# Patient Record
Sex: MALE | Race: WHITE | Employment: OTHER | URBAN - METROPOLITAN AREA
[De-identification: names, ages, dates, MRNs, and addresses within clinical notes are randomized per-mention and may not be internally consistent; named-entity substitution may affect disease eponyms.]

---

## 2022-05-05 ENCOUNTER — APPOINTMENT (OUTPATIENT)
Dept: ULTRASOUND IMAGING | Age: 82
DRG: 920 | End: 2022-05-05
Payer: OTHER GOVERNMENT

## 2022-05-05 ENCOUNTER — HOSPITAL ENCOUNTER (INPATIENT)
Age: 82
LOS: 3 days | Discharge: HOME | DRG: 920 | End: 2022-05-08
Attending: EMERGENCY MEDICINE | Admitting: FAMILY MEDICINE
Payer: OTHER GOVERNMENT

## 2022-05-05 ENCOUNTER — APPOINTMENT (OUTPATIENT)
Dept: GENERAL RADIOLOGY | Age: 82
DRG: 920 | End: 2022-05-05
Payer: OTHER GOVERNMENT

## 2022-05-05 DIAGNOSIS — K92.2 GASTROINTESTINAL HEMORRHAGE, UNSPECIFIED GASTROINTESTINAL HEMORRHAGE TYPE: ICD-10-CM

## 2022-05-05 DIAGNOSIS — K62.5 RECTAL BLEED: Primary | ICD-10-CM

## 2022-05-05 DIAGNOSIS — D64.9 ANEMIA, UNSPECIFIED TYPE: ICD-10-CM

## 2022-05-05 LAB
ABO/RH: NORMAL
ALBUMIN SERPL-MCNC: 3.8 G/DL (ref 3.5–5.2)
ALP BLD-CCNC: 94 U/L (ref 40–129)
ALT SERPL-CCNC: 7 U/L (ref 0–40)
ANION GAP SERPL CALCULATED.3IONS-SCNC: 9 MMOL/L (ref 7–16)
ANISOCYTOSIS: ABNORMAL
ANTIBODY SCREEN: NORMAL
AST SERPL-CCNC: 12 U/L (ref 0–39)
BASOPHILS ABSOLUTE: 0 E9/L (ref 0–0.2)
BASOPHILS RELATIVE PERCENT: 0.4 % (ref 0–2)
BILIRUB SERPL-MCNC: 0.2 MG/DL (ref 0–1.2)
BUN BLDV-MCNC: 23 MG/DL (ref 6–23)
CALCIUM SERPL-MCNC: 9.1 MG/DL (ref 8.6–10.2)
CHLORIDE BLD-SCNC: 107 MMOL/L (ref 98–107)
CO2: 21 MMOL/L (ref 22–29)
CREAT SERPL-MCNC: 1.9 MG/DL (ref 0.7–1.2)
EKG ATRIAL RATE: 68 BPM
EKG P AXIS: 79 DEGREES
EKG P-R INTERVAL: 230 MS
EKG Q-T INTERVAL: 416 MS
EKG QRS DURATION: 108 MS
EKG QTC CALCULATION (BAZETT): 442 MS
EKG R AXIS: 4 DEGREES
EKG T AXIS: -13 DEGREES
EKG VENTRICULAR RATE: 68 BPM
EOSINOPHILS ABSOLUTE: 0.05 E9/L (ref 0.05–0.5)
EOSINOPHILS RELATIVE PERCENT: 0.9 % (ref 0–6)
GFR AFRICAN AMERICAN: 41
GFR NON-AFRICAN AMERICAN: 34 ML/MIN/1.73
GLUCOSE BLD-MCNC: 101 MG/DL (ref 74–99)
HCT VFR BLD CALC: 23.6 % (ref 37–54)
HEMOGLOBIN: 6.7 G/DL (ref 12.5–16.5)
HYPOCHROMIA: ABNORMAL
INR BLD: 1
LYMPHOCYTES ABSOLUTE: 0.59 E9/L (ref 1.5–4)
LYMPHOCYTES RELATIVE PERCENT: 11.2 % (ref 20–42)
MAGNESIUM: 2 MG/DL (ref 1.6–2.6)
MCH RBC QN AUTO: 23.9 PG (ref 26–35)
MCHC RBC AUTO-ENTMCNC: 28.4 % (ref 32–34.5)
MCV RBC AUTO: 84.3 FL (ref 80–99.9)
MONOCYTES ABSOLUTE: 0.38 E9/L (ref 0.1–0.95)
MONOCYTES RELATIVE PERCENT: 6.9 % (ref 2–12)
NEUTROPHILS ABSOLUTE: 4.37 E9/L (ref 1.8–7.3)
NEUTROPHILS RELATIVE PERCENT: 81 % (ref 43–80)
OVALOCYTES: ABNORMAL
PDW BLD-RTO: 17.2 FL (ref 11.5–15)
PLATELET # BLD: 236 E9/L (ref 130–450)
PMV BLD AUTO: 10.3 FL (ref 7–12)
POIKILOCYTES: ABNORMAL
POLYCHROMASIA: ABNORMAL
POTASSIUM SERPL-SCNC: 4.3 MMOL/L (ref 3.5–5)
PRO-BNP: 1337 PG/ML (ref 0–450)
PROTHROMBIN TIME: 11.3 SEC (ref 9.3–12.4)
RBC # BLD: 2.8 E12/L (ref 3.8–5.8)
SODIUM BLD-SCNC: 137 MMOL/L (ref 132–146)
TOTAL PROTEIN: 6.4 G/DL (ref 6.4–8.3)
TROPONIN, HIGH SENSITIVITY: 34 NG/L (ref 0–11)
WBC # BLD: 5.4 E9/L (ref 4.5–11.5)

## 2022-05-05 PROCEDURE — 93971 EXTREMITY STUDY: CPT

## 2022-05-05 PROCEDURE — 99285 EMERGENCY DEPT VISIT HI MDM: CPT

## 2022-05-05 PROCEDURE — 2580000003 HC RX 258: Performed by: FAMILY MEDICINE

## 2022-05-05 PROCEDURE — 84484 ASSAY OF TROPONIN QUANT: CPT

## 2022-05-05 PROCEDURE — 85610 PROTHROMBIN TIME: CPT

## 2022-05-05 PROCEDURE — 82728 ASSAY OF FERRITIN: CPT

## 2022-05-05 PROCEDURE — 93005 ELECTROCARDIOGRAM TRACING: CPT | Performed by: PHYSICIAN ASSISTANT

## 2022-05-05 PROCEDURE — 86850 RBC ANTIBODY SCREEN: CPT

## 2022-05-05 PROCEDURE — 86900 BLOOD TYPING SEROLOGIC ABO: CPT

## 2022-05-05 PROCEDURE — 1200000000 HC SEMI PRIVATE

## 2022-05-05 PROCEDURE — 86923 COMPATIBILITY TEST ELECTRIC: CPT

## 2022-05-05 PROCEDURE — 86901 BLOOD TYPING SEROLOGIC RH(D): CPT

## 2022-05-05 PROCEDURE — 71045 X-RAY EXAM CHEST 1 VIEW: CPT

## 2022-05-05 PROCEDURE — 85018 HEMOGLOBIN: CPT

## 2022-05-05 PROCEDURE — 85025 COMPLETE CBC W/AUTO DIFF WBC: CPT

## 2022-05-05 PROCEDURE — 83880 ASSAY OF NATRIURETIC PEPTIDE: CPT

## 2022-05-05 PROCEDURE — 6360000002 HC RX W HCPCS: Performed by: STUDENT IN AN ORGANIZED HEALTH CARE EDUCATION/TRAINING PROGRAM

## 2022-05-05 PROCEDURE — P9016 RBC LEUKOCYTES REDUCED: HCPCS

## 2022-05-05 PROCEDURE — 2580000003 HC RX 258: Performed by: STUDENT IN AN ORGANIZED HEALTH CARE EDUCATION/TRAINING PROGRAM

## 2022-05-05 PROCEDURE — 83540 ASSAY OF IRON: CPT

## 2022-05-05 PROCEDURE — 93010 ELECTROCARDIOGRAM REPORT: CPT | Performed by: INTERNAL MEDICINE

## 2022-05-05 PROCEDURE — 83735 ASSAY OF MAGNESIUM: CPT

## 2022-05-05 PROCEDURE — 83550 IRON BINDING TEST: CPT

## 2022-05-05 PROCEDURE — C9113 INJ PANTOPRAZOLE SODIUM, VIA: HCPCS | Performed by: STUDENT IN AN ORGANIZED HEALTH CARE EDUCATION/TRAINING PROGRAM

## 2022-05-05 PROCEDURE — 80053 COMPREHEN METABOLIC PANEL: CPT

## 2022-05-05 RX ORDER — ACETAMINOPHEN 325 MG/1
650 TABLET ORAL EVERY 6 HOURS PRN
Status: DISCONTINUED | OUTPATIENT
Start: 2022-05-05 | End: 2022-05-08 | Stop reason: HOSPADM

## 2022-05-05 RX ORDER — POLYETHYLENE GLYCOL 3350 17 G/17G
17 POWDER, FOR SOLUTION ORAL DAILY PRN
Status: DISCONTINUED | OUTPATIENT
Start: 2022-05-05 | End: 2022-05-08 | Stop reason: HOSPADM

## 2022-05-05 RX ORDER — ONDANSETRON 4 MG/1
4 TABLET, ORALLY DISINTEGRATING ORAL EVERY 8 HOURS PRN
Status: DISCONTINUED | OUTPATIENT
Start: 2022-05-05 | End: 2022-05-08 | Stop reason: HOSPADM

## 2022-05-05 RX ORDER — SODIUM CHLORIDE 0.9 % (FLUSH) 0.9 %
5-40 SYRINGE (ML) INJECTION PRN
Status: DISCONTINUED | OUTPATIENT
Start: 2022-05-05 | End: 2022-05-08 | Stop reason: HOSPADM

## 2022-05-05 RX ORDER — SODIUM CHLORIDE 0.9 % (FLUSH) 0.9 %
5-40 SYRINGE (ML) INJECTION EVERY 12 HOURS SCHEDULED
Status: DISCONTINUED | OUTPATIENT
Start: 2022-05-05 | End: 2022-05-08 | Stop reason: HOSPADM

## 2022-05-05 RX ORDER — DEXTROSE AND SODIUM CHLORIDE 5; .9 G/100ML; G/100ML
INJECTION, SOLUTION INTRAVENOUS CONTINUOUS
Status: DISCONTINUED | OUTPATIENT
Start: 2022-05-05 | End: 2022-05-06

## 2022-05-05 RX ORDER — ONDANSETRON 2 MG/ML
4 INJECTION INTRAMUSCULAR; INTRAVENOUS EVERY 6 HOURS PRN
Status: DISCONTINUED | OUTPATIENT
Start: 2022-05-05 | End: 2022-05-08 | Stop reason: HOSPADM

## 2022-05-05 RX ORDER — ACETAMINOPHEN 650 MG/1
650 SUPPOSITORY RECTAL EVERY 6 HOURS PRN
Status: DISCONTINUED | OUTPATIENT
Start: 2022-05-05 | End: 2022-05-08 | Stop reason: HOSPADM

## 2022-05-05 RX ORDER — SODIUM CHLORIDE 9 MG/ML
INJECTION, SOLUTION INTRAVENOUS PRN
Status: DISCONTINUED | OUTPATIENT
Start: 2022-05-05 | End: 2022-05-08 | Stop reason: HOSPADM

## 2022-05-05 RX ADMIN — DEXTROSE AND SODIUM CHLORIDE: 5; 900 INJECTION, SOLUTION INTRAVENOUS at 19:57

## 2022-05-05 RX ADMIN — SODIUM CHLORIDE 80 MG: 9 INJECTION, SOLUTION INTRAVENOUS at 19:58

## 2022-05-05 NOTE — ED PROVIDER NOTES
61-year-old male presenting for abnormal lab. Patient states that he had blood work drawn at the [facility] and his hemoglobin was 6. He has never required a blood transfusion before. Patient states he has been losing blood in his stool. Symptoms have been present for 1 month. They have been mild and intermittent. Exacerbated by nothing and relieved by nothing. He states he has been having bright red blood on the paper when he wipes. He had a colonoscopy 1 month ago and states he had polyps removed. He complains of fatigue and weakness, as well as shortness of breath with exertion. He also complains of lightheadedness and notes swelling of his feet. He denies any chest pain or abdominal pain. Review of Systems   Constitutional: Positive for fatigue. Negative for fever. HENT: Negative for congestion. Respiratory: Positive for shortness of breath. Negative for cough. Cardiovascular: Negative for chest pain. Gastrointestinal: Positive for blood in stool. Negative for abdominal pain, diarrhea and nausea. Genitourinary: Negative for flank pain. Musculoskeletal: Negative for back pain and myalgias. Skin: Negative for rash and wound. Neurological: Positive for light-headedness. Negative for headaches. All other systems reviewed and are negative. Physical Exam  Constitutional:       General: He is not in acute distress. Appearance: Normal appearance. HENT:      Head: Normocephalic. Right Ear: External ear normal.      Left Ear: External ear normal.      Nose: Nose normal.   Cardiovascular:      Rate and Rhythm: Normal rate and regular rhythm. Pulmonary:      Effort: Pulmonary effort is normal.      Breath sounds: Normal breath sounds. Abdominal:      General: There is no distension. Palpations: Abdomen is soft. Tenderness: There is no abdominal tenderness.    Genitourinary:     Comments: No masses, lesions, or fissures, normal rectal tone, light brown stool expressed from rectal vault, weakly hemoccult positive  Musculoskeletal:      Comments: 1-2+ pitting edema bilateral lower extremities   Skin:     General: Skin is warm and dry. Neurological:      General: No focal deficit present. Mental Status: He is alert. Psychiatric:         Mood and Affect: Mood normal.         Behavior: Behavior normal.          Procedures     MDM  Number of Diagnoses or Management Options  Anemia, unspecified type  Gastrointestinal hemorrhage, unspecified gastrointestinal hemorrhage type  Diagnosis management comments: [de-identified] yo male presenting for low hemoglobin. Hemoglobin 6.7. Hemoccult weakly positive. No DVT on US. Patient ordered 1 unit PRBCs. General surgery consulted. Patient admitted to telemetry in stable condition. ED Course as of 05/06/22 0234 Thu May 05, 2022   1627 Spoke to Dr. Liana Parra, discussed case. He is agreeable to following patient. Spoke to Dr. Jemma Bowie, discussed case. He is agreeable to admission. [AP]      ED Course User Index  [AP] Dennys Astorga MD      ED Course as of 05/06/22 0238   Thu May 05, 2022   1627 Spoke to Dr. Liana Parra, discussed case. He is agreeable to following patient. Spoke to Dr. Jemma Bowie, discussed case. He is agreeable to admission. [AP]      ED Course User Index  [AP] Dennys Astorga MD       --------------------------------------------- PAST HISTORY ---------------------------------------------  Past Medical History:  has no past medical history on file. Past Surgical History:  has no past surgical history on file. Social History:  reports that he has never smoked. He has never used smokeless tobacco. He reports that he does not drink alcohol and does not use drugs. Family History: family history is not on file. The patients home medications have been reviewed. Allergies: Patient has no known allergies.     -------------------------------------------------- RESULTS -------------------------------------------------    LABS:  Results for orders placed or performed during the hospital encounter of 05/05/22   CBC with Auto Differential   Result Value Ref Range    WBC 5.4 4.5 - 11.5 E9/L    RBC 2.80 (L) 3.80 - 5.80 E12/L    Hemoglobin 6.7 (LL) 12.5 - 16.5 g/dL    Hematocrit 23.6 (L) 37.0 - 54.0 %    MCV 84.3 80.0 - 99.9 fL    MCH 23.9 (L) 26.0 - 35.0 pg    MCHC 28.4 (L) 32.0 - 34.5 %    RDW 17.2 (H) 11.5 - 15.0 fL    Platelets 849 103 - 885 E9/L    MPV 10.3 7.0 - 12.0 fL    Neutrophils % 81.0 (H) 43.0 - 80.0 %    Lymphocytes % 11.2 (L) 20.0 - 42.0 %    Monocytes % 6.9 2.0 - 12.0 %    Eosinophils % 0.9 0.0 - 6.0 %    Basophils % 0.4 0.0 - 2.0 %    Neutrophils Absolute 4.37 1.80 - 7.30 E9/L    Lymphocytes Absolute 0.59 (L) 1.50 - 4.00 E9/L    Monocytes Absolute 0.38 0.10 - 0.95 E9/L    Eosinophils Absolute 0.05 0.05 - 0.50 E9/L    Basophils Absolute 0.00 0.00 - 0.20 E9/L    Anisocytosis 2+     Polychromasia 1+     Hypochromia 2+     Poikilocytes 1+     Ovalocytes 1+    Comprehensive Metabolic Panel   Result Value Ref Range    Sodium 137 132 - 146 mmol/L    Potassium 4.3 3.5 - 5.0 mmol/L    Chloride 107 98 - 107 mmol/L    CO2 21 (L) 22 - 29 mmol/L    Anion Gap 9 7 - 16 mmol/L    Glucose 101 (H) 74 - 99 mg/dL    BUN 23 6 - 23 mg/dL    CREATININE 1.9 (H) 0.7 - 1.2 mg/dL    GFR Non-African American 34 >=60 mL/min/1.73    GFR African American 41     Calcium 9.1 8.6 - 10.2 mg/dL    Total Protein 6.4 6.4 - 8.3 g/dL    Albumin 3.8 3.5 - 5.2 g/dL    Total Bilirubin 0.2 0.0 - 1.2 mg/dL    Alkaline Phosphatase 94 40 - 129 U/L    ALT 7 0 - 40 U/L    AST 12 0 - 39 U/L   Troponin   Result Value Ref Range    Troponin, High Sensitivity 34 (H) 0 - 11 ng/L   Brain Natriuretic Peptide   Result Value Ref Range    Pro-BNP 1,337 (H) 0 - 450 pg/mL   Magnesium   Result Value Ref Range    Magnesium 2.0 1.6 - 2.6 mg/dL   Protime-INR   Result Value Ref Range    Protime 11.3 9.3 - 12.4 sec    INR 1.0 Hemoglobin   Result Value Ref Range    Hemoglobin 6.4 (LL) 12.5 - 16.5 g/dL   Iron and TIBC   Result Value Ref Range    Iron 17 (L) 59 - 158 mcg/dL    TIBC 236 (L) 250 - 450 mcg/dL    Iron Saturation 7 (L) 20 - 55 %   Ferritin   Result Value Ref Range    Ferritin 24 ng/mL   EKG 12 Lead   Result Value Ref Range    Ventricular Rate 68 BPM    Atrial Rate 68 BPM    P-R Interval 230 ms    QRS Duration 108 ms    Q-T Interval 416 ms    QTc Calculation (Bazett) 442 ms    P Axis 79 degrees    R Axis 4 degrees    T Axis -13 degrees   TYPE AND SCREEN   Result Value Ref Range    ABO/Rh O POS     Antibody Screen NEG    PREPARE RBC (CROSSMATCH)   Result Value Ref Range    Product Code Blood Bank [id number]     Description Blood Bank Red Blood Cells, Leuko-reduced     Unit Number [id number]     Dispense Status Blood Bank transfused     Product Code Blood Bank [id number]     Description Blood Bank Red Blood Cells, Leuko-reduced     Unit Number [id number]     Dispense Status Blood Bank issued        RADIOLOGY:  XR CHEST PORTABLE   Final Result   No acute  pulmonary process         US DUP LOWER EXTREMITY RIGHT HARITHA   Final Result   No evidence of DVT in the right lower extremity. RECOMMENDATIONS:   Unavailable             EKG:  This EKG is signed and interpreted by me. Rate: 68  Rhythm: Sinus  Interpretation: no acute ST elevations or depressions; no acute T wave changes; , , QTc 442; occasional PVC  Comparison: no previous EKG available      ------------------------- NURSING NOTES AND VITALS REVIEWED ---------------------------  Date / Time Roomed:  5/5/2022  2:16 PM  ED Bed Assignment:  [id number]    The nursing notes within the ED encounter and vital signs as below have been reviewed.      Patient Vitals for the past 24 hrs:   BP Temp Temp src Pulse Resp SpO2 Height Weight   05/06/22 0204 117/89 98 °F (36.7 °C) Oral 66 17 94 % -- --   05/06/22 0139 121/65 97.9 °F (36.6 °C) Oral 64 17 95 % -- --   05/06/22 0030 (!) 119/57 98 °F (36.7 °C) Oral 60 17 97 % -- --   05/05/22 2232 (!) 110/58 -- -- -- -- 97 % -- --   05/05/22 2134 (!) 123/52 -- -- -- -- 97 % -- --   05/05/22 2002 131/67 -- -- -- -- 97 % -- --   05/05/22 1955 134/69 -- -- -- -- 98 % -- --   05/05/22 1831 (!) 152/75 -- -- -- -- 100 % -- --   05/05/22 1802 (!) 147/76 -- -- -- -- 100 % -- --   05/05/22 1731 131/78 -- -- -- -- 98 % -- --   05/05/22 1640 (!) 143/72 -- -- -- -- 98 % -- --   05/05/22 1632 (!) 143/72 97.5 °F (36.4 °C) -- 60 16 97 % -- --   05/05/22 1617 (!) 140/84 97.8 °F (36.6 °C) -- 68 16 98 % -- --   05/05/22 1104 126/67 98.2 °F (36.8 °C) Oral -- 16 -- 5' 11\" (1.803 m) 212 lb (96.2 kg)   05/05/22 1002 -- -- -- 78 -- 99 % -- --       Oxygen Saturation Interpretation: Normal    ------------------------------------------ PROGRESS NOTES ------------------------------------------    Counseling:  I have spoken with the patient and discussed todays results, in addition to providing specific details for the plan of care and counseling regarding the diagnosis and prognosis. Their questions are answered at this time and they are agreeable with the plan of admission.    --------------------------------- ADDITIONAL PROVIDER NOTES ---------------------------------    This patient's ED course included: a personal history and physicial examination, re-evaluation prior to disposition, multiple bedside re-evaluations, IV medications, cardiac monitoring, continuous pulse oximetry and complex medical decision making and emergency management    This patient has remained hemodynamically stable during their ED course. Diagnosis:  1. Anemia, unspecified type    2. Gastrointestinal hemorrhage, unspecified gastrointestinal hemorrhage type        Disposition:  Patient's disposition: Admit to telemetry  Patient's condition is stable.                 Fallon Sarmiento MD  Resident  05/06/22 9528

## 2022-05-05 NOTE — CONSULTS
GENERAL SURGERY  CONSULT NOTE  5/5/2022    Physician Consulted: Dr. Aggie Marrero  Reason for Consult: Rectal bleeding      SHAHBAZ Ponce is a [de-identified] y.o. male who presents for evaluation of rectal bleeding as well as abnormal lab work. Patient recently underwent a colonoscopy with polypectomy at the [address] [place] [address] by the gastroenterology group roughly 4 weeks ago. Since then patient has had intermittent blood per rectum. Patient states that it is worse when he has constipation. He states that he has progressively started to feel weak over these last 4 weeks and wanted to be evaluated. Outside facility his hemoglobin was measured to be 6, on repeat here was 6.7. On evaluation patient was receiving a unit of blood. Patient's been otherwise hemodynamically stable. Patient was FOBT positive. Patient is now 100% sure of his current medication but does state that he thinks he takes a blood thinning medication. History reviewed. No pertinent past medical history. History reviewed. No pertinent surgical history. Medications Prior to Admission:    Prior to Admission medications    Not on File       No Known Allergies    History reviewed. No pertinent family history. Social History     Tobacco Use    Smoking status: Never Smoker    Smokeless tobacco: Never Used   Substance Use Topics    Alcohol use: Never    Drug use: Never         Review of Systems   General ROS: Fatigue  Hematological and Lymphatic ROS: negative  Respiratory ROS: negative  Cardiovascular ROS: negative  Gastrointestinal ROS: positive for -bright red blood per rectum  Genito-Urinary ROS: negative  Musculoskeletal ROS: negative      PHYSICAL EXAM:    Vitals:    05/05/22 1632   BP: (!) 143/72   Pulse: 60   Resp: 16   Temp: 97.5 °F (36.4 °C)   SpO2: 97%       General Appearance:  awake, alert, oriented, in no acute distress  Skin:  Skin color, texture, turgor normal. No rashes or lesions.   Head/face:  NCAT  Eyes:  PERRL  Lungs:  No chest wall tenderness. Heart:  Heart regular rate and rhythm  Abdomen:  Soft, non-tender, normal bowel sounds. No bruits, organomegaly or masses. Extremities: pulses present in all extremities  Male Rectal: Small hemorrhoids present not actively bleeding, bright blood specks on stool, FOBT positive. Normal rectal tone no masses or asymmetry noted      LABS:    CBC  Recent Labs     05/05/22  1139   WBC 5.4   HGB 6.7*   HCT 23.6*        BMP  Recent Labs     05/05/22  1138      K 4.3      CO2 21*   BUN 23   CREATININE 1.9*   CALCIUM 9.1     Liver Function  Recent Labs     05/05/22  1138   BILITOT 0.2   AST 12   ALT 7   ALKPHOS 94   PROT 6.4   LABALBU 3.8     No results for input(s): LACTATE in the last 72 hours. Recent Labs     05/05/22  1615   INR 1.0       RADIOLOGY    XR CHEST PORTABLE    Result Date: 5/5/2022  EXAMINATION: ONE XRAY VIEW OF THE CHEST 5/5/2022 1:23 pm COMPARISON: None. HISTORY: ORDERING SYSTEM PROVIDED HISTORY: chest pain TECHNOLOGIST PROVIDED HISTORY: Reason for exam:->chest pain What reading provider will be dictating this exam?->CRC FINDINGS: The lungs are without acute focal process. There is no effusion or pneumothorax. The cardiomediastinal silhouette is without acute process. The osseous structures are without acute process. No acute  pulmonary process     US DUP LOWER EXTREMITY RIGHT HARITHA    Result Date: 5/5/2022  EXAMINATION: DUPLEX VENOUS ULTRASOUND OF THE RIGHT LOWER EXTREMITY 5/5/2022 12:41 pm TECHNIQUE: Duplex ultrasound using B-mode/gray scaled imaging and Doppler spectral analysis and color flow was obtained of the deep venous structures of the right lower extremity. COMPARISON: None.  HISTORY: ORDERING SYSTEM PROVIDED HISTORY: right leg swelling TECHNOLOGIST PROVIDED HISTORY: Reason for exam:->right leg swelling What reading provider will be dictating this exam?->CRC FINDINGS: The visualized veins of the right lower extremity are patent and free of echogenic thrombus. The veins demonstrate good compressibility with normal color flow study and spectral analysis. No evidence of DVT in the right lower extremity.  RECOMMENDATIONS: Unavailable         ASSESSMENT:  [de-identified] y.o. male with rectal bleeding most likely post polypectomy bleeding    PLAN:  Okay for clear liquid diet  Obtain medical and medication records from [address] [place] [address] all anticoagulation if patient is truly taking anticoagulation at this time  Continue to monitor rectal bleeding  Will attempt to manage nonoperatively, however if persistent transfusion requirements are necessary then patient may need a colonoscopy to evaluate bleeding  Continue to monitor hemoglobin  Transfuse when below 7    Electronically signed by Ruslan Adams DO on 5/5/22 at 5:10 PM EDT

## 2022-05-05 NOTE — H&P
Hospitalist History & Physical      PCP: No primary care provider on file. Date of Admission: 5/5/2022    Date of Service: Pt seen/examined on 5/5/2022 and is admitted to Inpatient with expected LOS greater than two midnights due to medical therapy. Chief Complaint:  had concerns including Abnormal Lab (Bloodwork from  the other day, HGB 6.0.) and Leg Swelling (Right lower leg swelling for the last 2 weeks. ). History Of Present Illness:    Mr. Dwayne Jaquez, a [de-identified]y.o. year old male  who past medical history of abdominal aortic aneurysm status postrepair 10 years ago and recent colonoscopy a month ago status post polypectomy. Presents to the ER due to abnormal lab work hemoglobin of 6.7. Patient mention has been getting very weak and unable to even take his normal walks as he gets very tired which is unusual for him. Given his symptoms he saw physician and lab work was abnormal signs to come to the ER. He does follow with the [place] clinic normally. Hemoglobin 6.7 and he was ordered a weight of blood. He did mention he he had a colonoscopy a month ago with polypectomy and has been having rectal bleeding since then for the past month. Reports most times is just some blood when he wipes but also sometimes some blood streaks mixed with his stool. Denies any issues with hemorrhoids though he does get constipated sometimes. By ED no hemorrhage on exam.  Patient denies being on any blood thinners of a baby aspirin he takes daily. Past Medical History:    History reviewed. No pertinent past medical history. Past Surgical History:    History reviewed. No pertinent surgical history. Medications Prior to Admission:      Prior to Admission medications    Not on File       Allergies:  Patient has no known allergies. Social History:    TOBACCO:   reports that he has never smoked. He has never used smokeless tobacco.  ETOH:   reports no history of alcohol use.     Family History: Reviewed in detail and negative for DM, CAD, Cancer, CVA. Positive as follows\"  History reviewed. No pertinent family history. REVIEW OF SYSTEMS:   Pertinent positives as noted in the HPI. All other systems reviewed and negative. PHYSICAL EXAM:  BP (!) 143/72   Pulse 60   Temp 97.5 °F (36.4 °C)   Resp 16   Ht 5' 11\" (1.803 m)   Wt 212 lb (96.2 kg)   SpO2 97%   BMI 29.57 kg/m²   General appearance: No apparent distress, appears stated age and cooperative. HEENT: Normal cephalic, atraumatic without obvious deformity. Pupils equal, round, and reactive to light. Extra ocular muscles intact. Conjunctivae/corneas clear. Neck: Supple, with full range of motion. No jugular venous distention. Trachea midline. Respiratory: Clear to auscultation and no wheezing or rhonchi  Cardiovascular: Normal heart sounds normal S1-S2 no murmur rubs or gallops  Abdomen: Soft and nontender   musculoskeletal: No clubbing, cyanosis, no edema. Brisk capillary refill. Skin: Normal skin color. No rashes or lesions. Neurologic:  Neurovascularly intact without any focal sensory/motor deficits. Cranial nerves: II-XII intact, grossly non-focal.  Psychiatric: Alert and oriented, thought content appropriate, normal insight      Reviewed EKG and CXR personally    CBC:   Recent Labs     05/05/22  1139   WBC 5.4   RBC 2.80*   HGB 6.7*   HCT 23.6*   MCV 84.3   RDW 17.2*        BMP:   Recent Labs     05/05/22  1138      K 4.3      CO2 21*   BUN 23   CREATININE 1.9*   MG 2.0     LFT:  Recent Labs     05/05/22  1138   PROT 6.4   ALKPHOS 94   ALT 7   AST 12   BILITOT 0.2     CE:  No results for input(s): Vandana Comment in the last 72 hours. PT/INR:   Recent Labs     05/05/22  1615   INR 1.0     BNP: No results for input(s): BNP in the last 72 hours.   ESR: No results found for: SEDRATE  CRP: No results found for: CRP  D Dimer: No results found for: DDIMER   Folate and B12: No results found for: [id number], No results found for: FOLATE  Lactic Acid: No results found for: LACTA  Thyroid Studies: No results found for: TSH, S3FVRPL, D2VDYXD, THYROIDAB    Oupatient labs:  Lab Results   Component Value Date    INR 1.0 05/05/2022       Urinalysis:  No results found for: NITRU, WBCUA, BACTERIA, RBCUA, BLOODU, SPECGRAV, GLUCOSEU    Imaging:  XR CHEST PORTABLE    Result Date: 5/5/2022  EXAMINATION: ONE XRAY VIEW OF THE CHEST 5/5/2022 1:23 pm COMPARISON: None. HISTORY: ORDERING SYSTEM PROVIDED HISTORY: chest pain TECHNOLOGIST PROVIDED HISTORY: Reason for exam:->chest pain What reading provider will be dictating this exam?->CRC FINDINGS: The lungs are without acute focal process. There is no effusion or pneumothorax. The cardiomediastinal silhouette is without acute process. The osseous structures are without acute process. No acute  pulmonary process     US DUP LOWER EXTREMITY RIGHT HARITHA    Result Date: 5/5/2022  EXAMINATION: DUPLEX VENOUS ULTRASOUND OF THE RIGHT LOWER EXTREMITY 5/5/2022 12:41 pm TECHNIQUE: Duplex ultrasound using B-mode/gray scaled imaging and Doppler spectral analysis and color flow was obtained of the deep venous structures of the right lower extremity. COMPARISON: None. HISTORY: ORDERING SYSTEM PROVIDED HISTORY: right leg swelling TECHNOLOGIST PROVIDED HISTORY: Reason for exam:->right leg swelling What reading provider will be dictating this exam?->CRC FINDINGS: The visualized veins of the right lower extremity are patent and free of echogenic thrombus. The veins demonstrate good compressibility with normal color flow study and spectral analysis. No evidence of DVT in the right lower extremity. RECOMMENDATIONS: Unavailable       ASSESSMENT:  1. Acute blood loss anemia  2. Rectal bleed  3. Weakness  4. History of recent colonoscopy a month ago status post polypectomy  5. Abdominal aortic aneurysm status postrepair 10 years ago      PLAN:  1. Patient has been ordered for weight of blood.   Monitor hemoglobin closely. PPI IV. Consult GI.  N.p.o. for now. IV hydration. PT OT for weakness. Check iron studies. SCDs for DVT prophylaxis. Do not have patient's medications as follows with the [facility]. At this time awaiting them to fax medication list by ER. Diet: Diet NPO  Code Status: Full Code    Surrogate decision maker confirmed with patient:  Primary Emergency Contact: mariah martinez    DVT Prophylaxis: []Lovenox []Heparin [x]PCD [] [address] []Encouraged ambulation    Disposition: [x]Med/Surg [] Intermediate [] ICU/CCU  Admit status: [] Observation [x] Inpatient     +++++++++++++++++++++++++++++++++++++++++++++++++  Ethyl MD Kiesha  [facility] Physician - [address] [place] [address]  +++++++++++++++++++++++++++++++++++++++++++++++++  NOTE: This report was transcribed using voice recognition software. Every effort was made to ensure accuracy; however, inadvertent computerized transcription errors may be present.

## 2022-05-05 NOTE — ED NOTES
Department of Emergency Medicine  FIRST PROVIDER TRIAGE NOTE             Independent MLP           5/5/22  11:25 AM EDT    Date of Encounter: 5/5/22   MRN: [id number]      HPI: Bethel Carr is a [de-identified] y.o. male who presents to the ED for Abnormal Lab (Bloodwork from  the other day, HGB 6.0.) and Leg Swelling (Right lower leg swelling for the last 2 weeks. )     Patient [de-identified]year-old sent in from the [place] due to a low hemoglobin of 6 as well as right leg swelling for the last 2 weeks with some shortness of breath    ROS: Negative for cp, abd pain, back pain, fever or cough. PE: Gen Appearance/Constitutional: alert  HEENT: NC/NT. PERRLA,  Airway patent. Neck: supple     Initial Plan of Care: All treatment areas with department are currently occupied. Plan to order/Initiate the following while awaiting opening in ED: labs, EKG, imaging studies and ultrasound.   Initiate Treatment-Testing, Proceed toTreatment Area When Bed Available for ED Attending/MLP to Continue Care    Electronically signed by Keneth Mcburney, PA-C   DD: 5/5/22         Keneth Mcburney, PA-C  05/05/22 1126

## 2022-05-06 LAB
ANION GAP SERPL CALCULATED.3IONS-SCNC: 12 MMOL/L (ref 7–16)
BASOPHILS ABSOLUTE: 0.01 E9/L (ref 0–0.2)
BASOPHILS RELATIVE PERCENT: 0.2 % (ref 0–2)
BLOOD BANK DISPENSE STATUS: NORMAL
BLOOD BANK DISPENSE STATUS: NORMAL
BLOOD BANK PRODUCT CODE: NORMAL
BLOOD BANK PRODUCT CODE: NORMAL
BPU ID: NORMAL
BPU ID: NORMAL
BUN BLDV-MCNC: 19 MG/DL (ref 6–23)
CALCIUM SERPL-MCNC: 8.9 MG/DL (ref 8.6–10.2)
CHLORIDE BLD-SCNC: 109 MMOL/L (ref 98–107)
CO2: 18 MMOL/L (ref 22–29)
CREAT SERPL-MCNC: 1.8 MG/DL (ref 0.7–1.2)
DESCRIPTION BLOOD BANK: NORMAL
DESCRIPTION BLOOD BANK: NORMAL
EOSINOPHILS ABSOLUTE: 0.24 E9/L (ref 0.05–0.5)
EOSINOPHILS RELATIVE PERCENT: 5.8 % (ref 0–6)
FERRITIN: 24 NG/ML
GFR AFRICAN AMERICAN: 44
GFR NON-AFRICAN AMERICAN: 36 ML/MIN/1.73
GLUCOSE BLD-MCNC: 85 MG/DL (ref 74–99)
HCT VFR BLD CALC: 23.7 % (ref 37–54)
HEMOGLOBIN: 6.4 G/DL (ref 12.5–16.5)
HEMOGLOBIN: 7.2 G/DL (ref 12.5–16.5)
HEMOGLOBIN: 7.5 G/DL (ref 12.5–16.5)
IMMATURE GRANULOCYTES #: 0.02 E9/L
IMMATURE GRANULOCYTES %: 0.5 % (ref 0–5)
IRON SATURATION: 7 % (ref 20–55)
IRON: 17 MCG/DL (ref 59–158)
LYMPHOCYTES ABSOLUTE: 0.78 E9/L (ref 1.5–4)
LYMPHOCYTES RELATIVE PERCENT: 18.8 % (ref 20–42)
MCH RBC QN AUTO: 25.4 PG (ref 26–35)
MCHC RBC AUTO-ENTMCNC: 30.4 % (ref 32–34.5)
MCV RBC AUTO: 83.7 FL (ref 80–99.9)
MONOCYTES ABSOLUTE: 0.37 E9/L (ref 0.1–0.95)
MONOCYTES RELATIVE PERCENT: 8.9 % (ref 2–12)
NEUTROPHILS ABSOLUTE: 2.74 E9/L (ref 1.8–7.3)
NEUTROPHILS RELATIVE PERCENT: 65.8 % (ref 43–80)
PDW BLD-RTO: 16.5 FL (ref 11.5–15)
PLATELET # BLD: 185 E9/L (ref 130–450)
PMV BLD AUTO: 10.8 FL (ref 7–12)
POTASSIUM REFLEX MAGNESIUM: 4.2 MMOL/L (ref 3.5–5)
RBC # BLD: 2.83 E12/L (ref 3.8–5.8)
SODIUM BLD-SCNC: 139 MMOL/L (ref 132–146)
TOTAL IRON BINDING CAPACITY: 236 MCG/DL (ref 250–450)
WBC # BLD: 4.2 E9/L (ref 4.5–11.5)

## 2022-05-06 PROCEDURE — 6360000002 HC RX W HCPCS: Performed by: FAMILY MEDICINE

## 2022-05-06 PROCEDURE — C9113 INJ PANTOPRAZOLE SODIUM, VIA: HCPCS | Performed by: FAMILY MEDICINE

## 2022-05-06 PROCEDURE — 85018 HEMOGLOBIN: CPT

## 2022-05-06 PROCEDURE — 97165 OT EVAL LOW COMPLEX 30 MIN: CPT

## 2022-05-06 PROCEDURE — 6370000000 HC RX 637 (ALT 250 FOR IP): Performed by: FAMILY MEDICINE

## 2022-05-06 PROCEDURE — 36415 COLL VENOUS BLD VENIPUNCTURE: CPT

## 2022-05-06 PROCEDURE — 1200000000 HC SEMI PRIVATE

## 2022-05-06 PROCEDURE — 85025 COMPLETE CBC W/AUTO DIFF WBC: CPT

## 2022-05-06 PROCEDURE — 80048 BASIC METABOLIC PNL TOTAL CA: CPT

## 2022-05-06 PROCEDURE — A4216 STERILE WATER/SALINE, 10 ML: HCPCS | Performed by: FAMILY MEDICINE

## 2022-05-06 PROCEDURE — 97161 PT EVAL LOW COMPLEX 20 MIN: CPT

## 2022-05-06 PROCEDURE — 36430 TRANSFUSION BLD/BLD COMPNT: CPT

## 2022-05-06 PROCEDURE — 2580000003 HC RX 258: Performed by: FAMILY MEDICINE

## 2022-05-06 RX ORDER — LATANOPROST 50 UG/ML
1 SOLUTION/ DROPS OPHTHALMIC DAILY
COMMUNITY

## 2022-05-06 RX ORDER — LATANOPROST 50 UG/ML
1 SOLUTION/ DROPS OPHTHALMIC NIGHTLY
Status: DISCONTINUED | OUTPATIENT
Start: 2022-05-06 | End: 2022-05-08 | Stop reason: HOSPADM

## 2022-05-06 RX ORDER — METOPROLOL SUCCINATE 50 MG/1
75 TABLET, EXTENDED RELEASE ORAL 2 TIMES DAILY
COMMUNITY

## 2022-05-06 RX ORDER — LANOLIN ALCOHOL/MO/W.PET/CERES
325 CREAM (GRAM) TOPICAL
COMMUNITY

## 2022-05-06 RX ORDER — DOCUSATE SODIUM 100 MG/1
100 CAPSULE, LIQUID FILLED ORAL 2 TIMES DAILY
COMMUNITY

## 2022-05-06 RX ORDER — FUROSEMIDE 20 MG/1
20 TABLET ORAL 2 TIMES DAILY
COMMUNITY

## 2022-05-06 RX ORDER — POLYVINYL ALCOHOL 14 MG/ML
1 SOLUTION/ DROPS OPHTHALMIC 3 TIMES DAILY PRN
Status: DISCONTINUED | OUTPATIENT
Start: 2022-05-06 | End: 2022-05-08 | Stop reason: HOSPADM

## 2022-05-06 RX ORDER — LISINOPRIL 40 MG/1
40 TABLET ORAL 2 TIMES DAILY
COMMUNITY

## 2022-05-06 RX ORDER — ACETAMINOPHEN 325 MG/1
325 TABLET ORAL EVERY 6 HOURS PRN
COMMUNITY

## 2022-05-06 RX ORDER — DOCUSATE SODIUM 100 MG/1
100 CAPSULE, LIQUID FILLED ORAL 2 TIMES DAILY
Status: DISCONTINUED | OUTPATIENT
Start: 2022-05-06 | End: 2022-05-08 | Stop reason: HOSPADM

## 2022-05-06 RX ORDER — ATORVASTATIN CALCIUM 40 MG/1
40 TABLET, FILM COATED ORAL DAILY
Status: DISCONTINUED | OUTPATIENT
Start: 2022-05-06 | End: 2022-05-08 | Stop reason: HOSPADM

## 2022-05-06 RX ORDER — DORZOLAMIDE HCL 20 MG/ML
1 SOLUTION/ DROPS OPHTHALMIC 3 TIMES DAILY
Status: DISCONTINUED | OUTPATIENT
Start: 2022-05-06 | End: 2022-05-08 | Stop reason: HOSPADM

## 2022-05-06 RX ORDER — POLYETHYLENE GLYCOL 3350 17 G/17G
17 POWDER, FOR SOLUTION ORAL DAILY PRN
COMMUNITY

## 2022-05-06 RX ORDER — ASPIRIN 81 MG/1
81 TABLET, CHEWABLE ORAL DAILY
COMMUNITY

## 2022-05-06 RX ORDER — ATORVASTATIN CALCIUM 40 MG/1
40 TABLET, FILM COATED ORAL NIGHTLY
COMMUNITY

## 2022-05-06 RX ORDER — BRIMONIDINE TARTRATE 2 MG/ML
1 SOLUTION/ DROPS OPHTHALMIC 3 TIMES DAILY
Status: DISCONTINUED | OUTPATIENT
Start: 2022-05-06 | End: 2022-05-08 | Stop reason: HOSPADM

## 2022-05-06 RX ORDER — M-VIT,TX,IRON,MINS/CALC/FOLIC 27MG-0.4MG
1 TABLET ORAL DAILY
Status: DISCONTINUED | OUTPATIENT
Start: 2022-05-07 | End: 2022-05-08 | Stop reason: HOSPADM

## 2022-05-06 RX ORDER — M-VIT,TX,IRON,MINS/CALC/FOLIC 27MG-0.4MG
1 TABLET ORAL DAILY
COMMUNITY

## 2022-05-06 RX ORDER — LISINOPRIL 20 MG/1
40 TABLET ORAL DAILY
Status: DISCONTINUED | OUTPATIENT
Start: 2022-05-07 | End: 2022-05-08 | Stop reason: HOSPADM

## 2022-05-06 RX ORDER — FUROSEMIDE 20 MG/1
20 TABLET ORAL 2 TIMES DAILY
Status: DISCONTINUED | OUTPATIENT
Start: 2022-05-06 | End: 2022-05-08 | Stop reason: HOSPADM

## 2022-05-06 RX ORDER — SODIUM CHLORIDE 9 MG/ML
INJECTION, SOLUTION INTRAVENOUS PRN
Status: DISCONTINUED | OUTPATIENT
Start: 2022-05-06 | End: 2022-05-08 | Stop reason: HOSPADM

## 2022-05-06 RX ADMIN — METOPROLOL SUCCINATE 75 MG: 50 TABLET, EXTENDED RELEASE ORAL at 22:15

## 2022-05-06 RX ADMIN — FUROSEMIDE 20 MG: 20 TABLET ORAL at 14:16

## 2022-05-06 RX ADMIN — PANTOPRAZOLE SODIUM 40 MG: 40 INJECTION, POWDER, FOR SOLUTION INTRAVENOUS at 11:32

## 2022-05-06 RX ADMIN — ATORVASTATIN CALCIUM 40 MG: 40 TABLET, FILM COATED ORAL at 14:16

## 2022-05-06 RX ADMIN — Medication 10 ML: at 01:13

## 2022-05-06 RX ADMIN — FUROSEMIDE 20 MG: 20 TABLET ORAL at 22:15

## 2022-05-06 RX ADMIN — BRIMONIDINE TARTRATE 1 DROP: 2 SOLUTION/ DROPS OPHTHALMIC at 22:16

## 2022-05-06 RX ADMIN — DORZOLAMIDE HYDROCHLORIDE 1 DROP: 20 SOLUTION/ DROPS OPHTHALMIC at 22:16

## 2022-05-06 RX ADMIN — PANTOPRAZOLE SODIUM 40 MG: 40 INJECTION, POWDER, FOR SOLUTION INTRAVENOUS at 01:11

## 2022-05-06 RX ADMIN — LATANOPROST 1 DROP: 50 SOLUTION OPHTHALMIC at 22:15

## 2022-05-06 RX ADMIN — BRIMONIDINE TARTRATE 1 DROP: 2 SOLUTION/ DROPS OPHTHALMIC at 14:17

## 2022-05-06 RX ADMIN — DOCUSATE SODIUM 100 MG: 100 CAPSULE, LIQUID FILLED ORAL at 14:16

## 2022-05-06 RX ADMIN — Medication 10 ML: at 22:16

## 2022-05-06 RX ADMIN — SODIUM CHLORIDE 125 MG: 9 INJECTION, SOLUTION INTRAVENOUS at 11:37

## 2022-05-06 RX ADMIN — Medication 10 ML: at 11:33

## 2022-05-06 RX ADMIN — DORZOLAMIDE HYDROCHLORIDE 1 DROP: 20 SOLUTION/ DROPS OPHTHALMIC at 14:17

## 2022-05-06 RX ADMIN — DOCUSATE SODIUM 100 MG: 100 CAPSULE, LIQUID FILLED ORAL at 22:15

## 2022-05-06 ASSESSMENT — ENCOUNTER SYMPTOMS
BLOOD IN STOOL: 1
BACK PAIN: 0
SHORTNESS OF BREATH: 1
DIARRHEA: 0
COUGH: 0
NAUSEA: 0
ABDOMINAL PAIN: 0

## 2022-05-06 NOTE — PROGRESS NOTES
Physical Therapy Initial Assessment     Name: Ranjit Armstrong  : 1940  MRN: [id number]      Date of Service: 2022    Evaluating PT:  Sheela Melgoza PT, DPT [id number]    Room #:  9316/2189-E  Diagnosis:  Anemia [D64.9]  Rectal bleed [K62.5]  PMHx/PSHx:  History reviewed. No pertinent past medical history. Procedure/Surgery:  None this admission   Reason for admission: anemia   Precautions:  Fall risk   Equipment Needs:  None at this time     SUBJECTIVE:  Pt lives alone in single story basement apartment with 2-3 BETO with hand rails. Pt notes he was independent with all mobility PTA but notes he has been having increased fatigue as result limiting his activity at home. OBJECTIVE:   Initial Evaluation  Date: 2022 Treatment Short Term/ Long Term   Goals   AM-PAC 6 Clicks 53/28     Was pt agreeable to Eval/treatment?  Yes     Does pt have pain? discomfort     Bed Mobility  Rolling: ind  Supine to sit: ind  Sit to supine: ind  Scooting: ind  Rolling: ind  Supine to sit: ind  Sit to supine: ind  Scooting: ind   Transfers Sit to stand: SBA  Stand to sit: SBA  Stand pivot: SBA  Sit to stand: ind  Stand to sit: ind  Stand pivot: ind   Ambulation    50 feet with no AD SBA  150 feet with no AD ind   Stair negotiation: ascended and descended NT  3 steps with hand rail ind   ROM BUE:  Refer to OT eval   BLE:  WNL     Strength BUE:  Refer to OT eval   BLE:  WNL     Balance Sitting EOB:  ind  Dynamic Standing:  SBA  Sitting EOB:  ind  Dynamic Standing:  ind     Pt is A & O x 4  Sensation:  Pt denies numbness and tingling to extremities  Edema:  None noted     Vitals:  Blood Pressure at rest -- Blood Pressure post session --   Heart Rate at rest -- Heart Rate post session --   SPO2 at rest -- SPO2 post session --     Therapeutic Exercises:  none performed this visit    Patient education  Pt educated on home safety, activity pacing, role of PT     Patient response to education:   Pt verbalized understanding Pt demonstrated skill Pt requires further education in this area   x x      ASSESSMENT:  Conditions Requiring Skilled Therapeutic Intervention:  [x]Decreased strength     []Decreased ROM  [x]Decreased functional mobility  []Decreased balance   [x]Decreased endurance   []Decreased posture  []Decreased sensation  []Decreased coordination   []Decreased vision  []Decreased safety awareness   []Increased pain     Comments:  Pt sitting EOB on arrival and agreeable to PT evaluation. RN cleared pt for evaluation. Pt  Notes he has been feeling weak but otherwise feels he is at his baseline. Pt independent with all transfers to/from bed today. SBA required for mobility d/t management of lines and for safety. Pt noted no fatigue after short bout of ambulation in room today; declined further ambulation at this time. Pt requested to stay in chair at end of session and was left in bedside chair with all needs met, RN aware. Treatment:  Patient practiced and was instructed in the following treatment:     Bed mobility: performed with cues for safety awareness and proper hand placement to promote improved functional independence.  Transfer Training: performed to assess safety, functional strength, independence    Gait training: performed to assess safety, functional strength, independence and endurance      Pt's/ family goals   1. Return home safely. Prognosis is good for reaching above PT goals. Patient and or family understand(s) diagnosis, prognosis, and plan of care.   yes    PHYSICAL THERAPY PLAN OF CARE:    PT POC is established based on physician order and patient diagnosis     Referring provider/PT Order:     PT eval and treat  Start:  05/05/22 1730,   End:  05/05/22 1730,   ONE TIME,   Standing Count:  1 Occurrences,   R         Danni Schneider MD     Diagnosis:  Anemia [D64.9]  Rectal bleed [K62.5]  Specific instructions for next treatment:  Increase ambulation distance to assess endurance     Current Treatment Recommendations:   [x] Strengthening to improve independence with functional mobility   [] ROM to improve independence with functional mobility   [] Balance Training to improve static/dynamic balance and to reduce fall risk  [x] Endurance Training to improve activity tolerance during functional mobility   [] Transfer Training to improve safety and independence with all functional transfers   [x] Gait Training to improve gait mechanics, endurance and assess need for appropriate assistive device  [] Stair Training in preparation for safe discharge home and/or into the community   [] Positioning to prevent skin breakdown and contractures  [] Safety and Education Training   [x] Patient/Caregiver Education   [] HEP  [] Other     PT long term treatment goals are located in above grid    Frequency of treatments: 2-5x/week x 1-2 weeks. Time in  0917  Time out  0933    Total Treatment Time  0 minutes     Evaluation Time includes thorough review of current medical information, gathering information on past medical history/social history and prior level of function, completion of standardized testing/informal observation of tasks, assessment of data and education on plan of care and goals.     CPT codes:  [x] Low Complexity PT evaluation 53865  [] Moderate Complexity PT evaluation 50337  [] High Complexity PT evaluation 72884  [] PT Re-evaluation 24420  [] Gait training 63223 -- minutes  [] Manual therapy 71811 - minutes  [] Therapeutic activities 61083 -- minutes  [] Therapeutic exercises 98377 - minutes  [] Neuromuscular reeducation 48412 -- minutes     Shannan Solano, PT, DPT  [id number]

## 2022-05-06 NOTE — ED NOTES
Report called to [facility].  In transport     [place], [address] [facility]  05/06/22 0003

## 2022-05-06 NOTE — PROGRESS NOTES
Release of information form faxed to the [facility] await medical records

## 2022-05-06 NOTE — PLAN OF CARE
Problem: Safety - Adult  Goal: Free from fall injury  Outcome: Progressing     Problem: Pain  Goal: Verbalizes/displays adequate comfort level or baseline comfort level  Outcome: Progressing

## 2022-05-06 NOTE — PROGRESS NOTES
Hospitalist Progress Note      SYNOPSIS: Patient admitted on 2022 for Anemia      SUBJECTIVE:    Patient seen and examined. Denies any bloody stools so far. Received units of blood. Discussed iron deficiency and iron transfusion. He is tolerated clear liquid diet. Discussed plan of care per general surgery. All questions and concerns addressed. Records reviewed. Stable overnight. No other overnight issues reported. Temp (24hrs), Av.9 °F (36.6 °C), Min:97.5 °F (36.4 °C), Max:98.2 °F (36.8 °C)    DIET: ADULT DIET; Clear Liquid  CODE: Full Code    Intake/Output Summary (Last 24 hours) at 2022 0918  Last data filed at 2022 0548  Gross per 24 hour   Intake 1342.5 ml   Output 200 ml   Net 1142.5 ml       OBJECTIVE:    BP (!) 111/57   Pulse 64   Temp 97.9 °F (36.6 °C) (Oral)   Resp 18   Ht 5' 11\" (1.803 m)   Wt 212 lb (96.2 kg)   SpO2 95%   BMI 29.57 kg/m²     General appearance: No apparent distress, appears stated age and cooperative. HEENT:  Conjunctivae/corneas clear. Neck: Supple. No jugular venous distention. Respiratory: Clear to auscultation bilaterally, normal respiratory effort  Cardiovascular: Regular rate rhythm, normal S1-S2  Abdomen: Soft, nontender, nondistended  Musculoskeletal: No clubbing, cyanosis, no bilateral lower extremity edema. Brisk capillary refill. Skin:  No rashes  on visible skin  Neurologic: awake, alert and following commands       ASSESSMENT:  1. Acute blood loss anemia  2. Rectal bleed  3. Weakness  4. Iron deficiency anemia  5. History of recent colonoscopy a month ago status post polypectomy  6. Abdominal aortic aneurysm status postrepair 10 years ago        PLAN:  1. Hemoglobin 7.4 status post 2 units of PRBC. Diet advanced to clear this time per general surgery like to monitor and if he requires multiple transfusion a drop in hemoglobin may benefit from colonoscopy.   Also awaiting records from the [facility] and also his medications list.  DC fluids for now as he is tolerating oral intake. PPI. 2.  Iron deficiency anemia. IV iron ordered. 3.  PT OT      DISPOSITION:     Medications:  REVIEWED DAILY    Infusion Medications    sodium chloride      sodium chloride      sodium chloride      dextrose 5 % and 0.9 % NaCl 75 mL/hr at 05/05/22 1957     Scheduled Medications    ferric gluconate (FERRLECIT) IVPB  125 mg IntraVENous Daily    sodium chloride flush  5-40 mL IntraVENous 2 times per day    pantoprazole (PROTONIX) 40 mg injection  40 mg IntraVENous Q12H     PRN Meds: sodium chloride, sodium chloride, sodium chloride flush, sodium chloride, ondansetron **OR** ondansetron, polyethylene glycol, acetaminophen **OR** acetaminophen    Labs:     Recent Labs     05/05/22  1139 05/05/22  2354 05/06/22  0546   WBC 5.4  --  4.2*   HGB 6.7* 6.4* 7.2*   HCT 23.6*  --  23.7*     --  185       Recent Labs     05/05/22  1138 05/06/22  0546    139   K 4.3 4.2    109*   CO2 21* 18*   BUN 23 19   CREATININE 1.9* 1.8*   CALCIUM 9.1 8.9       Recent Labs     05/05/22  1138   PROT 6.4   ALKPHOS 94   ALT 7   AST 12   BILITOT 0.2       Recent Labs     05/05/22  1615   INR 1.0       No results for input(s): CKTOTAL, TROPONINI in the last 72 hours. Chronic labs:    Lab Results   Component Value Date    INR 1.0 05/05/2022       Radiology: REVIEWED DAILY    +++++++++++++++++++++++++++++++++++++++++++++++++  Adriana Kennedy MD  [facility] [place] Physician - [address], [place]  +++++++++++++++++++++++++++++++++++++++++++++++++  NOTE: This report was transcribed using voice recognition software. Every effort was made to ensure accuracy; however, inadvertent computerized transcription errors may be present.

## 2022-05-06 NOTE — ED PROVIDER NOTES
[de-identified] male presenting for abnormal lab. Patient states that he had blood work drawn at the [place] and his hemoglobin was 6. He has never required a blood transfusion before. Patient states he has been losing blood in his stool. He states he has been having bright red blood on the paper when he wipes. He had a colonoscopy 1 month ago and states he had polyps removed. He complains of fatigue and weakness, as well as shortness of breath with exertion. He also complains of lightheadedness and notes swelling of his feet. He denies any chest pain or abdominal pain. Review of Systems     Physical Exam  Constitutional:       General: He is not in acute distress. Appearance: Normal appearance. HENT:      Head: Normocephalic. Right Ear: External ear normal.      Left Ear: External ear normal.      Nose: Nose normal.   Cardiovascular:      Rate and Rhythm: Normal rate and regular rhythm. Musculoskeletal:      Comments: 1-2+ pitting edema bilateral lower extremities   Neurological:      Mental Status: He is alert. Procedures     [facility]     ED Course as of 05/06/22 0147   Thu May 05, 2022   0467 Spoke to Dr. Luis Thomas, discussed case. He is agreeable to following patient. Spoke to Dr. Berenda Skiff, discussed case. He is agreeable to admission.  [AP]      ED Course User Index  [AP] Dayton Man MD

## 2022-05-06 NOTE — PROGRESS NOTES
Left a message with the [place] medical records department asking for patient records awaiting call back

## 2022-05-06 NOTE — PLAN OF CARE
Problem: Safety - Adult  Goal: Free from fall injury  5/6/2022 0817 by Salu Cormier RN  Outcome: Progressing  5/6/2022 0154 by Ariana Barnes RN  Outcome: Progressing     Problem: Pain  Goal: Verbalizes/displays adequate comfort level or baseline comfort level  5/6/2022 0817 by Saul Cormier RN  Outcome: Progressing  5/6/2022 0154 by Ariana Barnes RN  Outcome: Progressing

## 2022-05-06 NOTE — CARE COORDINATION
5/6 Care Coordination. Patient was admit from ED yesterday for abnormal labs, Hgb 6.0, RLE edema, fatigue and SOB. He was sent in from [facility]. Patient has received 2 units PRBC so far. [address] consulted, they want to manage nonoperatively as long as Hgb does not continue trending down. Started on Ferrlecit IV. OT evaluated this AM, 18/24. PT eval pending. Met with patient at bedside regarding transition of care planning. Patient's PCP is Dr. Chely Infante at [facility] and he uses them for prescriptions. Patient lives home alone in a 1 story home with 3 BETO. DME owned is none and no history of HHC/ELISA. Plan on discharge is to return home with no needs identified at this time and patient states he transported himself here and if necessary his nephew Rolo Umanzor will provide transportation. Left message with [facility] regarding admission.      KEVEN AllredN, RN  [facility] Case Management   Cell: [phone]

## 2022-05-06 NOTE — PROGRESS NOTES
GENERAL SURGERY  DAILY PROGRESS NOTE  5/6/2022    Chief Complaint   Patient presents with    Abnormal Lab     Bloodwork from  the other day, HGB 6.0.    Leg Swelling     Right lower leg swelling for the last 2 weeks. Subjective:  No acute issues overnight. Decreased hgb from admission, working on 2nd unit of blood.  Decreased weakness, decrease bloody BM-- hasn't had one while here     Objective:  /67   Pulse 66   Temp 97.9 °F (36.6 °C) (Oral)   Resp 17   Ht 5' 11\" (1.803 m)   Wt 212 lb (96.2 kg)   SpO2 96%   BMI 29.57 kg/m²     GENERAL:  Laying in bed, awake, alert, cooperative, no apparent distress  LUNGS:  No increased work of breathing  CARDIOVASCULAR:  RR  ABDOMEN:  Soft, non-tender, non-distended  EXTREMITIES: No edema or swelling  SKIN: Warm and dry    Assessment/Plan:  [de-identified] y.o. male with rectal bleeding most likely post polypectomy bleeding    Okay for clear liquid diet  Will attempt to manage nonoperatively, however if persistent transfusion requirements are necessary then patient may need a colonoscopy to evaluate bleeding  Monitor h/h, transfuse below 7     Electronically signed by Jan Crum DO on 5/6/2022 at 6:14 AM

## 2022-05-06 NOTE — PROGRESS NOTES
[address] [place] [address], [address] [place] [address]                                                  Patient Name: Boo Kee    MRN: [id number]    : 1940    Room: [address] [place] [address]      Evaluating OT: Rochelle Pathak, [address] OTR/L; [id number]      Referring Provider: Violet Almendarez MD    Specific Provider Orders/Date: OT Eval and Treat 2022      Diagnosis: Anemia    Surgery:  None this admission    Pertinent Medical History:  has no past medical history on file.      Reason for Admission:     Recommended Adaptive Equipment: shower chair - pt declining at this time     Precautions:  Fall Risk, IV     Assessment of current deficits    [x] Functional mobility  [x]ADLs  [x] Strength               [x]Cognition    [x] Functional transfers   [x] IADLs         [x] Safety Awareness   [x]Endurance    [x] Fine Coordination              [x] Balance      [] Vision/perception   []Sensation     []Gross Motor Coordination  [] ROM  [] Delirium                   [] Motor Control     OT PLAN OF CARE   OT POC based on physician orders, patient diagnosis and results of clinical assessment    Frequency/Duration: 1-3 days/wk for 2 weeks PRN   Specific OT Treatment Interventions to include:   * Instruction/training on adapted ADL techniques and AE recommendations to increase functional independence within precautions       * Training on energy conservation strategies, correct breathing pattern and techniques to improve independence/tolerance for self-care routine  * Functional transfer/mobility training/DME recommendations for increased independence, safety, and fall prevention  * Patient/Family education to increase follow through with safety techniques and functional independence  * Recommendation of environmental modifications for increased safety with functional transfers/mobility and ADLs  * Therapeutic exercise to improve motor endurance, ROM, and functional strength for ADLs/functional transfers  * Therapeutic activities to facilitate/challenge dynamic balance, stand tolerance for increased safety and independence with ADLs    Home Living: Pt lives alone in 2 story home with 2-3 steps to enter and 1HR. Bathroom setup: tub/shower unit with sliding glass doors   Equipment owned: none    Prior Level of Function: independent with ADLs , independent with IADLs; ambulated with no AD prior  Driving: yes      Pain Level: pt reports overall body aches from being sedentary in bed - experiencing increased \"aching\" in Right shoulder (reports fall ~5 years ago [place])  Cognition: A&O: 4/4; Follows multi step directions   Memory:  good   Sequencing:  fair   Problem solving:  fair   Judgement/safety:  fair     Functional Assessment:  AM-PAC Daily Activity Raw Score: 18/24   Initial Eval Status  Date: 5/6/22 Treatment Status  Date: STGs = LTGs  Time frame: 10-14 days   Feeding Stand by Assist   Tray set up  Independent    Grooming Stand by Assist   Standing sink side washing hands  Independent    UB Dressing Stand by Assist   Tiago/doff gown seated EOB   Demonstrated overhead reach  Independent    LB Dressing Stand by Assist   Tiago/doff pants/socks seated EOB   (pt donned pants prior to session and demonstrated functional reach seated EOB)  Independent    Bathing Stand by Assist  Educated on safety/endurance during task - encouraged use of shower chair (pt declined need at this time)  Independent    Toileting Stand by Assist   Tiago/doff toilet demonstrated safety and functional reach for pericare  Independent    Bed Mobility  Log Roll: NT  Supine to sit: NT   Sit to supine: NT  Pt seated EOB upon entering room and seated in bedside chair at end of session   Supine to sit:  Independent   Sit to supine: Independent    Functional Transfers Sit to stand: IND   Stand to sit:IND  Stand pivot: SBA  Commode: SBA  Stand pivot: IND  Commode: IND    Functional Mobility SBA with no AD   Within household distances    (EOB <>bathroom)  Educated on gait pace and proper body mechanics  Independent    Balance Sitting:     Static - IND     Dynamic - IND  Standing: SBA no AD  Standing: IND   Activity Tolerance Fair   Pt reports overall fatigue and weakness (not baseline)  Educated on AD to assist with endurance and safety during prolonged tasks (including showering)     Visual/  Perceptual Glasses: yes - reading only           Vitals SpO2 on RA during session = 96%       Hand Dominance: Right   AROM (PROM) Strength Additional Info:  Goal:   RUE  WFL 4/5 good  and wfl FMC/dexterity noted during ADL tasks   Improve overall RUE strength to 4+/5 for participation in functional tasks       LUE WFL 4/5 Good  and wfl FMC/dexterity noted during ADL tasks   Improve overall LUE strength to 4+/5 for participation in functional tasks       Fine Motor Coordination:  finger to nose assessment appears WFL  Hearing: WFL   Sensation:  No c/o numbness or tingling  Tone: WFL   Edema: unremarkable    Patient/Family Goal: pt reports goal is to return home with no AD (shower chair) and no at home/outpatient therapy - would like to return to leisure activities (golfing and walking 1 mile a day)    Comment: Cleared by RN to see pt. Upon arrival patient seated EOB and agreeable to OT session. At end of session, patient seated in bedside chair with call light and phone within reach, all lines and tubes intact. Overall patient demonstrated  decreased independence and safety during completion of ADL/functional transfer/mobility tasks. Pt would benefit from continued skilled OT to increase safety and independence with completion of ADL/IADL tasks for functional independence and quality of life.     Treatment: Facilitation of unsupported sitting balance at EOB (addressed posture, weight shifting, dynamic reaching to improve independence) functional transfer EOB <> toilet and EOB to bedside chair (verbal/tactile cues and retraining for transfers from various surfaces for safety and increased independence) standing tolerance tasks (addressed posture, balance and activity tolerance within bathroom d/t impact on ADLs and functional activity) and functional mobility within household distances (bedroom and bathroom - in preparation for ambulation to/ from bathroom; cuing on posture, w/w management and safety) - skilled cuing on hand placement, posture, body mechanics, energy conservation techniques and safety during functional mobility and ADLs. Therapist facilitated self-care retraining: UB/LB self-care tasks (downing gown over shoulders, and socks seated EOB), toileting hygiene task and grooming tasks while educating pt on modified techniques, posture, safety and energy conservation techniques. Rehab Potential: Good  for established goals       LTG: maximize independence with ADLs to return to PLOF    Patient and/or family were instructed on functional diagnosis, prognosis/goals and OT plan of care. Demonstrated fair understanding. [] Malnutrition indicators have been identified and nursing has been notified to ensure a dietitian consult is ordered. ·  Eval Complexity: Low    Evaluation time includes thorough review of current medical information, gathering information on past medical & social history & PLOF, completion of standardized testing, informal observation of tasks, consultation with other medical professions/disciplines, assessment of data & development of POC/goals.      Time In: 8305  Time Out: 0932  Total Treatment Time: none    Min Units   OT Eval Low 53021  x     OT Eval Medium 07101      OT Eval High 82948      OT Re-Eval [id number]       Therapeutic Ex [id number]       Therapeutic Activities 41723       ADL/Self Care 87475       Orthotic Management 54531       Manual 57461     Neuro Re-Ed 33228       Non-Billable Time          Evaluation Time additionally includes thorough review of current medical information, gathering information on past medical history/social history and prior level of function, interpretation of standardized testing/informal observation of tasks, assessment of data and development of plan of care and goals.           AZAEL Meneses OTR/L; [id number]

## 2022-05-07 VITALS
HEIGHT: 71 IN | TEMPERATURE: 98 F | WEIGHT: 212 LBS | RESPIRATION RATE: 18 BRPM | HEART RATE: 60 BPM | DIASTOLIC BLOOD PRESSURE: 69 MMHG | OXYGEN SATURATION: 96 % | SYSTOLIC BLOOD PRESSURE: 117 MMHG | BODY MASS INDEX: 29.68 KG/M2

## 2022-05-07 LAB
ANION GAP SERPL CALCULATED.3IONS-SCNC: 7 MMOL/L (ref 7–16)
BASOPHILS ABSOLUTE: 0.01 E9/L (ref 0–0.2)
BASOPHILS RELATIVE PERCENT: 0.3 % (ref 0–2)
BUN BLDV-MCNC: 17 MG/DL (ref 6–23)
CALCIUM SERPL-MCNC: 9.5 MG/DL (ref 8.6–10.2)
CHLORIDE BLD-SCNC: 109 MMOL/L (ref 98–107)
CO2: 21 MMOL/L (ref 22–29)
CREAT SERPL-MCNC: 1.9 MG/DL (ref 0.7–1.2)
EOSINOPHILS ABSOLUTE: 0.2 E9/L (ref 0.05–0.5)
EOSINOPHILS RELATIVE PERCENT: 6.3 % (ref 0–6)
GFR AFRICAN AMERICAN: 41
GFR NON-AFRICAN AMERICAN: 34 ML/MIN/1.73
GLUCOSE BLD-MCNC: 85 MG/DL (ref 74–99)
HCT VFR BLD CALC: 25.1 % (ref 37–54)
HEMOGLOBIN: 7.4 G/DL (ref 12.5–16.5)
HEMOGLOBIN: 7.5 G/DL (ref 12.5–16.5)
IMMATURE GRANULOCYTES #: 0.01 E9/L
IMMATURE GRANULOCYTES %: 0.3 % (ref 0–5)
LYMPHOCYTES ABSOLUTE: 0.65 E9/L (ref 1.5–4)
LYMPHOCYTES RELATIVE PERCENT: 20.4 % (ref 20–42)
MCH RBC QN AUTO: 24.8 PG (ref 26–35)
MCHC RBC AUTO-ENTMCNC: 29.9 % (ref 32–34.5)
MCV RBC AUTO: 83.1 FL (ref 80–99.9)
MONOCYTES ABSOLUTE: 0.41 E9/L (ref 0.1–0.95)
MONOCYTES RELATIVE PERCENT: 12.9 % (ref 2–12)
NEUTROPHILS ABSOLUTE: 1.91 E9/L (ref 1.8–7.3)
NEUTROPHILS RELATIVE PERCENT: 59.8 % (ref 43–80)
PDW BLD-RTO: 16.5 FL (ref 11.5–15)
PLATELET # BLD: 187 E9/L (ref 130–450)
PMV BLD AUTO: 10.7 FL (ref 7–12)
POTASSIUM REFLEX MAGNESIUM: 4.3 MMOL/L (ref 3.5–5)
RBC # BLD: 3.02 E12/L (ref 3.8–5.8)
SODIUM BLD-SCNC: 137 MMOL/L (ref 132–146)
WBC # BLD: 3.2 E9/L (ref 4.5–11.5)

## 2022-05-07 PROCEDURE — 6370000000 HC RX 637 (ALT 250 FOR IP): Performed by: FAMILY MEDICINE

## 2022-05-07 PROCEDURE — 85018 HEMOGLOBIN: CPT

## 2022-05-07 PROCEDURE — 80048 BASIC METABOLIC PNL TOTAL CA: CPT

## 2022-05-07 PROCEDURE — 1200000000 HC SEMI PRIVATE

## 2022-05-07 PROCEDURE — 36415 COLL VENOUS BLD VENIPUNCTURE: CPT

## 2022-05-07 PROCEDURE — C9113 INJ PANTOPRAZOLE SODIUM, VIA: HCPCS | Performed by: FAMILY MEDICINE

## 2022-05-07 PROCEDURE — 85025 COMPLETE CBC W/AUTO DIFF WBC: CPT

## 2022-05-07 PROCEDURE — A4216 STERILE WATER/SALINE, 10 ML: HCPCS | Performed by: FAMILY MEDICINE

## 2022-05-07 PROCEDURE — 6360000002 HC RX W HCPCS: Performed by: FAMILY MEDICINE

## 2022-05-07 PROCEDURE — 2580000003 HC RX 258: Performed by: FAMILY MEDICINE

## 2022-05-07 RX ADMIN — FUROSEMIDE 20 MG: 20 TABLET ORAL at 11:14

## 2022-05-07 RX ADMIN — DORZOLAMIDE HYDROCHLORIDE 1 DROP: 20 SOLUTION/ DROPS OPHTHALMIC at 11:10

## 2022-05-07 RX ADMIN — DOCUSATE SODIUM 100 MG: 100 CAPSULE, LIQUID FILLED ORAL at 21:25

## 2022-05-07 RX ADMIN — PANTOPRAZOLE SODIUM 40 MG: 40 INJECTION, POWDER, FOR SOLUTION INTRAVENOUS at 11:09

## 2022-05-07 RX ADMIN — Medication 10 ML: at 11:29

## 2022-05-07 RX ADMIN — LATANOPROST 1 DROP: 50 SOLUTION OPHTHALMIC at 21:26

## 2022-05-07 RX ADMIN — BRIMONIDINE TARTRATE 1 DROP: 2 SOLUTION/ DROPS OPHTHALMIC at 21:26

## 2022-05-07 RX ADMIN — METOPROLOL SUCCINATE 75 MG: 50 TABLET, EXTENDED RELEASE ORAL at 11:07

## 2022-05-07 RX ADMIN — DORZOLAMIDE HYDROCHLORIDE 1 DROP: 20 SOLUTION/ DROPS OPHTHALMIC at 22:06

## 2022-05-07 RX ADMIN — LISINOPRIL 40 MG: 20 TABLET ORAL at 11:08

## 2022-05-07 RX ADMIN — BRIMONIDINE TARTRATE 1 DROP: 2 SOLUTION/ DROPS OPHTHALMIC at 17:49

## 2022-05-07 RX ADMIN — ATORVASTATIN CALCIUM 40 MG: 40 TABLET, FILM COATED ORAL at 11:13

## 2022-05-07 RX ADMIN — DOCUSATE SODIUM 100 MG: 100 CAPSULE, LIQUID FILLED ORAL at 11:07

## 2022-05-07 RX ADMIN — POLYVINYL ALCOHOL 1 DROP: 14 SOLUTION/ DROPS OPHTHALMIC at 21:26

## 2022-05-07 RX ADMIN — BRIMONIDINE TARTRATE 1 DROP: 2 SOLUTION/ DROPS OPHTHALMIC at 11:11

## 2022-05-07 RX ADMIN — PANTOPRAZOLE SODIUM 40 MG: 40 INJECTION, POWDER, FOR SOLUTION INTRAVENOUS at 01:49

## 2022-05-07 RX ADMIN — SODIUM CHLORIDE 125 MG: 9 INJECTION, SOLUTION INTRAVENOUS at 11:26

## 2022-05-07 RX ADMIN — DORZOLAMIDE HYDROCHLORIDE 1 DROP: 20 SOLUTION/ DROPS OPHTHALMIC at 17:50

## 2022-05-07 RX ADMIN — FUROSEMIDE 20 MG: 20 TABLET ORAL at 21:25

## 2022-05-07 RX ADMIN — Medication 1 TABLET: at 11:09

## 2022-05-07 RX ADMIN — METOPROLOL SUCCINATE 75 MG: 50 TABLET, EXTENDED RELEASE ORAL at 21:25

## 2022-05-07 RX ADMIN — Medication 10 ML: at 22:07

## 2022-05-07 NOTE — PROGRESS NOTES
Hospitalist Progress Note      SYNOPSIS: Patient admitted on 2022 for Anemia      SUBJECTIVE:    Patient seen and examined  Records reviewed. Patient examined today. Denies any nausea or chest pain. Tolerating oral intake. Denies any bloody stools so far as he had a bowel movement. Denies any blood in it. Denies abdominal pain. Stable overnight. No other overnight issues reported. Temp (24hrs), Av.4 °F (36.3 °C), Min:97.3 °F (36.3 °C), Max:97.4 °F (36.3 °C)    DIET: ADULT DIET; Regular; Low Fat (less than or equal to 50 gm/day)  CODE: Full Code    Intake/Output Summary (Last 24 hours) at 2022 1242  Last data filed at 2022 0517  Gross per 24 hour   Intake 240 ml   Output --   Net 240 ml       OBJECTIVE:    /67   Pulse 64   Temp 97.3 °F (36.3 °C) (Oral)   Resp 16   Ht 5' 11\" (1.803 m)   Wt 212 lb (96.2 kg)   SpO2 93%   BMI 29.57 kg/m²     General appearance: No apparent distress, appears stated age and cooperative. HEENT:  Conjunctivae/corneas clear. Neck: Supple. No jugular venous distention. Respiratory: Clear to auscultation bilaterally, normal respiratory effort  Cardiovascular: Regular rate rhythm, normal S1-S2  Abdomen: Soft, nontender, nondistended  Musculoskeletal: No clubbing, cyanosis, no bilateral lower extremity edema. Brisk capillary refill. Skin:  No rashes  on visible skin  Neurologic: awake, alert and following commands       ASSESSMENT:  1.  Acute blood loss anemia  2.  Rectal bleed  3.  Weakness  4.  Iron deficiency anemia  5. History of recent colonoscopy a month ago status post polypectomy  6.  Abdominal aortic aneurysm status postrepair 10 years ago        PLAN:  1. Hemoglobin 7.5 status post 2 units of PRBC. Diet has been advanced today to regular per general surgery. At this time no plan for colonoscopy as the patient has drop in hemoglobin or persistent bleeding. Last dose of IV iron today.   May benefit from oral iron at time of discharge. DISPOSITION:   Medications:  REVIEWED DAILY    Infusion Medications    sodium chloride      sodium chloride      sodium chloride       Scheduled Medications    atorvastatin  40 mg Oral Daily    docusate sodium  100 mg Oral BID    furosemide  20 mg Oral BID    latanoprost  1 drop Both Eyes Nightly    lisinopril  40 mg Oral Daily    metoprolol succinate  75 mg Oral BID    therapeutic multivitamin-minerals  1 tablet Oral Daily    dorzolamide  1 drop Both Eyes TID    brimonidine  1 drop Both Eyes TID    sodium chloride flush  5-40 mL IntraVENous 2 times per day    pantoprazole (PROTONIX) 40 mg injection  40 mg IntraVENous Q12H     PRN Meds: sodium chloride, polyvinyl alcohol, sodium chloride, sodium chloride flush, sodium chloride, ondansetron **OR** ondansetron, polyethylene glycol, acetaminophen **OR** acetaminophen    Labs:     Recent Labs     05/05/22  1139 05/05/22  2354 05/06/22  0546 05/06/22  0546 05/06/22 2010 05/07/22  0330 05/07/22  0529   WBC 5.4  --  4.2*  --   --   --  3.2*   HGB 6.7*   < > 7.2*   < > 7.5* 7.4* 7.5*   HCT 23.6*  --  23.7*  --   --   --  25.1*     --  185  --   --   --  187    < > = values in this interval not displayed. Recent Labs     05/05/22  1138 05/06/22  0546 05/07/22  0529    139 137   K 4.3 4.2 4.3    109* 109*   CO2 21* 18* 21*   BUN 23 19 17   CREATININE 1.9* 1.8* 1.9*   CALCIUM 9.1 8.9 9.5       Recent Labs     05/05/22  1138   PROT 6.4   ALKPHOS 94   ALT 7   AST 12   BILITOT 0.2       Recent Labs     05/05/22  1615   INR 1.0       No results for input(s): CKTOTAL, TROPONINI in the last 72 hours.     Chronic labs:    Lab Results   Component Value Date    INR 1.0 05/05/2022       Radiology: REVIEWED DAILY    +++++++++++++++++++++++++++++++++++++++++++++++++  Levormauri Balbuena MD  [facility] - [address] [facility], [place]  +++++++++++++++++++++++++++++++++++++++++++++++++  NOTE: This report was transcribed using voice recognition software. Every effort was made to ensure accuracy; however, inadvertent computerized transcription errors may be present.

## 2022-05-07 NOTE — PROGRESS NOTES
GENERAL SURGERY  DAILY PROGRESS NOTE  5/7/2022    Chief Complaint   Patient presents with    Abnormal Lab     Bloodwork from Dr the other day, HGB 6.0.    Leg Swelling     Right lower leg swelling for the last 2 weeks. Subjective:  Had brown bowel movements overnight. Denies pain and has had no signs of bleeding overnight. Hb remained stable in the 7's     Objective:  /67   Pulse 64   Temp 97.3 °F (36.3 °C) (Oral)   Resp 16   Ht 5' 11\" (1.803 m)   Wt 212 lb (96.2 kg)   SpO2 93%   BMI 29.57 kg/m²     GENERAL:  Laying in bed, awake, alert, cooperative, no apparent distress  LUNGS:  No increased work of breathing  CARDIOVASCULAR:  RR  ABDOMEN:  Soft, non-tender, non-distended  EXTREMITIES: No edema or swelling  SKIN: Warm and dry    Assessment/Plan:  [de-identified] y.o. male with rectal bleeding most likely post polypectomy bleeding seems to be resolved     Okay for diet  Reduce H/H checks   If patient rebleeds or transfusion requirements increase patient may need assessment with colonoscopy  Monitor h/h, transfuse below 7     Electronically signed by Gisel Tran MD on 5/7/2022 at 7:36 AM     Dr. Burger Life covering for Dr. Aubree Lakhani    Pt seen and examined; agree w above  No further bleeding per patient; normal brown bm overnight  Hb remains stable  No plans for endoscopic intervention at this time but will continue to monitor for bleeding or increasing transfusion requirements    Dorene Phalen MD  Min[facility]ally Invasive General Surgery and Endoscopy  [address] [place] [address].  [address]: [phone]  F: [phone]    Electronically signed by Brenden Medel MD on 5/7/2022 at 9:58 AM

## 2022-05-08 LAB
ANION GAP SERPL CALCULATED.3IONS-SCNC: 8 MMOL/L (ref 7–16)
BASOPHILS ABSOLUTE: 0.02 E9/L (ref 0–0.2)
BASOPHILS RELATIVE PERCENT: 0.5 % (ref 0–2)
BUN BLDV-MCNC: 21 MG/DL (ref 6–23)
CALCIUM SERPL-MCNC: 9.5 MG/DL (ref 8.6–10.2)
CHLORIDE BLD-SCNC: 111 MMOL/L (ref 98–107)
CO2: 22 MMOL/L (ref 22–29)
CREAT SERPL-MCNC: 2.2 MG/DL (ref 0.7–1.2)
EOSINOPHILS ABSOLUTE: 0.18 E9/L (ref 0.05–0.5)
EOSINOPHILS RELATIVE PERCENT: 4.6 % (ref 0–6)
GFR AFRICAN AMERICAN: 35
GFR NON-AFRICAN AMERICAN: 29 ML/MIN/1.73
GLUCOSE BLD-MCNC: 87 MG/DL (ref 74–99)
HCT VFR BLD CALC: 26 % (ref 37–54)
HEMOGLOBIN: 7.8 G/DL (ref 12.5–16.5)
IMMATURE GRANULOCYTES #: 0.01 E9/L
IMMATURE GRANULOCYTES %: 0.3 % (ref 0–5)
LYMPHOCYTES ABSOLUTE: 0.61 E9/L (ref 1.5–4)
LYMPHOCYTES RELATIVE PERCENT: 15.6 % (ref 20–42)
MCH RBC QN AUTO: 24.9 PG (ref 26–35)
MCHC RBC AUTO-ENTMCNC: 30 % (ref 32–34.5)
MCV RBC AUTO: 83.1 FL (ref 80–99.9)
MONOCYTES ABSOLUTE: 0.5 E9/L (ref 0.1–0.95)
MONOCYTES RELATIVE PERCENT: 12.8 % (ref 2–12)
NEUTROPHILS ABSOLUTE: 2.58 E9/L (ref 1.8–7.3)
NEUTROPHILS RELATIVE PERCENT: 66.2 % (ref 43–80)
PDW BLD-RTO: 16.8 FL (ref 11.5–15)
PLATELET # BLD: 205 E9/L (ref 130–450)
PMV BLD AUTO: 11.3 FL (ref 7–12)
POTASSIUM REFLEX MAGNESIUM: 4.2 MMOL/L (ref 3.5–5)
RBC # BLD: 3.13 E12/L (ref 3.8–5.8)
SODIUM BLD-SCNC: 141 MMOL/L (ref 132–146)
WBC # BLD: 3.9 E9/L (ref 4.5–11.5)

## 2022-05-08 PROCEDURE — 85025 COMPLETE CBC W/AUTO DIFF WBC: CPT

## 2022-05-08 PROCEDURE — 2580000003 HC RX 258: Performed by: FAMILY MEDICINE

## 2022-05-08 PROCEDURE — 6370000000 HC RX 637 (ALT 250 FOR IP): Performed by: FAMILY MEDICINE

## 2022-05-08 PROCEDURE — 36415 COLL VENOUS BLD VENIPUNCTURE: CPT

## 2022-05-08 PROCEDURE — 80048 BASIC METABOLIC PNL TOTAL CA: CPT

## 2022-05-08 RX ORDER — PANTOPRAZOLE SODIUM 40 MG/1
40 TABLET, DELAYED RELEASE ORAL
Qty: 90 TABLET | Refills: 1 | Status: SHIPPED | OUTPATIENT
Start: 2022-05-08

## 2022-05-08 RX ADMIN — BRIMONIDINE TARTRATE 1 DROP: 2 SOLUTION/ DROPS OPHTHALMIC at 08:50

## 2022-05-08 RX ADMIN — DOCUSATE SODIUM 100 MG: 100 CAPSULE, LIQUID FILLED ORAL at 08:48

## 2022-05-08 RX ADMIN — ATORVASTATIN CALCIUM 40 MG: 40 TABLET, FILM COATED ORAL at 08:48

## 2022-05-08 RX ADMIN — LISINOPRIL 40 MG: 20 TABLET ORAL at 08:48

## 2022-05-08 RX ADMIN — DORZOLAMIDE HYDROCHLORIDE 1 DROP: 20 SOLUTION/ DROPS OPHTHALMIC at 08:50

## 2022-05-08 RX ADMIN — Medication 10 ML: at 08:54

## 2022-05-08 RX ADMIN — METOPROLOL SUCCINATE 75 MG: 50 TABLET, EXTENDED RELEASE ORAL at 08:48

## 2022-05-08 RX ADMIN — FUROSEMIDE 20 MG: 20 TABLET ORAL at 08:48

## 2022-05-08 RX ADMIN — Medication 1 TABLET: at 08:49

## 2022-05-08 NOTE — PROGRESS NOTES
GENERAL SURGERY  DAILY PROGRESS NOTE  5/8/2022    Chief Complaint   Patient presents with    Abnormal Lab     Bloodwork from Dr the other day, HGB 6.0.    Leg Swelling     Right lower leg swelling for the last 2 weeks. Subjective:  Patient in no pain. Having brown bowel movements. HgB 7.8 from 7.5    Objective:  /69   Pulse 60   Temp 98 °F (36.7 °C) (Oral)   Resp 18   Ht 5' 11\" (1.803 m)   Wt 212 lb (96.2 kg)   SpO2 96%   BMI 29.57 kg/m²     GENERAL:  Laying in bed, awake, alert, cooperative, no apparent distress  LUNGS:  No increased work of breathing  CARDIOVASCULAR:  RR  ABDOMEN:  Soft, non-tender, non-distended  EXTREMITIES: No edema or swelling  SKIN: Warm and dry    Assessment/Plan:  [de-identified] y.o. male with rectal bleeding most likely post polypectomy bleeding seems to be resolved     [facility] for diet  Ok for Madeline signed by Cheryle Lin MD on 5/8/2022 at 7:51 AM     Dr. Roula Jaramillo covering for Dr. Zain Grijalva seen and examined; agree w above  Sitting in chair in his clothes ready to leave  Hb remains stable  Ok for [place]     Rose Martinez MD  Minimally Invasive General Surgery and Endoscopy  [address] [facility].  [address]: [phone]  F: [phone]    Electronically signed by Irma Sow MD on 5/8/2022 at 9:01 AM

## 2022-05-08 NOTE — DISCHARGE SUMMARY
Hospitalist Discharge Summary    Patient ID: Jero Bryant   Patient : [id number]  Patient's PCP: No primary care provider on file. Admit Date: 2022   Admitting Physician: Andriy Infante MD    Discharge Date:  2022  Discharge Physician: Andriy Infante MD   Discharge Condition: Stable  Discharge Disposition: Home    History of presenting illness:  Mr. Jero Bryant, a [de-identified]y.o. year old male  who past medical history of abdominal aortic aneurysm status postrepair 10 years ago and recent colonoscopy a month ago status post polypectomy. Presents to the ER due to abnormal lab work hemoglobin of 6.7. Patient mention has been getting very weak and unable to even take his normal walks as he gets very tired which is unusual for him. Given his symptoms he saw physician and lab work was abnormal signs to come to the ER. He does follow with the [place] clinic normally. Hemoglobin 6.7 and he was ordered a weight of blood. He did mention he he had a colonoscopy a month ago with polypectomy and has been having rectal bleeding since then for the past month. Reports most times is just some blood when he wipes but also sometimes some blood streaks mixed with his stool. Denies any issues with hemorrhoids though he does get constipated sometimes. By ED no hemorrhage on exam.  Patient denies being on any blood thinners of a baby aspirin he takes daily.       Hospital course in brief:  (Please refer to daily progress notes for a comprehensive review of the hospitalization by requesting medical records)  He was seen by general surgery and managed conservatively. The 16th of PRBC transfusion. Hemoglobin improved to 7.8 at the time of discharge. Also found to have an deficiency anemia received IV iron. He did tolerate oral intake and by general surgery no need for colonoscopy at this time. He is to have CBC checked palpation in 3 to 5 days.     Physical Exam  Constitutional:       Appearance: Normal appearance. Eyes:      Pupils: Pupils are equal, round, and reactive to light. Cardiovascular:      Rate and Rhythm: Normal rate and regular rhythm. Pulses: Normal pulses. Heart sounds: Normal heart sounds. Pulmonary:      Effort: Pulmonary effort is normal.   Abdominal:      General: Abdomen is flat. Bowel sounds are normal.   Skin:     General: Skin is warm. Findings: No rash. Neurological:      General: No focal deficit present. Mental Status: He is alert and oriented to person, place, and time. Psychiatric:         Mood and Affect: Mood normal.         Behavior: Behavior normal.       Consults:   IP CONSULT TO HOSPITALIST  IP CONSULT TO GENERAL SURGERY  IP CONSULT TO INTERNAL MEDICINE  IP CONSULT TO GENERAL SURGERY    Discharge Diagnoses:  1.  Acute blood loss anemia  2.  Rectal bleed  3.  Weakness  4.  Iron deficiency anemia  5.  History of recent colonoscopy a month ago status post polypectomy  6.  Abdominal aortic aneurysm status postrepair 10 years ago    Discharge Instructions / Follow up:    Continued appropriate risk factor modification of blood pressure, diabetes and serum lipids will remain essential to reducing risk of future atherosclerotic development    Activity: activity as tolerated    Significant labs:  CBC:   Recent Labs     05/06/22 0546 05/06/22 2010 05/07/22 0330 05/07/22 0529 05/08/22  0534   WBC 4.2*  --   --  3.2* 3.9*   RBC 2.83*  --   --  3.02* 3.13*   HGB 7.2*   < > 7.4* 7.5* 7.8*   HCT 23.7*  --   --  25.1* 26.0*   MCV 83.7  --   --  83.1 83.1   RDW 16.5*  --   --  16.5* 16.8*     --   --  187 205    < > = values in this interval not displayed.      BMP:   Recent Labs     05/05/22  1138 05/05/22  1138 05/06/22 0546 05/07/22 0529 05/08/22  0534      < > 139 137 141   K 4.3  --  4.2 4.3 4.2      < > 109* 109* 111*   CO2 21*   < > 18* 21* 22   BUN 23   < > 19 17 21   CREATININE 1.9*   < > 1.8* 1.9* 2.2*   MG 2.0  --   --   --   -- < > = values in this interval not displayed. LFT:  Recent Labs     05/05/22  1138   PROT 6.4   ALKPHOS 94   ALT 7   AST 12   BILITOT 0.2     PT/INR:   Recent Labs     05/05/22  1615   INR 1.0     BNP: No results for input(s): BNP in the last 72 hours. Hgb A1C: No results found for: LABA1C  Folate and B12: No results found for: [id number], No results found for: FOLATE  Thyroid Studies: No results found for: TSH, X8MSDWR, P3BYNVF, THYROIDAB    Urinalysis:  No results found for: NITRU, WBCUA, BACTERIA, RBCUA, BLOODU, SPECGRAV, GLUCOSEU    Imaging:  XR CHEST PORTABLE    Result Date: 5/5/2022  EXAMINATION: ONE XRAY VIEW OF THE CHEST 5/5/2022 1:23 pm COMPARISON: None. HISTORY: ORDERING SYSTEM PROVIDED HISTORY: chest pain TECHNOLOGIST PROVIDED HISTORY: Reason for exam:->chest pain What reading provider will be dictating this exam?->CRC FINDINGS: The lungs are without acute focal process. There is no effusion or pneumothorax. The cardiomediastinal silhouette is without acute process. The osseous structures are without acute process. No acute  pulmonary process     US DUP LOWER EXTREMITY RIGHT HARITHA    Result Date: 5/5/2022  EXAMINATION: DUPLEX VENOUS ULTRASOUND OF THE RIGHT LOWER EXTREMITY 5/5/2022 12:41 pm TECHNIQUE: Duplex ultrasound using B-mode/gray scaled imaging and Doppler spectral analysis and color flow was obtained of the deep venous structures of the right lower extremity. COMPARISON: None. HISTORY: ORDERING SYSTEM PROVIDED HISTORY: right leg swelling TECHNOLOGIST PROVIDED HISTORY: Reason for exam:->right leg swelling What reading provider will be dictating this exam?->CRC FINDINGS: The visualized veins of the right lower extremity are patent and free of echogenic thrombus. The veins demonstrate good compressibility with normal color flow study and spectral analysis. No evidence of DVT in the right lower extremity.  RECOMMENDATIONS: Unavailable       Discharge Medications:      Medication List START taking these medications    pantoprazole 40 MG tablet  Commonly known as: PROTONIX  Take 1 tablet by mouth every morning (before breakfast)        CONTINUE taking these medications    acetaminophen 325 MG tablet  Commonly known as: TYLENOL     aspirin 81 MG chewable tablet     atorvastatin 40 MG tablet  Commonly known as: LIPITOR     brinzolamide-brimonidine 1-0.2 % Susp     carboxymethylcellulose 1 % ophthalmic solution     docusate sodium 100 MG capsule  Commonly known as: COLACE     ferrous sulfate 325 (65 Fe) MG EC tablet  Commonly known as: FE TABS 325     furosemide 20 MG tablet  Commonly known as: LASIX     latanoprost 0.005 % ophthalmic solution  Commonly known as: XALATAN     lisinopril 40 MG tablet  Commonly known as: PRINIVIL;ZESTRIL     metoprolol succinate 50 MG extended release tablet  Commonly known as: TOPROL XL     polyethylene glycol 17 g packet  Commonly known as: GLYCOLAX     therapeutic multivitamin-minerals tablet     Vitamin D3 125 MCG (5000 UT) Tabs           Where to Get Your Medications      These medications were sent to [address] [phone] [facility] [phone]  [address] [place] [id number]    Phone: [phone]   · pantoprazole 40 MG tablet         Time Spent on discharge is more than 35 minutes in the examination, evaluation, counseling and review of medications and discharge plan.    +++++++++++++++++++++++++++++++++++++++++++++++++  Emir Connors MD  [address]  +++++++++++++++++++++++++++++++++++++++++++++++++  NOTE: This report was transcribed using voice recognition software. Every effort was made to ensure accuracy; however, inadvertent computerized transcription errors may be present.

## 2022-10-11 ENCOUNTER — HOSPITAL ENCOUNTER (INPATIENT)
Age: 82
LOS: 7 days | Discharge: HOME | DRG: 812 | End: 2022-10-18
Attending: EMERGENCY MEDICINE | Admitting: FAMILY MEDICINE
Payer: OTHER GOVERNMENT

## 2022-10-11 DIAGNOSIS — D50.0 CHRONIC BLOOD LOSS ANEMIA: ICD-10-CM

## 2022-10-11 DIAGNOSIS — K92.2 GASTROINTESTINAL HEMORRHAGE, UNSPECIFIED GASTROINTESTINAL HEMORRHAGE TYPE: Primary | ICD-10-CM

## 2022-10-11 LAB
ABO/RH: NORMAL
ALBUMIN SERPL-MCNC: 3.7 G/DL (ref 3.5–5.2)
ALP BLD-CCNC: 97 U/L (ref 40–129)
ALT SERPL-CCNC: 10 U/L (ref 0–40)
ANION GAP SERPL CALCULATED.3IONS-SCNC: 13 MMOL/L (ref 7–16)
ANTIBODY SCREEN: NORMAL
AST SERPL-CCNC: 14 U/L (ref 0–39)
BASOPHILS ABSOLUTE: 0.03 E9/L (ref 0–0.2)
BASOPHILS RELATIVE PERCENT: 0.5 % (ref 0–2)
BILIRUB SERPL-MCNC: <0.2 MG/DL (ref 0–1.2)
BLOOD BANK DISPENSE STATUS: NORMAL
BLOOD BANK PRODUCT CODE: NORMAL
BPU ID: NORMAL
BUN BLDV-MCNC: 27 MG/DL (ref 6–23)
CALCIUM SERPL-MCNC: 10 MG/DL (ref 8.6–10.2)
CHLORIDE BLD-SCNC: 104 MMOL/L (ref 98–107)
CO2: 19 MMOL/L (ref 22–29)
CREAT SERPL-MCNC: 2.4 MG/DL (ref 0.7–1.2)
DESCRIPTION BLOOD BANK: NORMAL
EOSINOPHILS ABSOLUTE: 0.27 E9/L (ref 0.05–0.5)
EOSINOPHILS RELATIVE PERCENT: 4.8 % (ref 0–6)
GFR AFRICAN AMERICAN: 32
GFR NON-AFRICAN AMERICAN: 26 ML/MIN/1.73
GLUCOSE BLD-MCNC: 102 MG/DL (ref 74–99)
HCT VFR BLD CALC: 22.5 % (ref 37–54)
HEMOGLOBIN: 6.8 G/DL (ref 12.5–16.5)
IMMATURE GRANULOCYTES #: 0.02 E9/L
IMMATURE GRANULOCYTES %: 0.4 % (ref 0–5)
INR BLD: 1
LYMPHOCYTES ABSOLUTE: 0.76 E9/L (ref 1.5–4)
LYMPHOCYTES RELATIVE PERCENT: 13.6 % (ref 20–42)
MCH RBC QN AUTO: 28.2 PG (ref 26–35)
MCHC RBC AUTO-ENTMCNC: 30.2 % (ref 32–34.5)
MCV RBC AUTO: 93.4 FL (ref 80–99.9)
MONOCYTES ABSOLUTE: 0.38 E9/L (ref 0.1–0.95)
MONOCYTES RELATIVE PERCENT: 6.8 % (ref 2–12)
NEUTROPHILS ABSOLUTE: 4.14 E9/L (ref 1.8–7.3)
NEUTROPHILS RELATIVE PERCENT: 73.9 % (ref 43–80)
PDW BLD-RTO: 15.4 FL (ref 11.5–15)
PLATELET # BLD: 266 E9/L (ref 130–450)
PMV BLD AUTO: 9.5 FL (ref 7–12)
POTASSIUM SERPL-SCNC: 4.7 MMOL/L (ref 3.5–5)
PROTHROMBIN TIME: 10.8 SEC (ref 9.3–12.4)
RBC # BLD: 2.41 E12/L (ref 3.8–5.8)
SODIUM BLD-SCNC: 136 MMOL/L (ref 132–146)
TOTAL PROTEIN: 6.4 G/DL (ref 6.4–8.3)
WBC # BLD: 5.6 E9/L (ref 4.5–11.5)

## 2022-10-11 PROCEDURE — 36430 TRANSFUSION BLD/BLD COMPNT: CPT

## 2022-10-11 PROCEDURE — 93005 ELECTROCARDIOGRAM TRACING: CPT | Performed by: PHYSICIAN ASSISTANT

## 2022-10-11 PROCEDURE — P9016 RBC LEUKOCYTES REDUCED: HCPCS

## 2022-10-11 PROCEDURE — 86900 BLOOD TYPING SEROLOGIC ABO: CPT

## 2022-10-11 PROCEDURE — 99285 EMERGENCY DEPT VISIT HI MDM: CPT

## 2022-10-11 PROCEDURE — 85025 COMPLETE CBC W/AUTO DIFF WBC: CPT

## 2022-10-11 PROCEDURE — 2580000003 HC RX 258: Performed by: FAMILY MEDICINE

## 2022-10-11 PROCEDURE — 86923 COMPATIBILITY TEST ELECTRIC: CPT

## 2022-10-11 PROCEDURE — 86850 RBC ANTIBODY SCREEN: CPT

## 2022-10-11 PROCEDURE — 6370000000 HC RX 637 (ALT 250 FOR IP): Performed by: FAMILY MEDICINE

## 2022-10-11 PROCEDURE — 36415 COLL VENOUS BLD VENIPUNCTURE: CPT

## 2022-10-11 PROCEDURE — 2060000000 HC ICU INTERMEDIATE R&B

## 2022-10-11 PROCEDURE — 86901 BLOOD TYPING SEROLOGIC RH(D): CPT

## 2022-10-11 PROCEDURE — 80053 COMPREHEN METABOLIC PANEL: CPT

## 2022-10-11 PROCEDURE — 85610 PROTHROMBIN TIME: CPT

## 2022-10-11 RX ORDER — ATORVASTATIN CALCIUM 40 MG/1
40 TABLET, FILM COATED ORAL NIGHTLY
Status: DISCONTINUED | OUTPATIENT
Start: 2022-10-11 | End: 2022-10-18 | Stop reason: HOSPADM

## 2022-10-11 RX ORDER — DOCUSATE SODIUM 100 MG/1
100 CAPSULE, LIQUID FILLED ORAL 2 TIMES DAILY
Status: DISCONTINUED | OUTPATIENT
Start: 2022-10-11 | End: 2022-10-18 | Stop reason: HOSPADM

## 2022-10-11 RX ORDER — SODIUM CHLORIDE 0.9 % (FLUSH) 0.9 %
5-40 SYRINGE (ML) INJECTION EVERY 12 HOURS SCHEDULED
Status: DISCONTINUED | OUTPATIENT
Start: 2022-10-11 | End: 2022-10-18 | Stop reason: HOSPADM

## 2022-10-11 RX ORDER — POLYVINYL ALCOHOL 14 MG/ML
1 SOLUTION/ DROPS OPHTHALMIC 3 TIMES DAILY
Status: DISCONTINUED | OUTPATIENT
Start: 2022-10-11 | End: 2022-10-18 | Stop reason: HOSPADM

## 2022-10-11 RX ORDER — SODIUM CHLORIDE 0.9 % (FLUSH) 0.9 %
5-40 SYRINGE (ML) INJECTION PRN
Status: DISCONTINUED | OUTPATIENT
Start: 2022-10-11 | End: 2022-10-18 | Stop reason: HOSPADM

## 2022-10-11 RX ORDER — FERROUS SULFATE 325(65) MG
325 TABLET ORAL
Status: DISCONTINUED | OUTPATIENT
Start: 2022-10-12 | End: 2022-10-18 | Stop reason: HOSPADM

## 2022-10-11 RX ORDER — DORZOLAMIDE HCL 20 MG/ML
1 SOLUTION/ DROPS OPHTHALMIC 2 TIMES DAILY
Status: DISCONTINUED | OUTPATIENT
Start: 2022-10-11 | End: 2022-10-18 | Stop reason: HOSPADM

## 2022-10-11 RX ORDER — SODIUM CHLORIDE 9 MG/ML
INJECTION, SOLUTION INTRAVENOUS CONTINUOUS
Status: DISCONTINUED | OUTPATIENT
Start: 2022-10-11 | End: 2022-10-15

## 2022-10-11 RX ORDER — ONDANSETRON 2 MG/ML
4 INJECTION INTRAMUSCULAR; INTRAVENOUS EVERY 6 HOURS PRN
Status: DISCONTINUED | OUTPATIENT
Start: 2022-10-11 | End: 2022-10-18 | Stop reason: HOSPADM

## 2022-10-11 RX ORDER — ACETAMINOPHEN 325 MG/1
650 TABLET ORAL EVERY 6 HOURS PRN
Status: DISCONTINUED | OUTPATIENT
Start: 2022-10-11 | End: 2022-10-18 | Stop reason: HOSPADM

## 2022-10-11 RX ORDER — ONDANSETRON 4 MG/1
4 TABLET, ORALLY DISINTEGRATING ORAL EVERY 8 HOURS PRN
Status: DISCONTINUED | OUTPATIENT
Start: 2022-10-11 | End: 2022-10-18 | Stop reason: HOSPADM

## 2022-10-11 RX ORDER — BRIMONIDINE TARTRATE 2 MG/ML
1 SOLUTION/ DROPS OPHTHALMIC 2 TIMES DAILY
Status: DISCONTINUED | OUTPATIENT
Start: 2022-10-11 | End: 2022-10-18 | Stop reason: HOSPADM

## 2022-10-11 RX ORDER — LATANOPROST 50 UG/ML
1 SOLUTION/ DROPS OPHTHALMIC DAILY
Status: DISCONTINUED | OUTPATIENT
Start: 2022-10-11 | End: 2022-10-18 | Stop reason: HOSPADM

## 2022-10-11 RX ORDER — LISINOPRIL 20 MG/1
40 TABLET ORAL 2 TIMES DAILY
Status: DISCONTINUED | OUTPATIENT
Start: 2022-10-11 | End: 2022-10-18 | Stop reason: HOSPADM

## 2022-10-11 RX ORDER — FUROSEMIDE 20 MG/1
20 TABLET ORAL 2 TIMES DAILY
Status: DISCONTINUED | OUTPATIENT
Start: 2022-10-12 | End: 2022-10-18 | Stop reason: HOSPADM

## 2022-10-11 RX ORDER — ACETAMINOPHEN 650 MG/1
650 SUPPOSITORY RECTAL EVERY 6 HOURS PRN
Status: DISCONTINUED | OUTPATIENT
Start: 2022-10-11 | End: 2022-10-18 | Stop reason: HOSPADM

## 2022-10-11 RX ORDER — PANTOPRAZOLE SODIUM 40 MG/1
40 TABLET, DELAYED RELEASE ORAL
Status: DISCONTINUED | OUTPATIENT
Start: 2022-10-12 | End: 2022-10-11 | Stop reason: SDUPTHER

## 2022-10-11 RX ORDER — ASPIRIN 81 MG/1
81 TABLET, CHEWABLE ORAL DAILY
Status: DISCONTINUED | OUTPATIENT
Start: 2022-10-12 | End: 2022-10-15

## 2022-10-11 RX ORDER — SODIUM CHLORIDE 9 MG/ML
INJECTION, SOLUTION INTRAVENOUS PRN
Status: DISCONTINUED | OUTPATIENT
Start: 2022-10-11 | End: 2022-10-11

## 2022-10-11 RX ORDER — SODIUM CHLORIDE 9 MG/ML
INJECTION, SOLUTION INTRAVENOUS PRN
Status: DISCONTINUED | OUTPATIENT
Start: 2022-10-11 | End: 2022-10-18 | Stop reason: HOSPADM

## 2022-10-11 RX ADMIN — ATORVASTATIN CALCIUM 40 MG: 40 TABLET, FILM COATED ORAL at 22:50

## 2022-10-11 RX ADMIN — DOCUSATE SODIUM 100 MG: 100 CAPSULE, LIQUID FILLED ORAL at 22:50

## 2022-10-11 RX ADMIN — METOPROLOL SUCCINATE 75 MG: 50 TABLET, EXTENDED RELEASE ORAL at 22:50

## 2022-10-11 RX ADMIN — DORZOLAMIDE HYDROCHLORIDE 1 DROP: 20 SOLUTION/ DROPS OPHTHALMIC at 22:52

## 2022-10-11 RX ADMIN — BRIMONIDINE TARTRATE 1 DROP: 2 SOLUTION OPHTHALMIC at 22:52

## 2022-10-11 RX ADMIN — SODIUM CHLORIDE: 9 INJECTION, SOLUTION INTRAVENOUS at 22:55

## 2022-10-11 RX ADMIN — SODIUM CHLORIDE, PRESERVATIVE FREE 10 ML: 5 INJECTION INTRAVENOUS at 22:49

## 2022-10-11 RX ADMIN — LATANOPROST 1 DROP: 50 SOLUTION OPHTHALMIC at 22:52

## 2022-10-11 RX ADMIN — POLYVINYL ALCOHOL 1 DROP: 14 SOLUTION/ DROPS OPHTHALMIC at 22:52

## 2022-10-11 ASSESSMENT — ENCOUNTER SYMPTOMS
COLOR CHANGE: 0
VOMITING: 0
ABDOMINAL PAIN: 0
CONSTIPATION: 0
SHORTNESS OF BREATH: 0
COUGH: 0
DIARRHEA: 0
NAUSEA: 0
ABDOMINAL DISTENTION: 0

## 2022-10-11 ASSESSMENT — LIFESTYLE VARIABLES
HOW OFTEN DO YOU HAVE A DRINK CONTAINING ALCOHOL: NEVER
HOW MANY STANDARD DRINKS CONTAINING ALCOHOL DO YOU HAVE ON A TYPICAL DAY: PATIENT DOES NOT DRINK

## 2022-10-11 ASSESSMENT — PAIN - FUNCTIONAL ASSESSMENT: PAIN_FUNCTIONAL_ASSESSMENT: NONE - DENIES PAIN

## 2022-10-11 NOTE — ED PROVIDER NOTES
HPI:  10/11/22,   Time: 7:33 PM EDT         Joaquim Segovia is a [de-identified] y.o. male presenting to the ED for dizziness fatigue and low hemoglobin. Patient went to the [facility] and was told that his blood count was 2 and he should go to the emergency department. Patient states he has been having black stool. Patient denies hematemesis or hematochezia. Patient states that he had a colonoscopy 5 months ago his current symptomatology started, beginning 4 days ago. The complaint has been persistent, moderate in severity, and worsened by light exertion. She denies chest pain abdominal pain vomiting. He complains of some mild lower extremity edema. ROS:   Pertinent positives and negatives are stated within HPI, all other systems reviewed and are negative.  --------------------------------------------- PAST HISTORY ---------------------------------------------  Past Medical History:  has a past medical history of Disease of blood and blood forming organ and Hypertension. Past Surgical History:  has a past surgical history that includes Abdominal aortic aneurysm repair. Social History:  reports that he has never smoked. He has never used smokeless tobacco. He reports that he does not drink alcohol and does not use drugs. Family History: family history is not on file. The patients home medications have been reviewed. Allergies: Patient has no known allergies.     -------------------------------------------------- RESULTS -------------------------------------------------  All laboratory and radiology results have been personally reviewed by myself   LABS:  Results for orders placed or performed during the hospital encounter of 10/11/22   CBC with Auto Differential   Result Value Ref Range    WBC 5.6 4.5 - 11.5 E9/L    RBC 2.41 (L) 3.80 - 5.80 E12/L    Hemoglobin 6.8 (LL) 12.5 - 16.5 g/dL    Hematocrit 22.5 (L) 37.0 - 54.0 %    MCV 93.4 80.0 - 99.9 fL    MCH 28.2 26.0 - 35.0 pg    MCHC 30.2 (L) 32.0 - 34.5 % RDW 15.4 (H) 11.5 - 15.0 fL    Platelets 512 968 - 466 E9/L    MPV 9.5 7.0 - 12.0 fL    Neutrophils % 73.9 43.0 - 80.0 %    Immature Granulocytes % 0.4 0.0 - 5.0 %    Lymphocytes % 13.6 (L) 20.0 - 42.0 %    Monocytes % 6.8 2.0 - 12.0 %    Eosinophils % 4.8 0.0 - 6.0 %    Basophils % 0.5 0.0 - 2.0 %    Neutrophils Absolute 4.14 1.80 - 7.30 E9/L    Immature Granulocytes # 0.02 E9/L    Lymphocytes Absolute 0.76 (L) 1.50 - 4.00 E9/L    Monocytes Absolute 0.38 0.10 - 0.95 E9/L    Eosinophils Absolute 0.27 0.05 - 0.50 E9/L    Basophils Absolute 0.03 0.00 - 0.20 E9/L   Comprehensive Metabolic Panel   Result Value Ref Range    Sodium 136 132 - 146 mmol/L    Potassium 4.7 3.5 - 5.0 mmol/L    Chloride 104 98 - 107 mmol/L    CO2 19 (L) 22 - 29 mmol/L    Anion Gap 13 7 - 16 mmol/L    Glucose 102 (H) 74 - 99 mg/dL    BUN 27 (H) 6 - 23 mg/dL    Creatinine 2.4 (H) 0.7 - 1.2 mg/dL    GFR Non-African American 26 >=60 mL/min/1.73    GFR African American 32     Calcium 10.0 8.6 - 10.2 mg/dL    Total Protein 6.4 6.4 - 8.3 g/dL    Albumin 3.7 3.5 - 5.2 g/dL    Total Bilirubin <0.2 0.0 - 1.2 mg/dL    Alkaline Phosphatase 97 40 - 129 U/L    ALT 10 0 - 40 U/L    AST 14 0 - 39 U/L   Protime-INR   Result Value Ref Range    Protime 10.8 9.3 - 12.4 sec    INR 1.0    EKG 12 Lead   Result Value Ref Range    Ventricular Rate 77 BPM    Atrial Rate 77 BPM    P-R Interval 260 ms    QRS Duration 108 ms    Q-T Interval 362 ms    QTc Calculation (Bazett) 409 ms    P Axis 65 degrees    R Axis 35 degrees    T Axis 73 degrees   TYPE AND SCREEN   Result Value Ref Range    ABO/Rh O POS     Antibody Screen NEG    PREPARE RBC (CROSSMATCH), 1 Units   Result Value Ref Range    Product Code Blood Bank [id number]     Description Blood Bank Red Blood Cells, Apheresis, Leuko-reduced     Unit Number [id number]     Dispense Status Blood Bank issued        RADIOLOGY:  Interpreted by Radiologist.  No orders to display       ------------------------- NURSING NOTES AND VITALS REVIEWED ---------------------------   The nursing notes within the ED encounter and vital signs as below have been reviewed. /61   Pulse 80   Temp 98.1 °F (36.7 °C)   Resp 16   Ht 5' 11\" (1.803 m)   Wt 196 lb (88.9 kg)   SpO2 98%   BMI 27.34 kg/m²   Oxygen Saturation Interpretation: Normal      ---------------------------------------------------PHYSICAL EXAM--------------------------------------      Constitutional/General: Alert and oriented x3, well appearing, non toxic in NAD  Head: NC/AT  Eyes: PERRL, EOMI  Mouth: Oropharynx clear, handling secretions, no trismus  Neck: Supple, full ROM, no meningeal signs  Pulmonary: Lungs clear to auscultation bilaterally, no wheezes, rales, or rhonchi. Not in respiratory distress  Cardiovascular:  Regular rate and rhythm, no murmurs, gallops, or rubs. 2+ distal pulses  Abdomen: Soft, non tender, non distended,   Extremities: Moves all extremities x 4. Warm and well perfused 1+ edema. Skin: warm and dry without rash  Neurologic: GCS 15,  Psych: Normal Affect  Rectal guaiac positive dark stool    ------------------------------ ED COURSE/MEDICAL DECISION MAKING----------------------  Medications   0.9 % sodium chloride infusion (has no administration in time range)         Medical Decision Making:    A blood transfusion was ordered when the patient's hemoglobin was noted to be 6.8. Patient's EKG revealed no acute pathology. Patient was discussed with sound physicians and admitted to the hospital.    Counseling: The emergency provider has spoken with the patient and discussed todays results, in addition to providing specific details for the plan of care and counseling regarding the diagnosis and prognosis. Questions are answered at this time and they are agreeable with the plan.      --------------------------------- IMPRESSION AND DISPOSITION ---------------------------------    IMPRESSION  1.  Gastrointestinal hemorrhage, unspecified gastrointestinal hemorrhage type    2. Chronic blood loss anemia        DISPOSITION  Disposition: Admit to telemetry  Patient condition is serious      EKG: This EKG is signed and interpreted by me.     Rate: 77  Rhythm: Sinus  Interpretation: no acute changes  Comparison: no previous EKG available                 Mary Nolasco MD  10/11/22 2000       Mary Nolasco MD  11/26/22 8494

## 2022-10-11 NOTE — ED NOTES
[de-identified] y/o m to the ed with a c/c of fatigue and abnormal hgb. Patient denies chest pain, sob or difficulty breathing. Patient denies ABD pain or n/v/d. Patient noted with + msp x 4, pupils PERRLA @ 3 and lung sounds that are clear and equil bilaterally. Patient placed on telemetry, BP and pulse ox. 12-lead EKG performed. Vitals noted as recorded. PIV placed with labs drawn and sent. EKG performed. Call light placed within patient's reach, and bed in lowest position with side rails up x 2 for safety. Provider at the bedside for assessment.         Ronnie Marroquin RN  10/11/22 3180

## 2022-10-11 NOTE — ED NOTES
The following labs were labeled with appropriate pt sticker and tubed to lab:     [x] Blue     [x] Lavender   [] on ice  [x] Green/yellow  [] Green/black [] on ice  [] Brandy Kate  [] on ice  [] Yellow  [] Red  [x] Pink  [] ABG  [] VBG    [] COVID-19 swab    [] Rapid  [] PCR  [] Flu swab  [] Peds Viral Panel     [] Urine Sample  [] Pelvic Cultures  [] Blood Cultures  [] X 2  [] STREP Cultures         Wylie Dandy, RN  10/11/22 5903

## 2022-10-11 NOTE — ED NOTES
Department of Emergency Medicine  FIRST PROVIDER TRIAGE NOTE             Independent MLP           10/11/22  5:25 PM EDT    Date of Encounter: 10/11/22   MRN: [id number]      HPI: Mata Davidson is a [de-identified] y.o. male who presents to the ED for No chief complaint on file. Patient is a 53-year-old that is presenting from the [place]. Patient states he was called and told that his hemoglobin was 2. Patient states he felt very fatigued recently. Patient states that he has had low hemoglobin before. Patient states he is not bleeding from anywhere that he knows of. Patient states he has no abdominal pain he just feels exhausted and cannot walk    ROS: Negative for cp, sob, abd pain, or back pain. PE: Gen Appearance/Constitutional: alert  HEENT: NC/NT. PERRLA,  Airway patent. Neck: supple     Initial Plan of Care: All treatment areas with department are currently occupied. Plan to order/Initiate the following while awaiting opening in ED: labs and EKG.   Initiate Treatment-Testing, Proceed toTreatment Area When Bed Available for ED Attending/MLP to Continue Care    Electronically signed by Kimberlee Powell PA-C   DD: 10/11/22       Kimberlee Powell PA-C  10/11/22 0705 RIVAS Kohler PA-C  10/11/22 2740

## 2022-10-12 ENCOUNTER — ANESTHESIA (OUTPATIENT)
Dept: ENDOSCOPY | Age: 82
DRG: 812 | End: 2022-10-12
Payer: OTHER GOVERNMENT

## 2022-10-12 ENCOUNTER — APPOINTMENT (OUTPATIENT)
Dept: GENERAL RADIOLOGY | Age: 82
DRG: 812 | End: 2022-10-12
Payer: OTHER GOVERNMENT

## 2022-10-12 ENCOUNTER — ANESTHESIA EVENT (OUTPATIENT)
Dept: ENDOSCOPY | Age: 82
DRG: 812 | End: 2022-10-12
Payer: OTHER GOVERNMENT

## 2022-10-12 LAB
ALBUMIN SERPL-MCNC: 3.3 G/DL (ref 3.5–5.2)
ALP BLD-CCNC: 89 U/L (ref 40–129)
ALT SERPL-CCNC: 9 U/L (ref 0–40)
ANION GAP SERPL CALCULATED.3IONS-SCNC: 8 MMOL/L (ref 7–16)
APTT: 26.8 SEC (ref 24.5–35.1)
AST SERPL-CCNC: 14 U/L (ref 0–39)
BILIRUB SERPL-MCNC: 0.3 MG/DL (ref 0–1.2)
BUN BLDV-MCNC: 25 MG/DL (ref 6–23)
CALCIUM SERPL-MCNC: 9.7 MG/DL (ref 8.6–10.2)
CHLORIDE BLD-SCNC: 108 MMOL/L (ref 98–107)
CO2: 21 MMOL/L (ref 22–29)
CREAT SERPL-MCNC: 2.3 MG/DL (ref 0.7–1.2)
EKG ATRIAL RATE: 77 BPM
EKG P AXIS: 65 DEGREES
EKG P-R INTERVAL: 260 MS
EKG Q-T INTERVAL: 362 MS
EKG QRS DURATION: 108 MS
EKG QTC CALCULATION (BAZETT): 409 MS
EKG R AXIS: 35 DEGREES
EKG T AXIS: 73 DEGREES
EKG VENTRICULAR RATE: 77 BPM
GFR AFRICAN AMERICAN: 33
GFR NON-AFRICAN AMERICAN: 27 ML/MIN/1.73
GLUCOSE BLD-MCNC: 87 MG/DL (ref 74–99)
HCT VFR BLD CALC: 22.2 % (ref 37–54)
HCT VFR BLD CALC: 23.5 % (ref 37–54)
HCT VFR BLD CALC: 24.1 % (ref 37–54)
HCT VFR BLD CALC: 26.4 % (ref 37–54)
HEMOGLOBIN: 6.6 G/DL (ref 12.5–16.5)
HEMOGLOBIN: 7 G/DL (ref 12.5–16.5)
HEMOGLOBIN: 7 G/DL (ref 12.5–16.5)
HEMOGLOBIN: 7.6 G/DL (ref 12.5–16.5)
INFLUENZA A: NOT DETECTED
INFLUENZA B: NOT DETECTED
IRON SATURATION: 33 % (ref 20–55)
IRON: 90 MCG/DL (ref 59–158)
POTASSIUM REFLEX MAGNESIUM: 4.7 MMOL/L (ref 3.5–5)
PROCALCITONIN: 0.17 NG/ML (ref 0–0.08)
SARS-COV-2 RNA, RT PCR: NOT DETECTED
SODIUM BLD-SCNC: 137 MMOL/L (ref 132–146)
TOTAL IRON BINDING CAPACITY: 271 MCG/DL (ref 250–450)
TOTAL PROTEIN: 5.8 G/DL (ref 6.4–8.3)

## 2022-10-12 PROCEDURE — 2060000000 HC ICU INTERMEDIATE R&B

## 2022-10-12 PROCEDURE — 6370000000 HC RX 637 (ALT 250 FOR IP): Performed by: ANESTHESIOLOGY

## 2022-10-12 PROCEDURE — 85018 HEMOGLOBIN: CPT

## 2022-10-12 PROCEDURE — 83550 IRON BINDING TEST: CPT

## 2022-10-12 PROCEDURE — 87636 SARSCOV2 & INF A&B AMP PRB: CPT

## 2022-10-12 PROCEDURE — 85730 THROMBOPLASTIN TIME PARTIAL: CPT

## 2022-10-12 PROCEDURE — 83540 ASSAY OF IRON: CPT

## 2022-10-12 PROCEDURE — 84145 PROCALCITONIN (PCT): CPT

## 2022-10-12 PROCEDURE — 80053 COMPREHEN METABOLIC PANEL: CPT

## 2022-10-12 PROCEDURE — 6360000002 HC RX W HCPCS: Performed by: STUDENT IN AN ORGANIZED HEALTH CARE EDUCATION/TRAINING PROGRAM

## 2022-10-12 PROCEDURE — 85014 HEMATOCRIT: CPT

## 2022-10-12 PROCEDURE — 6370000000 HC RX 637 (ALT 250 FOR IP): Performed by: FAMILY MEDICINE

## 2022-10-12 PROCEDURE — 36415 COLL VENOUS BLD VENIPUNCTURE: CPT

## 2022-10-12 PROCEDURE — A4216 STERILE WATER/SALINE, 10 ML: HCPCS | Performed by: STUDENT IN AN ORGANIZED HEALTH CARE EDUCATION/TRAINING PROGRAM

## 2022-10-12 PROCEDURE — 2700000000 HC OXYGEN THERAPY PER DAY

## 2022-10-12 PROCEDURE — 6360000002 HC RX W HCPCS: Performed by: NURSE ANESTHETIST, CERTIFIED REGISTERED

## 2022-10-12 PROCEDURE — 71045 X-RAY EXAM CHEST 1 VIEW: CPT

## 2022-10-12 PROCEDURE — 2580000003 HC RX 258: Performed by: FAMILY MEDICINE

## 2022-10-12 PROCEDURE — 6370000000 HC RX 637 (ALT 250 FOR IP): Performed by: INTERNAL MEDICINE

## 2022-10-12 PROCEDURE — 94640 AIRWAY INHALATION TREATMENT: CPT

## 2022-10-12 PROCEDURE — 2580000003 HC RX 258: Performed by: STUDENT IN AN ORGANIZED HEALTH CARE EDUCATION/TRAINING PROGRAM

## 2022-10-12 PROCEDURE — C9113 INJ PANTOPRAZOLE SODIUM, VIA: HCPCS | Performed by: STUDENT IN AN ORGANIZED HEALTH CARE EDUCATION/TRAINING PROGRAM

## 2022-10-12 RX ORDER — CALCIUM CARBONATE 200(500)MG
500 TABLET,CHEWABLE ORAL 3 TIMES DAILY PRN
Status: DISCONTINUED | OUTPATIENT
Start: 2022-10-12 | End: 2022-10-18 | Stop reason: HOSPADM

## 2022-10-12 RX ORDER — LANOLIN ALCOHOL/MO/W.PET/CERES
3 CREAM (GRAM) TOPICAL NIGHTLY PRN
Status: DISCONTINUED | OUTPATIENT
Start: 2022-10-12 | End: 2022-10-18 | Stop reason: HOSPADM

## 2022-10-12 RX ORDER — IPRATROPIUM BROMIDE AND ALBUTEROL SULFATE 2.5; .5 MG/3ML; MG/3ML
1 SOLUTION RESPIRATORY (INHALATION)
Status: DISCONTINUED | OUTPATIENT
Start: 2022-10-12 | End: 2022-10-18 | Stop reason: HOSPADM

## 2022-10-12 RX ORDER — SODIUM CHLORIDE 9 MG/ML
INJECTION, SOLUTION INTRAVENOUS PRN
Status: DISCONTINUED | OUTPATIENT
Start: 2022-10-12 | End: 2022-10-18 | Stop reason: HOSPADM

## 2022-10-12 RX ORDER — PROPOFOL 10 MG/ML
INJECTION, EMULSION INTRAVENOUS CONTINUOUS PRN
Status: DISCONTINUED | OUTPATIENT
Start: 2022-10-12 | End: 2022-10-12 | Stop reason: SDUPTHER

## 2022-10-12 RX ORDER — HYDRALAZINE HYDROCHLORIDE 20 MG/ML
10 INJECTION INTRAMUSCULAR; INTRAVENOUS EVERY 6 HOURS PRN
Status: DISCONTINUED | OUTPATIENT
Start: 2022-10-12 | End: 2022-10-18 | Stop reason: HOSPADM

## 2022-10-12 RX ORDER — IPRATROPIUM BROMIDE AND ALBUTEROL SULFATE 2.5; .5 MG/3ML; MG/3ML
1 SOLUTION RESPIRATORY (INHALATION) ONCE
Status: COMPLETED | OUTPATIENT
Start: 2022-10-12 | End: 2022-10-12

## 2022-10-12 RX ADMIN — SODIUM CHLORIDE, PRESERVATIVE FREE 40 MG: 5 INJECTION INTRAVENOUS at 22:54

## 2022-10-12 RX ADMIN — PROPOFOL 100 MCG/KG/MIN: 10 INJECTION, EMULSION INTRAVENOUS at 07:57

## 2022-10-12 RX ADMIN — ATORVASTATIN CALCIUM 40 MG: 40 TABLET, FILM COATED ORAL at 19:50

## 2022-10-12 RX ADMIN — SODIUM CHLORIDE, PRESERVATIVE FREE 10 ML: 5 INJECTION INTRAVENOUS at 12:26

## 2022-10-12 RX ADMIN — LATANOPROST 1 DROP: 50 SOLUTION OPHTHALMIC at 19:48

## 2022-10-12 RX ADMIN — BRIMONIDINE TARTRATE 1 DROP: 2 SOLUTION OPHTHALMIC at 19:48

## 2022-10-12 RX ADMIN — ACETAMINOPHEN 650 MG: 325 TABLET, FILM COATED ORAL at 22:53

## 2022-10-12 RX ADMIN — IPRATROPIUM BROMIDE AND ALBUTEROL SULFATE 1 AMPULE: 2.5; .5 SOLUTION RESPIRATORY (INHALATION) at 11:55

## 2022-10-12 RX ADMIN — DORZOLAMIDE HYDROCHLORIDE 1 DROP: 20 SOLUTION/ DROPS OPHTHALMIC at 19:48

## 2022-10-12 RX ADMIN — POLYVINYL ALCOHOL 1 DROP: 14 SOLUTION/ DROPS OPHTHALMIC at 14:58

## 2022-10-12 RX ADMIN — SODIUM CHLORIDE, PRESERVATIVE FREE 40 MG: 5 INJECTION INTRAVENOUS at 00:13

## 2022-10-12 RX ADMIN — DOCUSATE SODIUM 100 MG: 100 CAPSULE, LIQUID FILLED ORAL at 19:50

## 2022-10-12 RX ADMIN — IPRATROPIUM BROMIDE AND ALBUTEROL SULFATE 1 AMPULE: 2.5; .5 SOLUTION RESPIRATORY (INHALATION) at 20:09

## 2022-10-12 RX ADMIN — IPRATROPIUM BROMIDE AND ALBUTEROL SULFATE 1 AMPULE: 2.5; .5 SOLUTION RESPIRATORY (INHALATION) at 15:24

## 2022-10-12 RX ADMIN — SODIUM CHLORIDE, PRESERVATIVE FREE 40 MG: 5 INJECTION INTRAVENOUS at 12:13

## 2022-10-12 RX ADMIN — SODIUM CHLORIDE, PRESERVATIVE FREE 10 ML: 5 INJECTION INTRAVENOUS at 19:49

## 2022-10-12 RX ADMIN — IPRATROPIUM BROMIDE AND ALBUTEROL SULFATE 1 AMPULE: .5; 3 SOLUTION RESPIRATORY (INHALATION) at 08:33

## 2022-10-12 RX ADMIN — FERROUS SULFATE TAB 325 MG (65 MG ELEMENTAL FE) 325 MG: 325 (65 FE) TAB at 14:24

## 2022-10-12 RX ADMIN — METOPROLOL SUCCINATE 75 MG: 50 TABLET, EXTENDED RELEASE ORAL at 12:12

## 2022-10-12 RX ADMIN — POLYVINYL ALCOHOL 1 DROP: 14 SOLUTION/ DROPS OPHTHALMIC at 19:49

## 2022-10-12 RX ADMIN — FERROUS SULFATE TAB 325 MG (65 MG ELEMENTAL FE) 325 MG: 325 (65 FE) TAB at 17:57

## 2022-10-12 RX ADMIN — SODIUM CHLORIDE: 9 INJECTION, SOLUTION INTRAVENOUS at 12:17

## 2022-10-12 ASSESSMENT — PAIN DESCRIPTION - LOCATION: LOCATION: COCCYX

## 2022-10-12 ASSESSMENT — PAIN DESCRIPTION - DESCRIPTORS: DESCRIPTORS: ACHING

## 2022-10-12 ASSESSMENT — PAIN SCALES - GENERAL: PAINLEVEL_OUTOF10: 3

## 2022-10-12 NOTE — H&P
Hospitalist History & Physical      PCP: No primary care provider on file. Date of Service: Pt seen/examined on 10/11/2022    Chief Complaint:  had concerns including Dizziness and Fatigue (Patient has been very tired and dizzy since last week. He went to have lab work done and he was told hs blood count was 2 and to go to the ER.). History Of Present Illness:    Mr. Ellender Barthel, a [de-identified]y.o. year old male  who  has a past medical history of Disease of blood and blood forming organ and Hypertension. Patient presented to the emergency department with fatigue and low hemoglobin. Patient was seen by Gena Kohler earlier today and was told that his blood count was low that he did go to the emergency department. Patient reports having black stool. Symptoms started about 4 days ago. Worse with light exertion. Denies fever, chills, nausea, vomiting, chest pain, shortness of breath. Vital signs within normal limits and stable. Laboratory studies demonstrate BUN 27, creatinine 2.4, glucose 102, hemoglobin 6.8. Patient was transfused 1 unit of packed red blood cells and medicine was consulted for admission. Past Medical History:   Diagnosis Date    Disease of blood and blood forming organ     Hypertension        Past Surgical History:   Procedure Laterality Date    ABDOMINAL AORTIC ANEURYSM REPAIR         Prior to Admission medications    Medication Sig Start Date End Date Taking?  Authorizing Provider   pantoprazole (PROTONIX) 40 MG tablet Take 1 tablet by mouth every morning (before breakfast) 5/8/22   Anisha Hroton MD   acetaminophen (TYLENOL) 325 MG tablet Take 325 mg by mouth every 6 hours as needed for Pain    Historical Provider, MD   aspirin 81 MG chewable tablet Take 81 mg by mouth daily    Historical Provider, MD   atorvastatin (LIPITOR) 40 MG tablet Take 40 mg by mouth nightly    Historical Provider, MD   Cholecalciferol (VITAMIN D3) 125 MCG (5000 UT) TABS Take 1,000 Units by mouth Historical Provider, MD   docusate sodium (COLACE) 100 MG capsule Take 100 mg by mouth 2 times daily    Historical Provider, MD   ferrous sulfate (FE TABS 325) 325 (65 Fe) MG EC tablet Take 325 mg by mouth 3 times daily (with meals)    Historical Provider, MD   furosemide (LASIX) 20 MG tablet Take 20 mg by mouth 2 times daily    Historical Provider, MD   lisinopril (PRINIVIL;ZESTRIL) 40 MG tablet Take 40 mg by mouth 2 times daily    Historical Provider, MD   metoprolol succinate (TOPROL XL) 50 MG extended release tablet Take 75 mg by mouth in the morning and at bedtime    Historical Provider, MD   Multiple Vitamins-Minerals (THERAPEUTIC MULTIVITAMIN-MINERALS) tablet Take 1 tablet by mouth daily    Historical Provider, MD   polyethylene glycol (GLYCOLAX) 17 g packet Take 17 g by mouth daily as needed for Constipation    Historical Provider, MD   carboxymethylcellulose 1 % ophthalmic solution 1 drop 3 times daily    Historical Provider, MD   brinzolamide-brimonidine 1-0.2 % SUSP Apply 1 drop to eye 2 times daily Left eye    Historical Provider, MD   latanoprost (XALATAN) 0.005 % ophthalmic solution Place 1 drop into both eyes daily    Historical Provider, MD         Allergies:  Patient has no known allergies. Social History:    TOBACCO:   reports that he has never smoked. He has never used smokeless tobacco.  ETOH:   reports no history of alcohol use. Family History:    Reviewed in detail and negative for DM, CAD, Cancer, CVA. Positive as follows\"  No family history on file. REVIEW OF SYSTEMS:   Pertinent positives as noted in the HPI. All other systems reviewed and negative. PHYSICAL EXAM:  BP (!) 116/56   Pulse 75   Temp 98.1 °F (36.7 °C) (Oral)   Resp 16   Ht 5' 11\" (1.803 m)   Wt 196 lb (88.9 kg)   SpO2 98%   BMI 27.34 kg/m²   General appearance: No apparent distress, appears stated age and cooperative. HEENT: Normal cephalic, atraumatic without obvious deformity.  Pupils equal, round, and reactive to light. Extra ocular muscles intact. Conjunctivae/corneas clear. Neck: Supple, with full range of motion. No jugular venous distention. Trachea midline. Respiratory: CTA. Cardiovascular: RRR  Abdomen: S/NT/ND  Musculoskeletal: No clubbing, cyanosis, edema of bilateral lower extremities. Brisk capillary refill. Skin: Normal skin color. No rashes or lesions. Neurologic:  Neurovascularly intact without any focal sensory/motor deficits. Cranial nerves: II-XII intact, grossly non-focal.  Psychiatric: Alert and oriented, thought content appropriate, normal insight    Reviewed EKG and CXR personally      CBC:   Recent Labs     10/11/22  1748   WBC 5.6   RBC 2.41*   HGB 6.8*   HCT 22.5*   MCV 93.4   RDW 15.4*        BMP:   Recent Labs     10/11/22  1748      K 4.7      CO2 19*   BUN 27*   CREATININE 2.4*     LFT:  Recent Labs     10/11/22  1748   PROT 6.4   ALKPHOS 97   ALT 10   AST 14   BILITOT <0.2     CE:  No results for input(s): Inez Zelaya in the last 72 hours. PT/INR:   Recent Labs     10/11/22  1748   INR 1.0     BNP: No results for input(s): BNP in the last 72 hours. ESR: No results found for: SEDRATE  CRP: No results found for: CRP  D Dimer: No results found for: DDIMER   Folate and B12: No results found for: [id number], No results found for: FOLATE  Lactic Acid: No results found for: LACTA  Thyroid Studies: No results found for: TSH, V3HUYSQ, D3ABNSG, THYROIDAB    Oupatient labs:  Lab Results   Component Value Date    INR 1.0 10/11/2022       Urinalysis:  No results found for: NITRU, WBCUA, BACTERIA, RBCUA, BLOODU, SPECGRAV, GLUCOSEU    Imaging:  No results found.     ASSESSMENT:  -Gastrointestinal hemorrhage  -Acute blood loss anemia  -CKD stage IV  -Hypertension  -GERD      PLAN:  -Admit to medicine  -Consult general surgery  -Monitor hemoglobin levels  -Transfuse if hemoglobin less than 7.0  -Protonix IV push panel  -Normal saline 75 mL/h  -Continue home medications        Diet: No diet orders on file  Code Status: Prior  Surrogate decision maker confirmed with patient:   Extended Emergency Contact Information  Primary Emergency Contact: mariah martinez  Mobile Phone: [phone]  Relation: Child   needed? No    DVT Prophylaxis: []Lovenox []Heparin []PCD [] [address] []Encouraged ambulation  Disposition: []Med/Surg [] Intermediate [] ICU/CCU  Admit status: [] Observation [] Inpatient     +++++++++++++++++++++++++++++++++++++++++++++++++  Bettylou Sicks, DO  +++++++++++++++++++++++++++++++++++++++++++++++++  NOTE: This report was transcribed using voice recognition software. Every effort was made to ensure accuracy; however, inadvertent computerized transcription errors may be present.

## 2022-10-12 NOTE — CARE COORDINATION
Spoke with patient. He is from home, lives alone. He does not use assistive devices. 2=3 steps to enter the home, 1 floor set up with washer/dryer in the basement. PCP is Dr. Davon Mensah at the [place]. No current homecare services. He tells me he thinks he has POA paperwork for his daughter, Arslan Agosto, he does have other children that do not live in the area. He drove himself here and plans to drive himself home. Only VA listed, I asked patient if he has medicare or another insurance. 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 Emailed [facility] to see if there is a medicare number for patient. . [facility] notified of admission, left a voicemail with the transfer center. Will check to see if he is service connnected. EGD today aborted at the beginning of procedure. PBO was able to locate [facility] policy for patient. He does want his VA coverage to be primary for him here. Spoke with Leon Ball at the [place] and sent clinicals to [phone]. Patient is NOT service connected. For questions I can be reached at [phone].  Evelia [place]

## 2022-10-12 NOTE — ANESTHESIA PRE PROCEDURE
Department of Anesthesiology  Preprocedure Note       Name:  Jayna Elizondo   Age:  [de-identified] y.o.  :  1940                                          MRN:  [id number]         Date:  10/12/2022      Surgeon: Jesus Herr):  Georgie Byers MD    Procedure: Procedure(s):  EGD ESOPHAGOGASTRODUODENOSCOPY    Medications prior to admission:   Prior to Admission medications    Medication Sig Start Date End Date Taking?  Authorizing Provider   pantoprazole (PROTONIX) 40 MG tablet Take 1 tablet by mouth every morning (before breakfast) 22   Parish Mcqueen MD   acetaminophen (TYLENOL) 325 MG tablet Take 325 mg by mouth every 6 hours as needed for Pain    Historical Provider, MD   aspirin 81 MG chewable tablet Take 81 mg by mouth daily    Historical Provider, MD   atorvastatin (LIPITOR) 40 MG tablet Take 40 mg by mouth nightly    Historical Provider, MD   Cholecalciferol (VITAMIN D3) 125 MCG (5000 UT) TABS Take 1,000 Units by mouth     Historical Provider, MD   docusate sodium (COLACE) 100 MG capsule Take 100 mg by mouth 2 times daily    Historical Provider, MD   ferrous sulfate (FE TABS 325) 325 (65 Fe) MG EC tablet Take 325 mg by mouth 3 times daily (with meals)    Historical Provider, MD   furosemide (LASIX) 20 MG tablet Take 20 mg by mouth 2 times daily    Historical Provider, MD   lisinopril (PRINIVIL;ZESTRIL) 40 MG tablet Take 40 mg by mouth 2 times daily    Historical Provider, MD   metoprolol succinate (TOPROL XL) 50 MG extended release tablet Take 75 mg by mouth in the morning and at bedtime    Historical Provider, MD   Multiple Vitamins-Minerals (THERAPEUTIC MULTIVITAMIN-MINERALS) tablet Take 1 tablet by mouth daily    Historical Provider, MD   polyethylene glycol (GLYCOLAX) 17 g packet Take 17 g by mouth daily as needed for Constipation    Historical Provider, MD   carboxymethylcellulose 1 % ophthalmic solution 1 drop 3 times daily    Historical Provider, MD   brinzolamide-brimonidine 1-0.2 % SUSP Apply 1 drop to eye 2 times daily Left eye    Historical Provider, MD   latanoprost (XALATAN) 0.005 % ophthalmic solution Place 1 drop into both eyes daily    Historical Provider, MD       Current medications:    Current Facility-Administered Medications   Medication Dose Route Frequency Provider Last Rate Last Admin    calcium carbonate (TUMS) chewable tablet 500 mg  500 mg Oral TID PRN Jairon , DO        melatonin tablet 3 mg  3 mg Oral Nightly PRN Jairon Realo, DO        hydrALAZINE (APRESOLINE) injection 10 mg  10 mg IntraVENous Q6H PRN Adam Le DO        [Held by provider] aspirin chewable tablet 81 mg  81 mg Oral Daily Kaitlyn Rojas, DO        atorvastatin (LIPITOR) tablet 40 mg  40 mg Oral Nightly Kaitlyn Rojas, DO   40 mg at 10/11/22 2250    polyvinyl alcohol (LIQUIFILM TEARS) 1.4 % ophthalmic solution 1 drop  1 drop Both Eyes TID Kaitlyn Rojas, DO   1 drop at 10/11/22 2252    docusate sodium (COLACE) capsule 100 mg  100 mg Oral BID Kaitlyn Rojas, DO   100 mg at 10/11/22 2250    ferrous sulfate (IRON 325) tablet 325 mg  325 mg Oral TID WC Kaitlyn Rojas, DO        [Held by provider] furosemide (LASIX) tablet 20 mg  20 mg Oral BID Kaitlyn Rojas, DO        latanoprost (XALATAN) 0.005 % ophthalmic solution 1 drop  1 drop Both Eyes Daily Kaitlyn Rojas, DO   1 drop at 10/11/22 2252    lisinopril (PRINIVIL;ZESTRIL) tablet 40 mg  40 mg Oral BID Kaitlyn Rojas, DO        metoprolol succinate (TOPROL XL) extended release tablet 75 mg  75 mg Oral BID Kaitlyn Rojas, DO   75 mg at 10/11/22 2250    sodium chloride flush 0.9 % injection 5-40 mL  5-40 mL IntraVENous 2 times per day Kaitlyn Rojas, DO   10 mL at 10/11/22 2249    sodium chloride flush 0.9 % injection 5-40 mL  5-40 mL IntraVENous PRN Kaitlyn Rojas, DO        0.9 % sodium chloride infusion   IntraVENous PRN Kaitlyn Rojas, DO        ondansetron (ZOFRAN-ODT) disintegrating tablet 4 mg  4 mg Oral Q8H PRN Kaitlyn Rojas, DO        Or  ondansetron (ZOFRAN) injection 4 mg  4 mg IntraVENous Q6H PRN Lenord Farm, DO        acetaminophen (TYLENOL) tablet 650 mg  650 mg Oral Q6H PRN Lenord Farm, DO        Or    acetaminophen (TYLENOL) suppository 650 mg  650 mg Rectal Q6H PRN Lenord Farm, DO        0.9 % sodium chloride infusion   IntraVENous Continuous Lenord Farm, DO 75 mL/hr at 10/11/22 2255 New Bag at 10/11/22 2255    dorzolamide (TRUSOPT) 2 % ophthalmic solution 1 drop  1 drop Ophthalmic BID Lenord Farm, DO   1 drop at 10/11/22 2252    And    brimonidine (ALPHAGAN) 0.2 % ophthalmic solution 1 drop  1 drop Ophthalmic BID Lenord Farm, DO   1 drop at 10/11/22 2252    pantoprazole (PROTONIX) 40 mg in sodium chloride (PF) 10 mL injection  40 mg IntraVENous Q12H Елена Parker MD   40 mg at 10/12/22 0013       Allergies:  No Known Allergies    Problem List:    Patient Active Problem List   Diagnosis Code    Anemia D64.9    Rectal bleed K62.5    GI bleed K92.2       Past Medical History:        Diagnosis Date    Disease of blood and blood forming organ     Hypertension        Past Surgical History:        Procedure Laterality Date    ABDOMINAL AORTIC ANEURYSM REPAIR         Social History:    Social History     Tobacco Use    Smoking status: Never    Smokeless tobacco: Never   Substance Use Topics    Alcohol use: Never                                Counseling given: Not Answered      Vital Signs (Current):   Vitals:    10/11/22 2005 10/11/22 2030 10/11/22 2100 10/11/22 2215   BP:  117/64 (!) 116/56 123/61   Pulse:  75 75 78   Resp:  14 16 16   Temp: 36.7 °C (98.1 °F)  36.7 °C (98.1 °F) 36.7 °C (98 °F)   TempSrc: Oral  Oral Oral   SpO2:  98% 98% 97%   Weight:       Height:                                                  BP Readings from Last 3 Encounters:   10/11/22 123/61   05/07/22 117/69       NPO Status:                                                                                 BMI:   Wt Readings from Last 3 Encounters:   10/11/22 196 lb (88.9 kg)   05/05/22 212 lb (96.2 kg)     Body mass index is 27.34 kg/m². CBC:   Lab Results   Component Value Date/Time    WBC 5.6 10/11/2022 05:48 PM    RBC 2.41 10/11/2022 05:48 PM    HGB 7.0 10/12/2022 03:24 AM    HCT 23.5 10/12/2022 03:24 AM    MCV 93.4 10/11/2022 05:48 PM    RDW 15.4 10/11/2022 05:48 PM     10/11/2022 05:48 PM       CMP:   Lab Results   Component Value Date/Time     10/12/2022 03:21 AM    K 4.7 10/12/2022 03:21 AM     10/12/2022 03:21 AM    CO2 21 10/12/2022 03:21 AM    BUN 25 10/12/2022 03:21 AM    CREATININE 2.3 10/12/2022 03:21 AM    GFRAA 33 10/12/2022 03:21 AM    LABGLOM 27 10/12/2022 03:21 AM    GLUCOSE 87 10/12/2022 03:21 AM    PROT 5.8 10/12/2022 03:21 AM    CALCIUM 9.7 10/12/2022 03:21 AM    BILITOT 0.3 10/12/2022 03:21 AM    ALKPHOS 89 10/12/2022 03:21 AM    AST 14 10/12/2022 03:21 AM    ALT 9 10/12/2022 03:21 AM       POC Tests: No results for input(s): POCGLU, POCNA, POCK, POCCL, POCBUN, POCHEMO, POCHCT in the last 72 hours.     Coags:   Lab Results   Component Value Date/Time    PROTIME 10.8 10/11/2022 05:48 PM    INR 1.0 10/11/2022 05:48 PM    APTT 26.8 10/12/2022 03:21 AM       HCG (If Applicable): No results found for: PREGTESTUR, PREGSERUM, HCG, HCGQUANT     ABGs: No results found for: PHART, PO2ART, IUI0SEJ, CRG5FQV, BEART, Q0ZYMNCX     Type & Screen (If Applicable):  No results found for: LABABO, LABRH    Drug/Infectious Status (If Applicable):  No results found for: HIV, HEPCAB    COVID-19 Screening (If Applicable): No results found for: COVID19        Anesthesia Evaluation  Patient summary reviewed and Nursing notes reviewed no history of anesthetic complications:   Airway: Mallampati: II  TM distance: >3 FB   Neck ROM: full  Mouth opening: > = 3 FB   Dental:    (+) implants      Pulmonary:Negative Pulmonary ROS breath sounds clear to auscultation                             Cardiovascular:  Exercise tolerance: good (>4 METS),   (+) hypertension: no interval change,         Rhythm: regular  Rate: normal                    Neuro/Psych:               GI/Hepatic/Renal:   (+) GERD:,           Endo/Other:                     Abdominal:             Vascular: Other Findings:      hx aaa repair, gi bleed     Anesthesia Plan      MAC     ASA 3       Induction: intravenous. Anesthetic plan and risks discussed with patient. Plan discussed with attending.                     JONATHAN Sullivan - CRNA   10/12/2022

## 2022-10-12 NOTE — PROGRESS NOTES
Hospitalist Progress Note      SYNOPSIS: Patient admitted on 10/11/2022 for GI bleed presented to the emergency department with fatigue and low hemoglobin. Patient was seen by Gena Kohler earlier today and was told that his blood count was low that he did go to the emergency department. Patient reports having black stool. Symptoms started about 4 days ago. Worse with light exertion. Denies fever, chills, nausea, vomiting, chest pain, shortness of breath. Vital signs within normal limits and stable. Laboratory studies demonstrate BUN 27, creatinine 2.4, glucose 102, hemoglobin 6.8. Patient was transfused 1 unit of packed red blood cells and medicine was consulted for admission. General surgery consulted. EGD attempted 10/12 however became hypoxic so was aborted      SUBJECTIVE:  Stable overnight. No other overnight issues reported. Patient seen and examined  Records reviewed. EGD attempted 10/12 however became hypoxic so was aborted        Temp (24hrs), Av.1 °F (36.7 °C), Min:98 °F (36.7 °C), Max:98.2 °F (36.8 °C)    DIET: Diet NPO Exceptions are: Ice Chips  CODE: Full Code    Intake/Output Summary (Last 24 hours) at 10/12/2022 0742  Last data filed at 10/12/2022 0159  Gross per 24 hour   Intake 684.12 ml   Output --   Net 684.12 ml       Review of Systems  All bolded are positive; please see HPI  General:  Fever, chills, diaphoresis, fatigue, malaise, night sweats, weight loss  Psychological:  Anxiety, disorientation, hallucinations. ENT:  Epistaxis, headaches, vertigo, visual changes. Cardiovascular:  Chest pain, irregular heartbeats, palpitations, paroxysmal nocturnal dyspnea. Respiratory:  Shortness of breath, coughing, sputum production, hemoptysis, wheezing, orthopnea.   Gastrointestinal:  Nausea, vomiting, diarrhea, heartburn, constipation, abdominal pain, hematemesis, hematochezia, melena, acholic stools  Genito-Urinary:  Dysuria, urgency, frequency, hematuria  Musculoskeletal:  Joint pain, joint stiffness, joint swelling, muscle pain  Neurology:  Headache, focal neurological deficits, weakness, numbness, paresthesia  Derm:  Rashes, ulcers, excoriations, bruising  Extremities:  Decreased ROM, peripheral edema, mottling      OBJECTIVE:    /61   Pulse 78   Temp 98 °F (36.7 °C) (Oral)   Resp 16   Ht 5' 11\" (1.803 m)   Wt 196 lb (88.9 kg)   SpO2 97%   BMI 27.34 kg/m²     General appearance:  awake, alert, and oriented to person, place, time, and purpose; appears stated age and cooperative; no apparent distress no labored breathing  HEENT:  Conjunctivae/corneas clear. Neck: Supple. No jugular venous distention. Respiratory: symmetrical; clear to auscultation bilaterally; no wheezes; no rhonchi; no rales  Cardiovascular: rhythm regular; rate controlled; no murmurs  Abdomen: Soft, nontender, nondistended  Extremities:  peripheral pulses present; no peripheral edema; no ulcers  Musculoskeletal: No clubbing, cyanosis, no bilateral lower extremity edema. Brisk capillary refill. Skin:  No rashes  on visible skin  Neurologic: awake, alert and following commands     ASSESSMENT and PLAN:    Acute blood loss anemia/melena- general surgery following. Follow H&H. Transfuse for hemoglobin <7. EGD attempted 10/12 however became hypoxic so was aborted. Aspirin held. Acute respiratory insufficiency- dropped into 40s during EGD. Had copious secretions. CXR showing chronic findings, no acute cardiopulmonary disease. Will check COVID-19 and flu. Currently on room air. CKD stage IV  Hypertension  GERD  History open AAA repair    Dispo: await surgery plan.  Follow H&H    Medications:  REVIEWED DAILY    Infusion Medications    sodium chloride      sodium chloride 75 mL/hr at 10/11/22 5705     Scheduled Medications    aspirin  81 mg Oral Daily    atorvastatin  40 mg Oral Nightly    polyvinyl alcohol  1 drop Both Eyes TID    docusate sodium  100 mg Oral BID    ferrous sulfate  325 mg Oral TID WC    furosemide  20 mg Oral BID    latanoprost  1 drop Both Eyes Daily    lisinopril  40 mg Oral BID    metoprolol succinate  75 mg Oral BID    sodium chloride flush  5-40 mL IntraVENous 2 times per day    dorzolamide  1 drop Ophthalmic BID    And    brimonidine  1 drop Ophthalmic BID    pantoprazole (PROTONIX) 40 mg injection  40 mg IntraVENous Q12H     PRN Meds: sodium chloride flush, sodium chloride, ondansetron **OR** ondansetron, acetaminophen **OR** acetaminophen    Labs:     Recent Labs     10/11/22  1748 10/12/22  0324   WBC 5.6  --    HGB 6.8* 7.0*   HCT 22.5* 23.5*     --        Recent Labs     10/11/22  1748 10/12/22  0321    137   K 4.7 4.7    108*   CO2 19* 21*   BUN 27* 25*   CREATININE 2.4* 2.3*   CALCIUM 10.0 9.7       Recent Labs     10/11/22  1748 10/12/22  0321   PROT 6.4 5.8*   ALKPHOS 97 89   ALT 10 9   AST 14 14   BILITOT <0.2 0.3       Recent Labs     10/11/22  1748   INR 1.0       No results for input(s): CKTOTAL, TROPONINI in the last 72 hours. Chronic labs:    Lab Results   Component Value Date    INR 1.0 10/11/2022       Radiology: REVIEWED DAILY    +++++++++++++++++++++++++++++++++++++++++++++++++  DO Roger Vega Physician - [address], [place]  +++++++++++++++++++++++++++++++++++++++++++++++++  NOTE: This report was transcribed using voice recognition software. Every effort was made to ensure accuracy; however, inadvertent computerized transcription errors may be present.

## 2022-10-12 NOTE — PROGRESS NOTES
Patient presented for EGD this am in endoscopy  Once bite block was placed and sedated he began to spasm and cough uncontrollably and dropped his o2 sats requiring bag mask ventilation to get his saturations back up  Decided to abandon procedure secondary to the above airway issues and hypoxia  Once awake the patient did state he has been having increased secretions and difficulty breathing recently    From surgery POV, continue to trend his hb and monitor for continued/increased signs of GIB Continue PPI bid  EGD not completed secondary to above  Will obtain CXR in pacu  Breathing treatment per anesthesia service    Kathy Koch MD  Minimally Invasive General Surgery and Endoscopy  [address] [facility].  [address]: [phone]  F: [phone]    Electronically signed by Gracia Mcfarland MD on 10/12/2022 at 8:24 AM

## 2022-10-12 NOTE — ED NOTES
Nurse to nurse called to [facility]. SBAR faxed.       Ad Miller RN  10/11/22 2124

## 2022-10-12 NOTE — PROGRESS NOTES
Xray here . Spoke to Dr. Ibeth Basilio.   Will continue to watch patient before attempting to to do the procedure again

## 2022-10-12 NOTE — CONSULTS
GENERAL SURGERY  CONSULT NOTE  10/11/2022    Physician Consulted: Dr. Nia Ferris  Reason for Consult: GIB  Referring Physician: Dr. Roge Johnston    [facility]  Justine Bose is a [de-identified] y.o. male with a past medical hx of hypertension, hyperlipidemia, abdominal aortic aneurysm status post open repair who presents for evaluation of anemia. Patient states for the past 3 to 4 weeks he has had very dark-colored black stools and was found to be anemic on routine outpatient lab work done last week. He was sent to the ED for further evaluation with his PCP and received 1 unit of blood while in the emergency department for hemoglobin of 6.8. Repeat hemoglobin is pending. He denies any nausea, vomiting, chest pain, shortness of breath however he does endorse some significant fatigue especially with activity. He does take an iron pill and was found to be FOBT positive in the emergency department. His last colonoscopy was back in May of this year during which she had a polypectomy as well as some post scope bleeding for which she was evaluated at this hospital.  Was never had an EGD before and does not take any anticoagulation however he does take a daily aspirin at the recommendation of his PCP. Denies any hematemesis or change in appetite. Past Medical History:   Diagnosis Date    Disease of blood and blood forming organ     Hypertension        Past Surgical History:   Procedure Laterality Date    ABDOMINAL AORTIC ANEURYSM REPAIR         Medications Prior to Admission    Prior to Admission medications    Medication Sig Start Date End Date Taking?  Authorizing Provider   pantoprazole (PROTONIX) 40 MG tablet Take 1 tablet by mouth every morning (before breakfast) 5/8/22   Rob Yun MD   acetaminophen (TYLENOL) 325 MG tablet Take 325 mg by mouth every 6 hours as needed for Pain    Historical Provider, MD   aspirin 81 MG chewable tablet Take 81 mg by mouth daily    Historical Provider, MD   atorvastatin (LIPITOR) 40 MG tablet Take 40 mg by mouth nightly    Historical Provider, MD   Cholecalciferol (VITAMIN D3) 125 MCG (5000 UT) TABS Take 1,000 Units by mouth     Historical Provider, MD   docusate sodium (COLACE) 100 MG capsule Take 100 mg by mouth 2 times daily    Historical Provider, MD   ferrous sulfate (FE TABS 325) 325 (65 Fe) MG EC tablet Take 325 mg by mouth 3 times daily (with meals)    Historical Provider, MD   furosemide (LASIX) 20 MG tablet Take 20 mg by mouth 2 times daily    Historical Provider, MD   lisinopril (PRINIVIL;ZESTRIL) 40 MG tablet Take 40 mg by mouth 2 times daily    Historical Provider, MD   metoprolol succinate (TOPROL XL) 50 MG extended release tablet Take 75 mg by mouth in the morning and at bedtime    Historical Provider, MD   Multiple Vitamins-Minerals (THERAPEUTIC MULTIVITAMIN-MINERALS) tablet Take 1 tablet by mouth daily    Historical Provider, MD   polyethylene glycol (GLYCOLAX) 17 g packet Take 17 g by mouth daily as needed for Constipation    Historical Provider, MD   carboxymethylcellulose 1 % ophthalmic solution 1 drop 3 times daily    Historical Provider, MD   brinzolamide-brimonidine 1-0.2 % SUSP Apply 1 drop to eye 2 times daily Left eye    Historical Provider, MD   latanoprost (XALATAN) 0.005 % ophthalmic solution Place 1 drop into both eyes daily    Historical Provider, MD       No Known Allergies    No family history on file. Social History     Tobacco Use    Smoking status: Never    Smokeless tobacco: Never   Substance Use Topics    Alcohol use: Never    Drug use: Never         Review of Systems:   Review of Systems   Constitutional:  Positive for fatigue. Negative for chills and fever. Respiratory:  Negative for cough and shortness of breath. Cardiovascular:  Negative for chest pain. Gastrointestinal:  Negative for abdominal distention, abdominal pain, constipation, diarrhea, nausea and vomiting. Skin:  Negative for color change.    Neurological:  Positive for light-headedness. PHYSICAL EXAM:    Vitals:    10/11/22 2215   BP: 123/61   Pulse: 78   Resp: 16   Temp: 98 °F (36.7 °C)   SpO2: 97%       GENERAL:  NAD. A&Ox3. HEAD:  Normocephalic. Atraumatic. EYES:   No scleral icterus. PERRL. Conjunctiva pale   LUNGS: no acute respiratory distress. CARDIOVASCULAR: Hemodynamically stable, Regular rate   ABDOMEN:  Soft, non-distended, non-tender. No guarding, rigidity, rebound. EXTREMITIES:   MAEx4. Atraumatic. No LE edema. SKIN:  Warm and dry  NEUROLOGIC:  No focal neurologic deficits  RECTAL: No hemorrhoids. FOBT +, no masses palpated      ASSESSMENT/PLAN:  [de-identified] y.o. male with acute on chronic normocytic anemia with melenic stools past few weeks.     Plan  -Posttransfusion CBC pending trend hemoglobin every 6 hours  -Protonix twice daily  -Maintain 2 large-bore Ivs  -Transfuse for hemoglobin less than 7 or if patient becomes unstable  -N.p.o. sips with meds  -Recommend holding aspirin  -We will discuss timing of scopes    Plan will be discussed with Dr. Jessika Diehl MD  Surgery Resident PGY-2  10/11/2022  11:21 PM

## 2022-10-13 ENCOUNTER — APPOINTMENT (OUTPATIENT)
Dept: GENERAL RADIOLOGY | Age: 82
DRG: 812 | End: 2022-10-13
Payer: OTHER GOVERNMENT

## 2022-10-13 LAB
ANION GAP SERPL CALCULATED.3IONS-SCNC: 10 MMOL/L (ref 7–16)
BLOOD BANK DISPENSE STATUS: NORMAL
BLOOD BANK DISPENSE STATUS: NORMAL
BLOOD BANK PRODUCT CODE: NORMAL
BLOOD BANK PRODUCT CODE: NORMAL
BPU ID: NORMAL
BPU ID: NORMAL
BUN BLDV-MCNC: 24 MG/DL (ref 6–23)
CALCIUM SERPL-MCNC: 9.3 MG/DL (ref 8.6–10.2)
CHLORIDE BLD-SCNC: 108 MMOL/L (ref 98–107)
CO2: 19 MMOL/L (ref 22–29)
CREAT SERPL-MCNC: 2.1 MG/DL (ref 0.7–1.2)
DESCRIPTION BLOOD BANK: NORMAL
DESCRIPTION BLOOD BANK: NORMAL
GFR AFRICAN AMERICAN: 37
GFR NON-AFRICAN AMERICAN: 30 ML/MIN/1.73
GLUCOSE BLD-MCNC: 82 MG/DL (ref 74–99)
HCT VFR BLD CALC: 22.1 % (ref 37–54)
HCT VFR BLD CALC: 22.9 % (ref 37–54)
HCT VFR BLD CALC: 24.2 % (ref 37–54)
HCT VFR BLD CALC: 25.9 % (ref 37–54)
HEMOGLOBIN: 6.8 G/DL (ref 12.5–16.5)
HEMOGLOBIN: 7.2 G/DL (ref 12.5–16.5)
HEMOGLOBIN: 7.4 G/DL (ref 12.5–16.5)
HEMOGLOBIN: 7.7 G/DL (ref 12.5–16.5)
MCH RBC QN AUTO: 28.9 PG (ref 26–35)
MCHC RBC AUTO-ENTMCNC: 31.4 % (ref 32–34.5)
MCV RBC AUTO: 92 FL (ref 80–99.9)
PDW BLD-RTO: 15.1 FL (ref 11.5–15)
PLATELET # BLD: 187 E9/L (ref 130–450)
PMV BLD AUTO: 10.2 FL (ref 7–12)
POTASSIUM SERPL-SCNC: 4.3 MMOL/L (ref 3.5–5)
RBC # BLD: 2.49 E12/L (ref 3.8–5.8)
SODIUM BLD-SCNC: 137 MMOL/L (ref 132–146)
WBC # BLD: 3.8 E9/L (ref 4.5–11.5)

## 2022-10-13 PROCEDURE — 6370000000 HC RX 637 (ALT 250 FOR IP): Performed by: INTERNAL MEDICINE

## 2022-10-13 PROCEDURE — 36430 TRANSFUSION BLD/BLD COMPNT: CPT

## 2022-10-13 PROCEDURE — C9113 INJ PANTOPRAZOLE SODIUM, VIA: HCPCS | Performed by: STUDENT IN AN ORGANIZED HEALTH CARE EDUCATION/TRAINING PROGRAM

## 2022-10-13 PROCEDURE — 6370000000 HC RX 637 (ALT 250 FOR IP): Performed by: FAMILY MEDICINE

## 2022-10-13 PROCEDURE — 71045 X-RAY EXAM CHEST 1 VIEW: CPT

## 2022-10-13 PROCEDURE — 2580000003 HC RX 258: Performed by: FAMILY MEDICINE

## 2022-10-13 PROCEDURE — 85018 HEMOGLOBIN: CPT

## 2022-10-13 PROCEDURE — 2060000000 HC ICU INTERMEDIATE R&B

## 2022-10-13 PROCEDURE — 85014 HEMATOCRIT: CPT

## 2022-10-13 PROCEDURE — A4216 STERILE WATER/SALINE, 10 ML: HCPCS | Performed by: STUDENT IN AN ORGANIZED HEALTH CARE EDUCATION/TRAINING PROGRAM

## 2022-10-13 PROCEDURE — 36415 COLL VENOUS BLD VENIPUNCTURE: CPT

## 2022-10-13 PROCEDURE — 80048 BASIC METABOLIC PNL TOTAL CA: CPT

## 2022-10-13 PROCEDURE — 6360000002 HC RX W HCPCS: Performed by: STUDENT IN AN ORGANIZED HEALTH CARE EDUCATION/TRAINING PROGRAM

## 2022-10-13 PROCEDURE — 85027 COMPLETE CBC AUTOMATED: CPT

## 2022-10-13 PROCEDURE — 2580000003 HC RX 258: Performed by: STUDENT IN AN ORGANIZED HEALTH CARE EDUCATION/TRAINING PROGRAM

## 2022-10-13 PROCEDURE — 94640 AIRWAY INHALATION TREATMENT: CPT

## 2022-10-13 RX ADMIN — IPRATROPIUM BROMIDE AND ALBUTEROL SULFATE 1 AMPULE: 2.5; .5 SOLUTION RESPIRATORY (INHALATION) at 19:46

## 2022-10-13 RX ADMIN — FERROUS SULFATE TAB 325 MG (65 MG ELEMENTAL FE) 325 MG: 325 (65 FE) TAB at 17:15

## 2022-10-13 RX ADMIN — SODIUM CHLORIDE, PRESERVATIVE FREE 40 MG: 5 INJECTION INTRAVENOUS at 21:57

## 2022-10-13 RX ADMIN — BRIMONIDINE TARTRATE 1 DROP: 2 SOLUTION OPHTHALMIC at 10:03

## 2022-10-13 RX ADMIN — MELATONIN 3 MG ORAL TABLET 3 MG: 3 TABLET ORAL at 20:09

## 2022-10-13 RX ADMIN — FERROUS SULFATE TAB 325 MG (65 MG ELEMENTAL FE) 325 MG: 325 (65 FE) TAB at 11:43

## 2022-10-13 RX ADMIN — DORZOLAMIDE HYDROCHLORIDE 1 DROP: 20 SOLUTION/ DROPS OPHTHALMIC at 10:03

## 2022-10-13 RX ADMIN — LATANOPROST 1 DROP: 50 SOLUTION OPHTHALMIC at 20:08

## 2022-10-13 RX ADMIN — SODIUM CHLORIDE, PRESERVATIVE FREE 10 ML: 5 INJECTION INTRAVENOUS at 21:58

## 2022-10-13 RX ADMIN — POLYVINYL ALCOHOL 1 DROP: 14 SOLUTION/ DROPS OPHTHALMIC at 10:03

## 2022-10-13 RX ADMIN — SODIUM CHLORIDE, PRESERVATIVE FREE 40 MG: 5 INJECTION INTRAVENOUS at 11:43

## 2022-10-13 RX ADMIN — BRIMONIDINE TARTRATE 1 DROP: 2 SOLUTION OPHTHALMIC at 20:08

## 2022-10-13 RX ADMIN — SODIUM CHLORIDE: 9 INJECTION, SOLUTION INTRAVENOUS at 05:07

## 2022-10-13 RX ADMIN — IPRATROPIUM BROMIDE AND ALBUTEROL SULFATE 1 AMPULE: 2.5; .5 SOLUTION RESPIRATORY (INHALATION) at 11:33

## 2022-10-13 RX ADMIN — POLYVINYL ALCOHOL 1 DROP: 14 SOLUTION/ DROPS OPHTHALMIC at 15:02

## 2022-10-13 RX ADMIN — ATORVASTATIN CALCIUM 40 MG: 40 TABLET, FILM COATED ORAL at 20:09

## 2022-10-13 RX ADMIN — DORZOLAMIDE HYDROCHLORIDE 1 DROP: 20 SOLUTION/ DROPS OPHTHALMIC at 20:08

## 2022-10-13 RX ADMIN — FERROUS SULFATE TAB 325 MG (65 MG ELEMENTAL FE) 325 MG: 325 (65 FE) TAB at 09:10

## 2022-10-13 RX ADMIN — DOCUSATE SODIUM 100 MG: 100 CAPSULE, LIQUID FILLED ORAL at 09:10

## 2022-10-13 RX ADMIN — DOCUSATE SODIUM 100 MG: 100 CAPSULE, LIQUID FILLED ORAL at 20:09

## 2022-10-13 RX ADMIN — METOPROLOL SUCCINATE 75 MG: 50 TABLET, EXTENDED RELEASE ORAL at 20:09

## 2022-10-13 RX ADMIN — IPRATROPIUM BROMIDE AND ALBUTEROL SULFATE 1 AMPULE: 2.5; .5 SOLUTION RESPIRATORY (INHALATION) at 15:55

## 2022-10-13 RX ADMIN — METOPROLOL SUCCINATE 75 MG: 50 TABLET, EXTENDED RELEASE ORAL at 09:10

## 2022-10-13 RX ADMIN — IPRATROPIUM BROMIDE AND ALBUTEROL SULFATE 1 AMPULE: 2.5; .5 SOLUTION RESPIRATORY (INHALATION) at 07:41

## 2022-10-13 NOTE — CARE COORDINATION
Social Work/Case Management Transition of Care Planning (Te[place] Art[place] [phone]):   Per report, patient was scheduled to have an EGD yesterday but this was aborted due to respiratory distress. He was a scheduled for EGD today but it was again aborted for desaturation. From surgery POV, the plan is to continue to trend his hb and monitor for continued/increased signs of GIB. Patient remains on IV Protonix. He is currently on RA. Plan remains for patient to discharge to home with no needs. He plans to drive himself home. CM/SW will follow.   JOSE Fisher  10/13/2022

## 2022-10-13 NOTE — PROGRESS NOTES
GENERAL SURGERY  DAILY PROGRESS NOTE  10/13/2022    Chief Complaint   Patient presents with    Dizziness    Fatigue     Patient has been very tired and dizzy since last week. He went to have lab work done and he was told hs blood count was 2 and to go to the ER. Subjective:  Unable to do EGD yesterday. Has been stable, but did require a unit of blood due to anemia. Objective:  BP 98/73   Pulse 58   Temp 98.1 °F (36.7 °C) (Oral)   Resp 16   Ht 5' 11\" (1.803 m)   Wt 196 lb (88.9 kg)   SpO2 97%   BMI 27.34 kg/m²     GENERAL:  Laying in bed, awake, alert, cooperative, no apparent distress  HEAD: Normocephalic, atraumatic  EYES: No sclera icterus, pupils equal  LUNGS:  No increased work of breathing  CARDIOVASCULAR:  RR  ABDOMEN:  Soft, non-tender, non-distended  EXTREMITIES: No edema or swelling  SKIN: Warm and dry    Assessment/Plan:  [de-identified] y.o. male with acute on chronic normocytic anemia with melenic stools past few weeks    EGD due to aborted for desaturation  From surgery POV, continue to trend his hb and monitor for continued/increased signs of GIB Continue PPI bid  Will discuss with attending about starting diet     Electronically signed by Dimple Horowitz DO on 10/13/2022 at 6:53 AM     Pt seen and examined; agree w above  Hb responded  No bm overnight per patient    Continue to trend hb and monitor for signs of ongoing gib  Ok for diet from surgery pov    WILLY Ingram MD  Minimally Invasive General Surgery and Endoscopy  [facility].  [address]: [phone]  F: [phone]    Electronically signed by Guerda Casey MD on 10/13/2022 at 12:30 PM

## 2022-10-13 NOTE — PROGRESS NOTES
Comprehensive Nutrition Assessment    Type and Reason for Visit:  Initial, Positive Nutrition Screen    Nutrition Recommendations/Plan:   Continue Diet, ADAT once med appropriate. Will Start ONS and monitor. Malnutrition Assessment:  Malnutrition Status: At risk for malnutrition (Comment) (10/13/22 1411)    Context:  Acute Illness     Findings of the 6 clinical characteristics of malnutrition:  Energy Intake:  75% or less of estimated energy requirements for 7 or more days  Weight Loss:  Unable to assess (2/2 poor EMR wt hx pta)     Body Fat Loss:  Unable to assess     Muscle Mass Loss:  Unable to assess    Fluid Accumulation:  No significant fluid accumulation     Strength:  Not Performed    Nutrition Assessment:    Pt adm w/ melenic stools x ~3-4wks, worsening weakness/fatigue/dizziness w/ low hgb x past few days pta. PMHx HTN, AAA repair, CKD (4); Adm w/ chronic GIB and ABLA, w/ noted pend plan for EGD soon once resp. status improved. Pt at nutritional risk d/t poor intake w/ subjective wt loss d/t Anemia w/ chronic fatigue/poor appetite. Will Start ONS and monitor    Nutrition Related Findings:    A&O, poor dentition, Abd/BS WDL, no edema, +I/O's Wound Type: None       Current Nutrition Intake & Therapies:    Average Meal Intake: 26-50% (Clear Liquids only)  Average Supplements Intake: None Ordered  ADULT DIET; Clear Liquid    Anthropometric Measures:  Height: 5' 11\" (180.3 cm)  Ideal Body Weight (IBW): 172 lbs (78 kg)    Admission Body Weight: 196 lb (88.9 kg) (unknown method 10/11)  Current Body Weight: 196 lb (88.9 kg) (unknown method 10/11),   IBW.  Weight Source: Not Specified  Current BMI (kg/m2): 27.3  Usual Body Weight:  (UTO UBW 2/2 poor EMR wt hx pta; noted stated wt 212# 5/5/22, possible wt loss noted however unable to confirm at this time d/t poor EMR wt hx w/ no actual wt's pta as well as since adm currently)     Weight Adjustment For: No Adjustment                 BMI Categories: Overweight (BMI 25.0-29. 9)    Estimated Daily Nutrient Needs:  Energy Requirements Based On: Formula  Weight Used for Energy Requirements: Current  Energy (kcal/day):   Weight Used for Protein Requirements: Ideal  Protein (g/day): 1.2-1.4gm/kg IBW= ; as tolerated w/ CKD  Method Used for Fluid Requirements: 1 ml/kcal  Fluid (ml/day):     Nutrition Diagnosis:   Inadequate oral intake related to inadequate protein-energy intake (2/2 Anemia w/ chronic fatigue/poor appetite) as evidenced by intake 26-50%, poor intake prior to admission    Nutrition Interventions:   Food and/or Nutrient Delivery: Continue Current Diet, Start Oral Nutrition Supplement (Continue Diet, ADAT once med appropriate. Will Start ONS and monitor.)  Nutrition Education/Counseling: No recommendation at this time  Coordination of Nutrition Care: Continue to monitor while inpatient       Goals:     Goals: PO intake 50% or greater, by next RD assessment       Nutrition Monitoring and Evaluation:   Behavioral-Environmental Outcomes: None Identified  Food/Nutrient Intake Outcomes: Diet Advancement/Tolerance, Food and Nutrient Intake, Supplement Intake  Physical Signs/Symptoms Outcomes: Biochemical Data, Chewing or Swallowing, GI Status, Fluid Status or Edema, Nutrition Focused Physical Findings, Skin, Weight    Discharge Planning:     Too soon to determine     Cristiana Zarco RD, LD  Contact: ext [phone]

## 2022-10-13 NOTE — PLAN OF CARE
Problem: Discharge Planning  Goal: Discharge to home or other facility with appropriate resources  Outcome: Progressing     Problem: ABCDS Injury Assessment  Goal: Absence of physical injury  Outcome: Progressing     Problem: Safety - Adult  Goal: Free from fall injury  Outcome: Progressing     Problem: Nutrition Deficit:  Goal: Optimize nutritional status  Outcome: Progressing

## 2022-10-13 NOTE — PROGRESS NOTES
Hospitalist Progress Note      SYNOPSIS: Patient admitted on 10/11/2022 for GI bleed presented to the emergency department with fatigue and low hemoglobin. Patient was seen by [place] earlier today and was told that his blood count was low that he did go to the emergency department. Patient reports having black stool. Symptoms started about 4 days ago. Worse with light exertion. Denies fever, chills, nausea, vomiting, chest pain, shortness of breath. Vital signs within normal limits and stable. Laboratory studies demonstrate BUN 27, creatinine 2.4, glucose 102, hemoglobin 6.8. Patient was transfused 1 unit of packed red blood cells and medicine was consulted for admission. General surgery consulted. EGD attempted 10/12 however became hypoxic so was aborted. Remains anemic, ordered another unit PRBC overnight. SUBJECTIVE:  Stable overnight. No other overnight issues reported. Patient seen and examined  Records reviewed. EGD attempted 10/12 however became hypoxic so was aborted  Remains anemic, ordered a unit PRBC overnight. Possible scope tomorrow  Admits to cough and feeling winded, nebulized breathing treatments, incentive spirometry ordered        Temp (24hrs), Av.7 °F (36.5 °C), Min:97.3 °F (36.3 °C), Max:98.1 °F (36.7 °C)    DIET: Diet NPO Exceptions are: Ice Chips, Sips of Water with Meds  CODE: Full Code    Intake/Output Summary (Last 24 hours) at 10/13/2022 0855  Last data filed at 10/13/2022 4895  Gross per 24 hour   Intake 2669.23 ml   Output --   Net 2669.23 ml       Review of Systems  All bolded are positive; please see HPI  General:  Fever, chills, diaphoresis, fatigue, malaise, night sweats, weight loss  Psychological:  Anxiety, disorientation, hallucinations. ENT:  Epistaxis, headaches, vertigo, visual changes. Cardiovascular:  Chest pain, irregular heartbeats, palpitations, paroxysmal nocturnal dyspnea.   Respiratory:  Shortness of breath, coughing, sputum production, hemoptysis, wheezing, orthopnea. Gastrointestinal:  Nausea, vomiting, diarrhea, heartburn, constipation, abdominal pain, hematemesis, hematochezia, melena, acholic stools  Genito-Urinary:  Dysuria, urgency, frequency, hematuria  Musculoskeletal:  Joint pain, joint stiffness, joint swelling, muscle pain  Neurology:  Headache, focal neurological deficits, weakness, numbness, paresthesia  Derm:  Rashes, ulcers, excoriations, bruising  Extremities:  Decreased ROM, peripheral edema, mottling      OBJECTIVE:    /63   Pulse 64   Temp 97.5 °F (36.4 °C) (Oral)   Resp 16   Ht 5' 11\" (1.803 m)   Wt 196 lb (88.9 kg)   SpO2 97%   BMI 27.34 kg/m²     General appearance:  awake, alert, and oriented to person, place, time, and purpose; appears stated age and cooperative; no apparent distress no labored breathing  HEENT:  Conjunctivae/corneas clear. Neck: Supple. No jugular venous distention. Respiratory: symmetrical; clear to auscultation bilaterally; no wheezes; no rhonchi; no rales  Cardiovascular: rhythm regular; rate controlled; no murmurs  Abdomen: Soft, nontender, nondistended  Extremities:  peripheral pulses present; no peripheral edema; no ulcers  Musculoskeletal: No clubbing, cyanosis, no bilateral lower extremity edema. Brisk capillary refill. Skin:  No rashes  on visible skin  Neurologic: awake, alert and following commands     ASSESSMENT and PLAN:    Acute blood loss anemia/melena- general surgery following. Follow H&H. Transfuse for hemoglobin <7. EGD attempted 10/12 however became hypoxic so was aborted. Aspirin held. Received another unit PRBC due to persistent anemia. Await possible repeat EGD plan per surgery. Acute respiratory insufficiency- dropped into 40s during EGD 10/12. Had copious secretions. CXR showing chronic findings, no acute cardiopulmonary disease. COVId and flu negative. Currently on room air. Tan and IS.   CKD stage IV  Hypertension  GERD  History open AAA repair    Dispo: await surgery plan. Follow H&H. POssible scope tomororw    Medications:  REVIEWED DAILY    Infusion Medications    sodium chloride      sodium chloride      sodium chloride 75 mL/hr at 10/13/22 0507     Scheduled Medications    ipratropium-albuterol  1 ampule Inhalation Q4H WA    [Held by provider] aspirin  81 mg Oral Daily    atorvastatin  40 mg Oral Nightly    polyvinyl alcohol  1 drop Both Eyes TID    docusate sodium  100 mg Oral BID    ferrous sulfate  325 mg Oral TID WC    [Held by provider] furosemide  20 mg Oral BID    latanoprost  1 drop Both Eyes Daily    [Held by provider] lisinopril  40 mg Oral BID    metoprolol succinate  75 mg Oral BID    sodium chloride flush  5-40 mL IntraVENous 2 times per day    dorzolamide  1 drop Ophthalmic BID    And    brimonidine  1 drop Ophthalmic BID    pantoprazole (PROTONIX) 40 mg injection  40 mg IntraVENous Q12H     PRN Meds: calcium carbonate, melatonin, hydrALAZINE, sodium chloride, sodium chloride flush, sodium chloride, ondansetron **OR** ondansetron, acetaminophen **OR** acetaminophen    Labs:     Recent Labs     10/11/22  1748 10/12/22  0324 10/12/22  1610 10/12/22  2058 10/13/22  0500   WBC 5.6  --   --   --  3.8*   HGB 6.8*   < > 7.0* 6.6* 7.2*   HCT 22.5*   < > 24.1* 22.2* 22.9*     --   --   --  187    < > = values in this interval not displayed. Recent Labs     10/11/22  1748 10/12/22  0321 10/13/22  0500    137 137   K 4.7 4.7 4.3    108* 108*   CO2 19* 21* 19*   BUN 27* 25* 24*   CREATININE 2.4* 2.3* 2.1*   CALCIUM 10.0 9.7 9.3       Recent Labs     10/11/22  1748 10/12/22  0321   PROT 6.4 5.8*   ALKPHOS 97 89   ALT 10 9   AST 14 14   BILITOT <0.2 0.3       Recent Labs     10/11/22  1748   INR 1.0       No results for input(s): CKTOTAL, TROPONINI in the last 72 hours.     Chronic labs:    Lab Results   Component Value Date    INR 1.0 10/11/2022       Radiology: REVIEWED DAILY    +++++++++++++++++++++++++++++++++++++++++++++++++  DO Roger Ponce Physician - [address] [place] [address]  +++++++++++++++++++++++++++++++++++++++++++++++++  NOTE: This report was transcribed using voice recognition software. Every effort was made to ensure accuracy; however, inadvertent computerized transcription errors may be present.

## 2022-10-14 ENCOUNTER — ANESTHESIA (OUTPATIENT)
Dept: ENDOSCOPY | Age: 82
DRG: 812 | End: 2022-10-14
Payer: OTHER GOVERNMENT

## 2022-10-14 ENCOUNTER — ANESTHESIA EVENT (OUTPATIENT)
Dept: ENDOSCOPY | Age: 82
DRG: 812 | End: 2022-10-14
Payer: OTHER GOVERNMENT

## 2022-10-14 LAB
ANION GAP SERPL CALCULATED.3IONS-SCNC: 8 MMOL/L (ref 7–16)
BUN BLDV-MCNC: 20 MG/DL (ref 6–23)
CALCIUM SERPL-MCNC: 9.6 MG/DL (ref 8.6–10.2)
CHLORIDE BLD-SCNC: 110 MMOL/L (ref 98–107)
CO2: 18 MMOL/L (ref 22–29)
CREAT SERPL-MCNC: 2 MG/DL (ref 0.7–1.2)
GFR AFRICAN AMERICAN: 39
GFR NON-AFRICAN AMERICAN: 32 ML/MIN/1.73
GLUCOSE BLD-MCNC: 97 MG/DL (ref 74–99)
HCT VFR BLD CALC: 24 % (ref 37–54)
HCT VFR BLD CALC: 24.5 % (ref 37–54)
HCT VFR BLD CALC: 27.1 % (ref 37–54)
HCT VFR BLD CALC: 28.9 % (ref 37–54)
HEMOGLOBIN: 7.6 G/DL (ref 12.5–16.5)
HEMOGLOBIN: 7.8 G/DL (ref 12.5–16.5)
HEMOGLOBIN: 8 G/DL (ref 12.5–16.5)
HEMOGLOBIN: 9.1 G/DL (ref 12.5–16.5)
MCH RBC QN AUTO: 28.7 PG (ref 26–35)
MCHC RBC AUTO-ENTMCNC: 29.5 % (ref 32–34.5)
MCV RBC AUTO: 97.1 FL (ref 80–99.9)
PDW BLD-RTO: 15.8 FL (ref 11.5–15)
PLATELET # BLD: 173 E9/L (ref 130–450)
PMV BLD AUTO: 9.8 FL (ref 7–12)
POTASSIUM SERPL-SCNC: 4.4 MMOL/L (ref 3.5–5)
RBC # BLD: 2.79 E12/L (ref 3.8–5.8)
SODIUM BLD-SCNC: 136 MMOL/L (ref 132–146)
WBC # BLD: 4.5 E9/L (ref 4.5–11.5)

## 2022-10-14 PROCEDURE — 94640 AIRWAY INHALATION TREATMENT: CPT

## 2022-10-14 PROCEDURE — 36415 COLL VENOUS BLD VENIPUNCTURE: CPT

## 2022-10-14 PROCEDURE — 6370000000 HC RX 637 (ALT 250 FOR IP): Performed by: FAMILY MEDICINE

## 2022-10-14 PROCEDURE — 2060000000 HC ICU INTERMEDIATE R&B

## 2022-10-14 PROCEDURE — 2709999900 HC NON-CHARGEABLE SUPPLY: Performed by: SURGERY

## 2022-10-14 PROCEDURE — 6370000000 HC RX 637 (ALT 250 FOR IP): Performed by: INTERNAL MEDICINE

## 2022-10-14 PROCEDURE — 2580000003 HC RX 258: Performed by: STUDENT IN AN ORGANIZED HEALTH CARE EDUCATION/TRAINING PROGRAM

## 2022-10-14 PROCEDURE — 85027 COMPLETE CBC AUTOMATED: CPT

## 2022-10-14 PROCEDURE — 2580000003 HC RX 258: Performed by: ANESTHESIOLOGIST ASSISTANT

## 2022-10-14 PROCEDURE — A4216 STERILE WATER/SALINE, 10 ML: HCPCS | Performed by: STUDENT IN AN ORGANIZED HEALTH CARE EDUCATION/TRAINING PROGRAM

## 2022-10-14 PROCEDURE — 80048 BASIC METABOLIC PNL TOTAL CA: CPT

## 2022-10-14 PROCEDURE — 85018 HEMOGLOBIN: CPT

## 2022-10-14 PROCEDURE — 6360000002 HC RX W HCPCS: Performed by: STUDENT IN AN ORGANIZED HEALTH CARE EDUCATION/TRAINING PROGRAM

## 2022-10-14 PROCEDURE — 0DJ08ZZ INSPECTION OF UPPER INTESTINAL TRACT, VIA NATURAL OR ARTIFICIAL OPENING ENDOSCOPIC: ICD-10-PCS | Performed by: SURGERY

## 2022-10-14 PROCEDURE — 7100000001 HC PACU RECOVERY - ADDTL 15 MIN: Performed by: SURGERY

## 2022-10-14 PROCEDURE — C9113 INJ PANTOPRAZOLE SODIUM, VIA: HCPCS | Performed by: STUDENT IN AN ORGANIZED HEALTH CARE EDUCATION/TRAINING PROGRAM

## 2022-10-14 PROCEDURE — 6360000002 HC RX W HCPCS: Performed by: ANESTHESIOLOGIST ASSISTANT

## 2022-10-14 PROCEDURE — 85014 HEMATOCRIT: CPT

## 2022-10-14 PROCEDURE — 7100000000 HC PACU RECOVERY - FIRST 15 MIN: Performed by: SURGERY

## 2022-10-14 PROCEDURE — 3700000001 HC ADD 15 MINUTES (ANESTHESIA): Performed by: SURGERY

## 2022-10-14 PROCEDURE — 2580000003 HC RX 258: Performed by: FAMILY MEDICINE

## 2022-10-14 PROCEDURE — 3700000000 HC ANESTHESIA ATTENDED CARE: Performed by: SURGERY

## 2022-10-14 PROCEDURE — 3609017100 HC EGD: Performed by: SURGERY

## 2022-10-14 RX ORDER — PROPOFOL 10 MG/ML
INJECTION, EMULSION INTRAVENOUS PRN
Status: DISCONTINUED | OUTPATIENT
Start: 2022-10-14 | End: 2022-10-14 | Stop reason: SDUPTHER

## 2022-10-14 RX ORDER — SODIUM BICARBONATE 650 MG/1
650 TABLET ORAL 3 TIMES DAILY
Status: DISCONTINUED | OUTPATIENT
Start: 2022-10-14 | End: 2022-10-18 | Stop reason: HOSPADM

## 2022-10-14 RX ORDER — SODIUM CHLORIDE 9 MG/ML
INJECTION, SOLUTION INTRAVENOUS CONTINUOUS PRN
Status: DISCONTINUED | OUTPATIENT
Start: 2022-10-14 | End: 2022-10-14 | Stop reason: SDUPTHER

## 2022-10-14 RX ADMIN — SODIUM BICARBONATE 650 MG: 650 TABLET ORAL at 20:39

## 2022-10-14 RX ADMIN — DORZOLAMIDE HYDROCHLORIDE 1 DROP: 20 SOLUTION/ DROPS OPHTHALMIC at 11:00

## 2022-10-14 RX ADMIN — DORZOLAMIDE HYDROCHLORIDE 1 DROP: 20 SOLUTION/ DROPS OPHTHALMIC at 20:37

## 2022-10-14 RX ADMIN — SODIUM CHLORIDE: 9 INJECTION, SOLUTION INTRAVENOUS at 22:00

## 2022-10-14 RX ADMIN — METOPROLOL SUCCINATE 75 MG: 50 TABLET, EXTENDED RELEASE ORAL at 09:59

## 2022-10-14 RX ADMIN — MELATONIN 3 MG ORAL TABLET 3 MG: 3 TABLET ORAL at 20:39

## 2022-10-14 RX ADMIN — ATORVASTATIN CALCIUM 40 MG: 40 TABLET, FILM COATED ORAL at 20:39

## 2022-10-14 RX ADMIN — POLYVINYL ALCOHOL 1 DROP: 14 SOLUTION/ DROPS OPHTHALMIC at 11:00

## 2022-10-14 RX ADMIN — IPRATROPIUM BROMIDE AND ALBUTEROL SULFATE 1 AMPULE: 2.5; .5 SOLUTION RESPIRATORY (INHALATION) at 19:22

## 2022-10-14 RX ADMIN — SODIUM CHLORIDE, PRESERVATIVE FREE 10 ML: 5 INJECTION INTRAVENOUS at 10:00

## 2022-10-14 RX ADMIN — SODIUM BICARBONATE 650 MG: 650 TABLET ORAL at 16:45

## 2022-10-14 RX ADMIN — METOPROLOL SUCCINATE 75 MG: 50 TABLET, EXTENDED RELEASE ORAL at 20:38

## 2022-10-14 RX ADMIN — SODIUM CHLORIDE, PRESERVATIVE FREE 40 MG: 5 INJECTION INTRAVENOUS at 21:36

## 2022-10-14 RX ADMIN — IPRATROPIUM BROMIDE AND ALBUTEROL SULFATE 1 AMPULE: 2.5; .5 SOLUTION RESPIRATORY (INHALATION) at 08:35

## 2022-10-14 RX ADMIN — PROPOFOL 100 MG: 10 INJECTION, EMULSION INTRAVENOUS at 15:18

## 2022-10-14 RX ADMIN — BRIMONIDINE TARTRATE 1 DROP: 2 SOLUTION OPHTHALMIC at 11:00

## 2022-10-14 RX ADMIN — IPRATROPIUM BROMIDE AND ALBUTEROL SULFATE 1 AMPULE: 2.5; .5 SOLUTION RESPIRATORY (INHALATION) at 12:32

## 2022-10-14 RX ADMIN — SODIUM CHLORIDE, PRESERVATIVE FREE 10 ML: 5 INJECTION INTRAVENOUS at 20:40

## 2022-10-14 RX ADMIN — BRIMONIDINE TARTRATE 1 DROP: 2 SOLUTION OPHTHALMIC at 20:37

## 2022-10-14 RX ADMIN — DOCUSATE SODIUM 100 MG: 100 CAPSULE, LIQUID FILLED ORAL at 09:59

## 2022-10-14 RX ADMIN — SODIUM CHLORIDE: 9 INJECTION, SOLUTION INTRAVENOUS at 15:15

## 2022-10-14 RX ADMIN — SODIUM CHLORIDE, PRESERVATIVE FREE 40 MG: 5 INJECTION INTRAVENOUS at 12:23

## 2022-10-14 RX ADMIN — POLYVINYL ALCOHOL 1 DROP: 14 SOLUTION/ DROPS OPHTHALMIC at 13:49

## 2022-10-14 RX ADMIN — DOCUSATE SODIUM 100 MG: 100 CAPSULE, LIQUID FILLED ORAL at 20:39

## 2022-10-14 RX ADMIN — FERROUS SULFATE TAB 325 MG (65 MG ELEMENTAL FE) 325 MG: 325 (65 FE) TAB at 16:45

## 2022-10-14 ASSESSMENT — PAIN SCALES - GENERAL
PAINLEVEL_OUTOF10: 4
PAINLEVEL_OUTOF10: 0

## 2022-10-14 ASSESSMENT — PAIN DESCRIPTION - PAIN TYPE: TYPE: CHRONIC PAIN

## 2022-10-14 ASSESSMENT — PAIN DESCRIPTION - DESCRIPTORS: DESCRIPTORS: ACHING

## 2022-10-14 ASSESSMENT — PAIN DESCRIPTION - LOCATION: LOCATION: HAND

## 2022-10-14 ASSESSMENT — PAIN DESCRIPTION - ORIENTATION: ORIENTATION: RIGHT;LEFT

## 2022-10-14 NOTE — ANESTHESIA POSTPROCEDURE EVALUATION
Department of Anesthesiology  Postprocedure Note    Patient: Lennie Parra  MRN: [id number]  YOB: 1940  Date of evaluation: 10/13/2022      Procedure Summary     Date: 10/12/22 Room / Location: [facility] / [facility]    Anesthesia Start: 0773 Anesthesia Stop: 5875    Procedure: EGD ESOPHAGOGASTRODUODENOSCOPY Diagnosis:       Gastrointestinal hemorrhage, unspecified gastrointestinal hemorrhage type      (GIB)    Surgeons: Dennie Sings, MD Responsible Provider: Jeanette Dillard MD    Anesthesia Type: MAC ASA Status: 3          Anesthesia Type: No value filed.     Willian Phase I: Willian Score: 9    Willian Phase II:        Anesthesia Post Evaluation    Patient location during evaluation: PACU  Patient participation: complete - patient participated  Level of consciousness: awake and alert  Airway patency: patent  Nausea & Vomiting: no nausea and no vomiting  Complications: no  Cardiovascular status: blood pressure returned to baseline and hemodynamically stable  Respiratory status: acceptable and spontaneous ventilation  Hydration status: euvolemic  Multimodal analgesia pain management approach

## 2022-10-14 NOTE — PROGRESS NOTES
GENERAL SURGERY  DAILY PROGRESS NOTE  10/14/2022    Chief Complaint   Patient presents with    Dizziness    Fatigue     Patient has been very tired and dizzy since last week. He went to have lab work done and he was told hs blood count was 2 and to go to the ER. Subjective:  Seen this morning. Discussed plan for EGD     Objective:  /78   Pulse 67   Temp 98.3 °F (36.8 °C) (Oral)   Resp 17   Ht 5' 11\" (1.803 m)   Wt 196 lb (88.9 kg)   SpO2 97%   BMI 27.34 kg/m²     Assessment/Plan:  [de-identified] y.o. male with acute on chronic normocytic anemia with melenic stools past few weeks    EGD today   The risks, benefits, alternatives, and potential complications of the procedure, including the risks of bleeding, infection, injury to surrounding structures, need for additional procedures, and death were explained to the patient. All questions were answered. The patient understands and agrees to proceed with the procedure. Electronically signed by Zenia Hercules DO on 10/14/2022 at 6:56 AM      Patient seen and examined; agree with above  Normal EGD this afternoon without evidence of recent or active GI bleeding  See operative note for more details  Okay for diet from surgery standpoint  Recent colonoscopy this summer  I do not think he needs another colonoscopy at this time. We will continue to monitor for ongoing blood loss or signs of active GI bleeding. Saroj Bustillo MD  Minimally Invasive General Surgery and Endoscopy  [address] [place] [address].  [address]: [phone]  F: [phone]    Electronically signed by Leonila Meyers MD on 10/14/2022 at 3:30 PM

## 2022-10-14 NOTE — CARE COORDINATION
Patient for EGD today. Normally oriented but not fully oriented today. Patient had planned to return home independent at admission, will need to confirm he can return home when ready for discharge. Patient off the floor now in endo. For questions I can be reached at [phone].  Hunter Henley [facility]

## 2022-10-14 NOTE — ANESTHESIA PRE PROCEDURE
Department of Anesthesiology  Preprocedure Note       Name:  Roddy Duane   Age:  [de-identified] y.o.  :  1940                                          MRN:  [id number]         Date:  10/14/2022      Surgeon: Abraham Mcmahan):  Jarocho Leyva MD    Procedure: Procedure(s):  EGD DIAGNOSTIC ONLY    Medications prior to admission:   Prior to Admission medications    Medication Sig Start Date End Date Taking?  Authorizing Provider   pantoprazole (PROTONIX) 40 MG tablet Take 1 tablet by mouth every morning (before breakfast) 22   Vicki Castro MD   acetaminophen (TYLENOL) 325 MG tablet Take 325 mg by mouth every 6 hours as needed for Pain    Historical Provider, MD   aspirin 81 MG chewable tablet Take 81 mg by mouth daily    Historical Provider, MD   atorvastatin (LIPITOR) 40 MG tablet Take 40 mg by mouth nightly    Historical Provider, MD   Cholecalciferol (VITAMIN D3) 125 MCG (5000 UT) TABS Take 1,000 Units by mouth     Historical Provider, MD   docusate sodium (COLACE) 100 MG capsule Take 100 mg by mouth 2 times daily    Historical Provider, MD   ferrous sulfate (FE TABS 325) 325 (65 Fe) MG EC tablet Take 325 mg by mouth 3 times daily (with meals)    Historical Provider, MD   furosemide (LASIX) 20 MG tablet Take 20 mg by mouth 2 times daily    Historical Provider, MD   lisinopril (PRINIVIL;ZESTRIL) 40 MG tablet Take 40 mg by mouth 2 times daily    Historical Provider, MD   metoprolol succinate (TOPROL XL) 50 MG extended release tablet Take 75 mg by mouth in the morning and at bedtime    Historical Provider, MD   Multiple Vitamins-Minerals (THERAPEUTIC MULTIVITAMIN-MINERALS) tablet Take 1 tablet by mouth daily    Historical Provider, MD   polyethylene glycol (GLYCOLAX) 17 g packet Take 17 g by mouth daily as needed for Constipation    Historical Provider, MD   carboxymethylcellulose 1 % ophthalmic solution 1 drop 3 times daily    Historical Provider, MD   brinzolamide-brimonidine 1-0.2 % SUSP Apply 1 drop to eye 2 times daily Left eye    Historical Provider, MD   latanoprost (XALATAN) 0.005 % ophthalmic solution Place 1 drop into both eyes daily    Historical Provider, MD       Current medications:    No current facility-administered medications for this visit. No current outpatient medications on file.      Facility-Administered Medications Ordered in Other Visits   Medication Dose Route Frequency Provider Last Rate Last Admin    sodium bicarbonate tablet 650 mg  650 mg Oral TID Tolu Morse MD        calcium carbonate (TUMS) chewable tablet 500 mg  500 mg Oral TID PRN Jairon Montilla, DO        melatonin tablet 3 mg  3 mg Oral Nightly PRN Jairon Realo, DO   3 mg at 10/13/22 2009    hydrALAZINE (APRESOLINE) injection 10 mg  10 mg IntraVENous Q6H PRN Adam Le DO        ipratropium-albuterol (DUONEB) nebulizer solution 1 ampule  1 ampule Inhalation Q4H WA Hungbrandee Le DO   1 ampule at 10/14/22 1232    0.9 % sodium chloride infusion   IntraVENous PRN Etelvina Winkler MD        [Held by provider] aspirin chewable tablet 81 mg  81 mg Oral Daily Kaitlyn Rojas, DO        atorvastatin (LIPITOR) tablet 40 mg  40 mg Oral Nightly Kaitlyn Rojas, DO   40 mg at 10/13/22 2009    polyvinyl alcohol (LIQUIFILM TEARS) 1.4 % ophthalmic solution 1 drop  1 drop Both Eyes TID Kaitlyn Rojas, DO   1 drop at 10/14/22 1349    docusate sodium (COLACE) capsule 100 mg  100 mg Oral BID Kaitlyn Rojas, DO   100 mg at 10/14/22 0959    ferrous sulfate (IRON 325) tablet 325 mg  325 mg Oral TID  Kaitlyn Rojas, DO   325 mg at 10/13/22 1715    [Held by provider] furosemide (LASIX) tablet 20 mg  20 mg Oral BID Kaitlyn Rojas, DO        latanoprost (XALATAN) 0.005 % ophthalmic solution 1 drop  1 drop Both Eyes Daily Kaitlyn Rojas, DO   1 drop at 10/13/22 2008    [Held by provider] lisinopril (PRINIVIL;ZESTRIL) tablet 40 mg  40 mg Oral BID Kaitlyn Rojas, DO        metoprolol succinate (TOPROL XL) extended release tablet 75 mg  75 mg Oral BID Nolberto Schlatter, MD   75 mg at 10/14/22 0959    sodium chloride flush 0.9 % injection 5-40 mL  5-40 mL IntraVENous 2 times per day Luz Borrero, DO   10 mL at 10/14/22 1000    sodium chloride flush 0.9 % injection 5-40 mL  5-40 mL IntraVENous PRN Luz Borrero, DO        0.9 % sodium chloride infusion   IntraVENous PRN Luz Borrero, DO        ondansetron (ZOFRAN-ODT) disintegrating tablet 4 mg  4 mg Oral Q8H PRN Luz Borrero, DO        Or    [facility]ron [facility] injection 4 mg  4 mg IntraVENous Q6H PRN Luz Borrero, DO        acetaminophen (TYLENOL) tablet 650 mg  650 mg Oral Q6H PRN Luz Borrero, DO   650 mg at 10/12/22 2253    Or    acetaminophen (TYLENOL) suppository 650 mg  650 mg Rectal Q6H PRN Luz Borrero, DO        0.9 % sodium chloride infusion   IntraVENous Continuous Luz Borrero DO 75 mL/hr at 10/14/22 0230 Restarted at 10/14/22 0230    dorzolamide (TRUSOPT) 2 % ophthalmic solution 1 drop  1 drop Ophthalmic BID Luz Borrero, DO   1 drop at 10/14/22 1100    And    brimonidine (ALPHAGAN) 0.2 % ophthalmic solution 1 drop  1 drop Ophthalmic BID Luz Borrero, DO   1 drop at 10/14/22 1100    pantoprazole (PROTONIX) 40 mg in sodium chloride (PF) 10 mL injection  40 mg IntraVENous Q12H Agustin Whatley MD   40 mg at 10/14/22 1223       Allergies:  No Known Allergies    Problem List:    Patient Active Problem List   Diagnosis Code    Anemia D64.9    Rectal bleed K62.5    GI bleed K92.2       Past Medical History:        Diagnosis Date    Disease of blood and blood forming organ     Hypertension        Past Surgical History:        Procedure Laterality Date    ABDOMINAL AORTIC ANEURYSM REPAIR         Social History:    Social History     Tobacco Use    Smoking status: Never    Smokeless tobacco: Never   Substance Use Topics    Alcohol use: Never                                Counseling given: Not Answered      Vital Signs (Current):    There were no vitals filed for this visit. BP Readings from Last 3 Encounters:   10/14/22 118/70   05/07/22 117/69       NPO Status:                                                                                 BMI:   Wt Readings from Last 3 Encounters:   10/11/22 196 lb (88.9 kg)   05/05/22 212 lb (96.2 kg)     There is no height or weight on file to calculate BMI.    CBC:   Lab Results   Component Value Date/Time    WBC 4.5 10/14/2022 04:31 AM    RBC 2.79 10/14/2022 04:31 AM    HGB 7.8 10/14/2022 09:16 AM    HCT 24.5 10/14/2022 09:16 AM    MCV 97.1 10/14/2022 04:31 AM    RDW 15.8 10/14/2022 04:31 AM     10/14/2022 04:31 AM       CMP:   Lab Results   Component Value Date/Time     10/14/2022 04:31 AM    K 4.4 10/14/2022 04:31 AM    K 4.7 10/12/2022 03:21 AM     10/14/2022 04:31 AM    CO2 18 10/14/2022 04:31 AM    BUN 20 10/14/2022 04:31 AM    CREATININE 2.0 10/14/2022 04:31 AM    GFRAA 39 10/14/2022 04:31 AM    LABGLOM 32 10/14/2022 04:31 AM    GLUCOSE 97 10/14/2022 04:31 AM    PROT 5.8 10/12/2022 03:21 AM    CALCIUM 9.6 10/14/2022 04:31 AM    BILITOT 0.3 10/12/2022 03:21 AM    ALKPHOS 89 10/12/2022 03:21 AM    AST 14 10/12/2022 03:21 AM    ALT 9 10/12/2022 03:21 AM       POC Tests: No results for input(s): POCGLU, POCNA, POCK, POCCL, POCBUN, POCHEMO, POCHCT in the last 72 hours.     Coags:   Lab Results   Component Value Date/Time    PROTIME 10.8 10/11/2022 05:48 PM    INR 1.0 10/11/2022 05:48 PM    APTT 26.8 10/12/2022 03:21 AM       HCG (If Applicable): No results found for: PREGTESTUR, PREGSERUM, HCG, HCGQUANT     ABGs: No results found for: PHART, PO2ART, YHJ2EMV, OZB4GSO, BEART, D9ZYBBPI     Type & Screen (If Applicable):  No results found for: LABABO, LABRH    Drug/Infectious Status (If Applicable):  No results found for: HIV, HEPCAB    COVID-19 Screening (If Applicable):   Lab Results   Component Value Date/Time    COVID19 NOT DETECTED 10/12/2022 01:20 PM Anesthesia Evaluation  Patient summary reviewed and Nursing notes reviewed no history of anesthetic complications:   Airway: Mallampati: II  TM distance: >3 FB   Neck ROM: full  Mouth opening: > = 3 FB   Dental:    (+) implants      Pulmonary:Negative Pulmonary ROS breath sounds clear to auscultation                             Cardiovascular:  Exercise tolerance: good (>4 METS),   (+) hypertension: no interval change,         Rhythm: regular  Rate: normal                    Neuro/Psych:   Negative Neuro/Psych ROS              GI/Hepatic/Renal:   (+) GERD:,          ROS comment: GI bleed. Endo/Other: Negative Endo/Other ROS                    Abdominal:             Vascular: negative vascular ROS. Other Findings:      hx aaa repair, gi bleed       Anesthesia Plan      MAC     ASA 3       Induction: intravenous. Anesthetic plan and risks discussed with patient. Plan discussed with attending.                     JONATHAN Saucedo - CRNA   10/14/2022

## 2022-10-14 NOTE — PLAN OF CARE
Problem: Cardiovascular - Adult  Goal: Maintains optimal cardiac output and hemodynamic stability  Outcome: Not Progressing    Received 1 unit PRBCs d/t hgb of 6.8, recheck of 8.  Planned EGD today, CTM     Problem: Discharge Planning  Goal: Discharge to home or other facility with appropriate resources  10/13/2022 1842 by Stephanie Marin RN  Outcome: Progressing     Problem: ABCDS Injury Assessment  Goal: Absence of physical injury  10/14/2022 0655 by Kvng Victoria RN  Outcome: Progressing  10/13/2022 1842 by Stephanie Marin RN  Outcome: Progressing     Problem: Safety - Adult  Goal: Free from fall injury  10/14/2022 0655 by Kvng Victoria RN  Outcome: Progressing  10/13/2022 1842 by Stephanie Marin RN  Outcome: Progressing     Problem: Nutrition Deficit:  Goal: Optimize nutritional status  10/14/2022 0655 by Kvng Victoria RN  Outcome: Progressing  10/13/2022 1842 by Stephanie Marin RN  Outcome: Progressing

## 2022-10-14 NOTE — OP NOTE
Operative Note      Patient: Oh Mckeon  YOB: 1940  MRN: [id number]    Date of Procedure: 10/14/2022    Pre-Op Diagnosis: Anemia, melena    Post-Op Diagnosis:  gastritis       Procedure(s):  EGD DIAGNOSTIC ONLY    Surgeon(s):  Adalberto Larios MD    Assistant:   * No surgical staff found *    Anesthesia: Monitor Anesthesia Care    Estimated Blood Loss (mL): Minimal    Complications: None    Specimens:   * No specimens in log *    Implants:  * No implants in log *      Drains: * No LDAs found *    Procedure Details     Informed consent was obtained for the procedure, including conscious sedation. Risks of pancreatitis, infection, perforation, hemorrhage, adverse drug reaction and aspiration were discussed. The patient was placed in the left lateral decubitus position. The patient was monitored continuously with ECG tracing, pulse oximetry, blood pressure monitoring, and direct observation. The gastroscope was inserted into the mouth and advanced under direct vision to third portion of the duodenum. A careful inspection was made as the gastroscope was withdrawn, including a retroflexed view of the proximal stomach; findings and interventions are described below. Appropriate photodocumentation was obtained. Findings:  Normal esophagus without esophagitis, antral gastritis, normal duodenum to D3, no evidence of gastric or duodenal ulcers, no evidence of recent or active upper GI bleeding      Impression:    Mild antral gastritis without evidence of recent or active upper GI bleeding. Recommendations:  -Continue PPI  -recently had colonoscopy a few months ago at the [place] with post polypectomy bleeding that resolved on its own  -I do not think he needs another colonoscopy at this time  -continue to trend h/h  -ok for diet    Leandra Diop MD  Minimally Invasive General Surgery and Endoscopy  [address] [place] [address].  [address]: [phone]  F: [address] Sean Denise MD 10/14/2022 3:25 PM       Electronically signed by Fiorella Gomez MD on 10/14/2022 at 3:22 PM

## 2022-10-14 NOTE — PROGRESS NOTES
orthopnea. Gastrointestinal:  Nausea, vomiting, diarrhea, heartburn, constipation, abdominal pain, hematemesis, hematochezia, melena, acholic stools  Genito-Urinary:  Dysuria, urgency, frequency, hematuria  Musculoskeletal:  Joint pain, joint stiffness, joint swelling, muscle pain  Neurology:  Headache, focal neurological deficits, weakness, numbness, paresthesia  Derm:  Rashes, ulcers, excoriations, bruising  Extremities:  Decreased ROM, peripheral edema, mottling      OBJECTIVE:    /70   Pulse 70   Temp 98 °F (36.7 °C) (Oral)   Resp 17   Ht 5' 11\" (1.803 m)   Wt 196 lb (88.9 kg)   SpO2 94%   BMI 27.34 kg/m²     General appearance:  awake, alert, and oriented to person, place, time, and purpose; appears stated age and cooperative; no apparent distress no labored breathing  HEENT:  Conjunctivae/corneas clear. Neck: Supple. No jugular venous distention. Respiratory: symmetrical; clear to auscultation bilaterally; no wheezes; no rhonchi; no rales  Cardiovascular: rhythm regular; rate controlled; no murmurs  Abdomen: Soft, nontender, nondistended  Extremities:  peripheral pulses present; no peripheral edema; no ulcers  Musculoskeletal: No clubbing, cyanosis, no bilateral lower extremity edema. Brisk capillary refill. Skin:  No rashes  on visible skin  Neurologic: awake, alert and following commands     ASSESSMENT and PLAN:    --Acute blood loss anemia/melena- general surgery following. Follow H&H. Transfuse for hemoglobin <7. EGD attempted 10/12 however became hypoxic so was aborted. Aspirin held. IV PPI. Plan for EGD 10/14    --Acute respiratory insufficiency-likely due to anesthesia during EGD 10/12. Had copious secretions. CXR showing chronic findings, no acute cardiopulmonary disease. COVId and flu negative. Currently on room air. Duonebs and IS.    -- Elevated creatinine, MOISE/CKD . with baseline creatinine around 2.0.   Monitor renal function and avoid nephrotoxins and hypotension    --Gonzalez acidosis possibly secondary to MOISE. Sodium bicarb and treat as above    --Hypertension continue home meds parameters when blood pressure is more stable    --GERD, PPI    --History open AAA repair    --Lipidemia continue home statin    --Disposition, pending medical clearance. Patient normally lives alone and is independent at home. PT OT evaluation    Medications:  REVIEWED DAILY    Infusion Medications    sodium chloride      sodium chloride      sodium chloride 75 mL/hr at 10/14/22 0230     Scheduled Medications    ipratropium-albuterol  1 ampule Inhalation Q4H WA    [Held by provider] aspirin  81 mg Oral Daily    atorvastatin  40 mg Oral Nightly    polyvinyl alcohol  1 drop Both Eyes TID    docusate sodium  100 mg Oral BID    ferrous sulfate  325 mg Oral TID WC    [Held by provider] furosemide  20 mg Oral BID    latanoprost  1 drop Both Eyes Daily    [Held by provider] lisinopril  40 mg Oral BID    metoprolol succinate  75 mg Oral BID    sodium chloride flush  5-40 mL IntraVENous 2 times per day    dorzolamide  1 drop Ophthalmic BID    And    brimonidine  1 drop Ophthalmic BID    pantoprazole (PROTONIX) 40 mg injection  40 mg IntraVENous Q12H     PRN Meds: calcium carbonate, melatonin, hydrALAZINE, sodium chloride, sodium chloride flush, sodium chloride, ondansetron **OR** ondansetron, acetaminophen **OR** acetaminophen    Labs:     Recent Labs     10/11/22  1748 10/12/22  0324 10/13/22  0500 10/13/22  0853 10/13/22  2133 10/14/22  0431 10/14/22  0916   WBC 5.6  --  3.8*  --   --  4.5  --    HGB 6.8*   < > 7.2*   < > 6.8* 8.0* 7.8*   HCT 22.5*   < > 22.9*   < > 22.1* 27.1* 24.5*     --  187  --   --  173  --     < > = values in this interval not displayed.          Recent Labs     10/12/22  0321 10/13/22  0500 10/14/22  0431    137 136   K 4.7 4.3 4.4   * 108* 110*   CO2 21* 19* 18*   BUN 25* 24* 20   CREATININE 2.3* 2.1* 2.0*   CALCIUM 9.7 9.3 9.6         Recent Labs     10/11/22  1748

## 2022-10-14 NOTE — PROGRESS NOTES
GENERAL SURGERY  DAILY PROGRESS NOTE  10/14/2022    Chief Complaint   Patient presents with    Dizziness    Fatigue     Patient has been very tired and dizzy since last week. He went to have lab work done and he was told hs blood count was 2 and to go to the ER. Subjective:  Seen this morning, no acute complaints. Discussed plan for EGD. Objective:  /78   Pulse 67   Temp 98.3 °F (36.8 °C) (Oral)   Resp 17   Ht 5' 11\" (1.803 m)   Wt 196 lb (88.9 kg)   SpO2 97%   BMI 27.34 kg/m²     Assessment/Plan:  [de-identified] y.o. male with acute on chronic normocytic anemia with melenic stools past few weeks    - Scheduled for EGD today   The risks, benefits, alternatives, and potential complications of the procedure, including the risks of bleeding, infection, injury to surrounding structures, need for additional procedures, and death were explained to the patient. All questions were answered. The patient understands and agrees to proceed with the procedure.        Electronically signed by Carmencita Uribe on 28/31/6971 at 7:48 AM

## 2022-10-14 NOTE — ANESTHESIA POSTPROCEDURE EVALUATION
Department of Anesthesiology  Postprocedure Note    Patient: Lennie Parra  MRN: [id number]  YOB: 1940  Date of evaluation: 10/14/2022      Procedure Summary     Date: 10/14/22 Room / Location: [facility] 01 / [facility]    Anesthesia Start: 6359 Anesthesia Stop: 6263    Procedure: EGD DIAGNOSTIC ONLY Diagnosis:       Anemia, unspecified type      (Anemia, unspecified type [D64.9])    Surgeons: Dennie Sings, MD Responsible Provider: Sav Lo MD    Anesthesia Type: MAC ASA Status: 3          Anesthesia Type: No value filed.     Willian Phase I: Willian Score: 9    Willian Phase II:        Anesthesia Post Evaluation    Patient location during evaluation: PACU  Patient participation: complete - patient participated  Level of consciousness: awake  Pain score: 0  Airway patency: patent  Nausea & Vomiting: no nausea and no vomiting  Complications: no  Cardiovascular status: blood pressure returned to baseline  Respiratory status: acceptable  Hydration status: stable

## 2022-10-15 LAB
ABO/RH: NORMAL
ALBUMIN SERPL-MCNC: 2.9 G/DL (ref 3.5–5.2)
ALP BLD-CCNC: 84 U/L (ref 40–129)
ALT SERPL-CCNC: 9 U/L (ref 0–40)
ANION GAP SERPL CALCULATED.3IONS-SCNC: 7 MMOL/L (ref 7–16)
ANTIBODY SCREEN: NORMAL
AST SERPL-CCNC: 14 U/L (ref 0–39)
BASOPHILS ABSOLUTE: 0.04 E9/L (ref 0–0.2)
BASOPHILS RELATIVE PERCENT: 0.9 % (ref 0–2)
BILIRUB SERPL-MCNC: 0.3 MG/DL (ref 0–1.2)
BLOOD BANK DISPENSE STATUS: NORMAL
BLOOD BANK PRODUCT CODE: NORMAL
BPU ID: NORMAL
BUN BLDV-MCNC: 17 MG/DL (ref 6–23)
CALCIUM SERPL-MCNC: 9.4 MG/DL (ref 8.6–10.2)
CHLORIDE BLD-SCNC: 110 MMOL/L (ref 98–107)
CO2: 20 MMOL/L (ref 22–29)
CREAT SERPL-MCNC: 1.9 MG/DL (ref 0.7–1.2)
DESCRIPTION BLOOD BANK: NORMAL
EOSINOPHILS ABSOLUTE: 0.3 E9/L (ref 0.05–0.5)
EOSINOPHILS RELATIVE PERCENT: 7.8 % (ref 0–6)
GFR AFRICAN AMERICAN: 41
GFR NON-AFRICAN AMERICAN: 34 ML/MIN/1.73
GLUCOSE BLD-MCNC: 93 MG/DL (ref 74–99)
HCT VFR BLD CALC: 24.6 % (ref 37–54)
HCT VFR BLD CALC: 26.2 % (ref 37–54)
HEMOGLOBIN: 7.7 G/DL (ref 12.5–16.5)
HEMOGLOBIN: 8 G/DL (ref 12.5–16.5)
LYMPHOCYTES ABSOLUTE: 0.62 E9/L (ref 1.5–4)
LYMPHOCYTES RELATIVE PERCENT: 15.7 % (ref 20–42)
MAGNESIUM: 1.8 MG/DL (ref 1.6–2.6)
MCH RBC QN AUTO: 29.4 PG (ref 26–35)
MCHC RBC AUTO-ENTMCNC: 31.3 % (ref 32–34.5)
MCV RBC AUTO: 93.9 FL (ref 80–99.9)
MONOCYTES ABSOLUTE: 0.31 E9/L (ref 0.1–0.95)
MONOCYTES RELATIVE PERCENT: 7.8 % (ref 2–12)
NEUTROPHILS ABSOLUTE: 2.65 E9/L (ref 1.8–7.3)
NEUTROPHILS RELATIVE PERCENT: 67.8 % (ref 43–80)
OVALOCYTES: ABNORMAL
PDW BLD-RTO: 15.8 FL (ref 11.5–15)
PLATELET # BLD: 154 E9/L (ref 130–450)
PMV BLD AUTO: 9.8 FL (ref 7–12)
POIKILOCYTES: ABNORMAL
POLYCHROMASIA: ABNORMAL
POTASSIUM REFLEX MAGNESIUM: 4.5 MMOL/L (ref 3.5–5)
RBC # BLD: 2.62 E12/L (ref 3.8–5.8)
SCHISTOCYTES: ABNORMAL
SODIUM BLD-SCNC: 137 MMOL/L (ref 132–146)
T4 FREE: 1.03 NG/DL (ref 0.93–1.7)
TOTAL PROTEIN: 5.1 G/DL (ref 6.4–8.3)
TROPONIN, HIGH SENSITIVITY: 33 NG/L (ref 0–11)
TROPONIN, HIGH SENSITIVITY: 36 NG/L (ref 0–11)
TSH SERPL DL<=0.05 MIU/L-ACNC: 0.64 UIU/ML (ref 0.27–4.2)
WBC # BLD: 3.9 E9/L (ref 4.5–11.5)

## 2022-10-15 PROCEDURE — 85014 HEMATOCRIT: CPT

## 2022-10-15 PROCEDURE — P9016 RBC LEUKOCYTES REDUCED: HCPCS

## 2022-10-15 PROCEDURE — 86850 RBC ANTIBODY SCREEN: CPT

## 2022-10-15 PROCEDURE — 36415 COLL VENOUS BLD VENIPUNCTURE: CPT

## 2022-10-15 PROCEDURE — 6370000000 HC RX 637 (ALT 250 FOR IP): Performed by: FAMILY MEDICINE

## 2022-10-15 PROCEDURE — 2060000000 HC ICU INTERMEDIATE R&B

## 2022-10-15 PROCEDURE — 86923 COMPATIBILITY TEST ELECTRIC: CPT

## 2022-10-15 PROCEDURE — 6360000002 HC RX W HCPCS: Performed by: STUDENT IN AN ORGANIZED HEALTH CARE EDUCATION/TRAINING PROGRAM

## 2022-10-15 PROCEDURE — 2580000003 HC RX 258: Performed by: STUDENT IN AN ORGANIZED HEALTH CARE EDUCATION/TRAINING PROGRAM

## 2022-10-15 PROCEDURE — 6370000000 HC RX 637 (ALT 250 FOR IP): Performed by: INTERNAL MEDICINE

## 2022-10-15 PROCEDURE — 94640 AIRWAY INHALATION TREATMENT: CPT

## 2022-10-15 PROCEDURE — 84484 ASSAY OF TROPONIN QUANT: CPT

## 2022-10-15 PROCEDURE — 83735 ASSAY OF MAGNESIUM: CPT

## 2022-10-15 PROCEDURE — 80053 COMPREHEN METABOLIC PANEL: CPT

## 2022-10-15 PROCEDURE — 36430 TRANSFUSION BLD/BLD COMPNT: CPT

## 2022-10-15 PROCEDURE — 86900 BLOOD TYPING SEROLOGIC ABO: CPT

## 2022-10-15 PROCEDURE — 86901 BLOOD TYPING SEROLOGIC RH(D): CPT

## 2022-10-15 PROCEDURE — C9113 INJ PANTOPRAZOLE SODIUM, VIA: HCPCS | Performed by: STUDENT IN AN ORGANIZED HEALTH CARE EDUCATION/TRAINING PROGRAM

## 2022-10-15 PROCEDURE — 85025 COMPLETE CBC W/AUTO DIFF WBC: CPT

## 2022-10-15 PROCEDURE — 93005 ELECTROCARDIOGRAM TRACING: CPT | Performed by: FAMILY MEDICINE

## 2022-10-15 PROCEDURE — 2580000003 HC RX 258: Performed by: FAMILY MEDICINE

## 2022-10-15 PROCEDURE — 85018 HEMOGLOBIN: CPT

## 2022-10-15 PROCEDURE — 84439 ASSAY OF FREE THYROXINE: CPT

## 2022-10-15 PROCEDURE — 84443 ASSAY THYROID STIM HORMONE: CPT

## 2022-10-15 PROCEDURE — A4216 STERILE WATER/SALINE, 10 ML: HCPCS | Performed by: STUDENT IN AN ORGANIZED HEALTH CARE EDUCATION/TRAINING PROGRAM

## 2022-10-15 RX ORDER — SODIUM CHLORIDE 9 MG/ML
INJECTION, SOLUTION INTRAVENOUS PRN
Status: DISCONTINUED | OUTPATIENT
Start: 2022-10-15 | End: 2022-10-18 | Stop reason: HOSPADM

## 2022-10-15 RX ORDER — NYSTATIN 100000 U/G
CREAM TOPICAL 2 TIMES DAILY
Status: DISCONTINUED | OUTPATIENT
Start: 2022-10-15 | End: 2022-10-18 | Stop reason: HOSPADM

## 2022-10-15 RX ORDER — FUROSEMIDE 10 MG/ML
20 INJECTION INTRAMUSCULAR; INTRAVENOUS SEE ADMIN INSTRUCTIONS
Status: COMPLETED | OUTPATIENT
Start: 2022-10-15 | End: 2022-10-16

## 2022-10-15 RX ORDER — TRAMADOL HYDROCHLORIDE 50 MG/1
50 TABLET ORAL EVERY 6 HOURS PRN
Status: DISCONTINUED | OUTPATIENT
Start: 2022-10-15 | End: 2022-10-18 | Stop reason: HOSPADM

## 2022-10-15 RX ORDER — NITROGLYCERIN 0.4 MG/1
0.4 TABLET SUBLINGUAL EVERY 5 MIN PRN
Status: DISCONTINUED | OUTPATIENT
Start: 2022-10-15 | End: 2022-10-18 | Stop reason: HOSPADM

## 2022-10-15 RX ORDER — ASPIRIN 81 MG/1
81 TABLET, CHEWABLE ORAL DAILY
Status: DISCONTINUED | OUTPATIENT
Start: 2022-10-15 | End: 2022-10-18 | Stop reason: HOSPADM

## 2022-10-15 RX ADMIN — POLYVINYL ALCOHOL 1 DROP: 14 SOLUTION/ DROPS OPHTHALMIC at 09:14

## 2022-10-15 RX ADMIN — DOCUSATE SODIUM 100 MG: 100 CAPSULE, LIQUID FILLED ORAL at 09:14

## 2022-10-15 RX ADMIN — DORZOLAMIDE HYDROCHLORIDE 1 DROP: 20 SOLUTION/ DROPS OPHTHALMIC at 20:14

## 2022-10-15 RX ADMIN — IPRATROPIUM BROMIDE AND ALBUTEROL SULFATE 1 AMPULE: 2.5; .5 SOLUTION RESPIRATORY (INHALATION) at 19:41

## 2022-10-15 RX ADMIN — DORZOLAMIDE HYDROCHLORIDE 1 DROP: 20 SOLUTION/ DROPS OPHTHALMIC at 09:14

## 2022-10-15 RX ADMIN — BRIMONIDINE TARTRATE 1 DROP: 2 SOLUTION OPHTHALMIC at 09:14

## 2022-10-15 RX ADMIN — ATORVASTATIN CALCIUM 40 MG: 40 TABLET, FILM COATED ORAL at 20:12

## 2022-10-15 RX ADMIN — NITROGLYCERIN 0.4 MG: 0.4 TABLET, ORALLY DISINTEGRATING SUBLINGUAL at 20:12

## 2022-10-15 RX ADMIN — FERROUS SULFATE TAB 325 MG (65 MG ELEMENTAL FE) 325 MG: 325 (65 FE) TAB at 09:14

## 2022-10-15 RX ADMIN — SODIUM CHLORIDE, PRESERVATIVE FREE 40 MG: 5 INJECTION INTRAVENOUS at 11:41

## 2022-10-15 RX ADMIN — METOPROLOL SUCCINATE 75 MG: 50 TABLET, EXTENDED RELEASE ORAL at 09:13

## 2022-10-15 RX ADMIN — SODIUM BICARBONATE 650 MG: 650 TABLET ORAL at 09:13

## 2022-10-15 RX ADMIN — ASPIRIN 81 MG 81 MG: 81 TABLET ORAL at 11:41

## 2022-10-15 RX ADMIN — SODIUM BICARBONATE 650 MG: 650 TABLET ORAL at 14:11

## 2022-10-15 RX ADMIN — IPRATROPIUM BROMIDE AND ALBUTEROL SULFATE 1 AMPULE: 2.5; .5 SOLUTION RESPIRATORY (INHALATION) at 07:26

## 2022-10-15 RX ADMIN — POLYVINYL ALCOHOL 1 DROP: 14 SOLUTION/ DROPS OPHTHALMIC at 14:12

## 2022-10-15 RX ADMIN — SODIUM CHLORIDE, PRESERVATIVE FREE 10 ML: 5 INJECTION INTRAVENOUS at 20:14

## 2022-10-15 RX ADMIN — FERROUS SULFATE TAB 325 MG (65 MG ELEMENTAL FE) 325 MG: 325 (65 FE) TAB at 18:21

## 2022-10-15 RX ADMIN — BRIMONIDINE TARTRATE 1 DROP: 2 SOLUTION OPHTHALMIC at 20:14

## 2022-10-15 RX ADMIN — SODIUM CHLORIDE, PRESERVATIVE FREE 40 MG: 5 INJECTION INTRAVENOUS at 21:45

## 2022-10-15 RX ADMIN — FERROUS SULFATE TAB 325 MG (65 MG ELEMENTAL FE) 325 MG: 325 (65 FE) TAB at 11:41

## 2022-10-15 RX ADMIN — IPRATROPIUM BROMIDE AND ALBUTEROL SULFATE 1 AMPULE: 2.5; .5 SOLUTION RESPIRATORY (INHALATION) at 11:06

## 2022-10-15 RX ADMIN — DOCUSATE SODIUM 100 MG: 100 CAPSULE, LIQUID FILLED ORAL at 20:12

## 2022-10-15 RX ADMIN — SODIUM BICARBONATE 650 MG: 650 TABLET ORAL at 20:12

## 2022-10-15 RX ADMIN — IPRATROPIUM BROMIDE AND ALBUTEROL SULFATE 1 AMPULE: 2.5; .5 SOLUTION RESPIRATORY (INHALATION) at 15:48

## 2022-10-15 RX ADMIN — SODIUM CHLORIDE: 9 INJECTION, SOLUTION INTRAVENOUS at 11:23

## 2022-10-15 RX ADMIN — NYSTATIN: 100000 CREAM TOPICAL at 18:21

## 2022-10-15 ASSESSMENT — PAIN DESCRIPTION - LOCATION: LOCATION: CHEST

## 2022-10-15 ASSESSMENT — PAIN SCALES - GENERAL
PAINLEVEL_OUTOF10: 2
PAINLEVEL_OUTOF10: 0

## 2022-10-15 ASSESSMENT — PAIN DESCRIPTION - FREQUENCY: FREQUENCY: INTERMITTENT

## 2022-10-15 ASSESSMENT — PAIN DESCRIPTION - DESCRIPTORS: DESCRIPTORS: JABBING

## 2022-10-15 ASSESSMENT — PAIN DESCRIPTION - ORIENTATION: ORIENTATION: LEFT

## 2022-10-15 NOTE — PROGRESS NOTES
Hospitalist Progress Note      SYNOPSIS: Patient admitted on 10/11/2022 for GI bleed presented to the emergency department with fatigue and low hemoglobin. Patient was seen by [place] earlier today and was told that his blood count was low that he did go to the emergency department. Patient reports having black stool. Symptoms started about 4 days ago. Worse with light exertion. Denies fever, chills, nausea, vomiting, chest pain, shortness of breath. Vital signs within normal limits and stable. Laboratory studies demonstrate BUN 27, creatinine 2.4, glucose 102, hemoglobin 6.8. Patient was transfused 1 unit of packed red blood cells and medicine was consulted for admission. General surgery consulted. EGD attempted 10/12 however became hypoxic so was aborted. Remains anemic, ordered another unit PRBC overnight. SUBJECTIVE:  Patient seen and examined chart reviewed discussed with nursing staff and surgery team.  Patient feels comfortable no chest pain or shortness of breath. Denies any bleeding he had EGD yesterday signs of gastritis with no active bleeding. Patient had nonsustained V. tach on 10/15 morning reported by nurses    Temp (24hrs), Av.5 °F (36.4 °C), Min:97 °F (36.1 °C), Max:98 °F (36.7 °C)    DIET: ADULT DIET; Regular  CODE: Full Code    Intake/Output Summary (Last 24 hours) at 10/15/2022 1059  Last data filed at 10/15/2022 0731  Gross per 24 hour   Intake 2208.68 ml   Output 0 ml   Net 2208.68 ml         Review of Systems  All bolded are positive; please see HPI  General:  Fever, chills, diaphoresis, fatigue, malaise, night sweats, weight loss  Psychological:  Anxiety, disorientation, hallucinations. ENT:  Epistaxis, headaches, vertigo, visual changes. Cardiovascular:  Chest pain, irregular heartbeats, palpitations, paroxysmal nocturnal dyspnea. Respiratory:  Shortness of breath, coughing, sputum production, hemoptysis, wheezing, orthopnea.   Gastrointestinal:  Nausea, vomiting, diarrhea, heartburn, constipation, abdominal pain, hematemesis, hematochezia, melena, acholic stools  Genito-Urinary:  Dysuria, urgency, frequency, hematuria  Musculoskeletal:  Joint pain, joint stiffness, joint swelling, muscle pain  Neurology:  Headache, focal neurological deficits, weakness, numbness, paresthesia  Derm:  Rashes, ulcers, excoriations, bruising  Extremities:  Decreased ROM, peripheral edema, mottling      OBJECTIVE:    BP (!) 131/59   Pulse 67   Temp 97.8 °F (36.6 °C) (Oral)   Resp 20   Ht 5' 11\" (1.803 m)   Wt 196 lb (88.9 kg)   SpO2 97%   BMI 27.34 kg/m²     General appearance:  awake, alert, and oriented to person, place, time, and purpose; appears stated age and cooperative; no apparent distress no labored breathing  HEENT:  Conjunctivae/corneas clear. Neck: Supple. No jugular venous distention. Respiratory: symmetrical; clear to auscultation bilaterally; no wheezes; no rhonchi; no rales  Cardiovascular: rhythm regular; rate controlled; no murmurs  Abdomen: Soft, nontender, nondistended  Extremities:  peripheral pulses present; no peripheral edema; no ulcers  Musculoskeletal: No clubbing, cyanosis, no bilateral lower extremity edema. Brisk capillary refill. Skin:  No rashes  on visible skin  Neurologic: awake, alert and following commands     ASSESSMENT and PLAN:    --Acute blood loss anemia/melena- general surgery following. Follow H&H. Transfuse for hemoglobin <7. EGD 10/14. Signs of gastritis with no active bleeding. Restart aspirin continue PPI twice daily monitor hemoglobin with plan for possible discharge tomorrow    --Nonsustained V. tach. Check EKG troponin electrolytes and TSH. We will also obtain echocardiogram continue to closely monitor over telemetry, patient currently on metoprolol    --Acute respiratory insufficiency-likely due to anesthesia during EGD 10/12. Had copious secretions. CXR showing chronic findings, no acute cardiopulmonary disease.  COVId and flu negative. Currently on room air. Tan and IS.    -- Elevated creatinine, MOISE/CKD . with baseline creatinine around 2.0. Monitor renal function and avoid nephrotoxins and hypotension    -- Metabolic acidosis possibly secondary to MOISE. Sodium bicarb and treat as above    --Hypertension continue home meds parameters when blood pressure is more stable    --GERD, PPI    --History open AAA repair    -- Dyslipidemia continue home statin    --Disposition, pending medical clearance. Patient normally lives alone and is independent at home.   PT OT evaluation    Medications:  REVIEWED DAILY    Infusion Medications    sodium chloride      sodium chloride      sodium chloride 75 mL/hr at 10/14/22 2200     Scheduled Medications    aspirin  81 mg Oral Daily    sodium bicarbonate  650 mg Oral TID    ipratropium-albuterol  1 ampule Inhalation Q4H WA    atorvastatin  40 mg Oral Nightly    polyvinyl alcohol  1 drop Both Eyes TID    docusate sodium  100 mg Oral BID    ferrous sulfate  325 mg Oral TID WC    [Held by provider] furosemide  20 mg Oral BID    latanoprost  1 drop Both Eyes Daily    [Held by provider] lisinopril  40 mg Oral BID    metoprolol succinate  75 mg Oral BID    sodium chloride flush  5-40 mL IntraVENous 2 times per day    dorzolamide  1 drop Ophthalmic BID    And    brimonidine  1 drop Ophthalmic BID    pantoprazole (PROTONIX) 40 mg injection  40 mg IntraVENous Q12H     PRN Meds: perflutren lipid microspheres, calcium carbonate, melatonin, hydrALAZINE, sodium chloride, sodium chloride flush, sodium chloride, ondansetron **OR** ondansetron, acetaminophen **OR** acetaminophen    Labs:     Recent Labs     10/13/22  0500 10/13/22  0853 10/14/22  0431 10/14/22  0916 10/14/22  1713 10/14/22  2127 10/15/22  0432   WBC 3.8*  --  4.5  --   --   --  3.9*   HGB 7.2*   < > 8.0*   < > 9.1* 7.6* 7.7*   HCT 22.9*   < > 27.1*   < > 28.9* 24.0* 24.6*     --  173  --   --   --  154    < > = values in this interval not displayed. Recent Labs     10/13/22  0500 10/14/22  0431 10/15/22  0432    136 137   K 4.3 4.4 4.5   * 110* 110*   CO2 19* 18* 20*   BUN 24* 20 17   CREATININE 2.1* 2.0* 1.9*   CALCIUM 9.3 9.6 9.4         Recent Labs     10/15/22  0432   PROT 5.1*   ALKPHOS 84   ALT 9   AST 14   BILITOT 0.3         No results for input(s): INR in the last 72 hours. No results for input(s): Nida Fuse in the last 72 hours. Chronic labs:    Lab Results   Component Value Date    INR 1.0 10/11/2022       Radiology: REVIEWED DAILY    +++++++++++++++++++++++++++++++++++++++++++++++++  [address]  +++++++++++++++++++++++++++++++++++++++++++++++++  NOTE: This report was transcribed using voice recognition software. Every effort was made to ensure accuracy; however, inadvertent computerized transcription errors may be present. n/a

## 2022-10-15 NOTE — PLAN OF CARE
Problem: ABCDS Injury Assessment  Goal: Absence of physical injury  Outcome: Progressing     Problem: Safety - Adult  Goal: Free from fall injury  Outcome: Progressing     Problem: Pain  Goal: Verbalizes/displays adequate comfort level or baseline comfort level  Outcome: Progressing     Problem: Discharge Planning  Goal: Discharge to home or other facility with appropriate resources  Outcome: Not Progressing     Problem: Cardiovascular - Adult  Goal: Maintains optimal cardiac output and hemodynamic stability  Outcome: Not Progressing

## 2022-10-15 NOTE — PROGRESS NOTES
GENERAL SURGERY  DAILY PROGRESS NOTE  10/15/2022    Chief Complaint   Patient presents with    Dizziness    Fatigue     Patient has been very tired and dizzy since last week. He went to have lab work done and he was told hs blood count was 2 and to go to the ER.        Subjective:  No signs of bleeding hemoglobin stable    Objective:  BP (!) 131/59   Pulse 67   Temp 97.8 °F (36.6 °C) (Oral)   Resp 20   Ht 5' 11\" (1.803 m)   Wt 196 lb (88.9 kg)   SpO2 97%   BMI 27.34 kg/m²     Assessment/Plan:  [de-identified] y.o. male with acute on chronic normocytic anemia with melenic stools past few weeks  Patient underwent EGD on 10/14-no evidence of active GI bleeding    Okay for diet and discharge when stable medically      Electronically signed by Clari Rock MD on 10/15/2022 at 10:46 AM

## 2022-10-15 NOTE — PLAN OF CARE
Problem: Discharge Planning  Goal: Discharge to home or other facility with appropriate resources  10/15/2022 1040 by Robert Daily RN  Outcome: Progressing  10/15/2022 0356 by Porfirio Perez RN  Outcome: Not Progressing     Problem: Cardiovascular - Adult  Goal: Maintains optimal cardiac output and hemodynamic stability  10/15/2022 1040 by Robert Daily RN  Outcome: Progressing  10/15/2022 0356 by Porfirio Perez RN  Outcome: Not Progressing

## 2022-10-16 PROBLEM — I25.10 CORONARY ARTERY DISEASE INVOLVING NATIVE CORONARY ARTERY OF NATIVE HEART WITHOUT ANGINA PECTORIS: Status: ACTIVE | Noted: 2022-10-16

## 2022-10-16 PROBLEM — E78.5 HYPERLIPIDEMIA: Status: ACTIVE | Noted: 2022-10-16

## 2022-10-16 PROBLEM — R07.9 CHEST PAIN: Status: ACTIVE | Noted: 2022-10-16

## 2022-10-16 PROBLEM — I10 PRIMARY HYPERTENSION: Status: ACTIVE | Noted: 2022-10-16

## 2022-10-16 PROBLEM — N18.32 STAGE 3B CHRONIC KIDNEY DISEASE (HCC): Status: ACTIVE | Noted: 2022-10-16

## 2022-10-16 PROBLEM — I49.3 PVC'S (PREMATURE VENTRICULAR CONTRACTIONS): Status: ACTIVE | Noted: 2022-10-16

## 2022-10-16 LAB
ALBUMIN SERPL-MCNC: 3 G/DL (ref 3.5–5.2)
ALP BLD-CCNC: 87 U/L (ref 40–129)
ALT SERPL-CCNC: 9 U/L (ref 0–40)
ANION GAP SERPL CALCULATED.3IONS-SCNC: 9 MMOL/L (ref 7–16)
AST SERPL-CCNC: 19 U/L (ref 0–39)
BASOPHILS ABSOLUTE: 0.02 E9/L (ref 0–0.2)
BASOPHILS RELATIVE PERCENT: 0.4 % (ref 0–2)
BILIRUB SERPL-MCNC: 0.3 MG/DL (ref 0–1.2)
BUN BLDV-MCNC: 17 MG/DL (ref 6–23)
CALCIUM SERPL-MCNC: 9.4 MG/DL (ref 8.6–10.2)
CHLORIDE BLD-SCNC: 112 MMOL/L (ref 98–107)
CO2: 19 MMOL/L (ref 22–29)
CREAT SERPL-MCNC: 1.8 MG/DL (ref 0.7–1.2)
EOSINOPHILS ABSOLUTE: 0.34 E9/L (ref 0.05–0.5)
EOSINOPHILS RELATIVE PERCENT: 7.5 % (ref 0–6)
GFR AFRICAN AMERICAN: 44
GFR NON-AFRICAN AMERICAN: 36 ML/MIN/1.73
GLUCOSE BLD-MCNC: 85 MG/DL (ref 74–99)
HCT VFR BLD CALC: 28 % (ref 37–54)
HEMOGLOBIN: 8.9 G/DL (ref 12.5–16.5)
IMMATURE GRANULOCYTES #: 0.01 E9/L
IMMATURE GRANULOCYTES %: 0.2 % (ref 0–5)
LYMPHOCYTES ABSOLUTE: 0.74 E9/L (ref 1.5–4)
LYMPHOCYTES RELATIVE PERCENT: 16.3 % (ref 20–42)
MAGNESIUM: 1.7 MG/DL (ref 1.6–2.6)
MCH RBC QN AUTO: 29.2 PG (ref 26–35)
MCHC RBC AUTO-ENTMCNC: 31.8 % (ref 32–34.5)
MCV RBC AUTO: 91.8 FL (ref 80–99.9)
MONOCYTES ABSOLUTE: 0.39 E9/L (ref 0.1–0.95)
MONOCYTES RELATIVE PERCENT: 8.6 % (ref 2–12)
NEUTROPHILS ABSOLUTE: 3.04 E9/L (ref 1.8–7.3)
NEUTROPHILS RELATIVE PERCENT: 67 % (ref 43–80)
PDW BLD-RTO: 15.5 FL (ref 11.5–15)
PLATELET # BLD: 155 E9/L (ref 130–450)
PMV BLD AUTO: 11.1 FL (ref 7–12)
POTASSIUM REFLEX MAGNESIUM: 4.8 MMOL/L (ref 3.5–5)
RBC # BLD: 3.05 E12/L (ref 3.8–5.8)
SODIUM BLD-SCNC: 140 MMOL/L (ref 132–146)
TOTAL PROTEIN: 5.4 G/DL (ref 6.4–8.3)
WBC # BLD: 4.5 E9/L (ref 4.5–11.5)

## 2022-10-16 PROCEDURE — 99222 1ST HOSP IP/OBS MODERATE 55: CPT | Performed by: INTERNAL MEDICINE

## 2022-10-16 PROCEDURE — 36415 COLL VENOUS BLD VENIPUNCTURE: CPT

## 2022-10-16 PROCEDURE — 94640 AIRWAY INHALATION TREATMENT: CPT

## 2022-10-16 PROCEDURE — 6370000000 HC RX 637 (ALT 250 FOR IP): Performed by: FAMILY MEDICINE

## 2022-10-16 PROCEDURE — 2060000000 HC ICU INTERMEDIATE R&B

## 2022-10-16 PROCEDURE — 6360000002 HC RX W HCPCS: Performed by: FAMILY MEDICINE

## 2022-10-16 PROCEDURE — C9113 INJ PANTOPRAZOLE SODIUM, VIA: HCPCS | Performed by: STUDENT IN AN ORGANIZED HEALTH CARE EDUCATION/TRAINING PROGRAM

## 2022-10-16 PROCEDURE — A4216 STERILE WATER/SALINE, 10 ML: HCPCS | Performed by: STUDENT IN AN ORGANIZED HEALTH CARE EDUCATION/TRAINING PROGRAM

## 2022-10-16 PROCEDURE — 2580000003 HC RX 258: Performed by: FAMILY MEDICINE

## 2022-10-16 PROCEDURE — 6360000002 HC RX W HCPCS: Performed by: STUDENT IN AN ORGANIZED HEALTH CARE EDUCATION/TRAINING PROGRAM

## 2022-10-16 PROCEDURE — 85025 COMPLETE CBC W/AUTO DIFF WBC: CPT

## 2022-10-16 PROCEDURE — 6370000000 HC RX 637 (ALT 250 FOR IP): Performed by: INTERNAL MEDICINE

## 2022-10-16 PROCEDURE — 83735 ASSAY OF MAGNESIUM: CPT

## 2022-10-16 PROCEDURE — 80053 COMPREHEN METABOLIC PANEL: CPT

## 2022-10-16 PROCEDURE — 2580000003 HC RX 258: Performed by: STUDENT IN AN ORGANIZED HEALTH CARE EDUCATION/TRAINING PROGRAM

## 2022-10-16 RX ADMIN — ATORVASTATIN CALCIUM 40 MG: 40 TABLET, FILM COATED ORAL at 20:45

## 2022-10-16 RX ADMIN — METOPROLOL SUCCINATE 75 MG: 50 TABLET, EXTENDED RELEASE ORAL at 08:08

## 2022-10-16 RX ADMIN — DOCUSATE SODIUM 100 MG: 100 CAPSULE, LIQUID FILLED ORAL at 08:08

## 2022-10-16 RX ADMIN — FERROUS SULFATE TAB 325 MG (65 MG ELEMENTAL FE) 325 MG: 325 (65 FE) TAB at 17:05

## 2022-10-16 RX ADMIN — IPRATROPIUM BROMIDE AND ALBUTEROL SULFATE 1 AMPULE: 2.5; .5 SOLUTION RESPIRATORY (INHALATION) at 07:41

## 2022-10-16 RX ADMIN — SODIUM BICARBONATE 650 MG: 650 TABLET ORAL at 13:37

## 2022-10-16 RX ADMIN — ASPIRIN 81 MG 81 MG: 81 TABLET ORAL at 08:08

## 2022-10-16 RX ADMIN — SODIUM CHLORIDE, PRESERVATIVE FREE 10 ML: 5 INJECTION INTRAVENOUS at 20:44

## 2022-10-16 RX ADMIN — BRIMONIDINE TARTRATE 1 DROP: 2 SOLUTION OPHTHALMIC at 08:12

## 2022-10-16 RX ADMIN — IPRATROPIUM BROMIDE AND ALBUTEROL SULFATE 1 AMPULE: 2.5; .5 SOLUTION RESPIRATORY (INHALATION) at 19:23

## 2022-10-16 RX ADMIN — IPRATROPIUM BROMIDE AND ALBUTEROL SULFATE 1 AMPULE: 2.5; .5 SOLUTION RESPIRATORY (INHALATION) at 11:17

## 2022-10-16 RX ADMIN — FERROUS SULFATE TAB 325 MG (65 MG ELEMENTAL FE) 325 MG: 325 (65 FE) TAB at 08:08

## 2022-10-16 RX ADMIN — POLYVINYL ALCOHOL 1 DROP: 14 SOLUTION/ DROPS OPHTHALMIC at 08:12

## 2022-10-16 RX ADMIN — POLYVINYL ALCOHOL 1 DROP: 14 SOLUTION/ DROPS OPHTHALMIC at 20:46

## 2022-10-16 RX ADMIN — POLYVINYL ALCOHOL 1 DROP: 14 SOLUTION/ DROPS OPHTHALMIC at 13:36

## 2022-10-16 RX ADMIN — SODIUM BICARBONATE 650 MG: 650 TABLET ORAL at 08:08

## 2022-10-16 RX ADMIN — FERROUS SULFATE TAB 325 MG (65 MG ELEMENTAL FE) 325 MG: 325 (65 FE) TAB at 10:48

## 2022-10-16 RX ADMIN — FUROSEMIDE 20 MG: 10 INJECTION, SOLUTION INTRAMUSCULAR; INTRAVENOUS at 09:52

## 2022-10-16 RX ADMIN — SODIUM CHLORIDE, PRESERVATIVE FREE 40 MG: 5 INJECTION INTRAVENOUS at 10:47

## 2022-10-16 RX ADMIN — IPRATROPIUM BROMIDE AND ALBUTEROL SULFATE 1 AMPULE: 2.5; .5 SOLUTION RESPIRATORY (INHALATION) at 15:37

## 2022-10-16 RX ADMIN — ACETAMINOPHEN 650 MG: 325 TABLET, FILM COATED ORAL at 08:09

## 2022-10-16 RX ADMIN — DORZOLAMIDE HYDROCHLORIDE 1 DROP: 20 SOLUTION/ DROPS OPHTHALMIC at 08:12

## 2022-10-16 RX ADMIN — NYSTATIN: 100000 CREAM TOPICAL at 08:12

## 2022-10-16 RX ADMIN — NYSTATIN: 100000 CREAM TOPICAL at 20:46

## 2022-10-16 RX ADMIN — DOCUSATE SODIUM 100 MG: 100 CAPSULE, LIQUID FILLED ORAL at 20:45

## 2022-10-16 RX ADMIN — SODIUM CHLORIDE, PRESERVATIVE FREE 10 ML: 5 INJECTION INTRAVENOUS at 08:12

## 2022-10-16 RX ADMIN — BRIMONIDINE TARTRATE 1 DROP: 2 SOLUTION OPHTHALMIC at 20:46

## 2022-10-16 RX ADMIN — DORZOLAMIDE HYDROCHLORIDE 1 DROP: 20 SOLUTION/ DROPS OPHTHALMIC at 20:46

## 2022-10-16 RX ADMIN — METOPROLOL SUCCINATE 75 MG: 50 TABLET, EXTENDED RELEASE ORAL at 20:45

## 2022-10-16 RX ADMIN — LATANOPROST 1 DROP: 50 SOLUTION OPHTHALMIC at 20:54

## 2022-10-16 RX ADMIN — SODIUM BICARBONATE 650 MG: 650 TABLET ORAL at 21:00

## 2022-10-16 RX ADMIN — SODIUM CHLORIDE, PRESERVATIVE FREE 40 MG: 5 INJECTION INTRAVENOUS at 20:59

## 2022-10-16 ASSESSMENT — PAIN DESCRIPTION - LOCATION
LOCATION: SHOULDER
LOCATION: SHOULDER

## 2022-10-16 ASSESSMENT — PAIN DESCRIPTION - DESCRIPTORS
DESCRIPTORS: ACHING
DESCRIPTORS: ACHING;DULL

## 2022-10-16 ASSESSMENT — PAIN DESCRIPTION - FREQUENCY: FREQUENCY: INTERMITTENT

## 2022-10-16 ASSESSMENT — PAIN SCALES - GENERAL
PAINLEVEL_OUTOF10: 2
PAINLEVEL_OUTOF10: 2
PAINLEVEL_OUTOF10: 0
PAINLEVEL_OUTOF10: 0

## 2022-10-16 ASSESSMENT — PAIN DESCRIPTION - PAIN TYPE: TYPE: ACUTE PAIN

## 2022-10-16 ASSESSMENT — PAIN DESCRIPTION - ORIENTATION: ORIENTATION: LEFT

## 2022-10-16 NOTE — PROGRESS NOTES
Hospitalist Progress Note      SYNOPSIS: Patient admitted on 10/11/2022 for GI bleed presented to the emergency department with fatigue and low hemoglobin. Patient was seen by Gena Kohler earlier today and was told that his blood count was low that he did go to the emergency department. Patient reports having black stool. Symptoms started about 4 days ago. Worse with light exertion. Denies fever, chills, nausea, vomiting, chest pain, shortness of breath. Vital signs within normal limits and stable. Laboratory studies demonstrate BUN 27, creatinine 2.4, glucose 102, hemoglobin 6.8. Patient was transfused 1 unit of packed red blood cells and medicine was consulted for admission. General surgery consulted. EGD attempted 10/12 however became hypoxic so was aborted. Remains anemic, ordered another unit PRBC overnight. SUBJECTIVE:  Patient seen and examined chart reviewed. Patient had episodes of chest pain yesterday  Right now he is comfortable he has no chest pain or shortness of breath, his hemoglobin is stable    Temp (24hrs), Av.8 °F (36.6 °C), Min:97.3 °F (36.3 °C), Max:98.2 °F (36.8 °C)    DIET: ADULT DIET; Regular  CODE: Full Code    Intake/Output Summary (Last 24 hours) at 10/16/2022 0730  Last data filed at 10/16/2022 0719  Gross per 24 hour   Intake 2814.87 ml   Output 950 ml   Net 1864.87 ml         Review of Systems  All bolded are positive; please see HPI  General:  Fever, chills, diaphoresis, fatigue, malaise, night sweats, weight loss  Psychological:  Anxiety, disorientation, hallucinations. ENT:  Epistaxis, headaches, vertigo, visual changes. Cardiovascular:  Chest pain, irregular heartbeats, palpitations, paroxysmal nocturnal dyspnea. Respiratory:  Shortness of breath, coughing, sputum production, hemoptysis, wheezing, orthopnea.   Gastrointestinal:  Nausea, vomiting, diarrhea, heartburn, constipation, abdominal pain, hematemesis, hematochezia, melena, acholic stools  Genito-Urinary: Dysuria, urgency, frequency, hematuria  Musculoskeletal:  Joint pain, joint stiffness, joint swelling, muscle pain  Neurology:  Headache, focal neurological deficits, weakness, numbness, paresthesia  Derm:  Rashes, ulcers, excoriations, bruising  Extremities:  Decreased ROM, peripheral edema, mottling      OBJECTIVE:    BP (!) 142/65   Pulse 59   Temp 97.4 °F (36.3 °C) (Oral)   Resp 24   Ht 5' 11\" (1.803 m)   Wt 196 lb (88.9 kg)   SpO2 96%   BMI 27.34 kg/m²     General appearance:  awake, alert, and oriented to person, place, time, and purpose; appears stated age and cooperative; no apparent distress no labored breathing  HEENT:  Conjunctivae/corneas clear. Neck: Supple. No jugular venous distention. Respiratory: symmetrical; clear to auscultation bilaterally; no wheezes; no rhonchi; no rales  Cardiovascular: rhythm regular; rate controlled; no murmurs  Abdomen: Soft, nontender, nondistended  Extremities:  peripheral pulses present; no peripheral edema; no ulcers  Musculoskeletal: No clubbing, cyanosis, no bilateral lower extremity edema. Brisk capillary refill. Skin:  No rashes  on visible skin  Neurologic: awake, alert and following commands     ASSESSMENT and PLAN:    --Acute blood loss anemia/melena- general surgery following. Follow H&H. EGD 10/14. Signs of gastritis with no active bleeding. Restart aspirin continue PPI twice daily monitor now stable. Cleared by surgery for discharge--Nonsustained V. tach. Check EKG troponin electrolytes and TSH. We will also obtain echocardiogram continue to closely monitor over telemetry, patient currently on metoprolol    --Episodes of chest pain. Patient has a history of coronary artery disease with stents in place, last stent was several years ago per patient report  His aspirin was initially held due to GI bleed. Aspirin was restarted 10/15  Patient takes metoprolol, and statin at home. Lisinopril is currently on hold due to renal function.   His troponin peaked at 36. His EKG has some new changes in the inferior leads  He had an episode of nonsustained V. tach on telemetry 10/15  Continue current medications, keep hemoglobin more than 8.0   cardiology has been consulted input appreciated    --Acute respiratory insufficiency-likely due to anesthesia during EGD 10/12. Now resolved on room air    -- Elevated creatinine, MOISE/CKD . with baseline creatinine around 2.0. Monitor renal function and avoid nephrotoxins and hypotension    --History of coronary artery disease. -- Metabolic acidosis possibly secondary to MOISE. Sodium bicarb and treat as above    --Hypertension continue home meds parameters when blood pressure is more stable    --GERD, PPI    --History open AAA repair    -- Dyslipidemia continue home statin    --Disposition, pending medical clearance. Patient normally lives alone and is independent at home.   PT OT evaluation    Medications:  REVIEWED DAILY    Infusion Medications    sodium chloride      sodium chloride      sodium chloride       Scheduled Medications    aspirin  81 mg Oral Daily    nystatin   Topical BID    furosemide  20 mg IntraVENous See Admin Instructions    sodium bicarbonate  650 mg Oral TID    ipratropium-albuterol  1 ampule Inhalation Q4H WA    atorvastatin  40 mg Oral Nightly    polyvinyl alcohol  1 drop Both Eyes TID    docusate sodium  100 mg Oral BID    ferrous sulfate  325 mg Oral TID WC    [Held by provider] furosemide  20 mg Oral BID    latanoprost  1 drop Both Eyes Daily    [Held by provider] lisinopril  40 mg Oral BID    metoprolol succinate  75 mg Oral BID    sodium chloride flush  5-40 mL IntraVENous 2 times per day    dorzolamide  1 drop Ophthalmic BID    And    brimonidine  1 drop Ophthalmic BID    pantoprazole (PROTONIX) 40 mg injection  40 mg IntraVENous Q12H     PRN Meds: perflutren lipid microspheres, nitroGLYCERIN, traMADol, sodium chloride, calcium carbonate, melatonin, hydrALAZINE, sodium chloride, sodium chloride flush, sodium chloride, ondansetron **OR** ondansetron, acetaminophen **OR** acetaminophen    Labs:     Recent Labs     10/14/22  0431 10/14/22  0916 10/15/22  0432 10/15/22  1658 10/16/22  0429   WBC 4.5  --  3.9*  --  4.5   HGB 8.0*   < > 7.7* 8.0* 8.9*   HCT 27.1*   < > 24.6* 26.2* 28.0*     --  154  --  155    < > = values in this interval not displayed. Recent Labs     10/14/22  0431 10/15/22  0432 10/16/22  0429    137 140   K 4.4 4.5 4.8   * 110* 112*   CO2 18* 20* 19*   BUN 20 17 17   CREATININE 2.0* 1.9* 1.8*   CALCIUM 9.6 9.4 9.4         Recent Labs     10/15/22  0432 10/16/22  0429   PROT 5.1* 5.4*   ALKPHOS 84 87   ALT 9 9   AST 14 19   BILITOT 0.3 0.3         No results for input(s): INR in the last 72 hours. No results for input(s): [place] in the last 72 hours. Chronic labs:    Lab Results   Component Value Date    TSH 0.642 10/15/2022    INR 1.0 10/11/2022       Radiology: REVIEWED DAILY    +++++++++++++++++++++++++++++++++++++++++++++++++  Marco PEDRO/ [address]  +++++++++++++++++++++++++++++++++++++++++++++++++  NOTE: This report was transcribed using voice recognition software. Every effort was made to ensure accuracy; however, inadvertent computerized transcription errors may be present.

## 2022-10-16 NOTE — CONSULTS
Inpatient Cardiology Consultation      Reason for Consult: Chest pain, NSVT    Consulting Physician: Dr. Hazel Trejo    Requesting Physician:  Dr. Bailee Pérez    Date of Consultation: 10/16/2022    HISTORY OF PRESENT ILLNESS:   Mr. Karlos Milian is an [de-identified]year old male who is previously not known to [facility]) Cardiology Physicians in [facility]. [facility] as stated below. Mr. Karlos Milian presented to [facility] ED on 10/11/2022 with complaints of chest pain and dark stools. He states that prior to presentation over the course of the past 2 weeks he has been having intermittent left-sided heartburn sensation that occurs after he eats and last several seconds in duration. Denies recurrence of his chest discomfort with exertion or deep inspiration. He states he is chronic and unchanged TROTTER that he attributes to iron deficiency anemia. He states that 3 weeks ago he had a colonoscopy with polypectomy at the [place]. He states that he has been consuming iron 3 times a day since that time. He states that he has noticed increase in dark black stools and recently has been constipated. He denies palpitations, feelings of his heart racing or abdominal pain. He presented to the ED from the [place] for further evaluation of reported anemia. Upon arrival to the ED his VS were oral temperature 98.2-85-/66-99% RA. EKG SR.  WBC 5.6. H&H 6.8/22. 5. . K4.7.  BUN/SCR 27/2.4. He received PRBC. He was admitted to a telemetry monitored unit. General surgery was consulted and he underwent an EGD on 10/14/2022 that revealed gastritis. An echocardiogram was ordered. Cardiology was consulted for management of chest pain and NSVT. Please note: past medical records were reviewed per electronic medical record (EMR) - see detailed reports under Past Medical/ Surgical History. Past Medical and Surgical History:    Reported CAD s/p PCI (specifics unclear).   Patient states 12 years ago he had PCI in [place], further details unknown. States that he follows with the [place] and most recently had a stress test and unremarkable echocardiogram in 2021 (specifics unclear)  HTN  HLD  Remote tobacco abuse  COPD  CKD  GERD  Hypogonadism, on testosterone gel  Erectile dysfunction  Iron deficiency anemia  Lumbar radiculopathy/lumbar spinal stenosis  Monoclonal gammopathy  Osteoarthritis  Glaucoma  S/p abdominal aortic aneurysm repair          Medications Prior to admit:  Prior to Admission medications    Medication Sig Start Date End Date Taking?  Authorizing Provider   pantoprazole (PROTONIX) 40 MG tablet Take 1 tablet by mouth every morning (before breakfast) 5/8/22   Love Daniels MD   acetaminophen (TYLENOL) 325 MG tablet Take 325 mg by mouth every 6 hours as needed for Pain    Historical Provider, MD   aspirin 81 MG chewable tablet Take 81 mg by mouth daily    Historical Provider, MD   atorvastatin (LIPITOR) 40 MG tablet Take 40 mg by mouth nightly    Historical Provider, MD   Cholecalciferol (VITAMIN D3) 125 MCG (5000 UT) TABS Take 1,000 Units by mouth     Historical Provider, MD   docusate sodium (COLACE) 100 MG capsule Take 100 mg by mouth 2 times daily    Historical Provider, MD   ferrous sulfate (FE TABS 325) 325 (65 Fe) MG EC tablet Take 325 mg by mouth 3 times daily (with meals)    Historical Provider, MD   furosemide (LASIX) 20 MG tablet Take 20 mg by mouth 2 times daily    Historical Provider, MD   lisinopril (PRINIVIL;ZESTRIL) 40 MG tablet Take 40 mg by mouth 2 times daily    Historical Provider, MD   metoprolol succinate (TOPROL XL) 50 MG extended release tablet Take 75 mg by mouth in the morning and at bedtime    Historical Provider, MD   Multiple Vitamins-Minerals (THERAPEUTIC MULTIVITAMIN-MINERALS) tablet Take 1 tablet by mouth daily    Historical Provider, MD   polyethylene glycol (GLYCOLAX) 17 g packet Take 17 g by mouth daily as needed for Constipation    Historical Provider, MD carboxymethylcellulose 1 % ophthalmic solution 1 drop 3 times daily    Historical Provider, MD   brinzolamide-brimonidine 1-0.2 % SUSP Apply 1 drop to eye 2 times daily Left eye    Historical Provider, MD   latanoprost (XALATAN) 0.005 % ophthalmic solution Place 1 drop into both eyes daily    Historical Provider, MD       Current Medications:    Current Facility-Administered Medications: aspirin chewable tablet 81 mg, 81 mg, Oral, Daily  perflutren lipid microspheres (DEFINITY) injection 1.65 mg, 1.5 mL, IntraVENous, ONCE PRN  nitroGLYCERIN (NITROSTAT) SL tablet 0.4 mg, 0.4 mg, SubLINGual, Q5 Min PRN  nystatin (MYCOSTATIN) cream, , Topical, BID  traMADol (ULTRAM) tablet 50 mg, 50 mg, Oral, Q6H PRN  0.9 % sodium chloride infusion, , IntraVENous, PRN  furosemide (LASIX) injection 20 mg, 20 mg, IntraVENous, See Admin Instructions  sodium bicarbonate tablet 650 mg, 650 mg, Oral, TID  calcium carbonate (TUMS) chewable tablet 500 mg, 500 mg, Oral, TID PRN  melatonin tablet 3 mg, 3 mg, Oral, Nightly PRN  hydrALAZINE (APRESOLINE) injection 10 mg, 10 mg, IntraVENous, Q6H PRN  ipratropium-albuterol (DUONEB) nebulizer solution 1 ampule, 1 ampule, Inhalation, Q4H WA  0.9 % sodium chloride infusion, , IntraVENous, PRN  atorvastatin (LIPITOR) tablet 40 mg, 40 mg, Oral, Nightly  polyvinyl alcohol (LIQUIFILM TEARS) 1.4 % ophthalmic solution 1 drop, 1 drop, Both Eyes, TID  docusate sodium (COLACE) capsule 100 mg, 100 mg, Oral, BID  ferrous sulfate (IRON 325) tablet 325 mg, 325 mg, Oral, TID WC  [Held by provider] furosemide (LASIX) tablet 20 mg, 20 mg, Oral, BID  latanoprost (XALATAN) 0.005 % ophthalmic solution 1 drop, 1 drop, Both Eyes, Daily  [Held by provider] lisinopril (PRINIVIL;ZESTRIL) tablet 40 mg, 40 mg, Oral, BID  metoprolol succinate (TOPROL XL) extended release tablet 75 mg, 75 mg, Oral, BID  sodium chloride flush 0.9 % injection 5-40 mL, 5-40 mL, IntraVENous, 2 times per day  sodium chloride flush 0.9 % injection 5-40 mL, 5-40 mL, IntraVENous, PRN  0.9 % sodium chloride infusion, , IntraVENous, PRN  ondansetron (ZOFRAN-ODT) disintegrating tablet 4 mg, 4 mg, Oral, Q8H PRN **OR** ondansetron (ZOFRAN) injection 4 mg, 4 mg, IntraVENous, Q6H PRN  acetaminophen (TYLENOL) tablet 650 mg, 650 mg, Oral, Q6H PRN **OR** acetaminophen (TYLENOL) suppository 650 mg, 650 mg, Rectal, Q6H PRN  dorzolamide (TRUSOPT) 2 % ophthalmic solution 1 drop, 1 drop, Ophthalmic, BID **AND** brimonidine (ALPHAGAN) 0.2 % ophthalmic solution 1 drop, 1 drop, Ophthalmic, BID  pantoprazole (PROTONIX) 40 mg in sodium chloride (PF) 10 mL injection, 40 mg, IntraVENous, Q12H    Allergies:  Patient has no known allergies. Social History:    Quit smoking 30 years ago prior to that smoked 1 pack a day for 4 years while he was in the [employer]. Denies alcohol or illicit drug use. Caffeine intake includes 3-4 cups of coffee a day. Family History:   Please note this information was not obtained at this time, as it is limited in nature due to the patient's advanced age    REVIEW OF SYSTEMS:     Constitutional: Denies fevers, chills, night sweats, and fatigue  HEENT: Denies headaches, nose bleeds, and blurred vision,oral pain, abscess or lesion. Musculoskeletal: Denies falls, pain to BLE with ambulation and edema to BLE. Neurological: Denies dizziness and lightheadedness, numbness and tingling  Cardiovascular: Complains of chest pain and see HPI. Denies palpitations, and feelings of heart racing. Respiratory: Denies orthopnea and PND. Complains of chronic TROTTER-see HPI  Gastrointestinal: Denies heartburn, nausea/vomiting, diarrhea and constipation, bloody, and tarry stools.   Complains of black stools-see HPI  Genitourinary: Denies dysuria and hematuria  Hematologic: Denies excessive bruising or bleeding  Lymphatic: Denies lumps and bumps to neck, axilla, breast, and groin  Endocrine: Denies excessive thirst. Denies intolerance to hot and cold  Psychiatric: Denies anxiety and depression. PHYSICAL EXAM:   BP (!) 142/65   Pulse 80   Temp 97.4 °F (36.3 °C) (Oral)   Resp 24   Ht 5' 11\" (1.803 m)   Wt 196 lb (88.9 kg)   SpO2 96%   BMI 27.34 kg/m²   CONST:  Well developed, well nourished elderly  male who appears stated age. Awake, alert, cooperative, no apparent distress  HEENT:   Head- Normocephalic, atraumatic   Eyes- Conjunctivae pink, anicteric  Throat- Oral mucosa pink and moist  Neck-  No stridor, trachea midline, no jugular venous distention. No adenopathy   CHEST: Chest symmetrical and non-tender to palpation. No accessory muscle use or intercostal retractions  RESPIRATORY: Lung sounds - clear throughout fields   CARDIOVASCULAR:     No carotid bruit  Heart Inspection- shows no noted pulsations  Heart Palpation- no heaves or thrills; PMI is non-displaced   Heart Ausculation- Regular rate and rhythm, no murmur. No s3, s4 or rub   PV: No lower extremity edema. No varicosities. Pedal pulses palpable, no clubbing or cyanosis   ABDOMEN: Soft, non-tender to light palpation. Bowel sounds present. No palpable masses no organomegaly; no abdominal bruit  MS: Good muscle strength and tone. No atrophy or abnormal movements. : Deferred  SKIN: Warm and dry no statis dermatitis or ulcers   NEURO / PSYCH: Oriented to person, place and time. Speech clear and appropriate. Follows all commands.  Pleasant affect     DATA:    ECG: As above  Tele strips: SR with PACs  Diagnostic:      Intake/Output Summary (Last 24 hours) at 10/16/2022 0823  Last data filed at 10/16/2022 0816  Gross per 24 hour   Intake 3189.33 ml   Output 950 ml   Net 2239.33 ml       Labs:   CBC:   Recent Labs     10/15/22  0432 10/15/22  1658 10/16/22  0429   WBC 3.9*  --  4.5   HGB 7.7* 8.0* 8.9*   HCT 24.6* 26.2* 28.0*     --  155     BMP:   Recent Labs     10/15/22  0432 10/16/22  0429    140   K 4.5 4.8   CO2 20* 19*   BUN 17 17   CREATININE 1.9* 1.8*   LABGLOM 34 36   CALCIUM 9.4 9.4     Mag:   Recent Labs     10/15/22  0430   MG 1.8   TSH:   Recent Labs     10/15/22  0430   TSH 0.642   LIVER PROFILE:  Recent Labs     10/15/22  0432 10/16/22  0429   AST 14 19   ALT 9 9   LABALBU 2.9* 3.0*      Latest Reference Range & Units 10/15/22 04:30 10/15/22 11:47   Troponin, High Sensitivity 0 - 11 ng/L 36 (H) 33 (H)   (H): Data is abnormally high    10/12/2022 CXR:  Chronic findings. No acute cardiopulmonary disease    10/13/2022 CXR:  No acute process    IMPRESSION:  Atypical chest pain. Troponin pattern not consistent with ACS. Abnormal EKG with new T wave changes in lateral leads  Reported NSVT, unable to find strips. PACs on telemetry monitoring  Significant anemia with H&H 6.8/22.5 on presentation s/p PRBCs with H&H now 8.9/28. Known Hx iron deficiency anemia, on PO iron supplementation  Mild gastritis per EGD 10/14/2022  Reported Hx CAD with reported stenting in 2010  HTN: Controlled  HLD, on statin  COPD with remote tobacco abuse  CKD  GERD        PLAN:  Agree with echocardiogram as ordered  Monitor H&H. Transfuse as needed to maintain hemoglobin greater than 7  Above as discussed with Dr. Velma See    Electronically signed by JONATHAN Swain CNP on 10/16/2022 at 8:23 [id number] [facility] Consult       Ada Calvinr    I have personally participated in a face-to-face and personally obtained history and performed physical exam on the date of service. I reviewed chart, vitals, labs and radiologic studies. I also participated in medical decision making with JONATHAN Swain CNP on the date of service All of the assessments and recommendations are from me and I agree with all of the pertinent clinical information, assessment and treatment plan. I have reviewed and edited the note above based on my findings during my history, exam, and decision making. Please see my additional contributions to the history, physical exam, assessment, and recommendations below.       HISTORY OF PRESENT ILLNESS:     Reviewed, as above      Past medical history:  Reviewed, as above. Past surgical history:  Reviewed, as above. Current medications:  Reviewed, as above    Allergies:  Reviewed, as above    Social history:  Reviewed, as above    Family medical history:  Reviewed, as above. REVIEW OF SYSTEMS:   Reviewed, as above. PHYSICAL EXAM:   CONSTITUTIONAL:  awake, alert, cooperative, no apparent distress, and appears stated age  EYES:  lids and lashes normal, anicteric sclerae  HEAD:  normocepalic, without obvious abnormality, atraumatic, pink, moist mucous membranes. NECK:  Supple, symmetrical, trachea midline, no adenopathy, thyroid symmetric, not enlarged and no tenderness, skin normal  HEMATOLOGIC/LYMPHATICS:  no cervical lymphadenopathy and no supraclavicular lymphadenopathy  LUNGS:  No increased work of breathing, good air exchange, clear to auscultation bilaterally, no crackles or wheezing  CARDIOVASCULAR:  Normal apical impulse, regular rate and rhythm, normal S1 and S2, no S3 or S4, and no murmur noted and no JVD, no carotid bruit, no pedal edema, good carotid upstroke bilaterally. ABDOMEN:  Soft, nontender, no masses, no hepatomegaly or splenomegaly, BS+  CHEST: nontender to palpation, expands symmetrically  MUSCULOSKELETAL:  No clubbing no cyanosis. there is no redness, warmth, or swelling of the joints  full range of motion noted  NEUROLOGIC:  Alert, awake,oriented x3. SKIN:  no bruising or bleeding, normal skin color, texture, turgor and no redness, warmth, or swelling    BP (!) 149/74   Pulse 65   Temp 97.8 °F (36.6 °C) (Oral)   Resp 22   Ht 5' 11\" (1.803 m)   Wt 196 lb (88.9 kg)   SpO2 95%   BMI 27.34 kg/m²       I/O last 3 completed shifts: In: 0615 [P.O.:1320; I.V.:1899.7;  Blood:313.3; IV Piggyback:90]  Out: 350 [Urine:350]  I/O this shift:  In: 2280 [P.O.:2280]  Out: 1500 [Urine:1500]      DATA:   I personally reviewed the admission EKG with the following interpretation: Sinus bradycardia, first-degree AV block    ECHO: Not performed to date  Stress Test: Not performed to date  Angiography: Not performed to date  Cardiology Labs: BMP:    Lab Results   Component Value Date/Time     10/16/2022 04:29 AM    K 4.8 10/16/2022 04:29 AM     10/16/2022 04:29 AM    CO2 19 10/16/2022 04:29 AM    BUN 17 10/16/2022 04:29 AM     CMP:    Lab Results   Component Value Date/Time     10/16/2022 04:29 AM    K 4.8 10/16/2022 04:29 AM     10/16/2022 04:29 AM    CO2 19 10/16/2022 04:29 AM    BUN 17 10/16/2022 04:29 AM    PROT 5.4 10/16/2022 04:29 AM     CBC:    Lab Results   Component Value Date/Time    WBC 4.5 10/16/2022 04:29 AM    RBC 3.05 10/16/2022 04:29 AM    HGB 8.9 10/16/2022 04:29 AM    HCT 28.0 10/16/2022 04:29 AM    MCV 91.8 10/16/2022 04:29 AM    RDW 15.5 10/16/2022 04:29 AM     10/16/2022 04:29 AM     PT/INR:  No results found for: PTINR  PT/INR Warfarin:  No components found for: PTPATWAR, PTINRWAR  PTT:    Lab Results   Component Value Date/Time    APTT 26.8 10/12/2022 03:21 AM     PTT Heparin:  No components found for: APTTHEP  Magnesium:    Lab Results   Component Value Date/Time    MG 1.7 10/16/2022 08:49 AM     TSH:    Lab Results   Component Value Date/Time    TSH 0.642 10/15/2022 04:30 AM     TROPONIN:  No components found for: TROP  BNP:  No results found for: BNP  FASTING LIPID PANEL:  No results found for: CHOL, HDL, TRIG  XR CHEST PORTABLE   Final Result   No acute process. XR CHEST PORTABLE   Final Result   Chronic findings. No acute cardiopulmonary disease.              I have personally reviewed the laboratory, cardiac diagnostic and radiographic testing as outlined above:    IMPRESSION:  Chest pain: Noncardiac (indigestion-like, was laying down, lasted 2-to 3 minutes)  Elevated troponin: Flat pattern, not consistent with ACS type I, suspect secondary to anemia and chronic kidney disease  Reported wide-complex tachycardia: Telemetry reviewed, occasional PVCs, significant artifacts  History of CAD remote PCI: Details are unknown  Anemia  Hypertension: Controlled  Hyperlipidemia: On statin  Stage III chronic kidney disease      RECOMMENDATIONS:   Will review echocardiogram once done  transfuse RBCs to maintain hemoglobin more than 7 g/dL  Basic metabolic panel and CBC in a.m. Further cardiac recommendations will be forthcoming pending his clinical course and diagnostic test findings    I have reviewed my findings and recommendations with patient    Thank you for the consult  Electronically signed by Lois Davidson MD on 10/16/2022 at 6:41 PM    All of the above was discussed with the patient  reviewing the above stated recommendations. I directly participated in the medical-decision making, ordering tests, and medication adjustments as documented today. I contributed >50% to the overall encounter time including face-to-face time providing counseling and or coordination of care with the other providers, reviewing records/tests, counseling/education of the patient, ordering medications/tests/procedures, coordinating care, and documenting clinical information in the EHR. I also discussed the differential diagnosis and all of the proposed management plans with the patient and individuals accompanying the patient. NOTE: This report was transcribed using voice recognition software.  Every effort was made to ensure accuracy; however, inadvertent computerized transcription errors may be present

## 2022-10-16 NOTE — PLAN OF CARE
Problem: Discharge Planning  Goal: Discharge to home or other facility with appropriate resources  Outcome: Progressing     Problem: ABCDS Injury Assessment  Goal: Absence of physical injury  Outcome: Progressing     Problem: Safety - Adult  Goal: Free from fall injury  Outcome: Progressing     Problem: Nutrition Deficit:  Goal: Optimize nutritional status  Outcome: Progressing     Problem: Cardiovascular - Adult  Goal: Maintains optimal cardiac output and hemodynamic stability  Outcome: Progressing     Problem: Pain  Goal: Verbalizes/displays adequate comfort level or baseline comfort level  Outcome: Progressing

## 2022-10-17 LAB
ALBUMIN SERPL-MCNC: 2.8 G/DL (ref 3.5–5.2)
ALP BLD-CCNC: 89 U/L (ref 40–129)
ALT SERPL-CCNC: 9 U/L (ref 0–40)
ANION GAP SERPL CALCULATED.3IONS-SCNC: 10 MMOL/L (ref 7–16)
AST SERPL-CCNC: 12 U/L (ref 0–39)
BASOPHILS ABSOLUTE: 0.02 E9/L (ref 0–0.2)
BASOPHILS RELATIVE PERCENT: 0.4 % (ref 0–2)
BILIRUB SERPL-MCNC: 0.2 MG/DL (ref 0–1.2)
BUN BLDV-MCNC: 22 MG/DL (ref 6–23)
CALCIUM SERPL-MCNC: 9.1 MG/DL (ref 8.6–10.2)
CHLORIDE BLD-SCNC: 108 MMOL/L (ref 98–107)
CO2: 19 MMOL/L (ref 22–29)
CREAT SERPL-MCNC: 1.8 MG/DL (ref 0.7–1.2)
EKG ATRIAL RATE: 52 BPM
EKG ATRIAL RATE: 57 BPM
EKG P AXIS: 55 DEGREES
EKG P-R INTERVAL: 256 MS
EKG P-R INTERVAL: 256 MS
EKG Q-T INTERVAL: 398 MS
EKG Q-T INTERVAL: 414 MS
EKG QRS DURATION: 100 MS
EKG QRS DURATION: 98 MS
EKG QTC CALCULATION (BAZETT): 385 MS
EKG QTC CALCULATION (BAZETT): 387 MS
EKG R AXIS: -169 DEGREES
EKG R AXIS: -8 DEGREES
EKG T AXIS: -177 DEGREES
EKG T AXIS: 7 DEGREES
EKG VENTRICULAR RATE: 52 BPM
EKG VENTRICULAR RATE: 57 BPM
EOSINOPHILS ABSOLUTE: 0.37 E9/L (ref 0.05–0.5)
EOSINOPHILS RELATIVE PERCENT: 7.7 % (ref 0–6)
GFR AFRICAN AMERICAN: 44
GFR NON-AFRICAN AMERICAN: 36 ML/MIN/1.73
GLUCOSE BLD-MCNC: 89 MG/DL (ref 74–99)
HCT VFR BLD CALC: 28.9 % (ref 37–54)
HEMOGLOBIN: 9 G/DL (ref 12.5–16.5)
IMMATURE GRANULOCYTES #: 0.01 E9/L
IMMATURE GRANULOCYTES %: 0.2 % (ref 0–5)
LYMPHOCYTES ABSOLUTE: 0.68 E9/L (ref 1.5–4)
LYMPHOCYTES RELATIVE PERCENT: 14.1 % (ref 20–42)
MCH RBC QN AUTO: 29.2 PG (ref 26–35)
MCHC RBC AUTO-ENTMCNC: 31.1 % (ref 32–34.5)
MCV RBC AUTO: 93.8 FL (ref 80–99.9)
MONOCYTES ABSOLUTE: 0.45 E9/L (ref 0.1–0.95)
MONOCYTES RELATIVE PERCENT: 9.3 % (ref 2–12)
NEUTROPHILS ABSOLUTE: 3.29 E9/L (ref 1.8–7.3)
NEUTROPHILS RELATIVE PERCENT: 68.3 % (ref 43–80)
PDW BLD-RTO: 15.8 FL (ref 11.5–15)
PLATELET # BLD: 163 E9/L (ref 130–450)
PMV BLD AUTO: 10.2 FL (ref 7–12)
POTASSIUM REFLEX MAGNESIUM: 4.6 MMOL/L (ref 3.5–5)
RBC # BLD: 3.08 E12/L (ref 3.8–5.8)
SODIUM BLD-SCNC: 137 MMOL/L (ref 132–146)
TOTAL PROTEIN: 5.2 G/DL (ref 6.4–8.3)
WBC # BLD: 4.8 E9/L (ref 4.5–11.5)

## 2022-10-17 PROCEDURE — 6370000000 HC RX 637 (ALT 250 FOR IP): Performed by: FAMILY MEDICINE

## 2022-10-17 PROCEDURE — C9113 INJ PANTOPRAZOLE SODIUM, VIA: HCPCS | Performed by: STUDENT IN AN ORGANIZED HEALTH CARE EDUCATION/TRAINING PROGRAM

## 2022-10-17 PROCEDURE — 2580000003 HC RX 258: Performed by: FAMILY MEDICINE

## 2022-10-17 PROCEDURE — 94640 AIRWAY INHALATION TREATMENT: CPT

## 2022-10-17 PROCEDURE — 6360000002 HC RX W HCPCS: Performed by: STUDENT IN AN ORGANIZED HEALTH CARE EDUCATION/TRAINING PROGRAM

## 2022-10-17 PROCEDURE — 6370000000 HC RX 637 (ALT 250 FOR IP): Performed by: INTERNAL MEDICINE

## 2022-10-17 PROCEDURE — A4216 STERILE WATER/SALINE, 10 ML: HCPCS | Performed by: STUDENT IN AN ORGANIZED HEALTH CARE EDUCATION/TRAINING PROGRAM

## 2022-10-17 PROCEDURE — 2580000003 HC RX 258: Performed by: STUDENT IN AN ORGANIZED HEALTH CARE EDUCATION/TRAINING PROGRAM

## 2022-10-17 PROCEDURE — 85025 COMPLETE CBC W/AUTO DIFF WBC: CPT

## 2022-10-17 PROCEDURE — 80053 COMPREHEN METABOLIC PANEL: CPT

## 2022-10-17 PROCEDURE — 36415 COLL VENOUS BLD VENIPUNCTURE: CPT

## 2022-10-17 PROCEDURE — 93306 TTE W/DOPPLER COMPLETE: CPT

## 2022-10-17 PROCEDURE — 2060000000 HC ICU INTERMEDIATE R&B

## 2022-10-17 RX ADMIN — DORZOLAMIDE HYDROCHLORIDE 1 DROP: 20 SOLUTION/ DROPS OPHTHALMIC at 08:21

## 2022-10-17 RX ADMIN — NYSTATIN: 100000 CREAM TOPICAL at 08:20

## 2022-10-17 RX ADMIN — SODIUM CHLORIDE, PRESERVATIVE FREE 40 MG: 5 INJECTION INTRAVENOUS at 20:08

## 2022-10-17 RX ADMIN — IPRATROPIUM BROMIDE AND ALBUTEROL SULFATE 1 AMPULE: 2.5; .5 SOLUTION RESPIRATORY (INHALATION) at 19:46

## 2022-10-17 RX ADMIN — SODIUM CHLORIDE, PRESERVATIVE FREE 40 MG: 5 INJECTION INTRAVENOUS at 11:21

## 2022-10-17 RX ADMIN — SODIUM BICARBONATE 650 MG: 650 TABLET ORAL at 20:04

## 2022-10-17 RX ADMIN — ATORVASTATIN CALCIUM 40 MG: 40 TABLET, FILM COATED ORAL at 20:02

## 2022-10-17 RX ADMIN — ASPIRIN 81 MG 81 MG: 81 TABLET ORAL at 08:19

## 2022-10-17 RX ADMIN — POLYVINYL ALCOHOL 1 DROP: 14 SOLUTION/ DROPS OPHTHALMIC at 19:59

## 2022-10-17 RX ADMIN — LATANOPROST 1 DROP: 50 SOLUTION OPHTHALMIC at 20:00

## 2022-10-17 RX ADMIN — DOCUSATE SODIUM 100 MG: 100 CAPSULE, LIQUID FILLED ORAL at 08:19

## 2022-10-17 RX ADMIN — FERROUS SULFATE TAB 325 MG (65 MG ELEMENTAL FE) 325 MG: 325 (65 FE) TAB at 16:25

## 2022-10-17 RX ADMIN — METOPROLOL SUCCINATE 75 MG: 50 TABLET, EXTENDED RELEASE ORAL at 08:18

## 2022-10-17 RX ADMIN — IPRATROPIUM BROMIDE AND ALBUTEROL SULFATE 1 AMPULE: 2.5; .5 SOLUTION RESPIRATORY (INHALATION) at 15:41

## 2022-10-17 RX ADMIN — FERROUS SULFATE TAB 325 MG (65 MG ELEMENTAL FE) 325 MG: 325 (65 FE) TAB at 08:19

## 2022-10-17 RX ADMIN — BRIMONIDINE TARTRATE 1 DROP: 2 SOLUTION OPHTHALMIC at 19:59

## 2022-10-17 RX ADMIN — BRIMONIDINE TARTRATE 1 DROP: 2 SOLUTION OPHTHALMIC at 08:21

## 2022-10-17 RX ADMIN — SODIUM CHLORIDE, PRESERVATIVE FREE 10 ML: 5 INJECTION INTRAVENOUS at 20:25

## 2022-10-17 RX ADMIN — IPRATROPIUM BROMIDE AND ALBUTEROL SULFATE 1 AMPULE: 2.5; .5 SOLUTION RESPIRATORY (INHALATION) at 11:28

## 2022-10-17 RX ADMIN — METOPROLOL SUCCINATE 25 MG: 50 TABLET, EXTENDED RELEASE ORAL at 20:05

## 2022-10-17 RX ADMIN — DORZOLAMIDE HYDROCHLORIDE 1 DROP: 20 SOLUTION/ DROPS OPHTHALMIC at 19:59

## 2022-10-17 RX ADMIN — DOCUSATE SODIUM 100 MG: 100 CAPSULE, LIQUID FILLED ORAL at 20:02

## 2022-10-17 RX ADMIN — POLYVINYL ALCOHOL 1 DROP: 14 SOLUTION/ DROPS OPHTHALMIC at 13:23

## 2022-10-17 RX ADMIN — FERROUS SULFATE TAB 325 MG (65 MG ELEMENTAL FE) 325 MG: 325 (65 FE) TAB at 11:21

## 2022-10-17 RX ADMIN — POLYVINYL ALCOHOL 1 DROP: 14 SOLUTION/ DROPS OPHTHALMIC at 08:21

## 2022-10-17 RX ADMIN — ACETAMINOPHEN 650 MG: 325 TABLET, FILM COATED ORAL at 08:19

## 2022-10-17 RX ADMIN — SODIUM BICARBONATE 650 MG: 650 TABLET ORAL at 13:22

## 2022-10-17 RX ADMIN — SODIUM BICARBONATE 650 MG: 650 TABLET ORAL at 08:19

## 2022-10-17 RX ADMIN — NYSTATIN: 100000 CREAM TOPICAL at 19:59

## 2022-10-17 ASSESSMENT — PAIN DESCRIPTION - ORIENTATION: ORIENTATION: LEFT

## 2022-10-17 ASSESSMENT — PAIN SCALES - GENERAL
PAINLEVEL_OUTOF10: 0
PAINLEVEL_OUTOF10: 3
PAINLEVEL_OUTOF10: 0
PAINLEVEL_OUTOF10: 0

## 2022-10-17 ASSESSMENT — PAIN DESCRIPTION - DESCRIPTORS: DESCRIPTORS: DISCOMFORT

## 2022-10-17 ASSESSMENT — PAIN DESCRIPTION - LOCATION: LOCATION: BACK;GROIN

## 2022-10-17 NOTE — PROGRESS NOTES
Hospitalist Progress Note      SYNOPSIS: Patient admitted on 10/11/2022 for GI bleed presented to the emergency department with fatigue and low hemoglobin. Patient was seen by [place] earlier today and was told that his blood count was low that he did go to the emergency department. Patient reports having black stool. Symptoms started about 4 days ago. Worse with light exertion. Denies fever, chills, nausea, vomiting, chest pain, shortness of breath. Vital signs within normal limits and stable. Laboratory studies demonstrate BUN 27, creatinine 2.4, glucose 102, hemoglobin 6.8. Patient was transfused 1 unit of packed red blood cells and medicine was consulted for admission. General surgery consulted. EGD attempted 10/12 however became hypoxic so was aborted. Remains anemic, ordered another unit PRBC overnight. SUBJECTIVE:  Patient seen and examined chart reviewed. No further chest pain, no shortness of breath. Remains afebrile. Temp (24hrs), Av.5 °F (36.4 °C), Min:97.2 °F (36.2 °C), Max:97.8 °F (36.6 °C)    DIET: ADULT DIET; Regular  CODE: Full Code    Intake/Output Summary (Last 24 hours) at 10/17/2022 1150  Last data filed at 10/17/2022 0856  Gross per 24 hour   Intake 1800 ml   Output 1325 ml   Net 475 ml       Review of Systems  All bolded are positive; please see HPI  General:  Fever, chills, diaphoresis, fatigue, malaise, night sweats, weight loss  Psychological:  Anxiety, disorientation, hallucinations. ENT:  Epistaxis, headaches, vertigo, visual changes. Cardiovascular:  Chest pain, irregular heartbeats, palpitations, paroxysmal nocturnal dyspnea. Respiratory:  Shortness of breath, coughing, sputum production, hemoptysis, wheezing, orthopnea.   Gastrointestinal:  Nausea, vomiting, diarrhea, heartburn, constipation, abdominal pain, hematemesis, hematochezia, melena, acholic stools  Genito-Urinary:  Dysuria, urgency, frequency, hematuria  Musculoskeletal:  Joint pain, joint stiffness, joint swelling, muscle pain  Neurology:  Headache, focal neurological deficits, weakness, numbness, paresthesia  Derm:  Rashes, ulcers, excoriations, bruising  Extremities:  Decreased ROM, peripheral edema, mottling      OBJECTIVE:    BP (!) 152/74   Pulse 67   Temp 97.6 °F (36.4 °C) (Oral)   Resp 18   Ht 5' 11\" (1.803 m)   Wt 196 lb (88.9 kg)   SpO2 95%   BMI 27.34 kg/m²     General appearance:  awake, alert, and oriented to person, place, time, and purpose; appears stated age and cooperative; no apparent distress no labored breathing  HEENT:  Conjunctivae/corneas clear. Neck: Supple. Respiratory: symmetrical; clear to auscultation bilaterally; no wheezes; no rhonchi; no rales  Cardiovascular: rhythm regular; rate controlled; no murmurs  Abdomen: Soft, nontender, nondistended  Extremities:  peripheral pulses present; no peripheral edema; no ulcers  Musculoskeletal: No clubbing, cyanosis, no bilateral lower extremity edema. Brisk capillary refill. Neurologic: awake, alert and following commands     ASSESSMENT and PLAN:    --Acute blood loss anemia/melena- general surgery following. Follow H&H. EGD 10/14. Signs of gastritis with no active bleeding. Restart aspirin continue PPI twice daily monitor now stable. Cleared by surgery for discharge--Nonsustained V. tach. Check EKG troponin electrolytes and TSH. We will also obtain echocardiogram continue to closely monitor over telemetry, patient currently on metoprolol  Stable, hemoglobin. --Episodes of chest pain. Patient has a history of coronary artery disease with stents in place, last stent was several years ago per patient report  His aspirin was initially held due to GI bleed. Aspirin was restarted 10/15  Patient takes metoprolol, and statin at home. Lisinopril is currently on hold due to renal function. His troponin peaked at 36.   His EKG has some new changes in the inferior leads  He had an episode of nonsustained V. tach on telemetry 10/15  Continue current medications, keep hemoglobin more than 8.0   cardiology has been consulted input appreciated  New supportive care, pending 2D echo. --Acute respiratory insufficiency-likely due to anesthesia during EGD 10/12. Now resolved on room air    -- Elevated creatinine, MOISE/CKD . with baseline creatinine around 2.0. Monitor renal function and avoid nephrotoxins and hypotension    --History of coronary artery disease. -- Metabolic acidosis possibly secondary to MOISE. Sodium bicarb and treat as above, bicarbonate remains stable at 19    --Hypertension continue home meds parameters when blood pressure is more stable    --GERD, PPI    --History open AAA repair    -- Dyslipidemia continue home statin    --Disposition, pending medical clearance. Patient normally lives alone and is independent at home. PT OT evaluation  Disposition tomorrow once cleared by cardiology and medically stable.     Medications:  REVIEWED DAILY    Infusion Medications    sodium chloride      sodium chloride      sodium chloride       Scheduled Medications    aspirin  81 mg Oral Daily    nystatin   Topical BID    sodium bicarbonate  650 mg Oral TID    ipratropium-albuterol  1 ampule Inhalation Q4H WA    atorvastatin  40 mg Oral Nightly    polyvinyl alcohol  1 drop Both Eyes TID    docusate sodium  100 mg Oral BID    ferrous sulfate  325 mg Oral TID WC    [Held by provider] furosemide  20 mg Oral BID    latanoprost  1 drop Both Eyes Daily    [Held by provider] lisinopril  40 mg Oral BID    metoprolol succinate  75 mg Oral BID    sodium chloride flush  5-40 mL IntraVENous 2 times per day    dorzolamide  1 drop Ophthalmic BID    And    brimonidine  1 drop Ophthalmic BID    pantoprazole (PROTONIX) 40 mg injection  40 mg IntraVENous Q12H     PRN Meds: perflutren lipid microspheres, nitroGLYCERIN, traMADol, sodium chloride, calcium carbonate, melatonin, hydrALAZINE, sodium chloride, sodium chloride flush, sodium chloride, ondansetron **OR** ondansetron, acetaminophen **OR** acetaminophen    Labs:     Recent Labs     10/15/22  0432 10/15/22  1658 10/16/22  0429 10/17/22  0408   WBC 3.9*  --  4.5 4.8   HGB 7.7* 8.0* 8.9* 9.0*   HCT 24.6* 26.2* 28.0* 28.9*     --  155 163       Recent Labs     10/15/22  0432 10/16/22  0429 10/17/22  0408    140 137   K 4.5 4.8 4.6   * 112* 108*   CO2 20* 19* 19*   BUN 17 17 22   CREATININE 1.9* 1.8* 1.8*   CALCIUM 9.4 9.4 9.1       Recent Labs     10/15/22  0432 10/16/22  0429 10/17/22  0408   PROT 5.1* 5.4* 5.2*   ALKPHOS 84 87 89   ALT 9 9 9   AST 14 19 12   BILITOT 0.3 0.3 0.2       No results for input(s): INR in the last 72 hours. No results for input(s): [place] Ill in the last 72 hours. Chronic labs:    Lab Results   Component Value Date    TSH 0.642 10/15/2022    INR 1.0 10/11/2022       Radiology: REVIEWED DAILY    +++++++++++++++++++++++++++++++++++++++++++++++++  [facility]  +++++++++++++++++++++++++++++++++++++++++++++++++  NOTE: This report was transcribed using voice recognition software. Every effort was made to ensure accuracy; however, inadvertent computerized transcription errors may be present.

## 2022-10-17 NOTE — CARE COORDINATION
Called for pending echo this am. Patient plans for home when released with no needs. For questions I can be reached at [phone].  Yue Farrah [place]

## 2022-10-18 VITALS
BODY MASS INDEX: 27.44 KG/M2 | HEIGHT: 71 IN | RESPIRATION RATE: 18 BRPM | SYSTOLIC BLOOD PRESSURE: 134 MMHG | HEART RATE: 75 BPM | DIASTOLIC BLOOD PRESSURE: 70 MMHG | TEMPERATURE: 98.6 F | OXYGEN SATURATION: 93 % | WEIGHT: 196 LBS

## 2022-10-18 LAB
ALBUMIN SERPL-MCNC: 2.8 G/DL (ref 3.5–5.2)
ALP BLD-CCNC: 91 U/L (ref 40–129)
ALT SERPL-CCNC: 8 U/L (ref 0–40)
ANION GAP SERPL CALCULATED.3IONS-SCNC: 10 MMOL/L (ref 7–16)
AST SERPL-CCNC: 13 U/L (ref 0–39)
BASOPHILS ABSOLUTE: 0.02 E9/L (ref 0–0.2)
BASOPHILS RELATIVE PERCENT: 0.4 % (ref 0–2)
BILIRUB SERPL-MCNC: 0.3 MG/DL (ref 0–1.2)
BUN BLDV-MCNC: 23 MG/DL (ref 6–23)
CALCIUM SERPL-MCNC: 9.6 MG/DL (ref 8.6–10.2)
CHLORIDE BLD-SCNC: 109 MMOL/L (ref 98–107)
CO2: 20 MMOL/L (ref 22–29)
CREAT SERPL-MCNC: 1.8 MG/DL (ref 0.7–1.2)
EOSINOPHILS ABSOLUTE: 0.42 E9/L (ref 0.05–0.5)
EOSINOPHILS RELATIVE PERCENT: 9.1 % (ref 0–6)
GFR SERPL CREATININE-BSD FRML MDRD: 37 ML/MIN/1.73
GLUCOSE BLD-MCNC: 84 MG/DL (ref 74–99)
HCT VFR BLD CALC: 29 % (ref 37–54)
HEMOGLOBIN: 9.3 G/DL (ref 12.5–16.5)
IMMATURE GRANULOCYTES #: 0.01 E9/L
IMMATURE GRANULOCYTES %: 0.2 % (ref 0–5)
LYMPHOCYTES ABSOLUTE: 0.69 E9/L (ref 1.5–4)
LYMPHOCYTES RELATIVE PERCENT: 15 % (ref 20–42)
MCH RBC QN AUTO: 29.5 PG (ref 26–35)
MCHC RBC AUTO-ENTMCNC: 32.1 % (ref 32–34.5)
MCV RBC AUTO: 92.1 FL (ref 80–99.9)
MONOCYTES ABSOLUTE: 0.43 E9/L (ref 0.1–0.95)
MONOCYTES RELATIVE PERCENT: 9.3 % (ref 2–12)
NEUTROPHILS ABSOLUTE: 3.04 E9/L (ref 1.8–7.3)
NEUTROPHILS RELATIVE PERCENT: 66 % (ref 43–80)
PDW BLD-RTO: 15.9 FL (ref 11.5–15)
PLATELET # BLD: 169 E9/L (ref 130–450)
PMV BLD AUTO: 10.5 FL (ref 7–12)
POTASSIUM REFLEX MAGNESIUM: 4.4 MMOL/L (ref 3.5–5)
RBC # BLD: 3.15 E12/L (ref 3.8–5.8)
SODIUM BLD-SCNC: 139 MMOL/L (ref 132–146)
TOTAL PROTEIN: 5.5 G/DL (ref 6.4–8.3)
WBC # BLD: 4.6 E9/L (ref 4.5–11.5)

## 2022-10-18 PROCEDURE — 6370000000 HC RX 637 (ALT 250 FOR IP): Performed by: FAMILY MEDICINE

## 2022-10-18 PROCEDURE — 94640 AIRWAY INHALATION TREATMENT: CPT

## 2022-10-18 PROCEDURE — 85025 COMPLETE CBC W/AUTO DIFF WBC: CPT

## 2022-10-18 PROCEDURE — 6370000000 HC RX 637 (ALT 250 FOR IP): Performed by: INTERNAL MEDICINE

## 2022-10-18 PROCEDURE — 80053 COMPREHEN METABOLIC PANEL: CPT

## 2022-10-18 PROCEDURE — 2580000003 HC RX 258: Performed by: FAMILY MEDICINE

## 2022-10-18 PROCEDURE — 36415 COLL VENOUS BLD VENIPUNCTURE: CPT

## 2022-10-18 RX ADMIN — IPRATROPIUM BROMIDE AND ALBUTEROL SULFATE 1 AMPULE: 2.5; .5 SOLUTION RESPIRATORY (INHALATION) at 07:29

## 2022-10-18 RX ADMIN — FERROUS SULFATE TAB 325 MG (65 MG ELEMENTAL FE) 325 MG: 325 (65 FE) TAB at 09:15

## 2022-10-18 RX ADMIN — DORZOLAMIDE HYDROCHLORIDE 1 DROP: 20 SOLUTION/ DROPS OPHTHALMIC at 09:11

## 2022-10-18 RX ADMIN — POLYVINYL ALCOHOL 1 DROP: 14 SOLUTION/ DROPS OPHTHALMIC at 09:11

## 2022-10-18 RX ADMIN — METOPROLOL SUCCINATE 75 MG: 50 TABLET, EXTENDED RELEASE ORAL at 09:05

## 2022-10-18 RX ADMIN — SODIUM BICARBONATE 650 MG: 650 TABLET ORAL at 09:15

## 2022-10-18 RX ADMIN — SODIUM BICARBONATE 650 MG: 650 TABLET ORAL at 13:24

## 2022-10-18 RX ADMIN — BRIMONIDINE TARTRATE 1 DROP: 2 SOLUTION OPHTHALMIC at 09:10

## 2022-10-18 RX ADMIN — ASPIRIN 81 MG 81 MG: 81 TABLET ORAL at 09:05

## 2022-10-18 RX ADMIN — DOCUSATE SODIUM 100 MG: 100 CAPSULE, LIQUID FILLED ORAL at 09:08

## 2022-10-18 RX ADMIN — FERROUS SULFATE TAB 325 MG (65 MG ELEMENTAL FE) 325 MG: 325 (65 FE) TAB at 13:24

## 2022-10-18 RX ADMIN — NYSTATIN: 100000 CREAM TOPICAL at 09:11

## 2022-10-18 RX ADMIN — SODIUM CHLORIDE, PRESERVATIVE FREE 10 ML: 5 INJECTION INTRAVENOUS at 09:08

## 2022-10-18 NOTE — DISCHARGE SUMMARY
Hospitalist Discharge Summary    Patient ID: Arvin Hughes   Patient : 1940  Patient's PCP: No primary care provider on file. Admit Date: 10/11/2022   Admitting Physician: Molina Gomez DO    Discharge Date:  10/18/2022  Discharge Physician: Tae Sanders MD   Discharge Condition: Stable  Discharge Disposition: Home    History of presenting illness:  Mr. Arvin Hughes, a [de-identified]y.o. year old male  who  has a past medical history of Disease of blood and blood forming organ and Hypertension. Patient presented to the emergency department with fatigue and low hemoglobin. Patient was seen by [place] earlier today and was told that his blood count was low that he did go to the emergency department. Patient reports having black stool. Symptoms started about 4 days ago. Worse with light exertion. Denies fever, chills, nausea, vomiting, chest pain, shortness of breath. Vital signs within normal limits and stable. Laboratory studies demonstrate BUN 27, creatinine 2.4, glucose 102, hemoglobin 6.8. Patient was transfused 1 unit of packed red blood cells and medicine was consulted for admission. Hospital course in brief:  (Please refer to daily progress notes for a comprehensive review of the hospitalization by requesting medical records)    Patient admitted on 10/11/2022 for GI bleed presented to the emergency department with fatigue and low hemoglobin. Patient was seen by [place] earlier today and was told that his blood count was low that he did go to the emergency department. Patient reports having black stool. Symptoms started about 4 days ago. Worse with light exertion. Denies fever, chills, nausea, vomiting, chest pain, shortness of breath. Vital signs within normal limits and stable. Laboratory studies demonstrate BUN 27, creatinine 2.4, glucose 102, hemoglobin 6.8. Patient was transfused 1 unit of packed red blood cells and medicine was consulted for admission.  General surgery consulted. EGD attempted 10/12 however became hypoxic so was aborted. Remains anemic, ordered another unit PRBC overnight. he was restarted back on aspirin and continued on PPI. She was evaluated by surgery and cleared for discharge. he had nonsustained V. tach, echo was obtained unremarkable except for patent foraminal ovale, evaluated by cardiology and if cleared he will be discharged home.  he had acute respiratory insufficiency due to anesthesia during EGD. Acute kidney injury on chronic kidney disease stage IIIb. Consults:   IP CONSULT TO INTERNAL MEDICINE  IP CONSULT TO GENERAL SURGERY  IP CONSULT TO CARDIOLOGY    Discharge Diagnoses:    --Acute blood loss anemia/melena- general surgery following. Follow H&H. EGD 10/14. Signs of gastritis with no active bleeding. Restart aspirin continue PPI twice daily monitor now stable. Cleared by surgery for discharge--Nonsustained V. tach. Check EKG troponin electrolytes and TSH. We will also obtain echocardiogram continue to closely monitor over telemetry, patient currently on metoprolol  Stable, hemoglobin. --Episodes of chest pain. Patient has a history of coronary artery disease with stents in place, last stent was several years ago per patient report  His aspirin was initially held due to GI bleed. Aspirin was restarted 10/15  Patient takes metoprolol, and statin at home. Lisinopril is currently on hold due to renal function. His troponin peaked at 36. His EKG has some new changes in the inferior leads  He had an episode of nonsustained V. tach on telemetry 10/15  Continue current medications, keep hemoglobin more than 8.0   cardiology has been consulted input appreciated  New supportive care, pending 2D echo. --Acute respiratory insufficiency-likely due to anesthesia during EGD 10/12. Now resolved on room air     -- Elevated creatinine, MOISE/CKD . with baseline creatinine around 2.0.   Monitor renal function and avoid nephrotoxins and hypotension     --History of coronary artery disease. -- Metabolic acidosis possibly secondary to MOISE. Sodium bicarb and treat as above, bicarbonate remains stable at 19     --Hypertension continue home meds parameters when blood pressure is more stable     --GERD, PPI     --History open AAA repair     -- Dyslipidemia continue home statin    Discharge Instructions / Follow up:    Continued appropriate risk factor modification of blood pressure, diabetes and serum lipids will remain essential to reducing risk of future atherosclerotic development    Activity: activity as tolerated    Significant labs:  CBC:   Recent Labs     10/16/22  0429 10/17/22  0408 10/18/22  0451   WBC 4.5 4.8 4.6   RBC 3.05* 3.08* 3.15*   HGB 8.9* 9.0* 9.3*   HCT 28.0* 28.9* 29.0*   MCV 91.8 93.8 92.1   RDW 15.5* 15.8* 15.9*    163 169     BMP:   Recent Labs     10/16/22  0429 10/16/22  0849 10/17/22  0408 10/18/22  0450     --  137 139   K 4.8  --  4.6 4.4   *  --  108* 109*   CO2 19*  --  19* 20*   BUN 17  --  22 23   CREATININE 1.8*  --  1.8* 1.8*   MG  --  1.7  --   --      LFT:  Recent Labs     10/16/22  0429 10/17/22  0408 10/18/22  0450   PROT 5.4* 5.2* 5.5*   ALKPHOS 87 89 91   ALT 9 9 8   AST 19 12 13   BILITOT 0.3 0.2 0.3     PT/INR: No results for input(s): INR, APTT in the last 72 hours. BNP: No results for input(s): BNP in the last 72 hours.   Hgb A1C: No results found for: LABA1C  Folate and B12: No results found for: IRAIFFAA72, No results found for: FOLATE  Thyroid Studies:   Lab Results   Component Value Date    TSH 0.642 10/15/2022       Urinalysis:  No results found for: NITRU, WBCUA, BACTERIA, RBCUA, BLOODU, SPECGRAV, GLUCOSEU    Imaging:  XR CHEST PORTABLE    Result Date: 10/13/2022  EXAMINATION: ONE XRAY VIEW OF THE CHEST 10/13/2022 5:50 pm COMPARISON: 10/12/2022 HISTORY: ORDERING SYSTEM PROVIDED HISTORY: SOB TECHNOLOGIST PROVIDED HISTORY: Reason for exam:->SOB What reading provider will be dictating this exam?->CRC FINDINGS: The lungs are without acute focal process. There is no effusion or pneumothorax. The cardiomediastinal silhouette is without acute process. The osseous structures are without acute process. No acute process. XR CHEST PORTABLE    Result Date: 10/12/2022  EXAMINATION: ONE XRAY VIEW OF THE CHEST 10/12/2022 9:08 am COMPARISON: 05/05/2022. HISTORY: ORDERING SYSTEM PROVIDED HISTORY: wheezing, dropped o2 sats with induction prior to starting EGD TECHNOLOGIST PROVIDED HISTORY: In PACU Reason for exam:->wheezing, dropped o2 sats with induction prior to starting EGD What reading provider will be dictating this exam?->CRC FINDINGS: The cardiac silhouette is unchanged in size. The pulmonary vasculature is within normal limits. There are chronic findings in both lungs including granulomatous changes. No pulmonary consolidation or collapse is identified. No pneumothorax or pleural effusion is seen. Chronic findings. No acute cardiopulmonary disease.        Discharge Medications:      Medication List        CONTINUE taking these medications      acetaminophen 325 MG tablet  Commonly known as: TYLENOL     aspirin 81 MG chewable tablet     atorvastatin 40 MG tablet  Commonly known as: LIPITOR     brinzolamide-brimonidine 1-0.2 % Susp     carboxymethylcellulose 1 % ophthalmic solution     docusate sodium 100 MG capsule  Commonly known as: COLACE     ferrous sulfate 325 (65 Fe) MG EC tablet  Commonly known as: FE TABS 325     furosemide 20 MG tablet  Commonly known as: LASIX     latanoprost 0.005 % ophthalmic solution  Commonly known as: XALATAN     lisinopril 40 MG tablet  Commonly known as: PRINIVIL;ZESTRIL     metoprolol succinate 50 MG extended release tablet  Commonly known as: TOPROL XL     pantoprazole 40 MG tablet  Commonly known as: PROTONIX  Take 1 tablet by mouth every morning (before breakfast)     polyethylene glycol 17 g packet  Commonly known as: GLYCOLAX therapeutic multivitamin-minerals tablet     Vitamin D3 125 MCG (5000 UT) Tabs              Time Spent on discharge is more than 35 minutes in the examination, evaluation, counseling and review of medications and discharge plan.    +++++++++++++++++++++++++++++++++++++++++++++++++  Dandy Carpio MD  [address]  +++++++++++++++++++++++++++++++++++++++++++++++++  NOTE: This report was transcribed using voice recognition software. Every effort was made to ensure accuracy; however, inadvertent computerized transcription errors may be present.

## 2022-10-18 NOTE — DISCHARGE INSTR - COC
Continuity of Care Form    Patient Name: Brennen Landa   :  1940  MRN:  [id number]    Admit date:  10/11/2022  Discharge date:  ***    Code Status Order: Full Code   Advance Directives:     Admitting Physician:  Jennifer Menon DO  PCP: No primary care provider on file. Discharging Nurse: [facility] Unit/Room#: 4502/4502-A  Discharging Unit Phone Number: ***    Emergency Contact:   Extended Emergency Contact Information  Primary Emergency Contact: mariah martinez  Mobile Phone: [phone]  Relation: Child   needed? No  Secondary Emergency Contact: [address] Phone: [phone]  Relation: None  Preferred language: English   needed? No    Past Surgical History:  Past Surgical History:   Procedure Laterality Date    ABDOMINAL AORTIC ANEURYSM REPAIR      UPPER GASTROINTESTINAL ENDOSCOPY N/A 10/14/2022    EGD DIAGNOSTIC ONLY performed by Joycelyn Todd MD at [facility] ENDOSCOPY       Immunization History: There is no immunization history on file for this patient.     Active Problems:  Patient Active Problem List   Diagnosis Code    Anemia D64.9    Rectal bleed K62.5    GI bleed K92.2    Chest pain R07.9    PVC's (premature ventricular contractions) I49.3    Coronary artery disease involving native coronary artery of native heart without angina pectoris I25.10    Primary hypertension I10    Hyperlipidemia E78.5    Stage 3b chronic kidney disease ([facility] [address] N18.32       Isolation/Infection:   Isolation            No Isolation          Patient Infection Status       Infection Onset Added Last Indicated Last Indicated By Review Planned Expiration Resolved Resolved By    None active    Resolved    COVID-19 (Rule Out) 10/12/22 10/12/22 10/12/22 COVID-19 & Influenza Combo (Ordered)   10/12/22 Rule-Out Test Resulted            Nurse Assessment:  Last Vital Signs: /70   Pulse 75   Temp 98.6 °F (37 °C) (Oral)   Resp 18   Ht 5' 11\" (1.803 m)   Wt 196 lb (88.9 kg)   SpO2 93%   BMI 27.34 kg/m²     Last documented pain score (0-10 scale): Pain Level: 0  Last Weight:   Wt Readings from Last 1 Encounters:   10/11/22 196 lb (88.9 kg)     Mental Status:  {IP PT MENTAL STATUS:}    IV Access:  { MADISON IV ACCESS:417254137}    Nursing Mobility/ADLs:  Walking   {CHP DME NOMN:611471478}  Transfer  {CHP DME HQZV:662183949}  Bathing  {CHP DME IXPI:625713946}  Dressing  {CHP DME IFTZ:586385827}  Toileting  {CHP DME DTOY:548948199}  Feeding  {CHP DME AFVW:862959487}  Med Admin  {P DME QWNY:865969165}  Med Delivery   { MADISON MED Delivery:742246653}    Wound Care Documentation and Therapy:        Elimination:  Continence: Bowel: {YES / IF:49994}  Bladder: {YES / CB:79925}  Urinary Catheter: {Urinary Catheter:357955164}   Colostomy/Ileostomy/Ileal Conduit: {YES / KW:39889}       Date of Last BM: ***    Intake/Output Summary (Last 24 hours) at 10/18/2022 1032  Last data filed at 10/18/2022 0944  Gross per 24 hour   Intake 960 ml   Output --   Net 960 ml     I/O last 3 completed shifts:   In: 1200 [P.O.:1200]  Out: 600 [Urine:600]    Safety Concerns:     [employer] Safety Concerns:204789043}    Impairments/Disabilities:      [employer] Impairments/Disabilities:881733963}    Nutrition Therapy:  Current Nutrition Therapy:   [employer] Diet List:763661254}    Routes of Feeding: {CHP DME Other Feedings:951601484}  Liquids: {Slp liquid thickness:24057}  Daily Fluid Restriction: {CHP DME Yes amt example:450000626}  Last Modified Barium Swallow with Video (Video Swallowing Test): {Done Not Done FSOA:875047722}    Treatments at the Time of Hospital Discharge:   Respiratory Treatments: ***  Oxygen Therapy:  {Therapy; copd oxygen:33524}  Ventilator:    { CC Vent JEQT:279575925}    Rehab Therapies: {THERAPEUTIC INTERVENTION:5395749693}  Weight Bearing Status/Restrictions: [address] Weight Bearin}  Other Medical Equipment (for information only, NOT a DME order):  {EQUIPMENT:408841884}  Other Treatments: ***    Patient's personal belongings (please select all that are sent with patient):  {CHP DME Belongings:883510788}    RN SIGNATURE:  {Esignature:262827382}    CASE MANAGEMENT/SOCIAL WORK SECTION    Inpatient Status Date: ***    Readmission Risk Assessment Score:  Readmission Risk              Risk of Unplanned Readmission:  17           Discharging to Facility/ Agency   Name:   Address:  Phone:  Fax:    Dialysis Facility (if applicable)   Name:  Address:  Dialysis Schedule:  Phone:  Fax:    / signature: {Esignature:944937029}    PHYSICIAN SECTION    Prognosis: {Prognosis:9764498009}    Condition at Discharge: [address] [place] [address] Patient Condition:018473180}    Rehab Potential (if transferring to Rehab): {Prognosis:2235278735}    Recommended Labs or Other Treatments After Discharge: ***    Physician Certification: I certify the above information and transfer of [employer]  is necessary for the continuing treatment of the diagnosis listed and that he requires {Admit to Appropriate Level of Care:05876} for {GREATER/LESS:562646743} 30 days.      Update Admission H&P: {CHP DME Changes in TNBFY:229608501}    PHYSICIAN SIGNATURE:  Electronically signed by Derek Kiser MD on 10/18/22 at 10:32 AM EDT

## 2022-10-18 NOTE — DISCHARGE INSTR - DIET
Good nutrition is important when healing from an illness, injury, or surgery. Follow any nutrition recommendations given to you during your hospital stay. If you were given an oral nutrition supplement while in the hospital, continue to take this supplement at home. You can take it with meals, in-between meals, and/or before bedtime. These supplements can be purchased at most local grocery stores, pharmacies, and chain [employer]-stores. If you have any questions about your diet or nutrition, call the hospital and ask for the dietitian.   Cardiac diet

## 2022-10-18 NOTE — PROGRESS NOTES
Discharge instructions reviewed with patient. Follow ups an medications reviewed. Patient receptive. Unable to get transportation and would like staff to take him to his car via wheelchair in ER as patient drove here. Patient taken by wheelchair in stable condition.

## 2022-10-18 NOTE — PROGRESS NOTES
Pt cleared for discharge when cleared by cardiology. Message sent to Iesha Richardson NP to check with Dr. Shannon Walsh for any further inpatient or outpatient follow up needs.

## 2022-10-31 NOTE — PROGRESS NOTES
Physician Progress Note      PATIENT:               Neda Penaloza  CSN #:                  [id number]  :                       1940  ADMIT DATE:       10/11/2022 5:29 PM  [address] [place] DATE:        10/18/2022 2:15 PM  RESPONDING  PROVIDER #:        Austen Patel MD          QUERY TEXT:    Patient admitted with gastritis. Noted documentation of \"Acute Kidney Injury\"   in progress note on 10/16 and 10/14. In order to support the diagnosis of   MOISE, please include additional clinical indicators in your documentation. ? Or   please document if the diagnosis of MOISE has been ruled out after further   study. The medical record reflects the following:  Risk Factors: CKD  Clinical Indicators: Weakness, slightly confused, baseline creatinine around   2.0, Creatinine (mg/dL) 2.0H on 10/14, 8.2R-, 2.4H-10/11,[place] acidosis   possibly secondary to MOISE on 10/16 progress notes, KD stage IV, Used for Fluid   Requirements,10/14 and 10/16 documented \"Elevated creatinine, MOISE/CKD\". Monitoring renal function and avoid nephrotoxins and hypotension. Treatment: Fluid Requirements    Defined by Kidney Disease Improving Global Outcomes (KDIGO) clinical practice   guideline for acute kidney injury:  -Increase in SCr by greater than or equal to 0.3 mg/dl within 48 hours; or  -Increase or decrease in SCr to greater than or equal to 1.5 times baseline,   which is known or presumed to have occurred within the prior 7 days; or  -Urine volume < 0.5ml/kg/h for 6 hours. Options provided:  -- Acute kidney injury evidenced by, Please document evidence as well as a   numerical baseline creatinine, if known.   -- Acute kidney injury ruled out after study  -- Other - I will add my own diagnosis  -- Disagree - Not applicable / Not valid  -- Disagree - Clinically unable to determine / Unknown  -- Refer to Clinical Documentation Reviewer    PROVIDER RESPONSE TEXT:    This patient has acute kidney injury as evidenced by    Query created by: Suad Menjivar on 10/17/2022 11:09 PM      Electronically signed by:   Violeta Alba MD 10/31/2022 3:08 PM

## 2023-03-29 ENCOUNTER — HOSPITAL ENCOUNTER (EMERGENCY)
Age: 83
Discharge: HOME | End: 2023-03-29
Payer: OTHER GOVERNMENT

## 2023-03-29 ENCOUNTER — APPOINTMENT (OUTPATIENT)
Dept: CT IMAGING | Age: 83
End: 2023-03-29
Payer: OTHER GOVERNMENT

## 2023-03-29 VITALS
DIASTOLIC BLOOD PRESSURE: 79 MMHG | OXYGEN SATURATION: 100 % | RESPIRATION RATE: 18 BRPM | WEIGHT: 194 LBS | HEART RATE: 94 BPM | BODY MASS INDEX: 27.06 KG/M2 | SYSTOLIC BLOOD PRESSURE: 104 MMHG | TEMPERATURE: 98.5 F

## 2023-03-29 DIAGNOSIS — R93.5 ABNORMAL CT OF THE ABDOMEN: ICD-10-CM

## 2023-03-29 DIAGNOSIS — M51.36 BULGING LUMBAR DISC: ICD-10-CM

## 2023-03-29 DIAGNOSIS — M54.50 ACUTE LEFT-SIDED LOW BACK PAIN WITHOUT SCIATICA: Primary | ICD-10-CM

## 2023-03-29 LAB
ANION GAP SERPL CALCULATED.3IONS-SCNC: 11 MMOL/L (ref 7–16)
BACTERIA URNS QL MICRO: ABNORMAL /HPF
BASOPHILS # BLD: 0.01 E9/L (ref 0–0.2)
BASOPHILS NFR BLD: 0.1 % (ref 0–2)
BILIRUB UR QL STRIP: NEGATIVE
BUN SERPL-MCNC: 29 MG/DL (ref 6–23)
CALCIUM SERPL-MCNC: 10.1 MG/DL (ref 8.6–10.2)
CHLORIDE SERPL-SCNC: 104 MMOL/L (ref 98–107)
CLARITY UR: CLEAR
CO2 SERPL-SCNC: 18 MMOL/L (ref 22–29)
COLOR UR: YELLOW
CREAT SERPL-MCNC: 2.4 MG/DL (ref 0.7–1.2)
EOSINOPHIL # BLD: 0 E9/L (ref 0.05–0.5)
EOSINOPHIL NFR BLD: 0 % (ref 0–6)
ERYTHROCYTE [DISTWIDTH] IN BLOOD BY AUTOMATED COUNT: 14.6 FL (ref 11.5–15)
GLUCOSE SERPL-MCNC: 135 MG/DL (ref 74–99)
GLUCOSE UR STRIP-MCNC: 500 MG/DL
HCT VFR BLD AUTO: 34.7 % (ref 37–54)
HGB BLD-MCNC: 10.8 G/DL (ref 12.5–16.5)
HGB UR QL STRIP: NEGATIVE
IMM GRANULOCYTES # BLD: 0.06 E9/L
IMM GRANULOCYTES NFR BLD: 0.5 % (ref 0–5)
KETONES UR STRIP-MCNC: NEGATIVE MG/DL
LEUKOCYTE ESTERASE UR QL STRIP: NEGATIVE
LYMPHOCYTES # BLD: 0.73 E9/L (ref 1.5–4)
LYMPHOCYTES NFR BLD: 5.9 % (ref 20–42)
MCH RBC QN AUTO: 30.3 PG (ref 26–35)
MCHC RBC AUTO-ENTMCNC: 31.1 % (ref 32–34.5)
MCV RBC AUTO: 97.2 FL (ref 80–99.9)
MONOCYTES # BLD: 0.9 E9/L (ref 0.1–0.95)
MONOCYTES NFR BLD: 7.3 % (ref 2–12)
NEUTROPHILS # BLD: 10.63 E9/L (ref 1.8–7.3)
NEUTS SEG NFR BLD: 86.2 % (ref 43–80)
NITRITE UR QL STRIP: NEGATIVE
PH UR STRIP: 5.5 [PH] (ref 5–9)
PLATELET # BLD AUTO: 257 E9/L (ref 130–450)
PMV BLD AUTO: 9.8 FL (ref 7–12)
POTASSIUM SERPL-SCNC: 4.4 MMOL/L (ref 3.5–5)
PROT UR STRIP-MCNC: 100 MG/DL
RBC # BLD AUTO: 3.57 E12/L (ref 3.8–5.8)
RBC #/AREA URNS HPF: ABNORMAL /HPF (ref 0–2)
SODIUM SERPL-SCNC: 133 MMOL/L (ref 132–146)
SP GR UR STRIP: 1.01 (ref 1–1.03)
UROBILINOGEN UR STRIP-ACNC: 0.2 E.U./DL
WBC # BLD: 12.3 E9/L (ref 4.5–11.5)
WBC #/AREA URNS HPF: ABNORMAL /HPF (ref 0–5)

## 2023-03-29 PROCEDURE — 72131 CT LUMBAR SPINE W/O DYE: CPT

## 2023-03-29 PROCEDURE — 74176 CT ABD & PELVIS W/O CONTRAST: CPT

## 2023-03-29 PROCEDURE — 85025 COMPLETE CBC W/AUTO DIFF WBC: CPT

## 2023-03-29 PROCEDURE — 99284 EMERGENCY DEPT VISIT MOD MDM: CPT

## 2023-03-29 PROCEDURE — 81001 URINALYSIS AUTO W/SCOPE: CPT

## 2023-03-29 PROCEDURE — 80048 BASIC METABOLIC PNL TOTAL CA: CPT

## 2023-03-29 PROCEDURE — 6370000000 HC RX 637 (ALT 250 FOR IP): Performed by: NURSE PRACTITIONER

## 2023-03-29 RX ORDER — LIDOCAINE 50 MG/G
1 PATCH TOPICAL DAILY
Qty: 10 PATCH | Refills: 0 | Status: SHIPPED | OUTPATIENT
Start: 2023-03-29 | End: 2023-04-08

## 2023-03-29 RX ORDER — LIDOCAINE 4 G/G
1 PATCH TOPICAL DAILY
Status: DISCONTINUED | OUTPATIENT
Start: 2023-03-29 | End: 2023-03-29 | Stop reason: HOSPADM

## 2023-03-29 RX ORDER — METHOCARBAMOL 500 MG/1
500 TABLET, FILM COATED ORAL ONCE
Status: COMPLETED | OUTPATIENT
Start: 2023-03-29 | End: 2023-03-29

## 2023-03-29 RX ORDER — METHOCARBAMOL 500 MG/1
500 TABLET, FILM COATED ORAL 4 TIMES DAILY
Qty: 40 TABLET | Refills: 0 | Status: SHIPPED | OUTPATIENT
Start: 2023-03-29 | End: 2023-04-08

## 2023-03-29 RX ADMIN — METHOCARBAMOL 500 MG: 500 TABLET ORAL at 13:06

## 2023-03-29 ASSESSMENT — PAIN SCALES - GENERAL: PAINLEVEL_OUTOF10: 7

## 2023-03-29 ASSESSMENT — PAIN - FUNCTIONAL ASSESSMENT: PAIN_FUNCTIONAL_ASSESSMENT: 0-10

## 2023-03-29 NOTE — ED PROVIDER NOTES
multilevel disc bulges. A  large conglomeration of soft tissue mass in the retroperitoneum as noted   concerning for malignancy like lymphoma or metastasis and further assessment   by CT of the abdomen pelvis is recommended. Multiple somewhat hyperdense lesions in the kidneys more on the left side   which needs to be further assessed. RECOMMENDATIONS:   Unavailable         CT ABDOMEN PELVIS WO CONTRAST Additional Contrast? None   Final Result   Retrocrural and retroperitoneal lymphadenopathy. Possibilities include   metastatic disease and lymphoma. Multiple bilateral renal cysts and multiple bilateral hyperdense renal   lesions, probably hemorrhagic cysts. Partially calcified left renal mass. No urolithiasis or obstructive uropathy. Multiple other findings as described. ED Course / Medical Decision Making     Medications   lidocaine 4 % external patch 1 patch (1 patch TransDERmal Patch Applied 3/29/23 1306)   methocarbamol (ROBAXIN) tablet 500 mg (500 mg Oral Given 3/29/23 1306)        Re-examination:  3/29/23       Time: 1405   Discussed today's findings with patient. Advised him that there is a possibility that cancer was found on his CT scan. His PCP is through the [place]. I instructed him to follow up with his PCP tomorrow and that I would provide him follow up information for oncology. 1430: Labs are resulted. Patient states his pain is improving. He ambulated at this time. Discussed discharge plan. Consult(s):   None    Procedure(s):   none    Medical Decision Making:    Patient presented to the ER with complaints of low back pain that is primarily on the left side. He states it has been worsening over the last week. Gets worse with more activity. Differential diagnosis:  Lumbar radiculopathy, lumbar spine sprain, DDD, disk herniation, left renal stone    On exam, he is awake, alert, oriented and in no distress. Skin warm, pink, dry.   He is in a wheelchair but was observed ambulating for a urine specimen. He denies dysuria but stated he does have some urinary hesitancy. No fevers, chills, nausea, vomiting, abdominal pain. No loss of bowel/bladder control. No saddle paresthesias. No lower extremity numbness or tingling. He denies radiation of pain. He was medicated with  500mg Robaxin and a lidocaine patch was applied. CT scan of his abdomen/pelvis as well as his lumbar spine were performed. CT scan of the lumbar spine showed no acute fractures. There was advanced diffuse degenerative changes from L1-S1 with multilevel disc bulges. An incidental finding of a large conglomeration of soft tissue mass in the retroperitoneum concerning for malignancy like lymphoma or metastatic and further assessment by CT of the abdomen pelvis was recommended. There were multiple somewhat hyperdense lesions in the kidney more on the left side which needed further outpatient assessment. A CT abdomen pelvis was performed per recommendations that revealed retroperitoneal lymphadenopathy possibilities including metastatic disease and lymphoma. Bilateral renal cysts and multiple bilateral hyperdense renal lesions probably hemorrhagic cyst.  Multiple hyperdense lesions in both kidneys. Multiple enlarged retroperitoneal lymph nodes measuring up to 3 cm. I reviewed these findings with the patient in private. I told him he needs to follow-up with his PCP that is through the [place] as soon as possible. I also provided him with follow-up information for oncology within our network. He also stated he will try to contact oncology throughout the [place]. He started making phone calls here today in fact. I prescribed him lidocaine patches for the next 10 days in addition to Robaxin 4 times daily for 10 days. Patient's urinalysis was negative for any signs of urinary tract infection or renal stone. Renal stone was also excluded via CT scan. All questions were answered.   Patient

## 2023-03-29 NOTE — DISCHARGE INSTRUCTIONS
FOLLOW UP WITH YOUR [facility] DOCTOR AS SOON AS POSSIBLE. I HAVE ATTACHED CONTACT INFORMATION FOR A ONCOLOGY DOCTOR WITHIN [facility]. IF YOU HAVE A [facility]CER DOCTOR AT THE [facility], CONTACT THEM. RETURN TO THE ER FOR WORSENING SYMPTOMS. ALTERNATE TYLENOL AND MOTRIN FOR PAIN. LIDOCAINE PATCHES AS DIRECTED.

## 2023-10-10 ENCOUNTER — TELEPHONE (OUTPATIENT)
Dept: CASE MANAGEMENT | Age: 83
End: 2023-10-10

## 2023-10-10 ENCOUNTER — TELEPHONE (OUTPATIENT)
Dept: INFUSION THERAPY | Age: 83
End: 2023-10-10

## 2023-10-10 ENCOUNTER — OFFICE VISIT (OUTPATIENT)
Dept: ONCOLOGY | Age: 83
End: 2023-10-10
Payer: OTHER GOVERNMENT

## 2023-10-10 ENCOUNTER — HOSPITAL ENCOUNTER (OUTPATIENT)
Dept: ULTRASOUND IMAGING | Age: 83
Discharge: HOME | End: 2023-10-12
Attending: INTERNAL MEDICINE
Payer: OTHER GOVERNMENT

## 2023-10-10 ENCOUNTER — HOSPITAL ENCOUNTER (OUTPATIENT)
Dept: INFUSION THERAPY | Age: 83
Discharge: HOME | End: 2023-10-10

## 2023-10-10 VITALS
TEMPERATURE: 97.8 F | BODY MASS INDEX: 25.06 KG/M2 | DIASTOLIC BLOOD PRESSURE: 51 MMHG | OXYGEN SATURATION: 97 % | SYSTOLIC BLOOD PRESSURE: 103 MMHG | HEART RATE: 77 BPM | WEIGHT: 179 LBS | HEIGHT: 71 IN

## 2023-10-10 DIAGNOSIS — C64.9 RENAL CELL CARCINOMA, UNSPECIFIED LATERALITY (HCC): ICD-10-CM

## 2023-10-10 DIAGNOSIS — M79.605 ACUTE PAIN OF LEFT LOWER EXTREMITY: Primary | ICD-10-CM

## 2023-10-10 DIAGNOSIS — M79.605 ACUTE PAIN OF LEFT LOWER EXTREMITY: ICD-10-CM

## 2023-10-10 PROCEDURE — 3074F SYST BP LT 130 MM HG: CPT | Performed by: INTERNAL MEDICINE

## 2023-10-10 PROCEDURE — 1123F ACP DISCUSS/DSCN MKR DOCD: CPT | Performed by: INTERNAL MEDICINE

## 2023-10-10 PROCEDURE — 3078F DIAST BP <80 MM HG: CPT | Performed by: INTERNAL MEDICINE

## 2023-10-10 PROCEDURE — 99205 OFFICE O/P NEW HI 60 MIN: CPT | Performed by: INTERNAL MEDICINE

## 2023-10-10 PROCEDURE — 93971 EXTREMITY STUDY: CPT

## 2023-10-10 RX ORDER — SODIUM BICARBONATE 325 MG/1
1 TABLET ORAL 2 TIMES DAILY
COMMUNITY
Start: 2023-09-28

## 2023-10-10 NOTE — TELEPHONE ENCOUNTER
Met with patient during his  initial consultation with Dr. Deyanira Muse  for his recent renal cancer diagnosis. Introduced myself and explained my role with patients receiving treatment at our center. Patient was friendly and receptive. Instructed on next steps including stat US of lower legs today, due to edema, and cancer treatment with Opdivo per Dr. Paulo Eagle's recommendations and follow up care. Provided patient with transportation resource list and literature on Support group services, in person and online, 9690 8bit handout and NCCN Kidney Cancer booklet. Reviewed resources available to him  such as Social Work and Dietitian. Patient states that he has lost 40 lbs over the last year and that he really has no appetite. He states that he drinks well and that he used to drink Boost sometimes. He admits to being able to care for himself and drives himself to appointments. Will notify dietitian to contact patient at this time. New patient nursing assessment, medical history, surgical history, family history completed. Medication list reviewed and updated. Provided with my contact information and instructed patient to call me with questions or concerns. Verbalizes understanding. Patient appreciative of visit. Will continue to follow.  Talib BACAN, RN-OCN Nurse Navigator

## 2023-10-10 NOTE — TELEPHONE ENCOUNTER
This nurse reached out to patient for eliquis instructions. No answer.  Left message for patient to return call to this office

## 2023-10-11 ENCOUNTER — TELEPHONE (OUTPATIENT)
Dept: INFUSION THERAPY | Age: 83
End: 2023-10-11

## 2023-10-11 NOTE — TELEPHONE ENCOUNTER
Patient returned call stating he did  his Eliquis prescription and has been taking it. All questions answered.

## 2023-10-11 NOTE — TELEPHONE ENCOUNTER
Left a message for patient regarding need for a call back to go over new medication Eliquis and instructions.
Yes

## 2023-10-16 DIAGNOSIS — C64.9 RENAL CELL CARCINOMA, UNSPECIFIED LATERALITY (HCC): Primary | ICD-10-CM

## 2023-10-17 ENCOUNTER — TELEPHONE (OUTPATIENT)
Dept: ONCOLOGY | Age: 83
End: 2023-10-17

## 2023-10-17 NOTE — TELEPHONE ENCOUNTER
Attempted to contact pt on both home and mobile lines per referral for wt loss w/ new renal cell CA. Left message w/ contact information. Await return call.   Electronically signed by Pilar Pate MS, RD, LD on 10/17/2023 at 2:34 PM

## 2023-10-23 ENCOUNTER — HOSPITAL ENCOUNTER (OUTPATIENT)
Dept: INFUSION THERAPY | Age: 83
End: 2023-10-23

## 2023-10-24 ENCOUNTER — HOSPITAL ENCOUNTER (OUTPATIENT)
Dept: INFUSION THERAPY | Age: 83
End: 2023-10-24

## 2023-10-27 ENCOUNTER — TELEPHONE (OUTPATIENT)
Dept: ONCOLOGY | Age: 83
End: 2023-10-27

## 2023-10-27 NOTE — TELEPHONE ENCOUNTER
Second attempt to contact pt per referral for wt loss/poor PO intake. Phone went directly to voicemail. Left message w/ contact information. Await return call.   Electronically signed by Sary Barriga MS, RD, LD on 10/27/2023 at 11:38 AM

## 2023-11-02 ENCOUNTER — HOSPITAL ENCOUNTER (OUTPATIENT)
Dept: INFUSION THERAPY | Age: 83
End: 2023-11-02

## 2023-11-03 ENCOUNTER — HOSPITAL ENCOUNTER (OUTPATIENT)
Dept: INFUSION THERAPY | Age: 83
End: 2023-11-03

## 2023-11-06 ENCOUNTER — HOSPITAL ENCOUNTER (OUTPATIENT)
Dept: INFUSION THERAPY | Age: 83
Discharge: HOME | DRG: 378 | End: 2023-11-06
Payer: OTHER GOVERNMENT

## 2023-11-06 ENCOUNTER — HOSPITAL ENCOUNTER (INPATIENT)
Age: 83
LOS: 3 days | Discharge: HOME | DRG: 378 | End: 2023-11-09
Attending: EMERGENCY MEDICINE | Admitting: FAMILY MEDICINE
Payer: OTHER GOVERNMENT

## 2023-11-06 DIAGNOSIS — C64.9 RENAL CELL CARCINOMA, UNSPECIFIED LATERALITY (HCC): ICD-10-CM

## 2023-11-06 DIAGNOSIS — D64.9 SYMPTOMATIC ANEMIA: Primary | ICD-10-CM

## 2023-11-06 DIAGNOSIS — K92.2 GASTROINTESTINAL HEMORRHAGE, UNSPECIFIED GASTROINTESTINAL HEMORRHAGE TYPE: ICD-10-CM

## 2023-11-06 LAB
ALBUMIN SERPL-MCNC: 3.7 G/DL (ref 3.5–5.2)
ALP SERPL-CCNC: 97 U/L (ref 40–129)
ALT SERPL-CCNC: 10 U/L (ref 0–40)
ANION GAP SERPL CALCULATED.3IONS-SCNC: 13 MMOL/L (ref 7–16)
AST SERPL-CCNC: 13 U/L (ref 0–39)
BASOPHILS # BLD: 0 K/UL (ref 0–0.2)
BASOPHILS NFR BLD: 0 % (ref 0–2)
BILIRUB SERPL-MCNC: <0.2 MG/DL (ref 0–1.2)
BUN SERPL-MCNC: 32 MG/DL (ref 6–23)
CALCIUM SERPL-MCNC: 10.4 MG/DL (ref 8.6–10.2)
CHLORIDE SERPL-SCNC: 109 MMOL/L (ref 98–107)
CO2 SERPL-SCNC: 16 MMOL/L (ref 22–29)
CREAT SERPL-MCNC: 2.7 MG/DL (ref 0.7–1.2)
EOSINOPHIL # BLD: 0.03 K/UL (ref 0.05–0.5)
EOSINOPHILS RELATIVE PERCENT: 1 % (ref 0–6)
ERYTHROCYTE [DISTWIDTH] IN BLOOD BY AUTOMATED COUNT: 16.7 % (ref 11.5–15)
GFR SERPL CREATININE-BSD FRML MDRD: 23 ML/MIN/1.73M2
GLUCOSE SERPL-MCNC: 112 MG/DL (ref 74–99)
HCT VFR BLD AUTO: 16.1 % (ref 37–54)
HGB BLD-MCNC: 4.8 G/DL (ref 12.5–16.5)
IMM GRANULOCYTES # BLD AUTO: 0.03 K/UL (ref 0–0.58)
IMM GRANULOCYTES NFR BLD: 1 % (ref 0–5)
LYMPHOCYTES NFR BLD: 0.56 K/UL (ref 1.5–4)
LYMPHOCYTES RELATIVE PERCENT: 12 % (ref 20–42)
MCH RBC QN AUTO: 28.6 PG (ref 26–35)
MCHC RBC AUTO-ENTMCNC: 29.8 G/DL (ref 32–34.5)
MCV RBC AUTO: 95.8 FL (ref 80–99.9)
MONOCYTES NFR BLD: 0.3 K/UL (ref 0.1–0.95)
MONOCYTES NFR BLD: 6 % (ref 2–12)
NEUTROPHILS NFR BLD: 81 % (ref 43–80)
NEUTS SEG NFR BLD: 3.96 K/UL (ref 1.8–7.3)
PLATELET # BLD AUTO: 292 K/UL (ref 130–450)
PMV BLD AUTO: 9.8 FL (ref 7–12)
POTASSIUM SERPL-SCNC: 5 MMOL/L (ref 3.5–5)
PROT SERPL-MCNC: 6 G/DL (ref 6.4–8.3)
RBC # BLD AUTO: 1.68 M/UL (ref 3.8–5.8)
RBC # BLD: ABNORMAL 10*6/UL
SODIUM SERPL-SCNC: 138 MMOL/L (ref 132–146)
WBC OTHER # BLD: 4.9 K/UL (ref 4.5–11.5)

## 2023-11-06 PROCEDURE — 99285 EMERGENCY DEPT VISIT HI MDM: CPT

## 2023-11-06 PROCEDURE — 85025 COMPLETE CBC W/AUTO DIFF WBC: CPT

## 2023-11-06 PROCEDURE — 80053 COMPREHEN METABOLIC PANEL: CPT

## 2023-11-06 PROCEDURE — 86900 BLOOD TYPING SEROLOGIC ABO: CPT

## 2023-11-06 PROCEDURE — 36415 COLL VENOUS BLD VENIPUNCTURE: CPT

## 2023-11-06 PROCEDURE — 86923 COMPATIBILITY TEST ELECTRIC: CPT

## 2023-11-06 PROCEDURE — 86850 RBC ANTIBODY SCREEN: CPT

## 2023-11-06 PROCEDURE — 86901 BLOOD TYPING SEROLOGIC RH(D): CPT

## 2023-11-06 PROCEDURE — 2060000000 HC ICU INTERMEDIATE R&B

## 2023-11-06 PROCEDURE — 30233N1 TRANSFUSION OF NONAUTOLOGOUS RED BLOOD CELLS INTO PERIPHERAL VEIN, PERCUTANEOUS APPROACH: ICD-10-PCS | Performed by: FAMILY MEDICINE

## 2023-11-06 PROCEDURE — P9016 RBC LEUKOCYTES REDUCED: HCPCS

## 2023-11-06 PROCEDURE — 99214 OFFICE O/P EST MOD 30 MIN: CPT

## 2023-11-06 RX ORDER — SODIUM CHLORIDE 9 MG/ML
INJECTION, SOLUTION INTRAVENOUS PRN
Status: DISCONTINUED | OUTPATIENT
Start: 2023-11-06 | End: 2023-11-09 | Stop reason: HOSPADM

## 2023-11-06 NOTE — PROGRESS NOTES
Uzair AGUILERA Gina  11/6/2023  Ht Readings from Last 1 Encounters:   10/10/23 1.803 m (5' 11\")     Wt Readings from Last 10 Encounters:   10/10/23 81.2 kg (179 lb)   03/29/23 88 kg (194 lb)   10/11/22 88.9 kg (196 lb)   05/05/22 96.2 kg (212 lb)     BMI: 24.97    Assessment: Met w/ pt for introduction during chemo teach. Pt referred to this clinician for reported wt loss. Pt reports UBW ~200#, noting that it has been at least 6 months since he weighed this much. Per EMR review, confirmed loss of 15# x>7 months (7.7%). Pt had previously reported 40# wt loss over last year at initial consult, wt loss time frame likely longer than stated. Pt reports he consumes 1 meal/day. He reports that family occasionally brings him food, but he also is able to cook basic foods for himself. He is also able to drive himself to nearby restaurants. Educated pt on importance of consistent nutrition, encouraging more frequent PO intake. Provided w/ variety of resources to optimize nutritional intake. Pt reports he previously received ONS through [place], but has not received these in several months. Voicemail left for Nemo Lopez, dietitian at [facility] clinic requesting investigation. Pt also reports that he was once enrolled in a grocery shopping program, but funding was cut and he no longer has grocery assistance. Provided pt w/ information on meal delivery services as well as Delivery [place] grocery delivery through [facility]. AT [place]. Will alert  to social concerns. Pt provided w/ samples of Ensure and Boost. Discussed role of oncology dietitian in pt's care. All questions answered to pt satisfaction. Pt revisited after chemo teach completed for additional review. Encouraged to contact this clinician as needed.     Weight change: -7.7% x7 months  Appetite: poor  Calculated Needs: 28-30 kcal/kg CBW =  kcal, 1.2-1.4 gm/kg CBW = 100-115 gm pro, 1 ml/kcal =  ml fluids  Malnutrition Status: Severe  Nutrition Diagnosis: Severe malnutrition r/t renal cell carcinoma AEB severe fat and muscle wasting.     Recommendations: Pt to consume at least 3 meals/day + ONS of choice at least BID    Naomie Hanks MS, RD, LD

## 2023-11-06 NOTE — PROGRESS NOTES
Critical Hg of 4.8 (ran twice and specimen was sufficient to analyze) received from [address] at [facility] ([facility] lab-This nurse called and left a message for DIONI RN at [facility] to return call in order to notify Dr Da Og Fco-This nurse attempted to call pt to inform him several times with only a voicemail reached-This nurse did leave an urgent message on pts voicemail to return a call to the office

## 2023-11-06 NOTE — PROGRESS NOTES
Patient was here today alone for his chemotherapy teach. Patient watched Chemotherapy Basics video. Consent form signed and labs drawn. Reviewed with patient- Chemotherapy Tip sheet and routine and physical environment discussed. Emotional support given and questions answered. Patient acknowledges understanding of the plan.

## 2023-11-06 NOTE — PROGRESS NOTES
Patient was seen today for chemotherapy education for Opdivo for renal cell carcinoma. Patient was by himself. The schedule and mechanisms of action were discussed in detail. Some of the side effects discussed include, but are not limited to, fatigue, diarrhea, immune related adverse effects, skin rash, hepatotoxicity, nephrotoxicity, pulmonary toxicity, and thyroid dysfunction. Patient demonstrated comprehension and understanding throughout the education session. A packet containing the information discussed was provided to the patient. All questions asked were answered or deferred to the oncology team. Patient encouraged to reach out to their treatment team with any other additional questions or concerns that they may have.     Angelique Farr, PharmD, BCOP  Clinical Pharmacist, Hematology/Oncology  [facility]

## 2023-11-06 NOTE — PROGRESS NOTES
Pts daughter, Desean Perry, called this nurse back and was notified of pts critical HG result and of Dr's request to have pt seek emergency tx-Erica states that she also tried to call pt with only reaching a voicemail-Erica states that she will call her mother (pts ex-wife) who only lives a few miles away from pt to go check on pt and tell him to go to the ED-Erica will call this nurse back with update if able

## 2023-11-06 NOTE — PROGRESS NOTES
This nurse called pts daughter, Najma Stewart, and left her a detailed voicemail requesting a callback to discuss that pt  needs to seek emergency tx d/t critical HG level-This office phone number was left for daughter

## 2023-11-06 NOTE — ED PROVIDER NOTES
[address] ENCOUNTER        Pt Name: Gavi Coello  MRN: [id number]  [facility] 1940  Date of evaluation: 11/6/2023  Provider: Christopher Edward DO  PCP: No primary care provider on file. Note Started: 6:32 PM EST 11/6/23    CHIEF COMPLAINT       Chief Complaint   Patient presents with    Abnormal Test Results     Sent to ER for low Hgb       HISTORY OF PRESENT ILLNESS: 1 or more Elements   History From: Patient    Limitations to history : None    Gavi Coello is a 82 y.o. male who presents to the emergency department for abnormal test results. Patient states he was recently diagnosed with cancer. He had some routine labs today and was advised to go to the emergency room for low hemoglobin. Patient reports dark stools, fatigue, generalized weakness, lightheadedness for the past month. Patient states he has not yet started chemotherapy or radiation. He denies any hematochezia, hematuria, hemoptysis, or hematemesis. Patient is on Eliquis and aspirin. Nursing Notes were all reviewed and agreed with or any disagreements were addressed in the HPI. REVIEW OF EXTERNAL NOTE :       Office visit 11/6 reviewed. Patient seen for chemotherapy education. REVIEW OF SYSTEMS :           Positives and Pertinent negatives as per HPI.      SURGICAL HISTORY     Past Surgical History:   Procedure Laterality Date    ABDOMINAL AORTIC ANEURYSM REPAIR      COLONOSCOPY      TONSILLECTOMY      TOTAL KNEE ARTHROPLASTY Bilateral     UPPER GASTROINTESTINAL ENDOSCOPY N/A 10/14/2022    EGD DIAGNOSTIC ONLY performed by Arya Darnell MD at [address] [place] [address]       Previous Medications    ACETAMINOPHEN (TYLENOL) 325 MG TABLET    Take 1 tablet by mouth every 6 hours as needed for Pain    APIXABAN (ELIQUIS) 5 MG TABS TABLET    Take 1 tablet by mouth 2 times daily    ASPIRIN 81 MG CHEWABLE TABLET    Take 1 tablet by

## 2023-11-06 NOTE — PROGRESS NOTES
Bruce Tobar RN at [facility] notified of critical HG result-Elly to notify Dr Lili Eagle-This nurse again called pt and left a voicemail requesting an urgent call back

## 2023-11-06 NOTE — PROGRESS NOTES
Pts daughter, Trudy Pritchett, called back and stated that she was still unable to reach pt and that her mother was on her way to the pts house to advise him to go to the ED-Erica states that she will tell her mother to call an ambulance d/t pt being unstable

## 2023-11-07 ENCOUNTER — APPOINTMENT (OUTPATIENT)
Dept: ONCOLOGY | Age: 83
DRG: 378 | End: 2023-11-07
Payer: OTHER GOVERNMENT

## 2023-11-07 ENCOUNTER — HOSPITAL ENCOUNTER (OUTPATIENT)
Dept: INFUSION THERAPY | Age: 83
Discharge: HOME | End: 2023-11-07

## 2023-11-07 LAB
ALBUMIN SERPL-MCNC: 3.2 G/DL (ref 3.5–5.2)
ALP SERPL-CCNC: 83 U/L (ref 40–129)
ALT SERPL-CCNC: 9 U/L (ref 0–40)
ANION GAP SERPL CALCULATED.3IONS-SCNC: 9 MMOL/L (ref 7–16)
AST SERPL-CCNC: 15 U/L (ref 0–39)
BILIRUB SERPL-MCNC: 0.4 MG/DL (ref 0–1.2)
BUN SERPL-MCNC: 34 MG/DL (ref 6–23)
CALCIUM SERPL-MCNC: 9.7 MG/DL (ref 8.6–10.2)
CHLORIDE SERPL-SCNC: 112 MMOL/L (ref 98–107)
CO2 SERPL-SCNC: 17 MMOL/L (ref 22–29)
CREAT SERPL-MCNC: 2.5 MG/DL (ref 0.7–1.2)
FERRITIN SERPL-MCNC: 55 NG/ML
GFR SERPL CREATININE-BSD FRML MDRD: 25 ML/MIN/1.73M2
GLUCOSE SERPL-MCNC: 99 MG/DL (ref 74–99)
HCT VFR BLD AUTO: 16.9 % (ref 37–54)
HCT VFR BLD AUTO: 21.7 % (ref 37–54)
HCT VFR BLD AUTO: 23.9 % (ref 37–54)
HGB BLD-MCNC: 5.3 G/DL (ref 12.5–16.5)
HGB BLD-MCNC: 6.7 G/DL (ref 12.5–16.5)
HGB BLD-MCNC: 7.6 G/DL (ref 12.5–16.5)
INR PPP: 1
IRON SATN MFR SERPL: 21 % (ref 20–55)
IRON SERPL-MCNC: 54 UG/DL (ref 59–158)
PARTIAL THROMBOPLASTIN TIME: 26.9 SEC (ref 24.5–35.1)
POTASSIUM SERPL-SCNC: 4.6 MMOL/L (ref 3.5–5)
PROT SERPL-MCNC: 5.3 G/DL (ref 6.4–8.3)
PROTHROMBIN TIME: 11.2 SEC (ref 9.3–12.4)
SODIUM SERPL-SCNC: 138 MMOL/L (ref 132–146)
TIBC SERPL-MCNC: 262 UG/DL (ref 250–450)

## 2023-11-07 PROCEDURE — A4216 STERILE WATER/SALINE, 10 ML: HCPCS | Performed by: FAMILY MEDICINE

## 2023-11-07 PROCEDURE — 85610 PROTHROMBIN TIME: CPT

## 2023-11-07 PROCEDURE — 6360000002 HC RX W HCPCS: Performed by: STUDENT IN AN ORGANIZED HEALTH CARE EDUCATION/TRAINING PROGRAM

## 2023-11-07 PROCEDURE — 85018 HEMOGLOBIN: CPT

## 2023-11-07 PROCEDURE — 6360000002 HC RX W HCPCS: Performed by: FAMILY MEDICINE

## 2023-11-07 PROCEDURE — 2580000003 HC RX 258: Performed by: STUDENT IN AN ORGANIZED HEALTH CARE EDUCATION/TRAINING PROGRAM

## 2023-11-07 PROCEDURE — 6370000000 HC RX 637 (ALT 250 FOR IP): Performed by: FAMILY MEDICINE

## 2023-11-07 PROCEDURE — 83540 ASSAY OF IRON: CPT

## 2023-11-07 PROCEDURE — 80053 COMPREHEN METABOLIC PANEL: CPT

## 2023-11-07 PROCEDURE — 82728 ASSAY OF FERRITIN: CPT

## 2023-11-07 PROCEDURE — 83550 IRON BINDING TEST: CPT

## 2023-11-07 PROCEDURE — C9113 INJ PANTOPRAZOLE SODIUM, VIA: HCPCS | Performed by: FAMILY MEDICINE

## 2023-11-07 PROCEDURE — 2580000003 HC RX 258: Performed by: FAMILY MEDICINE

## 2023-11-07 PROCEDURE — 99255 IP/OBS CONSLTJ NEW/EST HI 80: CPT | Performed by: INTERNAL MEDICINE

## 2023-11-07 PROCEDURE — 85014 HEMATOCRIT: CPT

## 2023-11-07 PROCEDURE — P9016 RBC LEUKOCYTES REDUCED: HCPCS

## 2023-11-07 PROCEDURE — 2060000000 HC ICU INTERMEDIATE R&B

## 2023-11-07 PROCEDURE — 85730 THROMBOPLASTIN TIME PARTIAL: CPT

## 2023-11-07 PROCEDURE — C9113 INJ PANTOPRAZOLE SODIUM, VIA: HCPCS | Performed by: STUDENT IN AN ORGANIZED HEALTH CARE EDUCATION/TRAINING PROGRAM

## 2023-11-07 PROCEDURE — A4216 STERILE WATER/SALINE, 10 ML: HCPCS | Performed by: STUDENT IN AN ORGANIZED HEALTH CARE EDUCATION/TRAINING PROGRAM

## 2023-11-07 PROCEDURE — 36430 TRANSFUSION BLD/BLD COMPNT: CPT

## 2023-11-07 PROCEDURE — 36415 COLL VENOUS BLD VENIPUNCTURE: CPT

## 2023-11-07 RX ORDER — ACETAMINOPHEN 325 MG/1
650 TABLET ORAL EVERY 6 HOURS PRN
Status: DISCONTINUED | OUTPATIENT
Start: 2023-11-07 | End: 2023-11-09 | Stop reason: HOSPADM

## 2023-11-07 RX ORDER — ATORVASTATIN CALCIUM 40 MG/1
40 TABLET, FILM COATED ORAL NIGHTLY
Status: DISCONTINUED | OUTPATIENT
Start: 2023-11-07 | End: 2023-11-09 | Stop reason: HOSPADM

## 2023-11-07 RX ORDER — SODIUM CHLORIDE 9 MG/ML
INJECTION, SOLUTION INTRAVENOUS CONTINUOUS
Status: DISCONTINUED | OUTPATIENT
Start: 2023-11-07 | End: 2023-11-07

## 2023-11-07 RX ORDER — SODIUM CHLORIDE 9 MG/ML
INJECTION, SOLUTION INTRAVENOUS PRN
Status: DISCONTINUED | OUTPATIENT
Start: 2023-11-07 | End: 2023-11-09 | Stop reason: HOSPADM

## 2023-11-07 RX ORDER — M-VIT,TX,IRON,MINS/CALC/FOLIC 27MG-0.4MG
1 TABLET ORAL DAILY
COMMUNITY

## 2023-11-07 RX ORDER — FUROSEMIDE 20 MG/1
20 TABLET ORAL 2 TIMES DAILY
Status: DISCONTINUED | OUTPATIENT
Start: 2023-11-07 | End: 2023-11-09 | Stop reason: HOSPADM

## 2023-11-07 RX ORDER — ACETAMINOPHEN 650 MG/1
650 SUPPOSITORY RECTAL EVERY 6 HOURS PRN
Status: DISCONTINUED | OUTPATIENT
Start: 2023-11-07 | End: 2023-11-09 | Stop reason: HOSPADM

## 2023-11-07 RX ORDER — TAMSULOSIN HYDROCHLORIDE 0.4 MG/1
0.4 CAPSULE ORAL
COMMUNITY

## 2023-11-07 RX ORDER — SODIUM BICARBONATE 650 MG/1
325 TABLET ORAL 2 TIMES DAILY
Status: DISCONTINUED | OUTPATIENT
Start: 2023-11-07 | End: 2023-11-09 | Stop reason: HOSPADM

## 2023-11-07 RX ORDER — SODIUM CHLORIDE 0.9 % (FLUSH) 0.9 %
5-40 SYRINGE (ML) INJECTION EVERY 12 HOURS SCHEDULED
Status: DISCONTINUED | OUTPATIENT
Start: 2023-11-07 | End: 2023-11-09 | Stop reason: HOSPADM

## 2023-11-07 RX ORDER — METOPROLOL TARTRATE 50 MG/1
75 TABLET, FILM COATED ORAL DAILY
COMMUNITY

## 2023-11-07 RX ORDER — SODIUM CHLORIDE 0.9 % (FLUSH) 0.9 %
5-40 SYRINGE (ML) INJECTION PRN
Status: DISCONTINUED | OUTPATIENT
Start: 2023-11-07 | End: 2023-11-09 | Stop reason: HOSPADM

## 2023-11-07 RX ORDER — ASPIRIN 81 MG/1
81 TABLET, CHEWABLE ORAL DAILY
Status: DISCONTINUED | OUTPATIENT
Start: 2023-11-07 | End: 2023-11-09 | Stop reason: HOSPADM

## 2023-11-07 RX ORDER — LIDOCAINE 40 MG/G
CREAM TOPICAL DAILY PRN
COMMUNITY

## 2023-11-07 RX ORDER — LATANOPROST 50 UG/ML
1 SOLUTION/ DROPS OPHTHALMIC DAILY
Status: DISCONTINUED | OUTPATIENT
Start: 2023-11-07 | End: 2023-11-09 | Stop reason: HOSPADM

## 2023-11-07 RX ORDER — SENNOSIDES A AND B 8.6 MG/1
2 TABLET, FILM COATED ORAL DAILY PRN
COMMUNITY

## 2023-11-07 RX ORDER — METOPROLOL SUCCINATE 25 MG/1
75 TABLET, EXTENDED RELEASE ORAL 2 TIMES DAILY
Status: DISCONTINUED | OUTPATIENT
Start: 2023-11-07 | End: 2023-11-07

## 2023-11-07 RX ORDER — ONDANSETRON 4 MG/1
4 TABLET, ORALLY DISINTEGRATING ORAL EVERY 8 HOURS PRN
Status: DISCONTINUED | OUTPATIENT
Start: 2023-11-07 | End: 2023-11-09 | Stop reason: HOSPADM

## 2023-11-07 RX ORDER — ONDANSETRON 2 MG/ML
4 INJECTION INTRAMUSCULAR; INTRAVENOUS EVERY 6 HOURS PRN
Status: DISCONTINUED | OUTPATIENT
Start: 2023-11-07 | End: 2023-11-09 | Stop reason: HOSPADM

## 2023-11-07 RX ORDER — DOCUSATE SODIUM 100 MG/1
100 CAPSULE, LIQUID FILLED ORAL 2 TIMES DAILY
Status: DISCONTINUED | OUTPATIENT
Start: 2023-11-07 | End: 2023-11-09 | Stop reason: HOSPADM

## 2023-11-07 RX ORDER — UREA 200 MG/G
GEL TOPICAL DAILY PRN
COMMUNITY

## 2023-11-07 RX ORDER — MINERAL OIL AND PETROLATUM 150; 830 MG/G; MG/G
OINTMENT OPHTHALMIC 3 TIMES DAILY
Status: DISCONTINUED | OUTPATIENT
Start: 2023-11-07 | End: 2023-11-09 | Stop reason: HOSPADM

## 2023-11-07 RX ORDER — FINASTERIDE 5 MG/1
5 TABLET, FILM COATED ORAL DAILY
COMMUNITY

## 2023-11-07 RX ORDER — LISINOPRIL 20 MG/1
40 TABLET ORAL 2 TIMES DAILY
Status: DISCONTINUED | OUTPATIENT
Start: 2023-11-07 | End: 2023-11-09 | Stop reason: HOSPADM

## 2023-11-07 RX ORDER — LORATADINE 10 MG/1
10 TABLET ORAL DAILY PRN
COMMUNITY

## 2023-11-07 RX ADMIN — PANTOPRAZOLE SODIUM 80 MG: 40 INJECTION, POWDER, FOR SOLUTION INTRAVENOUS at 02:14

## 2023-11-07 RX ADMIN — SODIUM CHLORIDE: 9 INJECTION, SOLUTION INTRAVENOUS at 04:57

## 2023-11-07 RX ADMIN — ATORVASTATIN CALCIUM 40 MG: 40 TABLET, FILM COATED ORAL at 22:14

## 2023-11-07 RX ADMIN — DOCUSATE SODIUM 100 MG: 100 CAPSULE, LIQUID FILLED ORAL at 22:14

## 2023-11-07 RX ADMIN — SODIUM CHLORIDE: 9 INJECTION, SOLUTION INTRAVENOUS at 18:03

## 2023-11-07 RX ADMIN — FUROSEMIDE 20 MG: 20 TABLET ORAL at 22:14

## 2023-11-07 RX ADMIN — SODIUM BICARBONATE 325 MG: 650 TABLET ORAL at 22:14

## 2023-11-07 RX ADMIN — SODIUM CHLORIDE, PRESERVATIVE FREE 10 ML: 5 INJECTION INTRAVENOUS at 22:14

## 2023-11-07 RX ADMIN — PANTOPRAZOLE SODIUM 40 MG: 40 INJECTION, POWDER, FOR SOLUTION INTRAVENOUS at 18:03

## 2023-11-07 ASSESSMENT — ENCOUNTER SYMPTOMS: CONSTIPATION: 1

## 2023-11-07 NOTE — CONSULTS
GENERAL SURGERY  CONSULT NOTE    Patient's Name/Date of Birth: Carmelita Reed / 1940    Date: November 7, 2023     PCP: No primary care provider on file. Chief Complaint:   Chief Complaint   Patient presents with    Abnormal Test Results     Sent to ER for low Hgb       Physician Consulted: Dr. Rolo Davidson   Reason for Consult: GIB  Referring Physician: Dr. Tenisha Collins    HPI  Carmelita Reed is a 82 y.o. male with PMHx Metastatic renal cell carcinoma, CAD, PVCs, hx of iron deficiency anemia who presents for evaluation of GIB. Patient was having labs drawn advised to go to emergency room for low hemoglobin of 4.8. Patient received 2 units PRBC in ED, post transfusion Hgb is still pending. Patient was recently diagnosed with metastatic RCC and was getting labs drawn before he was to start [address] [place] [address] Rd. He currently follows with Dr. Deyanira Muse for Oncology. He has not had any chemotherapy treatments yet. Patient states he has had dark stools for a few months now and reports episodes of constipation. He reports significant weight loss over the last year and says food just doesn't taste very good. He denies any abdominal pain or dysphagia. Takes ASA and Eliquis for a DVT provoked by malignancy. He takes Iron tablets. Previous EGD with Dr. Rolo Davidson on Oct '/22 which showed gastritis. Colonoscopy done at [place] in April 22, w/ polyps. Previous AAA open repair in [place] '20      Past Medical History:   Diagnosis Date    Cancer ([address]) 2023    RENAL    Disease of blood and blood forming organ     Hypertension        Past Surgical History:   Procedure Laterality Date    ABDOMINAL AORTIC ANEURYSM REPAIR      COLONOSCOPY      TONSILLECTOMY      TOTAL KNEE ARTHROPLASTY Bilateral     UPPER GASTROINTESTINAL ENDOSCOPY N/A 10/14/2022    EGD DIAGNOSTIC ONLY performed by Keely Ward MD at [address] [facility]       Medications Prior to Admission:    Prior to Admission medications    Medication Sig Start Date End Date Taking? anemia, metastatic RCC now presents with acute on chronic anemia and melena likely 2/2 to RCC and iron tablet vs GIB     PLAN:    Recommend holding anticoagulation until stable repeat hemoglobin. Initial Hgb 4.8. Recieved 2 uPRBC; will follow post transfusion H&H.    Per chart review baseline Hgb around 6-8  Pain and nausea control prn  Trend Hgb -- Transfuse for Hb <7 or symptomatic  IV PPI BID  IVF   Bowel regimen   Plan will be discussed with Dr. Fanny Sylvester DO  General Surgery Resident, PGY-1    Electronically signed by Tereso Hodge DO on 11/7/23 at 2:37 AM EST

## 2023-11-07 NOTE — ED NOTES
Dr Leonard Rodriguez regarding pt's hgb 6.7     Mariana Mays, [address] [place] [address]  11/07/23 2903

## 2023-11-07 NOTE — ED NOTES
Admitting provider notified of critical hgb, awaiting further orders at this time     Nikia Jung, [address]  11/07/23 4359

## 2023-11-07 NOTE — H&P
Hospitalist History & Physical      PCP: No primary care provider on file. Date of Service: Pt seen/examined on 11/6/2023     Chief Complaint:  had concerns including Abnormal Test Results (Sent to ER for low Hgb). History Of Present Illness:    Mr. Meliza Alcazar, a 82y.o. year old male  who  has a past medical history of Cancer ([address] Disease of blood and blood forming organ, and Hypertension. Patient presented to the emergency department with low hemoglobin test result. Recently diagnosed with cancer. He is on Eliquis. Patient reports dark tarry stools, generalized fatigue and weakness and lightheadedness for the past month. Hemoglobin is 4.8. Vital signs show the patient to be hypotensive with a pressure of 92/47. The remainder of his vital signs within normal limits and stable. The patient is afebrile. Oratory studies demonstrate BUN 32, creatinine 2.7, glucose 112. Patient was transfused 2 units of packed red blood cells in the emergency department. Medicine consulted for admission. Past Medical History:   Diagnosis Date    Cancer ([address] ) 2023    RENAL    Disease of blood and blood forming organ     Hypertension        Past Surgical History:   Procedure Laterality Date    ABDOMINAL AORTIC ANEURYSM REPAIR      COLONOSCOPY      TONSILLECTOMY      TOTAL KNEE ARTHROPLASTY Bilateral     UPPER GASTROINTESTINAL ENDOSCOPY N/A 10/14/2022    EGD DIAGNOSTIC ONLY performed by Carolyn Julian MD at [address] [place] [address]       Prior to Admission medications    Medication Sig Start Date End Date Taking?  Authorizing Provider   sodium bicarbonate 325 MG tablet Take 1 tablet by mouth 2 times daily 9/28/23   ProviderTrevor MD   apixaban (ELIQUIS) 5 MG TABS tablet Take 1 tablet by mouth 2 times daily 10/10/23   Sidney Hartley MD   pantoprazole (PROTONIX) 40 MG tablet Take 1 tablet by mouth every morning (before breakfast) 5/8/22   Renard Luz MD   acetaminophen (TYLENOL) 325 MG Urinalysis:    Lab Results   Component Value Date/Time    NITRU Negative 03/29/2023 11:31 AM    WBCUA NONE 03/29/2023 11:31 AM    BACTERIA NONE SEEN 03/29/2023 11:31 AM    RBCUA NONE 03/29/2023 11:31 AM    BLOODU Negative 03/29/2023 11:31 AM    SPECGRAV 1.015 03/29/2023 11:31 AM    GLUCOSEU 500 03/29/2023 11:31 AM       Imaging:  US DUP LOWER EXTREMITY LEFT HARITHA    Result Date: 10/10/2023  EXAMINATION: DUPLEX VENOUS ULTRASOUND OF THE LEFT LOWER EXTREMITY 10/10/2023 10:21 am TECHNIQUE: Duplex ultrasound using B-mode/gray scaled imaging and Doppler spectral analysis and color flow was obtained of the deep venous structures of the left lower extremity. COMPARISON: None. HISTORY: ORDERING SYSTEM PROVIDED HISTORY: Acute pain of left lower extremity TECHNOLOGIST PROVIDED HISTORY: Reason for exam:->LEFT LOWER EXT PAIN. EDEMA What reading provider will be dictating this exam?->CRC FINDINGS: There is occlusive thrombus in the left peroneal vein and nonocclusive thrombus in the mid femoral and peroneal veins. The remaining visualized veins of the left lower extremity are patent and free of echogenic thrombus. The veins demonstrate good compressibility with normal color flow study and spectral analysis. DVT, left mid femoral and peroneal veins. Critical results were called by Dr. Miranda Mccracken to referring physician's office on 10/10/2023 at 11:15 a.m.        ASSESSMENT:  -Gastrointestinal hemorrhage  -Acute blood loss anemia  -Hypertension  -Renal cell carcinoma      PLAN:  -Admit to medicine  -Consult general surgery  -N.p.o. now  -Normal saline 75 mL/h  -Protonix IV push panel  -Monitor hemoglobin levels  -Transfuse to hemoglobin less than 7.0  -Telemetry  -Continue home medications        Diet: No diet orders on file  Code Status: Prior  Surrogate decision maker confirmed with patient:   Extended Emergency Contact Information  Primary Emergency Contact: mariah martinez  Home Phone: [phone]  Mobile Phone:

## 2023-11-08 LAB
ABO/RH: NORMAL
ANION GAP SERPL CALCULATED.3IONS-SCNC: 9 MMOL/L (ref 7–16)
ANTIBODY SCREEN: NEGATIVE
ARM BAND NUMBER: NORMAL
BASOPHILS # BLD: 0.02 K/UL (ref 0–0.2)
BASOPHILS NFR BLD: 0 % (ref 0–2)
BLOOD BANK BLOOD PRODUCT EXPIRATION DATE: NORMAL
BLOOD BANK DISPENSE STATUS: NORMAL
BLOOD BANK ISBT PRODUCT BLOOD TYPE: 5100
BLOOD BANK PRODUCT CODE: NORMAL
BLOOD BANK SAMPLE EXPIRATION: NORMAL
BLOOD BANK UNIT TYPE AND RH: NORMAL
BPU ID: NORMAL
BUN SERPL-MCNC: 29 MG/DL (ref 6–23)
CALCIUM SERPL-MCNC: 10.2 MG/DL (ref 8.6–10.2)
CHLORIDE SERPL-SCNC: 113 MMOL/L (ref 98–107)
CO2 SERPL-SCNC: 17 MMOL/L (ref 22–29)
COMPONENT: NORMAL
CREAT SERPL-MCNC: 2.3 MG/DL (ref 0.7–1.2)
CROSSMATCH RESULT: NORMAL
EOSINOPHIL # BLD: 0.18 K/UL (ref 0.05–0.5)
EOSINOPHILS RELATIVE PERCENT: 3 % (ref 0–6)
ERYTHROCYTE [DISTWIDTH] IN BLOOD BY AUTOMATED COUNT: 17 % (ref 11.5–15)
FOLATE SERPL-MCNC: >20 NG/ML (ref 4.8–24.2)
GFR SERPL CREATININE-BSD FRML MDRD: 27 ML/MIN/1.73M2
GLUCOSE SERPL-MCNC: 74 MG/DL (ref 74–99)
HCT VFR BLD AUTO: 23.7 % (ref 37–54)
HCT VFR BLD AUTO: 24.1 % (ref 37–54)
HCT VFR BLD AUTO: 25.9 % (ref 37–54)
HCT VFR BLD AUTO: 26.1 % (ref 37–54)
HGB BLD-MCNC: 7.4 G/DL (ref 12.5–16.5)
HGB BLD-MCNC: 7.5 G/DL (ref 12.5–16.5)
HGB BLD-MCNC: 8 G/DL (ref 12.5–16.5)
HGB BLD-MCNC: 8.1 G/DL (ref 12.5–16.5)
IMM GRANULOCYTES # BLD AUTO: <0.03 K/UL (ref 0–0.58)
IMM GRANULOCYTES NFR BLD: 0 % (ref 0–5)
IMM RETICS NFR: 30.3 % (ref 2.3–13.4)
LYMPHOCYTES NFR BLD: 0.66 K/UL (ref 1.5–4)
LYMPHOCYTES RELATIVE PERCENT: 12 % (ref 20–42)
MCH RBC QN AUTO: 28.1 PG (ref 26–35)
MCHC RBC AUTO-ENTMCNC: 30.7 G/DL (ref 32–34.5)
MCV RBC AUTO: 91.6 FL (ref 80–99.9)
MONOCYTES NFR BLD: 0.43 K/UL (ref 0.1–0.95)
MONOCYTES NFR BLD: 8 % (ref 2–12)
NEUTROPHILS NFR BLD: 77 % (ref 43–80)
NEUTS SEG NFR BLD: 4.32 K/UL (ref 1.8–7.3)
PATH REV BLD -IMP: NORMAL
PLATELET, FLUORESCENCE: 236 K/UL (ref 130–450)
PMV BLD AUTO: 10.1 FL (ref 7–12)
POTASSIUM SERPL-SCNC: 4 MMOL/L (ref 3.5–5)
RBC # BLD AUTO: 2.63 M/UL (ref 3.8–5.8)
RETIC HEMOGLOBIN: 23.1 PG (ref 28.2–36.6)
RETICS # AUTO: 0.07 M/UL
RETICS/RBC NFR AUTO: 2.6 % (ref 0.4–1.9)
SODIUM SERPL-SCNC: 139 MMOL/L (ref 132–146)
TRANSFUSION STATUS: NORMAL
UNIT DIVISION: 0
UNIT ISSUE DATE/TIME: NORMAL
VIT B12 SERPL-MCNC: 483 PG/ML (ref 211–946)
WBC OTHER # BLD: 5.6 K/UL (ref 4.5–11.5)

## 2023-11-08 PROCEDURE — C9113 INJ PANTOPRAZOLE SODIUM, VIA: HCPCS | Performed by: FAMILY MEDICINE

## 2023-11-08 PROCEDURE — 86334 IMMUNOFIX E-PHORESIS SERUM: CPT

## 2023-11-08 PROCEDURE — 6370000000 HC RX 637 (ALT 250 FOR IP): Performed by: INTERNAL MEDICINE

## 2023-11-08 PROCEDURE — 2580000003 HC RX 258: Performed by: FAMILY MEDICINE

## 2023-11-08 PROCEDURE — 36415 COLL VENOUS BLD VENIPUNCTURE: CPT

## 2023-11-08 PROCEDURE — 85045 AUTOMATED RETICULOCYTE COUNT: CPT

## 2023-11-08 PROCEDURE — 6360000002 HC RX W HCPCS: Performed by: FAMILY MEDICINE

## 2023-11-08 PROCEDURE — 85018 HEMOGLOBIN: CPT

## 2023-11-08 PROCEDURE — 80048 BASIC METABOLIC PNL TOTAL CA: CPT

## 2023-11-08 PROCEDURE — 82784 ASSAY IGA/IGD/IGG/IGM EACH: CPT

## 2023-11-08 PROCEDURE — 85025 COMPLETE CBC W/AUTO DIFF WBC: CPT

## 2023-11-08 PROCEDURE — 2060000000 HC ICU INTERMEDIATE R&B

## 2023-11-08 PROCEDURE — 84165 PROTEIN E-PHORESIS SERUM: CPT

## 2023-11-08 PROCEDURE — 82668 ASSAY OF ERYTHROPOIETIN: CPT

## 2023-11-08 PROCEDURE — 84630 ASSAY OF ZINC: CPT

## 2023-11-08 PROCEDURE — 6370000000 HC RX 637 (ALT 250 FOR IP): Performed by: FAMILY MEDICINE

## 2023-11-08 PROCEDURE — 85014 HEMATOCRIT: CPT

## 2023-11-08 PROCEDURE — 82525 ASSAY OF COPPER: CPT

## 2023-11-08 PROCEDURE — A4216 STERILE WATER/SALINE, 10 ML: HCPCS | Performed by: FAMILY MEDICINE

## 2023-11-08 PROCEDURE — 82746 ASSAY OF FOLIC ACID SERUM: CPT

## 2023-11-08 PROCEDURE — 84155 ASSAY OF PROTEIN SERUM: CPT

## 2023-11-08 PROCEDURE — 82607 VITAMIN B-12: CPT

## 2023-11-08 RX ORDER — DORZOLAMIDE HCL 20 MG/ML
1 SOLUTION/ DROPS OPHTHALMIC 2 TIMES DAILY
Status: DISCONTINUED | OUTPATIENT
Start: 2023-11-08 | End: 2023-11-09 | Stop reason: HOSPADM

## 2023-11-08 RX ORDER — SUCRALFATE 1 G/1
1 TABLET ORAL 4 TIMES DAILY
Qty: 120 TABLET | Refills: 0 | Status: SHIPPED | OUTPATIENT
Start: 2023-11-08

## 2023-11-08 RX ORDER — BRIMONIDINE TARTRATE 2 MG/ML
1 SOLUTION/ DROPS OPHTHALMIC 2 TIMES DAILY
Status: DISCONTINUED | OUTPATIENT
Start: 2023-11-08 | End: 2023-11-09 | Stop reason: HOSPADM

## 2023-11-08 RX ORDER — PANTOPRAZOLE SODIUM 40 MG/1
40 TABLET, DELAYED RELEASE ORAL
Qty: 60 TABLET | Refills: 0 | Status: SHIPPED | OUTPATIENT
Start: 2023-11-08

## 2023-11-08 RX ADMIN — DOCUSATE SODIUM 100 MG: 100 CAPSULE, LIQUID FILLED ORAL at 11:00

## 2023-11-08 RX ADMIN — DORZOLAMIDE HYDROCHLORIDE 1 DROP: 20 SOLUTION/ DROPS OPHTHALMIC at 22:37

## 2023-11-08 RX ADMIN — FUROSEMIDE 20 MG: 20 TABLET ORAL at 11:01

## 2023-11-08 RX ADMIN — BRIMONIDINE TARTRATE 1 DROP: 2 SOLUTION OPHTHALMIC at 11:01

## 2023-11-08 RX ADMIN — METOPROLOL SUCCINATE 75 MG: 50 TABLET, EXTENDED RELEASE ORAL at 11:00

## 2023-11-08 RX ADMIN — PANTOPRAZOLE SODIUM 40 MG: 40 INJECTION, POWDER, FOR SOLUTION INTRAVENOUS at 11:00

## 2023-11-08 RX ADMIN — MINERAL OIL, WHITE PETROLATUM: .03; .94 OINTMENT OPHTHALMIC at 11:02

## 2023-11-08 RX ADMIN — DOCUSATE SODIUM 100 MG: 100 CAPSULE, LIQUID FILLED ORAL at 22:37

## 2023-11-08 RX ADMIN — DORZOLAMIDE HYDROCHLORIDE 1 DROP: 20 SOLUTION/ DROPS OPHTHALMIC at 11:01

## 2023-11-08 RX ADMIN — ASPIRIN 81 MG: 81 TABLET, CHEWABLE ORAL at 11:00

## 2023-11-08 RX ADMIN — SODIUM CHLORIDE, PRESERVATIVE FREE 10 ML: 5 INJECTION INTRAVENOUS at 11:02

## 2023-11-08 RX ADMIN — SODIUM BICARBONATE 325 MG: 650 TABLET ORAL at 22:36

## 2023-11-08 RX ADMIN — SODIUM CHLORIDE, PRESERVATIVE FREE 10 ML: 5 INJECTION INTRAVENOUS at 22:37

## 2023-11-08 RX ADMIN — ATORVASTATIN CALCIUM 40 MG: 40 TABLET, FILM COATED ORAL at 22:37

## 2023-11-08 RX ADMIN — LATANOPROST 1 DROP: 50 SOLUTION OPHTHALMIC at 11:01

## 2023-11-08 RX ADMIN — FUROSEMIDE 20 MG: 20 TABLET ORAL at 22:37

## 2023-11-08 RX ADMIN — SODIUM BICARBONATE 325 MG: 650 TABLET ORAL at 11:00

## 2023-11-08 RX ADMIN — MINERAL OIL, WHITE PETROLATUM: .03; .94 OINTMENT OPHTHALMIC at 22:37

## 2023-11-08 RX ADMIN — BRIMONIDINE TARTRATE 1 DROP: 2 SOLUTION OPHTHALMIC at 22:37

## 2023-11-08 NOTE — CARE COORDINATION
11/08/23 Transition of care:  Pt from ED for low hemoglobin and GI bleed. Met with pt to discuss transition of care and potential d/c needs Pt lives by himself in one story home there are a few steps to enter with a handrail. Pt uses no AD to ambulate and is independent with ADLs and driving PTA. Pt has no [facility] affiliation but has been to [facility] in past (knee surgery) but does not remember name  PT goes to [place] PCP is Jordin Garcia is through [place]. Pt plan is to return home and expresses no needs for discharge at this time. [facility] neighbor or family can provide transport. [facility] and [facility]s faxed Electronically signed by Riana Ding RN CM on 11/8/2023 at 4:53 PM     Case Management Assessment  Initial Evaluation    Date/Time of Evaluation: 11/8/2023 4:53 PM  Assessment Completed by: Riana Ding RN    If patient is discharged prior to next notation, then this note serves as note for discharge by case management. Patient Name: Andree Camara                   YOB: 1940  Diagnosis: GI bleed [K92.2]  Symptomatic anemia [D64.9]  Gastrointestinal hemorrhage, unspecified gastrointestinal hemorrhage type [K92.2]  Acute on chronic anemia [D64.9]                   Date / Time: 11/6/2023  6:29 PM    Patient Admission Status: Inpatient   Readmission Risk (Low < 19, Mod (19-27), High > 27): Readmission Risk Score: 19    Current PCP: No primary care provider on file. PCP verified by CM? Yes    Chart Reviewed: Yes      History Provided by: Patient  Patient Orientation: Alert and Oriented    Patient Cognition: Alert    Hospitalization in the last 30 days (Readmission):  No    If yes, Readmission Assessment in  Navigator will be completed.     Advance Directives:      Code Status: Full Code   Patient's Primary Decision Maker is: Legal Next of Kin    Primary Decision MakerTluisa Herron Child - [phone]    Discharge Planning:    Patient lives with:   Type of Home:    Primary

## 2023-11-08 NOTE — PROGRESS NOTES
4 Eyes Skin Assessment     NAME:  Gail Morales  YOB: 1940  MEDICAL RECORD NUMBER:  [id number]    The patient is being assessed for  Admission    I agree that at least one RN has performed a thorough Head to Toe Skin Assessment on the patient. ALL assessment sites listed below have been assessed. Areas assessed by both nurses:    Head, Face, Ears, Shoulders, Back, Chest, Arms, Elbows, Hands, Sacrum. Buttock, Coccyx, Ischium, and Legs. Feet and Heels        Does the Patient have a Wound? Yes wound(s) were present on assessment.  LDA wound assessment was Initiated and completed by RN  Pink and redness in between gluteal folds, wound per pt       Jaylen Prevention initiated by RN: No  Wound Care Orders initiated by RN: Yes    Pressure Injury (Stage 3,4, Unstageable, DTI, NWPT, and Complex wounds) if present, place Wound referral order by RN under : No    New Ostomies, if present place, Ostomy referral order under : No     Nurse 1 eSignature: Electronically signed by Zhane Street RN on 11/7/23 at 9:54 PM EST    **SHARE this note so that the co-signing nurse can place an eSignature**    Nurse 2 eSignature: Electronically signed by Casey Blake RN on 11/7/23 at 10:29 PM EST

## 2023-11-08 NOTE — PROGRESS NOTES
Pt arrived to floor from ED via stretcher. Pt walked form stretcher in hallway to bed within room with an even and steady gait.

## 2023-11-08 NOTE — ACP (ADVANCE CARE PLANNING)
Advance Care Planning   Healthcare Decision Maker:    Primary Decision Maker: Saulo Jett - [phone]    Click here to complete Healthcare Decision Makers including selection of the Healthcare Decision Maker Relationship (ie \"Primary\").           Electronically signed by Lavinia Fitzpatrick RN CM on 11/8/2023 at 4:42 PM

## 2023-11-08 NOTE — PROGRESS NOTES
[facility]                                                                                                 PATIENT INFORMATION   Patient Name: Rose Ray, [address]: [de-identified] Patient MRN: [id number]   Address: [place] [address] Patient CSN: [id number]      B[employer]ist: Rastafari   Sex: Male Marital Status:    : 1940 Age:   82 yrs   Home Phone: [phone] 2.00 Mobile Phone:   [phone]     1.00   Race: White (non-) Employer:   Retired   Language: English Admitted/Arrived From:      [facility] Date: 2023 Admit Time: [facility] [facility],  [address]   Patient Class: Inpatient Service: Medical   Admit Source: Non-health care facility* Admit Type: Emergency   Admitting Provider: Jessika Dahl Attending Provider: Paty Mcfarland*   Unit: Devin Blank [facility] Room/Bed: [address] [place] [address]    Admission Diagnosis: GI bleed [K92.2] GI bleed, Symptomatic anemia, Gastrointestinal hemorrhage, unspecified gastrointestinal hemorrhage type, Acute on chronic anemia and DX codes: K92.2, D64.9, K92.2, D64.9   Emergency Complaint: sent in by pcp for low h*                Discharge Date:   Discharge Time:     GUARANTOR INFORMATION   Name:  Buzz Dueñas Address: [address] [place] [address] Rd.:  Self   Phone: [phone]   [employer] [facility] [employer] : 1940   EMERGENCY CONTACTS   Name: [address] [place] [address] Road:  Mobile: [phone] Rel: Child   COVERAGE INFORMATION   Primary Insurance:   Shanae Rai Subscriber: Buzz Dueñas   Plan Name: Shanae Rai Pt Rel to Subscriber: Self [01]   Claim Address: [address] [place] [address]  [facility] Sex[facility]  Male      Policy #:  [id number]    Group #:   Group Name:       Kendall Gum #Valsantyie Gum number: [id number] Ins Phone:         Secondary Insurance:   Subscriber:     Plan Name:   Pt Rel to Subscriber:     Claim Address: NA Sex:       Policy #: N/A    Group #: N/A Group Name: N/A   Auth #: N/A Ins Phone: Accident Date:    Accident Type:     PROVIDER INFORMATION   PCP:           PCP Phone:  None   Referring Prov:   No ref.  provider found Referring Phone:  Referring Fax:  N/A      Advanced Directive:  <no information> Research:     Lab Client:   Enrollment Status:            Printed on 11/8/23  4:54 PM Page

## 2023-11-09 VITALS
HEIGHT: 71 IN | RESPIRATION RATE: 14 BRPM | DIASTOLIC BLOOD PRESSURE: 55 MMHG | SYSTOLIC BLOOD PRESSURE: 108 MMHG | OXYGEN SATURATION: 98 % | BODY MASS INDEX: 23.8 KG/M2 | TEMPERATURE: 98.3 F | WEIGHT: 170 LBS | HEART RATE: 78 BPM

## 2023-11-09 LAB
ANION GAP SERPL CALCULATED.3IONS-SCNC: 9 MMOL/L (ref 7–16)
BASOPHILS # BLD: 0.02 K/UL (ref 0–0.2)
BASOPHILS NFR BLD: 0 % (ref 0–2)
BUN SERPL-MCNC: 22 MG/DL (ref 6–23)
CALCIUM SERPL-MCNC: 9.9 MG/DL (ref 8.6–10.2)
CHLORIDE SERPL-SCNC: 108 MMOL/L (ref 98–107)
CO2 SERPL-SCNC: 20 MMOL/L (ref 22–29)
CREAT SERPL-MCNC: 2 MG/DL (ref 0.7–1.2)
EOSINOPHIL # BLD: 0.25 K/UL (ref 0.05–0.5)
EOSINOPHILS RELATIVE PERCENT: 5 % (ref 0–6)
EPO SERPL-ACNC: 41 MU/ML (ref 4–27)
ERYTHROCYTE [DISTWIDTH] IN BLOOD BY AUTOMATED COUNT: 16.6 % (ref 11.5–15)
GFR SERPL CREATININE-BSD FRML MDRD: 32 ML/MIN/1.73M2
GLUCOSE SERPL-MCNC: 86 MG/DL (ref 74–99)
HCT VFR BLD AUTO: 25.6 % (ref 37–54)
HGB BLD-MCNC: 8 G/DL (ref 12.5–16.5)
IMM GRANULOCYTES # BLD AUTO: <0.03 K/UL (ref 0–0.58)
IMM GRANULOCYTES NFR BLD: 0 % (ref 0–5)
LYMPHOCYTES NFR BLD: 0.72 K/UL (ref 1.5–4)
LYMPHOCYTES RELATIVE PERCENT: 14 % (ref 20–42)
MCH RBC QN AUTO: 28.9 PG (ref 26–35)
MCHC RBC AUTO-ENTMCNC: 31.3 G/DL (ref 32–34.5)
MCV RBC AUTO: 92.4 FL (ref 80–99.9)
MONOCYTES NFR BLD: 0.51 K/UL (ref 0.1–0.95)
MONOCYTES NFR BLD: 10 % (ref 2–12)
NEUTROPHILS NFR BLD: 71 % (ref 43–80)
NEUTS SEG NFR BLD: 3.77 K/UL (ref 1.8–7.3)
PLATELET # BLD AUTO: 209 K/UL (ref 130–450)
PMV BLD AUTO: 10 FL (ref 7–12)
POTASSIUM SERPL-SCNC: 3.9 MMOL/L (ref 3.5–5)
RBC # BLD AUTO: 2.77 M/UL (ref 3.8–5.8)
SODIUM SERPL-SCNC: 137 MMOL/L (ref 132–146)
WBC OTHER # BLD: 5.3 K/UL (ref 4.5–11.5)

## 2023-11-09 PROCEDURE — C9113 INJ PANTOPRAZOLE SODIUM, VIA: HCPCS | Performed by: FAMILY MEDICINE

## 2023-11-09 PROCEDURE — A4216 STERILE WATER/SALINE, 10 ML: HCPCS | Performed by: FAMILY MEDICINE

## 2023-11-09 PROCEDURE — 6360000002 HC RX W HCPCS: Performed by: FAMILY MEDICINE

## 2023-11-09 PROCEDURE — 6370000000 HC RX 637 (ALT 250 FOR IP): Performed by: FAMILY MEDICINE

## 2023-11-09 PROCEDURE — 85025 COMPLETE CBC W/AUTO DIFF WBC: CPT

## 2023-11-09 PROCEDURE — 2580000003 HC RX 258: Performed by: FAMILY MEDICINE

## 2023-11-09 PROCEDURE — 36415 COLL VENOUS BLD VENIPUNCTURE: CPT

## 2023-11-09 PROCEDURE — 80048 BASIC METABOLIC PNL TOTAL CA: CPT

## 2023-11-09 RX ADMIN — SODIUM CHLORIDE, PRESERVATIVE FREE 10 ML: 5 INJECTION INTRAVENOUS at 08:49

## 2023-11-09 RX ADMIN — LATANOPROST 1 DROP: 50 SOLUTION OPHTHALMIC at 08:47

## 2023-11-09 RX ADMIN — SODIUM BICARBONATE 325 MG: 650 TABLET ORAL at 08:49

## 2023-11-09 RX ADMIN — BRIMONIDINE TARTRATE 1 DROP: 2 SOLUTION OPHTHALMIC at 08:47

## 2023-11-09 RX ADMIN — ASPIRIN 81 MG: 81 TABLET, CHEWABLE ORAL at 08:46

## 2023-11-09 RX ADMIN — DOCUSATE SODIUM 100 MG: 100 CAPSULE, LIQUID FILLED ORAL at 08:46

## 2023-11-09 RX ADMIN — PANTOPRAZOLE SODIUM 40 MG: 40 INJECTION, POWDER, FOR SOLUTION INTRAVENOUS at 08:47

## 2023-11-09 RX ADMIN — DORZOLAMIDE HYDROCHLORIDE 1 DROP: 20 SOLUTION/ DROPS OPHTHALMIC at 08:47

## 2023-11-09 RX ADMIN — METOPROLOL SUCCINATE 75 MG: 50 TABLET, EXTENDED RELEASE ORAL at 08:46

## 2023-11-09 RX ADMIN — FUROSEMIDE 20 MG: 20 TABLET ORAL at 08:46

## 2023-11-09 NOTE — DISCHARGE INSTR - DIET

## 2023-11-09 NOTE — PLAN OF CARE
Problem: Discharge Planning  Goal: Discharge to home or other facility with appropriate resources  11/9/2023 1259 by Dorita Yanez RN  Outcome: Progressing  11/9/2023 0117 by Theresa Avalos RN  Outcome: Progressing     Problem: Safety - Adult  Goal: Free from fall injury  11/9/2023 1259 by Dorita Yanez RN  Outcome: Progressing  11/9/2023 0117 by Theresa Avalos RN  Outcome: Progressing     Problem: ABCDS Injury Assessment  Goal: Absence of physical injury  11/9/2023 1259 by Dorita Yanez RN  Outcome: Progressing  11/9/2023 0117 by Theresa Avalos RN  Outcome: Progressing     Problem: Hematologic - Adult  Goal: Maintains hematologic stability  11/9/2023 1259 by Dorita Yanez RN  Outcome: Progressing  11/9/2023 0117 by Theresa Avalos RN  Outcome: Progressing

## 2023-11-09 NOTE — CARE COORDINATION
Discharger order noted, pt can discharge per attending if ok with gen surgery and oncology. Met with pt who states he can't go home until tomorrow he does not have a ride or a key to house. Called ex wife and left message to call this LSW since she has the keys. We can arrange transport if she can meet pt to let him in house. Aureliano Rodriguez, MSW, LSW

## 2023-11-09 NOTE — PLAN OF CARE
Problem: Discharge Planning  Goal: Discharge to home or other facility with appropriate resources  11/9/2023 0117 by Annalisa Myers RN  Outcome: Progressing  11/8/2023 1635 by Brian Lyn RN  Outcome: Progressing     Problem: Safety - Adult  Goal: Free from fall injury  11/9/2023 0117 by Annalisa Myers RN  Outcome: Progressing  11/8/2023 1635 by Brian Lyn RN  Outcome: Progressing     Problem: ABCDS Injury Assessment  Goal: Absence of physical injury  11/9/2023 0117 by Annalisa Myers RN  Outcome: Progressing  11/8/2023 1635 by Brian Lyn RN  Outcome: Progressing     Problem: Hematologic - Adult  Goal: Maintains hematologic stability  Outcome: Progressing

## 2023-11-09 NOTE — PLAN OF CARE
Problem: Discharge Planning  Goal: Discharge to home or other facility with appropriate resources  11/9/2023 1312 by Car Starks RN  Outcome: Completed  11/9/2023 1259 by Car Starks RN  Outcome: Progressing  11/9/2023 0117 by Chrissy Paulino RN  Outcome: Progressing     Problem: Safety - Adult  Goal: Free from fall injury  11/9/2023 1312 by Car Starks RN  Outcome: Completed  11/9/2023 1259 by Car Starks RN  Outcome: Progressing  11/9/2023 0117 by Chrissy Paulino RN  Outcome: Progressing     Problem: ABCDS Injury Assessment  Goal: Absence of physical injury  11/9/2023 1312 by Car Starks RN  Outcome: Completed  11/9/2023 1259 by Car Starks RN  Outcome: Progressing  11/9/2023 0117 by Chrissy Paulino RN  Outcome: Progressing     Problem: Hematologic - Adult  Goal: Maintains hematologic stability  11/9/2023 1312 by Car Starks RN  Outcome: Completed  11/9/2023 1259 by Car Starks RN  Outcome: Progressing  11/9/2023 0117 by Chrissy Paulino RN  Outcome: Progressing

## 2023-11-10 ENCOUNTER — TELEPHONE (OUTPATIENT)
Dept: ONCOLOGY | Age: 83
End: 2023-11-10

## 2023-11-10 ENCOUNTER — TELEPHONE (OUTPATIENT)
Dept: INFUSION THERAPY | Age: 83
End: 2023-11-10

## 2023-11-10 DIAGNOSIS — C64.9 RENAL CELL CARCINOMA, UNSPECIFIED LATERALITY (HCC): Primary | ICD-10-CM

## 2023-11-10 LAB
ALBUMIN SERPL-MCNC: 2.6 G/DL (ref 3.5–4.7)
ALPHA1 GLOB SERPL ELPH-MCNC: 0.3 G/DL (ref 0.2–0.4)
ALPHA2 GLOB SERPL ELPH-MCNC: 0.7 G/DL (ref 0.5–1)
B-GLOBULIN SERPL ELPH-MCNC: 0.8 G/DL (ref 0.8–1.3)
COPPER SERPL-MCNC: 90.2 UG/DL (ref 70–140)
GAMMA GLOB SERPL ELPH-MCNC: 0.7 G/DL (ref 0.7–1.6)
IGA SERPL-MCNC: 135 MG/DL (ref 70–400)
IGG SERPL-MCNC: 723 MG/DL (ref 700–1600)
IGM SERPL-MCNC: 25 MG/DL (ref 40–230)
INTERPRETATION SERPL IFE-IMP: NORMAL
M PROTEIN SERPL ELPH-MCNC: 0.5 G/DL
PATH REV: NORMAL
PATHOLOGIST: ABNORMAL
PROT PATTERN SERPL ELPH-IMP: ABNORMAL
PROT SERPL-MCNC: 5.1 G/DL (ref 6.4–8.3)
ZINC SERPL-MCNC: 45.9 UG/DL (ref 60–120)

## 2023-11-10 NOTE — TELEPHONE ENCOUNTER
Patient's daughter, Flo Busby,  has called and asked if patient has upcoming appointments. This nurse explained that since patient has been discharged from hospital, patient to be scheduled Monday 11/13/23 for labs and Tuesday 11/14/23 for visit and [place]. Scheduling aware and will be reaching out to patient. Daughter states she would also like to talk to scheduling.  Jamie Bahena, scheduling aware and phone call transferred to scheduling

## 2023-11-10 NOTE — TELEPHONE ENCOUNTER
SW left VM for pt. SW attempted to contact pt at the request of cancer center staff and re: positive distress screen. SW asked pt to return call at earliest convenience.         Frank Morales MSW, SUJATA  Oncology Social Worker

## 2023-11-10 NOTE — TELEPHONE ENCOUNTER
Patient called and stated that he is out of hospital. After reviewed with Dr. Mylene Alvarenga, patient to be scheduled for labs on Monday 11/13/23 and visit with [address] [place] [address] 11/14/23.  Scheduling aware

## 2023-11-14 ENCOUNTER — HOSPITAL ENCOUNTER (OUTPATIENT)
Dept: INFUSION THERAPY | Age: 83
End: 2023-11-14

## 2023-11-15 ENCOUNTER — TELEPHONE (OUTPATIENT)
Dept: ONCOLOGY | Age: 83
End: 2023-11-15

## 2023-11-15 NOTE — TELEPHONE ENCOUNTER
SW attempted to contact pt at the request of cancer center staff. VM left requesting return call.       Theresa GARCIA, SUJATA  Oncology Social Worker

## 2023-11-17 ENCOUNTER — TELEPHONE (OUTPATIENT)
Dept: ONCOLOGY | Age: 83
End: 2023-11-17

## 2023-11-17 NOTE — TELEPHONE ENCOUNTER
SW attempted to contact pt re: positive distress screen. VM left for pt requesting return call.       Zachariah Hagen MSW, RENAE-S  Oncology Social Worker

## 2023-11-17 NOTE — TELEPHONE ENCOUNTER
SW received return call from pt. Pt denied any needs at this time. Pt stated that he was distress when he came in because he just got his diagnosis but stated that he did not require any further assistance. Pt was encouraged to reach out to this provider should any needs arise.         Miguel Angel Cordova MSW, RENAE-S  Oncology Social Worker

## 2023-11-20 ENCOUNTER — HOSPITAL ENCOUNTER (OUTPATIENT)
Dept: INFUSION THERAPY | Age: 83
End: 2023-11-20
Payer: OTHER GOVERNMENT

## 2023-11-21 ENCOUNTER — HOSPITAL ENCOUNTER (OUTPATIENT)
Dept: INFUSION THERAPY | Age: 83
Discharge: HOME | End: 2023-11-21
Payer: OTHER GOVERNMENT

## 2023-11-21 ENCOUNTER — OFFICE VISIT (OUTPATIENT)
Dept: ONCOLOGY | Age: 83
End: 2023-11-21
Payer: OTHER GOVERNMENT

## 2023-11-21 ENCOUNTER — TELEPHONE (OUTPATIENT)
Dept: CASE MANAGEMENT | Age: 83
End: 2023-11-21

## 2023-11-21 VITALS
DIASTOLIC BLOOD PRESSURE: 58 MMHG | OXYGEN SATURATION: 99 % | HEART RATE: 74 BPM | TEMPERATURE: 97.7 F | WEIGHT: 171 LBS | HEIGHT: 71 IN | BODY MASS INDEX: 23.94 KG/M2 | SYSTOLIC BLOOD PRESSURE: 117 MMHG

## 2023-11-21 VITALS
TEMPERATURE: 97.6 F | OXYGEN SATURATION: 100 % | DIASTOLIC BLOOD PRESSURE: 58 MMHG | SYSTOLIC BLOOD PRESSURE: 122 MMHG | HEART RATE: 70 BPM | RESPIRATION RATE: 16 BRPM

## 2023-11-21 DIAGNOSIS — C64.9 RENAL CELL CARCINOMA, UNSPECIFIED LATERALITY (HCC): Primary | ICD-10-CM

## 2023-11-21 DIAGNOSIS — I82.402 ACUTE DEEP VEIN THROMBOSIS (DVT) OF LEFT LOWER EXTREMITY, UNSPECIFIED VEIN (HCC): ICD-10-CM

## 2023-11-21 DIAGNOSIS — D64.9 ANEMIA, UNSPECIFIED TYPE: ICD-10-CM

## 2023-11-21 LAB
ABO + RH BLD: NORMAL
ALBUMIN SERPL-MCNC: 4 G/DL (ref 3.5–5.2)
ALP SERPL-CCNC: 119 U/L (ref 40–129)
ALT SERPL-CCNC: 14 U/L (ref 0–40)
ANION GAP SERPL CALCULATED.3IONS-SCNC: 12 MMOL/L (ref 7–16)
ARM BAND NUMBER: NORMAL
AST SERPL-CCNC: 17 U/L (ref 0–39)
BASOPHILS # BLD: 0.01 K/UL (ref 0–0.2)
BASOPHILS NFR BLD: 0 % (ref 0–2)
BILIRUB SERPL-MCNC: 0.3 MG/DL (ref 0–1.2)
BLOOD BANK SAMPLE EXPIRATION: NORMAL
BLOOD GROUP ANTIBODIES SERPL: NEGATIVE
BUN SERPL-MCNC: 23 MG/DL (ref 6–23)
CALCIUM SERPL-MCNC: 9.9 MG/DL (ref 8.6–10.2)
CHLORIDE SERPL-SCNC: 110 MMOL/L (ref 98–107)
CO2 SERPL-SCNC: 19 MMOL/L (ref 22–29)
CREAT SERPL-MCNC: 2.3 MG/DL (ref 0.7–1.2)
EOSINOPHIL # BLD: 0.09 K/UL (ref 0.05–0.5)
EOSINOPHILS RELATIVE PERCENT: 2 % (ref 0–6)
ERYTHROCYTE [DISTWIDTH] IN BLOOD BY AUTOMATED COUNT: 16.5 % (ref 11.5–15)
FERRITIN SERPL-MCNC: 54 NG/ML
GFR SERPL CREATININE-BSD FRML MDRD: 28 ML/MIN/1.73M2
GLUCOSE SERPL-MCNC: 104 MG/DL (ref 74–99)
HCT VFR BLD AUTO: 28.6 % (ref 37–54)
HGB BLD-MCNC: 8.6 G/DL (ref 12.5–16.5)
IMM GRANULOCYTES # BLD AUTO: <0.03 K/UL (ref 0–0.58)
IMM GRANULOCYTES NFR BLD: 1 % (ref 0–5)
IRON SATN MFR SERPL: 13 % (ref 20–55)
IRON SERPL-MCNC: 40 UG/DL (ref 59–158)
LYMPHOCYTES NFR BLD: 0.62 K/UL (ref 1.5–4)
LYMPHOCYTES RELATIVE PERCENT: 16 % (ref 20–42)
MAGNESIUM SERPL-MCNC: 2.2 MG/DL (ref 1.6–2.6)
MCH RBC QN AUTO: 28 PG (ref 26–35)
MCHC RBC AUTO-ENTMCNC: 30.1 G/DL (ref 32–34.5)
MCV RBC AUTO: 93.2 FL (ref 80–99.9)
MONOCYTES NFR BLD: 0.2 K/UL (ref 0.1–0.95)
MONOCYTES NFR BLD: 5 % (ref 2–12)
NEUTROPHILS NFR BLD: 75 % (ref 43–80)
NEUTS SEG NFR BLD: 2.83 K/UL (ref 1.8–7.3)
PLATELET # BLD AUTO: 253 K/UL (ref 130–450)
PMV BLD AUTO: 10.3 FL (ref 7–12)
POTASSIUM SERPL-SCNC: 4.8 MMOL/L (ref 3.5–5)
PROT SERPL-MCNC: 7 G/DL (ref 6.4–8.3)
RBC # BLD AUTO: 3.07 M/UL (ref 3.8–5.8)
SODIUM SERPL-SCNC: 141 MMOL/L (ref 132–146)
TIBC SERPL-MCNC: 318 UG/DL (ref 250–450)
TSH SERPL DL<=0.05 MIU/L-ACNC: 0.37 UIU/ML (ref 0.27–4.2)
WBC OTHER # BLD: 3.8 K/UL (ref 4.5–11.5)

## 2023-11-21 PROCEDURE — 96413 CHEMO IV INFUSION 1 HR: CPT

## 2023-11-21 PROCEDURE — 99214 OFFICE O/P EST MOD 30 MIN: CPT | Performed by: INTERNAL MEDICINE

## 2023-11-21 PROCEDURE — 86850 RBC ANTIBODY SCREEN: CPT

## 2023-11-21 PROCEDURE — 80053 COMPREHEN METABOLIC PANEL: CPT

## 2023-11-21 PROCEDURE — 83550 IRON BINDING TEST: CPT

## 2023-11-21 PROCEDURE — 82728 ASSAY OF FERRITIN: CPT

## 2023-11-21 PROCEDURE — 6360000002 HC RX W HCPCS: Performed by: INTERNAL MEDICINE

## 2023-11-21 PROCEDURE — 83540 ASSAY OF IRON: CPT

## 2023-11-21 PROCEDURE — 86901 BLOOD TYPING SEROLOGIC RH(D): CPT

## 2023-11-21 PROCEDURE — 83735 ASSAY OF MAGNESIUM: CPT

## 2023-11-21 PROCEDURE — 36415 COLL VENOUS BLD VENIPUNCTURE: CPT

## 2023-11-21 PROCEDURE — 84443 ASSAY THYROID STIM HORMONE: CPT

## 2023-11-21 PROCEDURE — 3078F DIAST BP <80 MM HG: CPT | Performed by: INTERNAL MEDICINE

## 2023-11-21 PROCEDURE — 3074F SYST BP LT 130 MM HG: CPT | Performed by: INTERNAL MEDICINE

## 2023-11-21 PROCEDURE — 86900 BLOOD TYPING SEROLOGIC ABO: CPT

## 2023-11-21 PROCEDURE — 85025 COMPLETE CBC W/AUTO DIFF WBC: CPT

## 2023-11-21 PROCEDURE — 2580000003 HC RX 258: Performed by: INTERNAL MEDICINE

## 2023-11-21 PROCEDURE — 1123F ACP DISCUSS/DSCN MKR DOCD: CPT | Performed by: INTERNAL MEDICINE

## 2023-11-21 RX ORDER — DIPHENHYDRAMINE HYDROCHLORIDE 50 MG/ML
50 INJECTION INTRAMUSCULAR; INTRAVENOUS
Status: CANCELLED | OUTPATIENT
Start: 2023-11-21

## 2023-11-21 RX ORDER — EPINEPHRINE 1 MG/ML
0.3 INJECTION, SOLUTION, CONCENTRATE INTRAVENOUS PRN
Status: CANCELLED | OUTPATIENT
Start: 2023-11-21

## 2023-11-21 RX ORDER — SODIUM CHLORIDE 9 MG/ML
5-250 INJECTION, SOLUTION INTRAVENOUS PRN
Status: CANCELLED | OUTPATIENT
Start: 2023-11-21

## 2023-11-21 RX ORDER — ACETAMINOPHEN 325 MG/1
650 TABLET ORAL
Status: CANCELLED | OUTPATIENT
Start: 2023-11-21

## 2023-11-21 RX ORDER — HEPARIN 100 UNIT/ML
500 SYRINGE INTRAVENOUS PRN
Status: CANCELLED | OUTPATIENT
Start: 2023-11-21

## 2023-11-21 RX ORDER — FAMOTIDINE 10 MG/ML
20 INJECTION, SOLUTION INTRAVENOUS
Status: CANCELLED | OUTPATIENT
Start: 2023-11-21

## 2023-11-21 RX ORDER — SODIUM CHLORIDE 0.9 % (FLUSH) 0.9 %
5-40 SYRINGE (ML) INJECTION PRN
Status: CANCELLED | OUTPATIENT
Start: 2023-11-21

## 2023-11-21 RX ORDER — HEPARIN SODIUM (PORCINE) LOCK FLUSH IV SOLN 100 UNIT/ML 100 UNIT/ML
500 SOLUTION INTRAVENOUS PRN
Status: CANCELLED | OUTPATIENT
Start: 2023-11-21

## 2023-11-21 RX ORDER — SODIUM CHLORIDE 9 MG/ML
INJECTION, SOLUTION INTRAVENOUS CONTINUOUS
Status: CANCELLED | OUTPATIENT
Start: 2023-11-21

## 2023-11-21 RX ORDER — ALBUTEROL SULFATE 90 UG/1
4 AEROSOL, METERED RESPIRATORY (INHALATION) PRN
Status: CANCELLED | OUTPATIENT
Start: 2023-11-21

## 2023-11-21 RX ORDER — ONDANSETRON 2 MG/ML
8 INJECTION INTRAMUSCULAR; INTRAVENOUS
Status: CANCELLED | OUTPATIENT
Start: 2023-11-21

## 2023-11-21 RX ORDER — SODIUM CHLORIDE 9 MG/ML
5-250 INJECTION, SOLUTION INTRAVENOUS PRN
Status: DISCONTINUED | OUTPATIENT
Start: 2023-11-21 | End: 2023-11-22 | Stop reason: HOSPADM

## 2023-11-21 RX ORDER — ONDANSETRON 2 MG/ML
8 INJECTION INTRAMUSCULAR; INTRAVENOUS
Status: DISCONTINUED | OUTPATIENT
Start: 2023-11-21 | End: 2023-11-22 | Stop reason: HOSPADM

## 2023-11-21 RX ORDER — MEPERIDINE HYDROCHLORIDE 50 MG/ML
12.5 INJECTION INTRAMUSCULAR; INTRAVENOUS; SUBCUTANEOUS PRN
Status: CANCELLED | OUTPATIENT
Start: 2023-11-21

## 2023-11-21 RX ORDER — SODIUM CHLORIDE 0.9 % (FLUSH) 0.9 %
5-40 SYRINGE (ML) INJECTION PRN
Status: DISCONTINUED | OUTPATIENT
Start: 2023-11-21 | End: 2023-11-22 | Stop reason: HOSPADM

## 2023-11-21 RX ADMIN — SODIUM CHLORIDE 240 MG: 9 INJECTION, SOLUTION INTRAVENOUS at 15:08

## 2023-11-21 RX ADMIN — SODIUM CHLORIDE 50 ML/HR: 9 INJECTION, SOLUTION INTRAVENOUS at 15:04

## 2023-11-21 NOTE — PROGRESS NOTES
Patient tolerated Opdivo infusion well. Remained on unit for 10 minutes after treatment. Patient alert and oriented x3. No distress noted. Vital signs stable. Patient denies any new or worsening pain. Educated patient on possible side effects and treatment of medication. Patient verbalized understanding. Offered patient education and/or discharge material. Patient declined. Patient denies any needs. All questions answered. D/C in stable condition, patient taken via wheelchair to his car.

## 2023-11-21 NOTE — TELEPHONE ENCOUNTER
Met with patient during his infusion appointment today. Patient states he ready to start his treatment. He denies any questions regarding his infusion but did have questions regarding his eliquis does. He states that doctor decreased dose and he wanted to know if he could split his old pills. Discussed with pharmacist and he did not recommend patient split this medication pills. He recommend patient fill new prescription and take new dose prescribed. Patient aware and will call [facility] pharmacy to see how soon he can get new dose.

## 2023-11-29 ENCOUNTER — HOSPITAL ENCOUNTER (OUTPATIENT)
Dept: INFUSION THERAPY | Age: 83
Discharge: HOME | End: 2023-11-29
Payer: OTHER GOVERNMENT

## 2023-11-29 ENCOUNTER — TELEPHONE (OUTPATIENT)
Dept: CASE MANAGEMENT | Age: 83
End: 2023-11-29

## 2023-11-29 VITALS
RESPIRATION RATE: 16 BRPM | HEART RATE: 68 BPM | SYSTOLIC BLOOD PRESSURE: 121 MMHG | DIASTOLIC BLOOD PRESSURE: 60 MMHG | OXYGEN SATURATION: 97 % | TEMPERATURE: 97.8 F

## 2023-11-29 DIAGNOSIS — C64.9 RENAL CELL CARCINOMA, UNSPECIFIED LATERALITY (HCC): Primary | ICD-10-CM

## 2023-11-29 PROCEDURE — 96374 THER/PROPH/DIAG INJ IV PUSH: CPT

## 2023-11-29 PROCEDURE — 6360000002 HC RX W HCPCS: Performed by: INTERNAL MEDICINE

## 2023-11-29 PROCEDURE — 2580000003 HC RX 258: Performed by: INTERNAL MEDICINE

## 2023-11-29 RX ORDER — SODIUM CHLORIDE 9 MG/ML
5-250 INJECTION, SOLUTION INTRAVENOUS PRN
Status: DISCONTINUED | OUTPATIENT
Start: 2023-11-29 | End: 2023-11-30 | Stop reason: HOSPADM

## 2023-11-29 RX ORDER — SODIUM CHLORIDE 0.9 % (FLUSH) 0.9 %
5-40 SYRINGE (ML) INJECTION PRN
Status: DISCONTINUED | OUTPATIENT
Start: 2023-11-29 | End: 2023-11-30 | Stop reason: HOSPADM

## 2023-11-29 RX ORDER — HEPARIN 100 UNIT/ML
500 SYRINGE INTRAVENOUS PRN
Status: CANCELLED | OUTPATIENT
Start: 2023-12-01

## 2023-11-29 RX ORDER — SODIUM CHLORIDE 9 MG/ML
INJECTION, SOLUTION INTRAVENOUS CONTINUOUS
Status: CANCELLED | OUTPATIENT
Start: 2023-12-01

## 2023-11-29 RX ORDER — ACETAMINOPHEN 325 MG/1
650 TABLET ORAL
Status: CANCELLED | OUTPATIENT
Start: 2023-12-01

## 2023-11-29 RX ORDER — SODIUM CHLORIDE 0.9 % (FLUSH) 0.9 %
5-40 SYRINGE (ML) INJECTION PRN
Status: CANCELLED | OUTPATIENT
Start: 2023-12-01

## 2023-11-29 RX ORDER — ALBUTEROL SULFATE 90 UG/1
4 AEROSOL, METERED RESPIRATORY (INHALATION) PRN
Status: CANCELLED | OUTPATIENT
Start: 2023-12-01

## 2023-11-29 RX ORDER — EPINEPHRINE 1 MG/ML
0.3 INJECTION, SOLUTION, CONCENTRATE INTRAVENOUS PRN
Status: CANCELLED | OUTPATIENT
Start: 2023-12-01

## 2023-11-29 RX ORDER — DIPHENHYDRAMINE HYDROCHLORIDE 50 MG/ML
50 INJECTION INTRAMUSCULAR; INTRAVENOUS
Status: CANCELLED | OUTPATIENT
Start: 2023-12-01

## 2023-11-29 RX ORDER — SODIUM CHLORIDE 9 MG/ML
5-250 INJECTION, SOLUTION INTRAVENOUS PRN
Status: CANCELLED | OUTPATIENT
Start: 2023-12-01

## 2023-11-29 RX ORDER — ONDANSETRON 2 MG/ML
8 INJECTION INTRAMUSCULAR; INTRAVENOUS
Status: CANCELLED | OUTPATIENT
Start: 2023-12-01

## 2023-11-29 RX ADMIN — SODIUM CHLORIDE 200 ML/HR: 9 INJECTION, SOLUTION INTRAVENOUS at 14:09

## 2023-11-29 RX ADMIN — IRON SUCROSE 200 MG: 20 INJECTION, SOLUTION INTRAVENOUS at 14:08

## 2023-11-29 NOTE — TELEPHONE ENCOUNTER
Met with patient during his infusion appointment this morning and provided patient with central scheduling's number to call and schedule his CT scans. Encourage patient to call if any questions or issues with scheduling. Patient agreeable.

## 2023-12-01 ENCOUNTER — HOSPITAL ENCOUNTER (OUTPATIENT)
Dept: INFUSION THERAPY | Age: 83
Discharge: HOME | End: 2023-12-01
Payer: OTHER GOVERNMENT

## 2023-12-01 VITALS
OXYGEN SATURATION: 100 % | TEMPERATURE: 98.1 F | SYSTOLIC BLOOD PRESSURE: 114 MMHG | HEART RATE: 69 BPM | DIASTOLIC BLOOD PRESSURE: 54 MMHG | RESPIRATION RATE: 18 BRPM

## 2023-12-01 DIAGNOSIS — C64.9 RENAL CELL CARCINOMA, UNSPECIFIED LATERALITY (HCC): Primary | ICD-10-CM

## 2023-12-01 PROCEDURE — 6360000002 HC RX W HCPCS: Performed by: INTERNAL MEDICINE

## 2023-12-01 PROCEDURE — 96374 THER/PROPH/DIAG INJ IV PUSH: CPT

## 2023-12-01 PROCEDURE — 2580000003 HC RX 258: Performed by: INTERNAL MEDICINE

## 2023-12-01 RX ORDER — SODIUM CHLORIDE 9 MG/ML
INJECTION, SOLUTION INTRAVENOUS CONTINUOUS
Status: CANCELLED | OUTPATIENT
Start: 2023-12-03

## 2023-12-01 RX ORDER — ALBUTEROL SULFATE 90 UG/1
4 AEROSOL, METERED RESPIRATORY (INHALATION) PRN
Status: CANCELLED | OUTPATIENT
Start: 2023-12-03

## 2023-12-01 RX ORDER — SODIUM CHLORIDE 9 MG/ML
5-250 INJECTION, SOLUTION INTRAVENOUS PRN
Status: DISCONTINUED | OUTPATIENT
Start: 2023-12-01 | End: 2023-12-02 | Stop reason: HOSPADM

## 2023-12-01 RX ORDER — SODIUM CHLORIDE 9 MG/ML
5-250 INJECTION, SOLUTION INTRAVENOUS PRN
Status: CANCELLED | OUTPATIENT
Start: 2023-12-03

## 2023-12-01 RX ORDER — HEPARIN 100 UNIT/ML
500 SYRINGE INTRAVENOUS PRN
Status: CANCELLED | OUTPATIENT
Start: 2023-12-03

## 2023-12-01 RX ORDER — DIPHENHYDRAMINE HYDROCHLORIDE 50 MG/ML
50 INJECTION INTRAMUSCULAR; INTRAVENOUS
Status: CANCELLED | OUTPATIENT
Start: 2023-12-03

## 2023-12-01 RX ORDER — ONDANSETRON 2 MG/ML
8 INJECTION INTRAMUSCULAR; INTRAVENOUS
Status: CANCELLED | OUTPATIENT
Start: 2023-12-03

## 2023-12-01 RX ORDER — SODIUM CHLORIDE 0.9 % (FLUSH) 0.9 %
5-40 SYRINGE (ML) INJECTION PRN
Status: CANCELLED | OUTPATIENT
Start: 2023-12-03

## 2023-12-01 RX ORDER — ACETAMINOPHEN 325 MG/1
650 TABLET ORAL
Status: CANCELLED | OUTPATIENT
Start: 2023-12-03

## 2023-12-01 RX ORDER — EPINEPHRINE 1 MG/ML
0.3 INJECTION, SOLUTION, CONCENTRATE INTRAVENOUS PRN
Status: CANCELLED | OUTPATIENT
Start: 2023-12-03

## 2023-12-01 RX ORDER — SODIUM CHLORIDE 0.9 % (FLUSH) 0.9 %
5-40 SYRINGE (ML) INJECTION PRN
Status: DISCONTINUED | OUTPATIENT
Start: 2023-12-01 | End: 2023-12-02 | Stop reason: HOSPADM

## 2023-12-01 RX ADMIN — SODIUM CHLORIDE, PRESERVATIVE FREE 10 ML: 5 INJECTION INTRAVENOUS at 13:57

## 2023-12-01 RX ADMIN — SODIUM CHLORIDE 200 ML/HR: 9 INJECTION, SOLUTION INTRAVENOUS at 13:59

## 2023-12-01 RX ADMIN — IRON SUCROSE 200 MG: 20 INJECTION, SOLUTION INTRAVENOUS at 13:59

## 2023-12-01 NOTE — PROGRESS NOTES
Patient tolerated infusion well. Patient alert and oriented x 3. No distress voiced or noted. Vital signs stable. Patient denies any new or worsening pain. Patient denies questions regarding treatment or medication; verbalized understanding. All questions answered.

## 2023-12-04 ENCOUNTER — HOSPITAL ENCOUNTER (OUTPATIENT)
Dept: INFUSION THERAPY | Age: 83
Discharge: HOME | End: 2023-12-04
Payer: OTHER GOVERNMENT

## 2023-12-04 VITALS
TEMPERATURE: 98.5 F | HEART RATE: 66 BPM | SYSTOLIC BLOOD PRESSURE: 124 MMHG | OXYGEN SATURATION: 100 % | RESPIRATION RATE: 18 BRPM | DIASTOLIC BLOOD PRESSURE: 60 MMHG

## 2023-12-04 DIAGNOSIS — C64.9 RENAL CELL CARCINOMA, UNSPECIFIED LATERALITY (HCC): Primary | ICD-10-CM

## 2023-12-04 PROCEDURE — 2580000003 HC RX 258: Performed by: INTERNAL MEDICINE

## 2023-12-04 PROCEDURE — 6360000002 HC RX W HCPCS: Performed by: INTERNAL MEDICINE

## 2023-12-04 PROCEDURE — 96374 THER/PROPH/DIAG INJ IV PUSH: CPT

## 2023-12-04 RX ORDER — ALBUTEROL SULFATE 90 UG/1
4 AEROSOL, METERED RESPIRATORY (INHALATION) PRN
Status: CANCELLED | OUTPATIENT
Start: 2023-12-05

## 2023-12-04 RX ORDER — ACETAMINOPHEN 325 MG/1
650 TABLET ORAL
Status: CANCELLED | OUTPATIENT
Start: 2023-12-05

## 2023-12-04 RX ORDER — ONDANSETRON 2 MG/ML
8 INJECTION INTRAMUSCULAR; INTRAVENOUS
Status: CANCELLED | OUTPATIENT
Start: 2023-12-05

## 2023-12-04 RX ORDER — DIPHENHYDRAMINE HYDROCHLORIDE 50 MG/ML
50 INJECTION INTRAMUSCULAR; INTRAVENOUS
Status: CANCELLED | OUTPATIENT
Start: 2023-12-05

## 2023-12-04 RX ORDER — SODIUM CHLORIDE 9 MG/ML
5-250 INJECTION, SOLUTION INTRAVENOUS PRN
Status: CANCELLED | OUTPATIENT
Start: 2023-12-05

## 2023-12-04 RX ORDER — EPINEPHRINE 1 MG/ML
0.3 INJECTION, SOLUTION, CONCENTRATE INTRAVENOUS PRN
Status: CANCELLED | OUTPATIENT
Start: 2023-12-05

## 2023-12-04 RX ORDER — SODIUM CHLORIDE 9 MG/ML
5-250 INJECTION, SOLUTION INTRAVENOUS PRN
Status: DISCONTINUED | OUTPATIENT
Start: 2023-12-04 | End: 2023-12-05 | Stop reason: HOSPADM

## 2023-12-04 RX ORDER — SODIUM CHLORIDE 0.9 % (FLUSH) 0.9 %
5-40 SYRINGE (ML) INJECTION PRN
Status: CANCELLED | OUTPATIENT
Start: 2023-12-05

## 2023-12-04 RX ORDER — SODIUM CHLORIDE 9 MG/ML
INJECTION, SOLUTION INTRAVENOUS CONTINUOUS
Status: CANCELLED | OUTPATIENT
Start: 2023-12-05

## 2023-12-04 RX ORDER — HEPARIN 100 UNIT/ML
500 SYRINGE INTRAVENOUS PRN
Status: CANCELLED | OUTPATIENT
Start: 2023-12-05

## 2023-12-04 RX ADMIN — IRON SUCROSE 200 MG: 20 INJECTION, SOLUTION INTRAVENOUS at 14:13

## 2023-12-04 RX ADMIN — SODIUM CHLORIDE 50 ML/HR: 9 INJECTION, SOLUTION INTRAVENOUS at 14:12

## 2023-12-05 ENCOUNTER — OFFICE VISIT (OUTPATIENT)
Dept: ONCOLOGY | Age: 83
End: 2023-12-05
Payer: OTHER GOVERNMENT

## 2023-12-05 ENCOUNTER — HOSPITAL ENCOUNTER (OUTPATIENT)
Dept: INFUSION THERAPY | Age: 83
Discharge: HOME | End: 2023-12-05
Payer: OTHER GOVERNMENT

## 2023-12-05 VITALS
HEART RATE: 78 BPM | BODY MASS INDEX: 24.54 KG/M2 | TEMPERATURE: 97 F | SYSTOLIC BLOOD PRESSURE: 120 MMHG | HEIGHT: 71 IN | DIASTOLIC BLOOD PRESSURE: 56 MMHG | WEIGHT: 175.3 LBS | OXYGEN SATURATION: 94 %

## 2023-12-05 VITALS
HEART RATE: 67 BPM | DIASTOLIC BLOOD PRESSURE: 58 MMHG | OXYGEN SATURATION: 98 % | RESPIRATION RATE: 16 BRPM | SYSTOLIC BLOOD PRESSURE: 116 MMHG | TEMPERATURE: 98.2 F

## 2023-12-05 DIAGNOSIS — D64.9 ANEMIA, UNSPECIFIED TYPE: ICD-10-CM

## 2023-12-05 DIAGNOSIS — I82.402 ACUTE DEEP VEIN THROMBOSIS (DVT) OF LEFT LOWER EXTREMITY, UNSPECIFIED VEIN (HCC): ICD-10-CM

## 2023-12-05 DIAGNOSIS — D64.9 SYMPTOMATIC ANEMIA: ICD-10-CM

## 2023-12-05 DIAGNOSIS — C64.9 RENAL CELL CARCINOMA, UNSPECIFIED LATERALITY (HCC): Primary | ICD-10-CM

## 2023-12-05 LAB
ALBUMIN SERPL-MCNC: 3.5 G/DL (ref 3.5–5.2)
ALP SERPL-CCNC: 111 U/L (ref 40–129)
ALT SERPL-CCNC: 9 U/L (ref 0–40)
ANION GAP SERPL CALCULATED.3IONS-SCNC: 17 MMOL/L (ref 7–16)
AST SERPL-CCNC: 17 U/L (ref 0–39)
BASOPHILS # BLD: 0.01 K/UL (ref 0–0.2)
BASOPHILS NFR BLD: 0 % (ref 0–2)
BILIRUB SERPL-MCNC: 0.2 MG/DL (ref 0–1.2)
BUN SERPL-MCNC: 19 MG/DL (ref 6–23)
CALCIUM SERPL-MCNC: 9.8 MG/DL (ref 8.6–10.2)
CHLORIDE SERPL-SCNC: 107 MMOL/L (ref 98–107)
CO2 SERPL-SCNC: 16 MMOL/L (ref 22–29)
CORTIS SERPL-MCNC: 17.4 UG/DL (ref 2.7–18.4)
CORTISOL COLLECTION INFO: NORMAL
CREAT SERPL-MCNC: 2.1 MG/DL (ref 0.7–1.2)
EOSINOPHIL # BLD: 0.15 K/UL (ref 0.05–0.5)
EOSINOPHILS RELATIVE PERCENT: 4 % (ref 0–6)
ERYTHROCYTE [DISTWIDTH] IN BLOOD BY AUTOMATED COUNT: 18 % (ref 11.5–15)
FERRITIN SERPL-MCNC: 395 NG/ML
GFR SERPL CREATININE-BSD FRML MDRD: 31 ML/MIN/1.73M2
GLUCOSE SERPL-MCNC: 91 MG/DL (ref 74–99)
HCT VFR BLD AUTO: 27.1 % (ref 37–54)
HGB BLD-MCNC: 8.1 G/DL (ref 12.5–16.5)
IMM GRANULOCYTES # BLD AUTO: <0.03 K/UL (ref 0–0.58)
IMM GRANULOCYTES NFR BLD: 1 % (ref 0–5)
IRON SATN MFR SERPL: 42 % (ref 20–55)
IRON SERPL-MCNC: 109 UG/DL (ref 59–158)
LYMPHOCYTES NFR BLD: 0.65 K/UL (ref 1.5–4)
LYMPHOCYTES RELATIVE PERCENT: 16 % (ref 20–42)
MAGNESIUM SERPL-MCNC: 2.2 MG/DL (ref 1.6–2.6)
MCH RBC QN AUTO: 28 PG (ref 26–35)
MCHC RBC AUTO-ENTMCNC: 29.9 G/DL (ref 32–34.5)
MCV RBC AUTO: 93.8 FL (ref 80–99.9)
MONOCYTES NFR BLD: 0.31 K/UL (ref 0.1–0.95)
MONOCYTES NFR BLD: 7 % (ref 2–12)
NEUTROPHILS NFR BLD: 73 % (ref 43–80)
NEUTS SEG NFR BLD: 3.06 K/UL (ref 1.8–7.3)
PLATELET # BLD AUTO: 247 K/UL (ref 130–450)
PMV BLD AUTO: 10.3 FL (ref 7–12)
POTASSIUM SERPL-SCNC: 4.7 MMOL/L (ref 3.5–5)
PROT SERPL-MCNC: 6.5 G/DL (ref 6.4–8.3)
RBC # BLD AUTO: 2.89 M/UL (ref 3.8–5.8)
SODIUM SERPL-SCNC: 140 MMOL/L (ref 132–146)
TIBC SERPL-MCNC: 261 UG/DL (ref 250–450)
WBC OTHER # BLD: 4.2 K/UL (ref 4.5–11.5)

## 2023-12-05 PROCEDURE — 82533 TOTAL CORTISOL: CPT

## 2023-12-05 PROCEDURE — 96374 THER/PROPH/DIAG INJ IV PUSH: CPT

## 2023-12-05 PROCEDURE — 6360000002 HC RX W HCPCS: Performed by: INTERNAL MEDICINE

## 2023-12-05 PROCEDURE — 83550 IRON BINDING TEST: CPT

## 2023-12-05 PROCEDURE — 83735 ASSAY OF MAGNESIUM: CPT

## 2023-12-05 PROCEDURE — 3078F DIAST BP <80 MM HG: CPT | Performed by: INTERNAL MEDICINE

## 2023-12-05 PROCEDURE — 3074F SYST BP LT 130 MM HG: CPT | Performed by: INTERNAL MEDICINE

## 2023-12-05 PROCEDURE — 96375 TX/PRO/DX INJ NEW DRUG ADDON: CPT

## 2023-12-05 PROCEDURE — 96413 CHEMO IV INFUSION 1 HR: CPT

## 2023-12-05 PROCEDURE — 83540 ASSAY OF IRON: CPT

## 2023-12-05 PROCEDURE — 80053 COMPREHEN METABOLIC PANEL: CPT

## 2023-12-05 PROCEDURE — 1123F ACP DISCUSS/DSCN MKR DOCD: CPT | Performed by: INTERNAL MEDICINE

## 2023-12-05 PROCEDURE — 85025 COMPLETE CBC W/AUTO DIFF WBC: CPT

## 2023-12-05 PROCEDURE — 96361 HYDRATE IV INFUSION ADD-ON: CPT

## 2023-12-05 PROCEDURE — 82728 ASSAY OF FERRITIN: CPT

## 2023-12-05 PROCEDURE — 99214 OFFICE O/P EST MOD 30 MIN: CPT | Performed by: INTERNAL MEDICINE

## 2023-12-05 PROCEDURE — 2580000003 HC RX 258: Performed by: INTERNAL MEDICINE

## 2023-12-05 RX ORDER — HEPARIN SODIUM (PORCINE) LOCK FLUSH IV SOLN 100 UNIT/ML 100 UNIT/ML
500 SOLUTION INTRAVENOUS PRN
Status: CANCELLED | OUTPATIENT
Start: 2023-12-05

## 2023-12-05 RX ORDER — MEPERIDINE HYDROCHLORIDE 50 MG/ML
12.5 INJECTION INTRAMUSCULAR; INTRAVENOUS; SUBCUTANEOUS PRN
Status: CANCELLED | OUTPATIENT
Start: 2023-12-05

## 2023-12-05 RX ORDER — HEPARIN 100 UNIT/ML
500 SYRINGE INTRAVENOUS PRN
OUTPATIENT
Start: 2023-12-06

## 2023-12-05 RX ORDER — FAMOTIDINE 10 MG/ML
20 INJECTION, SOLUTION INTRAVENOUS
Status: CANCELLED | OUTPATIENT
Start: 2023-12-05

## 2023-12-05 RX ORDER — SODIUM CHLORIDE 9 MG/ML
5-250 INJECTION, SOLUTION INTRAVENOUS PRN
Status: DISCONTINUED | OUTPATIENT
Start: 2023-12-05 | End: 2023-12-06 | Stop reason: HOSPADM

## 2023-12-05 RX ORDER — SODIUM CHLORIDE 9 MG/ML
5-250 INJECTION, SOLUTION INTRAVENOUS PRN
Status: CANCELLED | OUTPATIENT
Start: 2023-12-05

## 2023-12-05 RX ORDER — ALBUTEROL SULFATE 90 UG/1
4 AEROSOL, METERED RESPIRATORY (INHALATION) PRN
Status: CANCELLED | OUTPATIENT
Start: 2023-12-05

## 2023-12-05 RX ORDER — SODIUM CHLORIDE 0.9 % (FLUSH) 0.9 %
5-40 SYRINGE (ML) INJECTION PRN
Status: DISCONTINUED | OUTPATIENT
Start: 2023-12-05 | End: 2023-12-06 | Stop reason: HOSPADM

## 2023-12-05 RX ORDER — SODIUM CHLORIDE 0.9 % (FLUSH) 0.9 %
5-40 SYRINGE (ML) INJECTION PRN
OUTPATIENT
Start: 2023-12-06

## 2023-12-05 RX ORDER — ONDANSETRON 2 MG/ML
8 INJECTION INTRAMUSCULAR; INTRAVENOUS
Status: CANCELLED | OUTPATIENT
Start: 2023-12-05

## 2023-12-05 RX ORDER — EPINEPHRINE 1 MG/ML
0.3 INJECTION, SOLUTION, CONCENTRATE INTRAVENOUS PRN
OUTPATIENT
Start: 2023-12-06

## 2023-12-05 RX ORDER — DIPHENHYDRAMINE HYDROCHLORIDE 50 MG/ML
50 INJECTION INTRAMUSCULAR; INTRAVENOUS
OUTPATIENT
Start: 2023-12-06

## 2023-12-05 RX ORDER — SODIUM CHLORIDE 9 MG/ML
5-250 INJECTION, SOLUTION INTRAVENOUS PRN
OUTPATIENT
Start: 2023-12-06

## 2023-12-05 RX ORDER — ACETAMINOPHEN 325 MG/1
650 TABLET ORAL
Status: CANCELLED | OUTPATIENT
Start: 2023-12-05

## 2023-12-05 RX ORDER — SODIUM CHLORIDE 9 MG/ML
INJECTION, SOLUTION INTRAVENOUS CONTINUOUS
Status: CANCELLED | OUTPATIENT
Start: 2023-12-05

## 2023-12-05 RX ORDER — ALBUTEROL SULFATE 90 UG/1
4 AEROSOL, METERED RESPIRATORY (INHALATION) PRN
OUTPATIENT
Start: 2023-12-06

## 2023-12-05 RX ORDER — EPINEPHRINE 1 MG/ML
0.3 INJECTION, SOLUTION, CONCENTRATE INTRAVENOUS PRN
Status: CANCELLED | OUTPATIENT
Start: 2023-12-05

## 2023-12-05 RX ORDER — SODIUM CHLORIDE 9 MG/ML
INJECTION, SOLUTION INTRAVENOUS CONTINUOUS
OUTPATIENT
Start: 2023-12-06

## 2023-12-05 RX ORDER — ACETAMINOPHEN 325 MG/1
650 TABLET ORAL
OUTPATIENT
Start: 2023-12-06

## 2023-12-05 RX ORDER — DIPHENHYDRAMINE HYDROCHLORIDE 50 MG/ML
50 INJECTION INTRAMUSCULAR; INTRAVENOUS
Status: CANCELLED | OUTPATIENT
Start: 2023-12-05

## 2023-12-05 RX ORDER — SODIUM CHLORIDE 0.9 % (FLUSH) 0.9 %
5-40 SYRINGE (ML) INJECTION PRN
Status: CANCELLED | OUTPATIENT
Start: 2023-12-05

## 2023-12-05 RX ORDER — ONDANSETRON 2 MG/ML
8 INJECTION INTRAMUSCULAR; INTRAVENOUS
OUTPATIENT
Start: 2023-12-06

## 2023-12-05 RX ADMIN — SODIUM CHLORIDE, PRESERVATIVE FREE 10 ML: 5 INJECTION INTRAVENOUS at 10:56

## 2023-12-05 RX ADMIN — SODIUM CHLORIDE 240 MG: 9 INJECTION, SOLUTION INTRAVENOUS at 12:58

## 2023-12-05 RX ADMIN — SODIUM CHLORIDE 250 ML/HR: 9 INJECTION, SOLUTION INTRAVENOUS at 11:40

## 2023-12-05 RX ADMIN — IRON SUCROSE 200 MG: 20 INJECTION, SOLUTION INTRAVENOUS at 11:40

## 2023-12-07 ENCOUNTER — HOSPITAL ENCOUNTER (OUTPATIENT)
Dept: CT IMAGING | Age: 83
Discharge: HOME | End: 2023-12-09
Attending: INTERNAL MEDICINE
Payer: OTHER GOVERNMENT

## 2023-12-07 DIAGNOSIS — C64.9 RENAL CELL CARCINOMA, UNSPECIFIED LATERALITY (HCC): ICD-10-CM

## 2023-12-07 PROCEDURE — 6360000004 HC RX CONTRAST MEDICATION: Performed by: RADIOLOGY

## 2023-12-07 PROCEDURE — 74176 CT ABD & PELVIS W/O CONTRAST: CPT

## 2023-12-07 PROCEDURE — 71250 CT THORAX DX C-: CPT

## 2023-12-07 RX ADMIN — IOPAMIDOL 18 ML: 755 INJECTION, SOLUTION INTRAVENOUS at 14:42

## 2023-12-08 ENCOUNTER — TELEPHONE (OUTPATIENT)
Dept: INFUSION THERAPY | Age: 83
End: 2023-12-08

## 2023-12-08 ENCOUNTER — HOSPITAL ENCOUNTER (OUTPATIENT)
Dept: INFUSION THERAPY | Age: 83
End: 2023-12-08

## 2023-12-08 NOTE — TELEPHONE ENCOUNTER
Home health care called and asked if the referral for wound care can be the week of 12/18/23 as that is the first available.  Per Dr Audrey Solorzano, is ok

## 2023-12-14 ENCOUNTER — HOSPITAL ENCOUNTER (OUTPATIENT)
Dept: INFUSION THERAPY | Age: 83
Discharge: HOME | End: 2023-12-14
Payer: OTHER GOVERNMENT

## 2023-12-14 VITALS
DIASTOLIC BLOOD PRESSURE: 65 MMHG | HEART RATE: 72 BPM | OXYGEN SATURATION: 97 % | TEMPERATURE: 98.4 F | SYSTOLIC BLOOD PRESSURE: 117 MMHG | RESPIRATION RATE: 16 BRPM

## 2023-12-14 DIAGNOSIS — C64.9 RENAL CELL CARCINOMA, UNSPECIFIED LATERALITY (HCC): Primary | ICD-10-CM

## 2023-12-14 PROCEDURE — 6360000002 HC RX W HCPCS: Performed by: INTERNAL MEDICINE

## 2023-12-14 PROCEDURE — 96374 THER/PROPH/DIAG INJ IV PUSH: CPT

## 2023-12-14 PROCEDURE — 2580000003 HC RX 258: Performed by: INTERNAL MEDICINE

## 2023-12-14 RX ORDER — SODIUM CHLORIDE 9 MG/ML
INJECTION, SOLUTION INTRAVENOUS CONTINUOUS
OUTPATIENT
Start: 2023-12-15

## 2023-12-14 RX ORDER — SODIUM CHLORIDE 9 MG/ML
5-250 INJECTION, SOLUTION INTRAVENOUS PRN
OUTPATIENT
Start: 2023-12-15

## 2023-12-14 RX ORDER — SODIUM CHLORIDE 9 MG/ML
5-250 INJECTION, SOLUTION INTRAVENOUS PRN
Status: DISCONTINUED | OUTPATIENT
Start: 2023-12-14 | End: 2023-12-15 | Stop reason: HOSPADM

## 2023-12-14 RX ORDER — ONDANSETRON 2 MG/ML
8 INJECTION INTRAMUSCULAR; INTRAVENOUS
OUTPATIENT
Start: 2023-12-15

## 2023-12-14 RX ORDER — SODIUM CHLORIDE 0.9 % (FLUSH) 0.9 %
5-40 SYRINGE (ML) INJECTION PRN
Status: DISCONTINUED | OUTPATIENT
Start: 2023-12-14 | End: 2023-12-15 | Stop reason: HOSPADM

## 2023-12-14 RX ORDER — ACETAMINOPHEN 325 MG/1
650 TABLET ORAL
OUTPATIENT
Start: 2023-12-15

## 2023-12-14 RX ORDER — DIPHENHYDRAMINE HYDROCHLORIDE 50 MG/ML
50 INJECTION INTRAMUSCULAR; INTRAVENOUS
OUTPATIENT
Start: 2023-12-15

## 2023-12-14 RX ORDER — EPINEPHRINE 1 MG/ML
0.3 INJECTION, SOLUTION, CONCENTRATE INTRAVENOUS PRN
OUTPATIENT
Start: 2023-12-15

## 2023-12-14 RX ORDER — SODIUM CHLORIDE 0.9 % (FLUSH) 0.9 %
5-40 SYRINGE (ML) INJECTION PRN
OUTPATIENT
Start: 2023-12-15

## 2023-12-14 RX ORDER — ALBUTEROL SULFATE 90 UG/1
4 AEROSOL, METERED RESPIRATORY (INHALATION) PRN
OUTPATIENT
Start: 2023-12-15

## 2023-12-14 RX ORDER — HEPARIN 100 UNIT/ML
500 SYRINGE INTRAVENOUS PRN
OUTPATIENT
Start: 2023-12-15

## 2023-12-14 RX ADMIN — IRON SUCROSE 200 MG: 20 INJECTION, SOLUTION INTRAVENOUS at 13:27

## 2023-12-14 RX ADMIN — SODIUM CHLORIDE 100 ML/HR: 9 INJECTION, SOLUTION INTRAVENOUS at 13:26

## 2023-12-14 ASSESSMENT — PAIN DESCRIPTION - LOCATION: LOCATION: KNEE

## 2023-12-14 ASSESSMENT — PAIN DESCRIPTION - ORIENTATION: ORIENTATION: LEFT

## 2023-12-14 ASSESSMENT — PAIN SCALES - GENERAL: PAINLEVEL_OUTOF10: 7

## 2023-12-14 NOTE — PROGRESS NOTES
Patient tolerated Venofer #5 treatment well without complications or complaints. Alert and oriented x3. Patient aware of potential side effects and has no questions regarding treatment. Pain assessed, patient denies any new or worsening pain.  Pt d/c via w.c to parking lot and pt ambulated with ease to his car

## 2023-12-19 ENCOUNTER — HOSPITAL ENCOUNTER (OUTPATIENT)
Dept: INFUSION THERAPY | Age: 83
Discharge: HOME | End: 2023-12-19
Payer: OTHER GOVERNMENT

## 2023-12-19 VITALS
RESPIRATION RATE: 16 BRPM | SYSTOLIC BLOOD PRESSURE: 118 MMHG | DIASTOLIC BLOOD PRESSURE: 71 MMHG | TEMPERATURE: 97.7 F | HEART RATE: 65 BPM

## 2023-12-19 DIAGNOSIS — C64.9 RENAL CELL CARCINOMA, UNSPECIFIED LATERALITY (HCC): Primary | ICD-10-CM

## 2023-12-19 PROCEDURE — 6360000002 HC RX W HCPCS: Performed by: INTERNAL MEDICINE

## 2023-12-19 PROCEDURE — 96413 CHEMO IV INFUSION 1 HR: CPT

## 2023-12-19 PROCEDURE — 2580000003 HC RX 258: Performed by: INTERNAL MEDICINE

## 2023-12-19 RX ORDER — SODIUM CHLORIDE 9 MG/ML
5-250 INJECTION, SOLUTION INTRAVENOUS PRN
Status: DISCONTINUED | OUTPATIENT
Start: 2023-12-19 | End: 2023-12-20 | Stop reason: HOSPADM

## 2023-12-19 RX ORDER — SODIUM CHLORIDE 0.9 % (FLUSH) 0.9 %
5-40 SYRINGE (ML) INJECTION PRN
Status: DISCONTINUED | OUTPATIENT
Start: 2023-12-19 | End: 2023-12-20 | Stop reason: HOSPADM

## 2023-12-19 RX ADMIN — SODIUM CHLORIDE 240 MG: 9 INJECTION, SOLUTION INTRAVENOUS at 12:19

## 2023-12-19 RX ADMIN — SODIUM CHLORIDE 20 ML/HR: 9 INJECTION, SOLUTION INTRAVENOUS at 12:18

## 2023-12-19 NOTE — PROGRESS NOTES
Wt Readings from Last 3 Encounters:   12/19/23 79.8 kg (176 lb)   12/05/23 79.5 kg (175 lb 4.8 oz)   11/21/23 77.6 kg (171 lb)     Met w/ pt during infusion. He reports improved appetite. Denies any N/V/D, though does admit to occasional constipation for which he is medically managed. He denies any nutrition needs at this time. Encouraged to contact this clinician as needed.   Electronically signed by Lexy Lebron MS, RD, LD on 12/19/2023 at 3:32 PM

## 2023-12-19 NOTE — PROGRESS NOTES
Patient tolerated treatment well without complications or complaints. Alert and oriented x3. Patient aware of potential side effects and has no questions regarding treatment. Vitals stable throughout treatment. Pain assessed, patient denies any new or worsening pain. Patient left via wheelchair.

## 2023-12-28 DIAGNOSIS — C64.9 RENAL CELL CARCINOMA, UNSPECIFIED LATERALITY (HCC): Primary | ICD-10-CM

## 2024-01-02 ENCOUNTER — HOSPITAL ENCOUNTER (OUTPATIENT)
Dept: INFUSION THERAPY | Age: 84
Discharge: HOME | End: 2024-01-02
Payer: OTHER GOVERNMENT

## 2024-01-02 ENCOUNTER — OFFICE VISIT (OUTPATIENT)
Dept: ONCOLOGY | Age: 84
End: 2024-01-02
Payer: OTHER GOVERNMENT

## 2024-01-02 VITALS
SYSTOLIC BLOOD PRESSURE: 102 MMHG | BODY MASS INDEX: 24.39 KG/M2 | DIASTOLIC BLOOD PRESSURE: 56 MMHG | OXYGEN SATURATION: 100 % | HEART RATE: 71 BPM | WEIGHT: 174.2 LBS | HEIGHT: 71 IN | TEMPERATURE: 97.6 F

## 2024-01-02 VITALS
RESPIRATION RATE: 16 BRPM | DIASTOLIC BLOOD PRESSURE: 56 MMHG | HEART RATE: 71 BPM | SYSTOLIC BLOOD PRESSURE: 104 MMHG | TEMPERATURE: 98 F

## 2024-01-02 DIAGNOSIS — I82.402 ACUTE DEEP VEIN THROMBOSIS (DVT) OF LEFT LOWER EXTREMITY, UNSPECIFIED VEIN (HCC): ICD-10-CM

## 2024-01-02 DIAGNOSIS — C64.9 RENAL CELL CARCINOMA, UNSPECIFIED LATERALITY (HCC): Primary | ICD-10-CM

## 2024-01-02 DIAGNOSIS — D64.9 ANEMIA, UNSPECIFIED TYPE: ICD-10-CM

## 2024-01-02 LAB
ALBUMIN SERPL-MCNC: 3.6 G/DL (ref 3.5–5.2)
ALP SERPL-CCNC: 105 U/L (ref 40–129)
ALT SERPL-CCNC: 12 U/L (ref 0–40)
ANION GAP SERPL CALCULATED.3IONS-SCNC: 12 MMOL/L (ref 7–16)
AST SERPL-CCNC: 18 U/L (ref 0–39)
BASOPHILS # BLD: 0 K/UL (ref 0–0.2)
BASOPHILS NFR BLD: 0 % (ref 0–2)
BILIRUB SERPL-MCNC: 0.2 MG/DL (ref 0–1.2)
BUN SERPL-MCNC: 24 MG/DL (ref 6–23)
CALCIUM SERPL-MCNC: 10 MG/DL (ref 8.6–10.2)
CHLORIDE SERPL-SCNC: 107 MMOL/L (ref 98–107)
CO2 SERPL-SCNC: 18 MMOL/L (ref 22–29)
CREAT SERPL-MCNC: 2.1 MG/DL (ref 0.7–1.2)
EOSINOPHIL # BLD: 0.1 K/UL (ref 0.05–0.5)
EOSINOPHILS RELATIVE PERCENT: 2 % (ref 0–6)
ERYTHROCYTE [DISTWIDTH] IN BLOOD BY AUTOMATED COUNT: 18.7 % (ref 11.5–15)
FERRITIN SERPL-MCNC: 154 NG/ML
GFR SERPL CREATININE-BSD FRML MDRD: 31 ML/MIN/1.73M2
GLUCOSE SERPL-MCNC: 98 MG/DL (ref 74–99)
HCT VFR BLD AUTO: 28.5 % (ref 37–54)
HGB BLD-MCNC: 9 G/DL (ref 12.5–16.5)
IRON SATN MFR SERPL: 21 % (ref 20–55)
IRON SERPL-MCNC: 53 UG/DL (ref 59–158)
LYMPHOCYTES NFR BLD: 0.55 K/UL (ref 1.5–4)
LYMPHOCYTES RELATIVE PERCENT: 10 % (ref 20–42)
MCH RBC QN AUTO: 29.8 PG (ref 26–35)
MCHC RBC AUTO-ENTMCNC: 31.6 G/DL (ref 32–34.5)
MCV RBC AUTO: 94.4 FL (ref 80–99.9)
MONOCYTES NFR BLD: 0.2 K/UL (ref 0.1–0.95)
MONOCYTES NFR BLD: 4 % (ref 2–12)
NEUTROPHILS NFR BLD: 85 % (ref 43–80)
NEUTS SEG NFR BLD: 4.86 K/UL (ref 1.8–7.3)
PLATELET # BLD AUTO: 213 K/UL (ref 130–450)
PMV BLD AUTO: 10.1 FL (ref 7–12)
POTASSIUM SERPL-SCNC: 4.6 MMOL/L (ref 3.5–5)
PROT SERPL-MCNC: 6.5 G/DL (ref 6.4–8.3)
RBC # BLD AUTO: 3.02 M/UL (ref 3.8–5.8)
RBC # BLD: ABNORMAL 10*6/UL
SODIUM SERPL-SCNC: 137 MMOL/L (ref 132–146)
TIBC SERPL-MCNC: 257 UG/DL (ref 250–450)
TSH SERPL DL<=0.05 MIU/L-ACNC: 0.25 UIU/ML (ref 0.27–4.2)
WBC OTHER # BLD: 5.7 K/UL (ref 4.5–11.5)

## 2024-01-02 PROCEDURE — 6360000002 HC RX W HCPCS: Performed by: INTERNAL MEDICINE

## 2024-01-02 PROCEDURE — 83540 ASSAY OF IRON: CPT

## 2024-01-02 PROCEDURE — 83550 IRON BINDING TEST: CPT

## 2024-01-02 PROCEDURE — 80053 COMPREHEN METABOLIC PANEL: CPT

## 2024-01-02 PROCEDURE — 85025 COMPLETE CBC W/AUTO DIFF WBC: CPT

## 2024-01-02 PROCEDURE — 82728 ASSAY OF FERRITIN: CPT

## 2024-01-02 PROCEDURE — 99214 OFFICE O/P EST MOD 30 MIN: CPT | Performed by: INTERNAL MEDICINE

## 2024-01-02 PROCEDURE — 96413 CHEMO IV INFUSION 1 HR: CPT

## 2024-01-02 PROCEDURE — 84443 ASSAY THYROID STIM HORMONE: CPT

## 2024-01-02 PROCEDURE — 2580000003 HC RX 258: Performed by: INTERNAL MEDICINE

## 2024-01-02 PROCEDURE — 3074F SYST BP LT 130 MM HG: CPT | Performed by: INTERNAL MEDICINE

## 2024-01-02 PROCEDURE — 3078F DIAST BP <80 MM HG: CPT | Performed by: INTERNAL MEDICINE

## 2024-01-02 PROCEDURE — 1123F ACP DISCUSS/DSCN MKR DOCD: CPT | Performed by: INTERNAL MEDICINE

## 2024-01-02 RX ORDER — SODIUM CHLORIDE 9 MG/ML
INJECTION, SOLUTION INTRAVENOUS CONTINUOUS
Status: CANCELLED | OUTPATIENT
Start: 2024-01-02

## 2024-01-02 RX ORDER — ACETAMINOPHEN 325 MG/1
650 TABLET ORAL
Status: CANCELLED | OUTPATIENT
Start: 2024-01-02

## 2024-01-02 RX ORDER — SODIUM CHLORIDE 0.9 % (FLUSH) 0.9 %
5-40 SYRINGE (ML) INJECTION PRN
Status: DISCONTINUED | OUTPATIENT
Start: 2024-01-02 | End: 2024-01-03 | Stop reason: HOSPADM

## 2024-01-02 RX ORDER — MEPERIDINE HYDROCHLORIDE 50 MG/ML
12.5 INJECTION INTRAMUSCULAR; INTRAVENOUS; SUBCUTANEOUS PRN
Status: CANCELLED | OUTPATIENT
Start: 2024-01-02

## 2024-01-02 RX ORDER — DIPHENHYDRAMINE HYDROCHLORIDE 50 MG/ML
50 INJECTION INTRAMUSCULAR; INTRAVENOUS
Status: CANCELLED | OUTPATIENT
Start: 2024-01-02

## 2024-01-02 RX ORDER — TRIAMCINOLONE ACETONIDE 1 MG/G
CREAM TOPICAL
Qty: 80 G | Refills: 2 | Status: SHIPPED | OUTPATIENT
Start: 2024-01-02

## 2024-01-02 RX ORDER — ONDANSETRON 2 MG/ML
8 INJECTION INTRAMUSCULAR; INTRAVENOUS
Status: CANCELLED | OUTPATIENT
Start: 2024-01-02

## 2024-01-02 RX ORDER — SODIUM CHLORIDE 9 MG/ML
5-250 INJECTION, SOLUTION INTRAVENOUS PRN
Status: DISCONTINUED | OUTPATIENT
Start: 2024-01-02 | End: 2024-01-03 | Stop reason: HOSPADM

## 2024-01-02 RX ORDER — HEPARIN SODIUM (PORCINE) LOCK FLUSH IV SOLN 100 UNIT/ML 100 UNIT/ML
500 SOLUTION INTRAVENOUS PRN
Status: CANCELLED | OUTPATIENT
Start: 2024-01-02

## 2024-01-02 RX ORDER — FAMOTIDINE 10 MG/ML
20 INJECTION, SOLUTION INTRAVENOUS
Status: CANCELLED | OUTPATIENT
Start: 2024-01-02

## 2024-01-02 RX ORDER — SODIUM CHLORIDE 9 MG/ML
5-250 INJECTION, SOLUTION INTRAVENOUS PRN
Status: CANCELLED | OUTPATIENT
Start: 2024-01-02

## 2024-01-02 RX ORDER — EPINEPHRINE 1 MG/ML
0.3 INJECTION, SOLUTION, CONCENTRATE INTRAVENOUS PRN
Status: CANCELLED | OUTPATIENT
Start: 2024-01-02

## 2024-01-02 RX ORDER — SODIUM CHLORIDE 0.9 % (FLUSH) 0.9 %
5-40 SYRINGE (ML) INJECTION PRN
Status: CANCELLED | OUTPATIENT
Start: 2024-01-02

## 2024-01-02 RX ORDER — ALBUTEROL SULFATE 90 UG/1
4 AEROSOL, METERED RESPIRATORY (INHALATION) PRN
Status: CANCELLED | OUTPATIENT
Start: 2024-01-02

## 2024-01-02 RX ADMIN — SODIUM CHLORIDE 25 ML/HR: 9 INJECTION, SOLUTION INTRAVENOUS at 12:51

## 2024-01-02 RX ADMIN — SODIUM CHLORIDE 240 MG: 9 INJECTION, SOLUTION INTRAVENOUS at 12:56

## 2024-01-02 NOTE — PROGRESS NOTES
Patient tolerated Opdivo infusion well. Vital signs stable. Discharged by wheelchair to patient's car.

## 2024-01-02 NOTE — PROGRESS NOTES
N.O. T3, Free T4, new referral to Dr. Nicola Newton with Endocrinology d/t decreased TSH. Pt notified, number provided.

## 2024-01-02 NOTE — PROGRESS NOTES
Patient denies any issues with diarrhea today. Patient states that he did have diarrhea a few days ago, but he was also taking a stool softner and laxative, which he since has stopped. Encouraged patient to notify staff if diarrhea becomes an issue.

## 2024-01-02 NOTE — PROGRESS NOTES
Patient provided with discharge instructions, received printed AVS.  All questions answered.  Patient understands follow up plan of care.  Patient aware to arrive @ 10:00 am. for labs same day first.    
parotid glands and left base of the neck, the patient had a CT-guided biopsy of the retroperitoneal lymphadenopathy on August 23, 2023, pathology was consistent with metastatic renal cell carcinoma, could not differentiate between clear and unclear cell carcinoma, [facility], was recommended Opdivo.       The patient has metastatic renal cell carcinoma, pathology could not differentiate between clear versus non clear RCC, diagnosis and prognosis were discussed with the patient, as well as recommendations for treatment with Opdivo, the side effects and the schedule of the treatment were reviewed with him,  The patient was started on Eliquis for left lower extremity DVT.  He was recently admitted to the hospital with profound anemia, hemoglobin 4.8G/DL, he received packed RBC transfusion.  The treatment start was delayed due to the hospitalization.  On 11/21/2023, the patient was started on systemic therapy, Opdivo, today 1/2/2024, labs reviewed, the patient is doing well clinically, proceed with Opdivo, cycle # 4, he has itching and intermittent skin rash, which had worsened with albuterol, prescribed Sarna and topical steroids, which has been helping.  The patient had baseline scans of the chest, abdomen and pelvis done on 12/7/2023, which I had reviewed, he has at least mild progression of the left calcified renal mass in the inferior pole up to 3.3 cm compared to 2.8 cm, progressive back retroperitoneal pathology cannot adenopathy in the aortocaval and for aortic regions, stable low attenuation focus in the right hepatic lobe, unchanged, of undetermined significance, will have a repeat scans done after 3 months.  TSH is suppressed, 0.25, which could be secondary to immune mediated hyperthyroidism, secondary to Opdivo, will refer to Endo, order T3 and free T4.    Anemia, iron studies are consistent with iron deficiency, no obvious bleeding at this time, recommended parenteral iron

## 2024-01-09 ENCOUNTER — TELEPHONE (OUTPATIENT)
Dept: CARDIOLOGY CLINIC | Age: 84
End: 2024-01-09

## 2024-01-09 ENCOUNTER — TELEPHONE (OUTPATIENT)
Dept: ONCOLOGY | Age: 84
End: 2024-01-09

## 2024-01-09 RX ORDER — LOPERAMIDE HYDROCHLORIDE 2 MG/1
2 CAPSULE ORAL 4 TIMES DAILY PRN
Qty: 40 CAPSULE | Refills: 0 | Status: SHIPPED | OUTPATIENT
Start: 2024-01-09 | End: 2024-01-19

## 2024-01-09 NOTE — TELEPHONE ENCOUNTER
Patient called in complaining of frequent diarrhea for the last three days. Dr. Chance Eagle aware and [facility], patient to come in tomorrow for blood work, and to possibly see Dr. Chance Eagle on Friday. Will then prescribe lomotil if his symptoms don't resolve. Patient reports understanding. Transferred call to  to get him scheduled.

## 2024-01-09 NOTE — TELEPHONE ENCOUNTER
Patient called in wondering who called. Could not find any information. Did attempt to call patient back, no answer.

## 2024-01-10 ENCOUNTER — TELEPHONE (OUTPATIENT)
Dept: INFUSION THERAPY | Age: 84
End: 2024-01-10

## 2024-01-10 ENCOUNTER — HOSPITAL ENCOUNTER (OUTPATIENT)
Dept: INFUSION THERAPY | Age: 84
Discharge: HOME | End: 2024-01-10

## 2024-01-10 DIAGNOSIS — C64.9 RENAL CELL CARCINOMA, UNSPECIFIED LATERALITY (HCC): Primary | ICD-10-CM

## 2024-01-10 RX ORDER — SODIUM CHLORIDE 0.9 % (FLUSH) 0.9 %
5-40 SYRINGE (ML) INJECTION PRN
Status: DISCONTINUED | OUTPATIENT
Start: 2024-01-10 | End: 2024-01-11 | Stop reason: HOSPADM

## 2024-01-10 RX ORDER — SODIUM CHLORIDE 0.9 % (FLUSH) 0.9 %
5-40 SYRINGE (ML) INJECTION PRN
Status: CANCELLED | OUTPATIENT
Start: 2024-01-10

## 2024-01-10 NOTE — TELEPHONE ENCOUNTER
Patient's family member, Joan, has called and stated that patient was out last night at dinner and incontinent of loose stool. Explained that patient had called yesterday with complaints of diarrhea and script sent for Imodium. Patient has not received medication yet. Explained to family member that Imodium can be OTC and may take 2 tabs when received . Family member states understanding

## 2024-01-10 NOTE — TELEPHONE ENCOUNTER
Patient called to cancel his appointment for blood work today. He states that he is worried he will be incontinent on his car ride. Patient states that he won't [facility]. Explained that the medication can also be picked up at a pharmacy over the counter. Patient insists on waiting for his prescription. Explained to patient how important it is for him to have the blood work done due to his frequent diarrhea. Patient does agree to come in on Friday at 1315 for blood work. Let patient know that if his symptoms get worse to please go right to the ER. Patient reports understanding.

## 2024-01-12 ENCOUNTER — CLINICAL DOCUMENTATION (OUTPATIENT)
Dept: INFUSION THERAPY | Age: 84
End: 2024-01-12

## 2024-01-12 ENCOUNTER — OFFICE VISIT (OUTPATIENT)
Dept: ONCOLOGY | Age: 84
End: 2024-01-12
Payer: OTHER GOVERNMENT

## 2024-01-12 ENCOUNTER — HOSPITAL ENCOUNTER (OUTPATIENT)
Dept: INFUSION THERAPY | Age: 84
Discharge: HOME | End: 2024-01-12
Payer: OTHER GOVERNMENT

## 2024-01-12 VITALS
WEIGHT: 173 LBS | BODY MASS INDEX: 24.22 KG/M2 | HEART RATE: 69 BPM | HEIGHT: 71 IN | OXYGEN SATURATION: 99 % | DIASTOLIC BLOOD PRESSURE: 60 MMHG | SYSTOLIC BLOOD PRESSURE: 128 MMHG | TEMPERATURE: 97.1 F

## 2024-01-12 DIAGNOSIS — R19.7 DIARRHEA, UNSPECIFIED TYPE: ICD-10-CM

## 2024-01-12 DIAGNOSIS — C64.9 RENAL CELL CARCINOMA, UNSPECIFIED LATERALITY (HCC): Primary | ICD-10-CM

## 2024-01-12 DIAGNOSIS — C64.9 RENAL CELL CARCINOMA, UNSPECIFIED LATERALITY (HCC): ICD-10-CM

## 2024-01-12 LAB
ALBUMIN SERPL-MCNC: 4 G/DL (ref 3.5–5.2)
ALP SERPL-CCNC: 107 U/L (ref 40–129)
ALT SERPL-CCNC: 10 U/L (ref 0–40)
ANION GAP SERPL CALCULATED.3IONS-SCNC: 11 MMOL/L (ref 7–16)
AST SERPL-CCNC: 18 U/L (ref 0–39)
BASOPHILS # BLD: 0.02 K/UL (ref 0–0.2)
BASOPHILS NFR BLD: 0 % (ref 0–2)
BILIRUB SERPL-MCNC: 0.3 MG/DL (ref 0–1.2)
BUN SERPL-MCNC: 18 MG/DL (ref 6–23)
CALCIUM SERPL-MCNC: 11.1 MG/DL (ref 8.6–10.2)
CHLORIDE SERPL-SCNC: 106 MMOL/L (ref 98–107)
CO2 SERPL-SCNC: 20 MMOL/L (ref 22–29)
CREAT SERPL-MCNC: 2.2 MG/DL (ref 0.7–1.2)
EOSINOPHIL # BLD: 0.32 K/UL (ref 0.05–0.5)
EOSINOPHILS RELATIVE PERCENT: 5 % (ref 0–6)
ERYTHROCYTE [DISTWIDTH] IN BLOOD BY AUTOMATED COUNT: 17.8 % (ref 11.5–15)
FERRITIN SERPL-MCNC: 98 NG/ML
GFR SERPL CREATININE-BSD FRML MDRD: 29 ML/MIN/1.73M2
GLUCOSE SERPL-MCNC: 102 MG/DL (ref 74–99)
HCT VFR BLD AUTO: 32 % (ref 37–54)
HGB BLD-MCNC: 9.9 G/DL (ref 12.5–16.5)
IMM GRANULOCYTES # BLD AUTO: <0.03 K/UL (ref 0–0.58)
IMM GRANULOCYTES NFR BLD: 0 % (ref 0–5)
IRON SATN MFR SERPL: 14 % (ref 20–55)
IRON SERPL-MCNC: 39 UG/DL (ref 59–158)
LYMPHOCYTES NFR BLD: 0.77 K/UL (ref 1.5–4)
LYMPHOCYTES RELATIVE PERCENT: 13 % (ref 20–42)
MCH RBC QN AUTO: 29.6 PG (ref 26–35)
MCHC RBC AUTO-ENTMCNC: 30.9 G/DL (ref 32–34.5)
MCV RBC AUTO: 95.5 FL (ref 80–99.9)
MONOCYTES NFR BLD: 0.31 K/UL (ref 0.1–0.95)
MONOCYTES NFR BLD: 5 % (ref 2–12)
NEUTROPHILS NFR BLD: 76 % (ref 43–80)
NEUTS SEG NFR BLD: 4.51 K/UL (ref 1.8–7.3)
PLATELET # BLD AUTO: 248 K/UL (ref 130–450)
PMV BLD AUTO: 9.8 FL (ref 7–12)
POTASSIUM SERPL-SCNC: 4.3 MMOL/L (ref 3.5–5)
PROT SERPL-MCNC: 6.9 G/DL (ref 6.4–8.3)
RBC # BLD AUTO: 3.35 M/UL (ref 3.8–5.8)
SODIUM SERPL-SCNC: 137 MMOL/L (ref 132–146)
T3 SERPL-MCNC: 112 NG/DL (ref 80–200)
T4 FREE SERPL-MCNC: 1.4 NG/DL (ref 0.9–1.7)
TIBC SERPL-MCNC: 279 UG/DL (ref 250–450)
WBC OTHER # BLD: 6 K/UL (ref 4.5–11.5)

## 2024-01-12 PROCEDURE — 84439 ASSAY OF FREE THYROXINE: CPT

## 2024-01-12 PROCEDURE — 99214 OFFICE O/P EST MOD 30 MIN: CPT | Performed by: INTERNAL MEDICINE

## 2024-01-12 PROCEDURE — 1123F ACP DISCUSS/DSCN MKR DOCD: CPT | Performed by: INTERNAL MEDICINE

## 2024-01-12 PROCEDURE — 82728 ASSAY OF FERRITIN: CPT

## 2024-01-12 PROCEDURE — 3078F DIAST BP <80 MM HG: CPT | Performed by: INTERNAL MEDICINE

## 2024-01-12 PROCEDURE — 84480 ASSAY TRIIODOTHYRONINE (T3): CPT

## 2024-01-12 PROCEDURE — 3074F SYST BP LT 130 MM HG: CPT | Performed by: INTERNAL MEDICINE

## 2024-01-12 PROCEDURE — 83540 ASSAY OF IRON: CPT

## 2024-01-12 PROCEDURE — 85025 COMPLETE CBC W/AUTO DIFF WBC: CPT

## 2024-01-12 PROCEDURE — 80053 COMPREHEN METABOLIC PANEL: CPT

## 2024-01-12 PROCEDURE — 83550 IRON BINDING TEST: CPT

## 2024-01-12 RX ORDER — DIPHENOXYLATE HYDROCHLORIDE AND ATROPINE SULFATE 2.5; .025 MG/1; MG/1
2 TABLET ORAL 4 TIMES DAILY PRN
Qty: 60 TABLET | Refills: 1 | Status: SHIPPED | OUTPATIENT
Start: 2024-01-12 | End: 2024-03-12

## 2024-01-12 NOTE — PROGRESS NOTES
Uzair Fuentes  1/12/2024  Ht Readings from Last 1 Encounters:   01/12/24 1.803 m (5' 11\")     Wt Readings from Last 10 Encounters:   01/12/24 78.5 kg (173 lb)   01/02/24 79 kg (174 lb 3.2 oz)   12/19/23 79.8 kg (176 lb)   12/05/23 79.5 kg (175 lb 4.8 oz)   11/21/23 77.6 kg (171 lb)   11/06/23 77.1 kg (170 lb)   10/10/23 81.2 kg (179 lb)   03/29/23 88 kg (194 lb)   10/11/22 88.9 kg (196 lb)   05/05/22 96.2 kg (212 lb)     Body mass index is 24.13 kg/m².    Assessment: Met w/ pt during infusion. He complains of persistent diarrhea. [facility] and feels this has been helpful, however he did have one episode of loose stool this morning. He was provided w/ script for Lomotil at today's visit. Discussed nutrition to assist w/ diarrhea management. He denies any additional needs at this time, appreciative of assistance. Will continue to follow PRN.      Mary Beth Eden, MS, RD, LD

## 2024-01-12 NOTE — PROGRESS NOTES
Patient has called and stated that he has been taking Imodium for 2 days with no relief of diarrhea. Patient had cancelled lab visit earlier this week. After reviewed with Dr Chance Eagle, patient needs to come to clinic visit with labs for further instructions. Tamela, scheduling aware

## 2024-01-12 NOTE — PROGRESS NOTES
Patient's labs reviewed with Dr Chance Eagle. Patient to drink plenty of fluids and needs to have stool sample for c diff. LG Miles infusion aware

## 2024-01-12 NOTE — PROGRESS NOTES
Patient did NOT stop at check out window, left without AVS.  Today's, 01/12/24, visit was same day add on follow up appointment.  Patient received next appointment information on AVS given 01/02/24.

## 2024-01-12 NOTE — PROGRESS NOTES
[facility] MED ONCOLOGY  [address]  [facility] [address]  Dept: [phone]  Loc: [phone]  Attending progress note      Reason for Visit:   Metastatic renal cell carcinoma.    Referring Physician:  [facility]    PCP:  Ken Liriano MD    History of Present Illness:    Mr. Fuentes is a pleasant 83-year-old gentleman, with a past medical history significant for CKD, MGUS, hyperlipidemia, hypertension, CAD, PVCs, and history of iron deficiency anemia, who had presented with left flank and back pain, he had CT scan of the abdomen the pelvis done on 3/20/2023, revealing retrocrural and retroperitoneal lymphadenopathy, multiple bilateral renal cysts, multiple bilateral hyperdense renal lesions, probable hemorrhagic cysts, CT scan of the lumbar spine had revealed a large conglomeration of soft tissue mass in the retroperitoneum, concerning for malignancy, hyperdense lesions in the kidneys, more on the left side, PET/CT scan was done on 2/12/2023, revealing retroperitoneal lymphadenopathy, measuring up to 8 cm, possible uptake in both parotid glands and left base of the neck, the patient had a CT-guided biopsy of the retroperitoneal lymphadenopathy on August 23, 2023, pathology was consistent with metastatic renal cell carcinoma, could not differentiate between clear and unclear cell carcinoma, the [facility], was [facility] Opdivo.  The patient was started on Eliquis for left lower extremity DVT.  He was recently admitted to the hospital with profound anemia, hemoglobin 4.8G/DL, he received packed RBC transfusion, he is doing well at this time, he denies any bleeding.  On 11/21/2023, the patient was started on systemic therapy, the beginning of the week the patient was having diarrhea, up to 4 episodes per day, he started taking Imodium, it is getting better, he had diarrhea 1 time today.    Review of Systems;  CONSTITUTIONAL: No

## 2024-01-12 NOTE — PROGRESS NOTES
IV to KARRIE removed-site clear-bandaid applied-Patient was encouraged to increase oral intake and to collect stool sample for c-diff ASAP and to return sample to [facility]-Pt voiced understanding of all instructions-Pt taken to [facility] per his request via w/c-Pt in stable condition

## 2024-01-16 ENCOUNTER — TELEPHONE (OUTPATIENT)
Dept: INFUSION THERAPY | Age: 84
End: 2024-01-16

## 2024-01-16 ENCOUNTER — TELEPHONE (OUTPATIENT)
Dept: ONCOLOGY | Age: 84
End: 2024-01-16

## 2024-01-16 ENCOUNTER — HOSPITAL ENCOUNTER (OUTPATIENT)
Dept: INFUSION THERAPY | Age: 84
Discharge: HOME | End: 2024-01-16

## 2024-01-16 DIAGNOSIS — C64.9 RENAL CELL CARCINOMA, UNSPECIFIED LATERALITY (HCC): Primary | ICD-10-CM

## 2024-01-16 RX ORDER — SODIUM CHLORIDE 0.9 % (FLUSH) 0.9 %
5-40 SYRINGE (ML) INJECTION PRN
OUTPATIENT
Start: 2024-01-16

## 2024-01-16 RX ORDER — SODIUM CHLORIDE 0.9 % (FLUSH) 0.9 %
5-40 SYRINGE (ML) INJECTION PRN
Status: DISCONTINUED | OUTPATIENT
Start: 2024-01-16 | End: 2024-01-17 | Stop reason: HOSPADM

## 2024-01-16 NOTE — TELEPHONE ENCOUNTER
Patient called inquiring about his appt, patient missed hs appt today and rescheduled to 1/19 to see . Patient then stated to  staff he woke up with flu like symptoms, a fever and is unable to keep food down, or keep liquids in w/o diarrhea. Feels like medications given by  hasn't been working. Ludivina called also asking about appt's told her Uzair called beforehand and straightened it out. She worries about his physical decline and his unwillingness to go to ER.

## 2024-01-16 NOTE — TELEPHONE ENCOUNTER
Patient's ex-wife called in due to concern for patient's health. He has flu like symptoms of nausea, diarrhea, fever, weakness, decreased appetite. Patient earlier had missed an appt today and called to reschedule for Friday only complaining of constant diarrhea. After speaking to Dr. Chance Eagle she recommends patient go to the ER as soon as possible. Called and left patient a message regarding this on his voicemail. Also, reached out to patient's ex-wife to have patient go to the ER. She said she would do her best to encourage him to go, but that she didn't think she could take him due to the weather. She will do her best to encourage him to go or find a way to get him there as soon as possible.

## 2024-01-19 ENCOUNTER — TELEPHONE (OUTPATIENT)
Dept: ONCOLOGY | Age: 84
End: 2024-01-19

## 2024-01-19 NOTE — TELEPHONE ENCOUNTER
CALLED PT PER LG TAVERAS. PT HAD MISSED APPT @1:45. LVM ON PT'S PHONE AND CALLED HIS EX-WIFE. SHE HAS NOT SPOKE W/HIM BUT SHE WILL KEEP TRYING. SHE STATED HE WILL NOT COME IN AS LONG AS HE HAS DIARRHEA. I ASKED IF SHE OR THE PT COULD PLEASE CALL THE OFFICE WITH PT CONDITION AND SHE STATED SHE WOULD.

## 2024-01-22 ENCOUNTER — TELEPHONE (OUTPATIENT)
Dept: ONCOLOGY | Age: 84
End: 2024-01-22

## 2024-01-22 NOTE — TELEPHONE ENCOUNTER
CALLED PT REGARDING MISSED 1/19/24 APPT WENT STRAIGHT TO . CALLED PT'S EX-WIFE TWICE AND PHONE ANSWERED BUT WAS FOLLOWED BY [facility]. I ALSO CALLED PT'S DAUGHTER AND LVM.

## 2024-01-24 ENCOUNTER — APPOINTMENT (OUTPATIENT)
Dept: CT IMAGING | Age: 84
End: 2024-01-24
Attending: STUDENT IN AN ORGANIZED HEALTH CARE EDUCATION/TRAINING PROGRAM
Payer: OTHER GOVERNMENT

## 2024-01-24 ENCOUNTER — HOSPITAL ENCOUNTER (INPATIENT)
Age: 84
LOS: 8 days | Discharge: NURSING, REHAB OR LONG-TERM CARE | End: 2024-02-02
Attending: STUDENT IN AN ORGANIZED HEALTH CARE EDUCATION/TRAINING PROGRAM | Admitting: INTERNAL MEDICINE
Payer: OTHER GOVERNMENT

## 2024-01-24 ENCOUNTER — APPOINTMENT (OUTPATIENT)
Dept: GENERAL RADIOLOGY | Age: 84
End: 2024-01-24
Payer: OTHER GOVERNMENT

## 2024-01-24 ENCOUNTER — TELEPHONE (OUTPATIENT)
Dept: INFUSION THERAPY | Age: 84
End: 2024-01-24

## 2024-01-24 DIAGNOSIS — K86.1 CHRONIC PANCREATITIS, UNSPECIFIED PANCREATITIS TYPE (HCC): ICD-10-CM

## 2024-01-24 DIAGNOSIS — R79.89 ELEVATED TROPONIN: Primary | ICD-10-CM

## 2024-01-24 DIAGNOSIS — N18.32 STAGE 3B CHRONIC KIDNEY DISEASE (HCC): ICD-10-CM
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
## 2024-01-24 DIAGNOSIS — E61.1 IRON DEFICIENCY: ICD-10-CM

## 2024-01-24 DIAGNOSIS — C64.9 RENAL CELL CARCINOMA, UNSPECIFIED LATERALITY (HCC): ICD-10-CM

## 2024-01-24 DIAGNOSIS — N18.9 CHRONIC KIDNEY DISEASE, UNSPECIFIED CKD STAGE: ICD-10-CM

## 2024-01-24 LAB
ALBUMIN SERPL-MCNC: 3.5 G/DL (ref 3.5–5.2)
ALP SERPL-CCNC: 110 U/L (ref 40–129)
ALT SERPL-CCNC: 43 U/L (ref 0–40)
ANION GAP SERPL CALCULATED.3IONS-SCNC: 11 MMOL/L (ref 7–16)
AST SERPL-CCNC: 89 U/L (ref 0–39)
BASOPHILS # BLD: 0.01 K/UL (ref 0–0.2)
BASOPHILS NFR BLD: 0 % (ref 0–2)
BILIRUB SERPL-MCNC: 0.4 MG/DL (ref 0–1.2)
BILIRUB UR QL STRIP: NEGATIVE
BUN SERPL-MCNC: 39 MG/DL (ref 6–23)
CALCIUM SERPL-MCNC: 12.3 MG/DL (ref 8.6–10.2)
CHLORIDE SERPL-SCNC: 102 MMOL/L (ref 98–107)
CLARITY UR: CLEAR
CO2 SERPL-SCNC: 20 MMOL/L (ref 22–29)
COLOR UR: YELLOW
CREAT SERPL-MCNC: 2.7 MG/DL (ref 0.7–1.2)
EOSINOPHIL # BLD: 0 K/UL (ref 0.05–0.5)
EOSINOPHILS RELATIVE PERCENT: 0 % (ref 0–6)
ERYTHROCYTE [DISTWIDTH] IN BLOOD BY AUTOMATED COUNT: 16.5 % (ref 11.5–15)
GFR SERPL CREATININE-BSD FRML MDRD: 23 ML/MIN/1.73M2
GLUCOSE SERPL-MCNC: 117 MG/DL (ref 74–99)
GLUCOSE UR STRIP-MCNC: 100 MG/DL
HCT VFR BLD AUTO: 31.5 % (ref 37–54)
HGB BLD-MCNC: 10 G/DL (ref 12.5–16.5)
HGB UR QL STRIP.AUTO: NEGATIVE
IMM GRANULOCYTES # BLD AUTO: 0.08 K/UL (ref 0–0.58)
IMM GRANULOCYTES NFR BLD: 1 % (ref 0–5)
KETONES UR STRIP-MCNC: NEGATIVE MG/DL
LEUKOCYTE ESTERASE UR QL STRIP: NEGATIVE
LYMPHOCYTES NFR BLD: 0.48 K/UL (ref 1.5–4)
LYMPHOCYTES RELATIVE PERCENT: 5 % (ref 20–42)
MAGNESIUM SERPL-MCNC: 2 MG/DL (ref 1.6–2.6)
MCH RBC QN AUTO: 28.9 PG (ref 26–35)
MCHC RBC AUTO-ENTMCNC: 31.7 G/DL (ref 32–34.5)
MCV RBC AUTO: 91 FL (ref 80–99.9)
MONOCYTES NFR BLD: 0.26 K/UL (ref 0.1–0.95)
MONOCYTES NFR BLD: 3 % (ref 2–12)
NEUTROPHILS NFR BLD: 92 % (ref 43–80)
NEUTS SEG NFR BLD: 9.39 K/UL (ref 1.8–7.3)
NITRITE UR QL STRIP: NEGATIVE
PH UR STRIP: 5.5 [PH] (ref 5–9)
PLATELET # BLD AUTO: 259 K/UL (ref 130–450)
PMV BLD AUTO: 11 FL (ref 7–12)
POTASSIUM SERPL-SCNC: 4.5 MMOL/L (ref 3.5–5)
PROT SERPL-MCNC: 6 G/DL (ref 6.4–8.3)
PROT UR STRIP-MCNC: NEGATIVE MG/DL
RBC # BLD AUTO: 3.46 M/UL (ref 3.8–5.8)
RBC # BLD: ABNORMAL 10*6/UL
RBC #/AREA URNS HPF: ABNORMAL /HPF
SODIUM SERPL-SCNC: 133 MMOL/L (ref 132–146)
SP GR UR STRIP: 1.01 (ref 1–1.03)
TROPONIN I SERPL HS-MCNC: 206 NG/L (ref 0–11)
TROPONIN I SERPL HS-MCNC: 228 NG/L (ref 0–11)
UROBILINOGEN UR STRIP-ACNC: 0.2 EU/DL (ref 0–1)
WBC #/AREA URNS HPF: ABNORMAL /HPF
WBC OTHER # BLD: 10.2 K/UL (ref 4.5–11.5)

## 2024-01-24 PROCEDURE — 83735 ASSAY OF MAGNESIUM: CPT

## 2024-01-24 PROCEDURE — 81001 URINALYSIS AUTO W/SCOPE: CPT

## 2024-01-24 PROCEDURE — 6360000002 HC RX W HCPCS: Performed by: STUDENT IN AN ORGANIZED HEALTH CARE EDUCATION/TRAINING PROGRAM

## 2024-01-24 PROCEDURE — 70450 CT HEAD/BRAIN W/O DYE: CPT

## 2024-01-24 PROCEDURE — 71045 X-RAY EXAM CHEST 1 VIEW: CPT

## 2024-01-24 PROCEDURE — 85025 COMPLETE CBC W/AUTO DIFF WBC: CPT

## 2024-01-24 PROCEDURE — 96375 TX/PRO/DX INJ NEW DRUG ADDON: CPT

## 2024-01-24 PROCEDURE — 74176 CT ABD & PELVIS W/O CONTRAST: CPT

## 2024-01-24 PROCEDURE — 72125 CT NECK SPINE W/O DYE: CPT

## 2024-01-24 PROCEDURE — 96374 THER/PROPH/DIAG INJ IV PUSH: CPT

## 2024-01-24 PROCEDURE — 99285 EMERGENCY DEPT VISIT HI MDM: CPT

## 2024-01-24 PROCEDURE — 84484 ASSAY OF TROPONIN QUANT: CPT

## 2024-01-24 PROCEDURE — 80053 COMPREHEN METABOLIC PANEL: CPT

## 2024-01-24 PROCEDURE — C9113 INJ PANTOPRAZOLE SODIUM, VIA: HCPCS | Performed by: STUDENT IN AN ORGANIZED HEALTH CARE EDUCATION/TRAINING PROGRAM

## 2024-01-24 PROCEDURE — 72131 CT LUMBAR SPINE W/O DYE: CPT

## 2024-01-24 PROCEDURE — 93005 ELECTROCARDIOGRAM TRACING: CPT | Performed by: STUDENT IN AN ORGANIZED HEALTH CARE EDUCATION/TRAINING PROGRAM

## 2024-01-24 RX ORDER — PANTOPRAZOLE SODIUM 40 MG/10ML
80 INJECTION, POWDER, LYOPHILIZED, FOR SOLUTION INTRAVENOUS ONCE
Status: COMPLETED | OUTPATIENT
Start: 2024-01-24 | End: 2024-01-24

## 2024-01-24 RX ORDER — FENTANYL CITRATE 50 UG/ML
50 INJECTION, SOLUTION INTRAMUSCULAR; INTRAVENOUS ONCE
Status: COMPLETED | OUTPATIENT
Start: 2024-01-24 | End: 2024-01-24

## 2024-01-24 RX ORDER — ASPIRIN 81 MG/1
324 TABLET, CHEWABLE ORAL ONCE
Status: COMPLETED | OUTPATIENT
Start: 2024-01-24 | End: 2024-01-25

## 2024-01-24 RX ADMIN — FENTANYL CITRATE 50 MCG: 50 INJECTION, SOLUTION INTRAMUSCULAR; INTRAVENOUS at 18:33

## 2024-01-24 RX ADMIN — PANTOPRAZOLE SODIUM 80 MG: 40 INJECTION, POWDER, LYOPHILIZED, FOR SOLUTION INTRAVENOUS at 18:33

## 2024-01-24 ASSESSMENT — PAIN SCALES - GENERAL: PAINLEVEL_OUTOF10: 8

## 2024-01-24 ASSESSMENT — PAIN DESCRIPTION - DESCRIPTORS: DESCRIPTORS: ACHING

## 2024-01-24 ASSESSMENT — PAIN DESCRIPTION - FREQUENCY: FREQUENCY: CONTINUOUS

## 2024-01-24 ASSESSMENT — PAIN - FUNCTIONAL ASSESSMENT: PAIN_FUNCTIONAL_ASSESSMENT: 0-10

## 2024-01-24 ASSESSMENT — PAIN DESCRIPTION - PAIN TYPE: TYPE: ACUTE PAIN

## 2024-01-24 ASSESSMENT — PAIN DESCRIPTION - LOCATION: LOCATION: GROIN

## 2024-01-24 NOTE — ED PROVIDER NOTES
[facility] EMERGENCY DEPARTMENT  EMERGENCY DEPARTMENT ENCOUNTER    Pt Name: Uzair Fuentes  MRN: [id number]  Birthdate 1940  Date of evaluation: 1/24/2024  Provider: Bandar Sparrow MD  PCP: Ken Liriano MD  Note Started: 5:05 PM EST 1/24/24    HPI     Patient is a 83 y.o. male presents with a chief complaint of   Chief Complaint   Patient presents with    Fall     Slipped on ice, hit head, c/o head, tailbone, and right groin pain, also states he has bright red blood in stool    .    Patient presents for follow-up.  Patient reportedly has been having dark tarry stool over the past couple of days to approximately a week.  Patient stated that he is having pain in his lower abdomen.  This has been going on for a while but have been worse after he fell.  Patient does take aspirin.  Fell backwards and hit the back of his head.  Patient did lose conscious.  No chest pain or shortness of breath.  Patient states that he has pain in his tailbone and lower abdomen that is gotten worse since he fell.  No changes in urinary habits.  Patient does have a history of a GI bleed and patient is concerned that he needs blood.    Nursing Notes were all reviewed and agreed with or any disagreements were addressed in the HPI.    History From: patient    Review of Systems   Pertinent positives and negatives as per HPI.     Physical Exam  Vitals and nursing note reviewed.   Constitutional:       Appearance: He is well-developed.   HENT:      Head: Normocephalic and atraumatic.   Eyes:      Conjunctiva/sclera: Conjunctivae normal.   Cardiovascular:      Rate and Rhythm: Normal rate and regular rhythm.      Heart sounds: Normal heart sounds. No murmur heard.  Pulmonary:      Effort: Pulmonary effort is normal. No respiratory distress.      Breath sounds: Normal breath sounds. No wheezing or rales.   Abdominal:      General: Bowel sounds are normal.      Palpations: Abdomen is soft.       osseous abnormality of the cervical spine.   2. 2.6 cm left supraclavicular adenopathy.      RECOMMENDATIONS:   Careful clinical correlation and follow up recommended.         CT LUMBAR SPINE WO CONTRAST   Final Result   1. No acute osseous abnormality of the lumbar spine.   2. 10.8 x 6.5 x 9.8 cm periaortic lymph node mass suggesting lymphoma or   other malignant process.   3. Partially calcified mass of the left renal lower pole incompletely   visualized.      RECOMMENDATIONS:   Careful clinical correlation and follow up recommended.         CT ABDOMEN PELVIS WO CONTRAST Additional Contrast? None   Final Result   1. No acute intra-abdominal or pelvic abnormality.   2. Slight progressive increase in size of the partially calcified left lower   pole renal mass.   3. Stable retroperitoneal lymph node mass.   4. Cholelithiasis.   5. Chronic pancreatitis.   6. Diverticulosis.   7. Atherosclerosis.   8. Prostatic enlargement. Correlate PSA.      RECOMMENDATIONS:   Careful clinical correlation and follow up recommended.         XR CHEST PORTABLE   Final Result   No acute process.                 ------------------------- NURSING NOTES AND VITALS REVIEWED ---------------------------  Date / Time Roomed:  1/24/2024  4:14 PM  ED Bed Assignment:  18A/18A-18    The nursing notes within the ED encounter and vital signs as below have been reviewed.     Patient Vitals for the past 24 hrs:   BP Temp Pulse Resp SpO2 Weight   01/25/24 0000 (!) 144/70 -- 90 17 97 % --   01/24/24 1620 128/76 97.6 °F (36.4 °C) 67 18 99 % 78.5 kg (173 lb)       Oxygen Saturation Interpretation: Normal    ------------------------------------------ PROGRESS NOTES ------------------------------------------  Re-evaluation(s):   Patient’s symptoms show no change  Repeat physical examination is not changed    Counseling:  I have spoken with the patient and discussed today’s results, in addition to providing specific details for the plan of care and

## 2024-01-25 ENCOUNTER — APPOINTMENT (OUTPATIENT)
Dept: CT IMAGING | Age: 84
End: 2024-01-25
Attending: INTERNAL MEDICINE
Payer: OTHER GOVERNMENT

## 2024-01-25 PROBLEM — R79.89 ELEVATED TROPONIN: Status: ACTIVE | Noted: 2024-01-25

## 2024-01-25 LAB
EKG ATRIAL RATE: 71 BPM
EKG P AXIS: 38 DEGREES
EKG P-R INTERVAL: 288 MS
EKG Q-T INTERVAL: 398 MS
EKG QRS DURATION: 124 MS
EKG QTC CALCULATION (BAZETT): 432 MS
EKG R AXIS: -19 DEGREES
EKG T AXIS: 1 DEGREES
EKG VENTRICULAR RATE: 71 BPM
TROPONIN I SERPL HS-MCNC: 232 NG/L (ref 0–11)
TROPONIN I SERPL HS-MCNC: 238 NG/L (ref 0–11)
TROPONIN I SERPL HS-MCNC: 240 NG/L (ref 0–11)
TROPONIN I SERPL HS-MCNC: 243 NG/L (ref 0–11)
TROPONIN I SERPL HS-MCNC: 250 NG/L (ref 0–11)
TROPONIN I SERPL HS-MCNC: 252 NG/L (ref 0–11)

## 2024-01-25 PROCEDURE — 6370000000 HC RX 637 (ALT 250 FOR IP): Performed by: INTERNAL MEDICINE

## 2024-01-25 PROCEDURE — 36415 COLL VENOUS BLD VENIPUNCTURE: CPT

## 2024-01-25 PROCEDURE — 2580000003 HC RX 258: Performed by: STUDENT IN AN ORGANIZED HEALTH CARE EDUCATION/TRAINING PROGRAM

## 2024-01-25 PROCEDURE — 2580000003 HC RX 258: Performed by: INTERNAL MEDICINE

## 2024-01-25 PROCEDURE — 84484 ASSAY OF TROPONIN QUANT: CPT

## 2024-01-25 PROCEDURE — 2140000000 HC CCU INTERMEDIATE R&B

## 2024-01-25 PROCEDURE — 6370000000 HC RX 637 (ALT 250 FOR IP): Performed by: STUDENT IN AN ORGANIZED HEALTH CARE EDUCATION/TRAINING PROGRAM

## 2024-01-25 PROCEDURE — 93010 ELECTROCARDIOGRAM REPORT: CPT | Performed by: INTERNAL MEDICINE

## 2024-01-25 PROCEDURE — 99222 1ST HOSP IP/OBS MODERATE 55: CPT | Performed by: INTERNAL MEDICINE

## 2024-01-25 PROCEDURE — 93005 ELECTROCARDIOGRAM TRACING: CPT | Performed by: INTERNAL MEDICINE

## 2024-01-25 PROCEDURE — 70450 CT HEAD/BRAIN W/O DYE: CPT

## 2024-01-25 RX ORDER — SODIUM CHLORIDE 0.9 % (FLUSH) 0.9 %
5-40 SYRINGE (ML) INJECTION EVERY 12 HOURS SCHEDULED
Status: DISCONTINUED | OUTPATIENT
Start: 2024-01-25 | End: 2024-02-02 | Stop reason: HOSPADM

## 2024-01-25 RX ORDER — SODIUM CHLORIDE 9 MG/ML
INJECTION, SOLUTION INTRAVENOUS PRN
Status: DISCONTINUED | OUTPATIENT
Start: 2024-01-25 | End: 2024-02-02 | Stop reason: HOSPADM

## 2024-01-25 RX ORDER — ACETAMINOPHEN 325 MG/1
650 TABLET ORAL EVERY 6 HOURS PRN
Status: DISCONTINUED | OUTPATIENT
Start: 2024-01-25 | End: 2024-02-02 | Stop reason: HOSPADM

## 2024-01-25 RX ORDER — ONDANSETRON 4 MG/1
4 TABLET, ORALLY DISINTEGRATING ORAL EVERY 8 HOURS PRN
Status: DISCONTINUED | OUTPATIENT
Start: 2024-01-25 | End: 2024-02-02 | Stop reason: HOSPADM

## 2024-01-25 RX ORDER — PANTOPRAZOLE SODIUM 40 MG/1
40 TABLET, DELAYED RELEASE ORAL
Status: DISCONTINUED | OUTPATIENT
Start: 2024-01-25 | End: 2024-02-02 | Stop reason: HOSPADM

## 2024-01-25 RX ORDER — SENNOSIDES A AND B 8.6 MG/1
1 TABLET, FILM COATED ORAL DAILY PRN
Status: DISCONTINUED | OUTPATIENT
Start: 2024-01-25 | End: 2024-02-02 | Stop reason: HOSPADM

## 2024-01-25 RX ORDER — ONDANSETRON 2 MG/ML
4 INJECTION INTRAMUSCULAR; INTRAVENOUS EVERY 6 HOURS PRN
Status: DISCONTINUED | OUTPATIENT
Start: 2024-01-25 | End: 2024-02-02 | Stop reason: HOSPADM

## 2024-01-25 RX ORDER — TAMSULOSIN HYDROCHLORIDE 0.4 MG/1
0.4 CAPSULE ORAL
Status: DISCONTINUED | OUTPATIENT
Start: 2024-01-25 | End: 2024-02-02 | Stop reason: HOSPADM

## 2024-01-25 RX ORDER — MAGNESIUM HYDROXIDE/ALUMINUM HYDROXICE/SIMETHICONE 120; 1200; 1200 MG/30ML; MG/30ML; MG/30ML
30 SUSPENSION ORAL EVERY 6 HOURS PRN
Status: DISCONTINUED | OUTPATIENT
Start: 2024-01-25 | End: 2024-02-02 | Stop reason: HOSPADM

## 2024-01-25 RX ORDER — ASPIRIN 81 MG/1
81 TABLET ORAL DAILY
Status: DISCONTINUED | OUTPATIENT
Start: 2024-01-25 | End: 2024-01-29

## 2024-01-25 RX ORDER — 0.9 % SODIUM CHLORIDE 0.9 %
1000 INTRAVENOUS SOLUTION INTRAVENOUS ONCE
Status: COMPLETED | OUTPATIENT
Start: 2024-01-25 | End: 2024-01-25

## 2024-01-25 RX ORDER — ATORVASTATIN CALCIUM 40 MG/1
40 TABLET, FILM COATED ORAL NIGHTLY
Status: DISCONTINUED | OUTPATIENT
Start: 2024-01-25 | End: 2024-02-02 | Stop reason: HOSPADM

## 2024-01-25 RX ORDER — SODIUM CHLORIDE 0.9 % (FLUSH) 0.9 %
5-40 SYRINGE (ML) INJECTION PRN
Status: DISCONTINUED | OUTPATIENT
Start: 2024-01-25 | End: 2024-02-02 | Stop reason: HOSPADM

## 2024-01-25 RX ORDER — ACETAMINOPHEN 650 MG/1
650 SUPPOSITORY RECTAL EVERY 6 HOURS PRN
Status: DISCONTINUED | OUTPATIENT
Start: 2024-01-25 | End: 2024-02-02 | Stop reason: HOSPADM

## 2024-01-25 RX ORDER — FINASTERIDE 5 MG/1
5 TABLET, FILM COATED ORAL DAILY
Status: DISCONTINUED | OUTPATIENT
Start: 2024-01-25 | End: 2024-02-02 | Stop reason: HOSPADM

## 2024-01-25 RX ADMIN — SODIUM CHLORIDE 1000 ML: 9 INJECTION, SOLUTION INTRAVENOUS at 00:52

## 2024-01-25 RX ADMIN — METOPROLOL TARTRATE 75 MG: 25 TABLET, FILM COATED ORAL at 12:59

## 2024-01-25 RX ADMIN — ACETAMINOPHEN 325MG 650 MG: 325 TABLET ORAL at 08:10

## 2024-01-25 RX ADMIN — TAMSULOSIN HYDROCHLORIDE 0.4 MG: 0.4 CAPSULE ORAL at 21:25

## 2024-01-25 RX ADMIN — ATORVASTATIN CALCIUM 40 MG: 40 TABLET, FILM COATED ORAL at 21:25

## 2024-01-25 RX ADMIN — ASPIRIN 81 MG: 81 TABLET, COATED ORAL at 12:59

## 2024-01-25 RX ADMIN — Medication 10 ML: at 21:25

## 2024-01-25 RX ADMIN — PANTOPRAZOLE SODIUM 40 MG: 40 TABLET, DELAYED RELEASE ORAL at 12:59

## 2024-01-25 RX ADMIN — ASPIRIN 324 MG: 81 TABLET, CHEWABLE ORAL at 00:05

## 2024-01-25 RX ADMIN — FINASTERIDE 5 MG: 5 TABLET, FILM COATED ORAL at 15:45

## 2024-01-25 RX ADMIN — PANTOPRAZOLE SODIUM 40 MG: 40 TABLET, DELAYED RELEASE ORAL at 15:45

## 2024-01-25 RX ADMIN — Medication 10 ML: at 13:02

## 2024-01-25 ASSESSMENT — PAIN SCALES - GENERAL
PAINLEVEL_OUTOF10: 0
PAINLEVEL_OUTOF10: 8

## 2024-01-25 ASSESSMENT — PAIN DESCRIPTION - LOCATION: LOCATION: ABDOMEN

## 2024-01-25 NOTE — ED NOTES
Report given to 6500, RN .   Report method by phone   The following was reviewed with receiving RN:   Current vital signs:  BP (!) 122/54   Pulse 83   Temp 97.6 °F (36.4 °C)   Resp 23   Wt 78.5 kg (173 lb)   SpO2 98%   BMI 24.13 kg/m²                MEWS Score: 1     Any medication or safety alerts were reviewed. Any pending diagnostics and notifications were also reviewed, as well as any safety concerns or issues, abnormal labs, abnormal imaging, and abnormal assessment findings. Questions were answered.

## 2024-01-25 NOTE — FLOWSHEET NOTE
Patient arrived to unit with the following belongings:   01/25/24 1405   Belongings   Dental Appliances Partials;At home   Vision - Corrective Lenses Eyeglasses;At bedside   Hearing Aid None   Clothing Hat;Pajamas;Footwear;At bedside   Jewelry None   Body Piercings Removed N/A   Electronic Devices Cell Phone   Weapons (Notify Protective Services/Security) None   Other Valuables Wallet;[place]   Home Medications Kept at bedside   Valuables Given To Patient   Provide Name(s) of Who Valuable(s) Were Given To Uzair Fuentes

## 2024-01-25 NOTE — PROGRESS NOTES
History and physical from earlier today noted  Patient was seen in person and examined  Patient continues to be confused pleasantly  Denies any new complaints  Per H&P patient was admitted because of frequent falls.  Patient was also noted to have acute kidney injury and elevated troponin  Patient has had no cardiac symptoms  Plan is to trend troponin  Eliquis is held because of frequent falls  Patient has a history of metastatic renal cell carcinoma  Oncology following  Monitor hemoglobin  PT OT

## 2024-01-25 NOTE — H&P
[facility] Hospitalist Group History and Physical    PATIENT NAME:  Uzair Fuentes    MRN:  [id number]  SERVICE DATE:  24    Primary Care Physician: Ken Liriano MD       SUBJECTIVE  CHIEF COMPLAINT:  had concerns including Fall (Slipped on ice, hit head, c/o head, tailbone, and right groin pain, also states he has bright red blood in stool ).    HPI:  Mr. Uzair Fuentes, a 83 y.o. year old male  who  has a past medical history of Cancer (HCC), Disease of blood and blood forming organ, and Hypertension. presents with Fall (Slipped on ice, hit head, c/o head, tailbone, and right groin pain, also states he has bright red blood in stool )  , falls, and more frequent recently, backward and hit back of his head, noted to have MOISE and elevated troponin by ER team. No chest pain, no SOB, no abdominal pain nausea or vomiting.     PAST MEDICAL HISTORY:    Past Medical History:   Diagnosis Date    Cancer (HCC)     RENAL    Disease of blood and blood forming organ     Hypertension      PAST SURGICAL HISTORY:    Past Surgical History:   Procedure Laterality Date    ABDOMINAL AORTIC ANEURYSM REPAIR      COLONOSCOPY      TONSILLECTOMY      TOTAL KNEE ARTHROPLASTY Bilateral     UPPER GASTROINTESTINAL ENDOSCOPY N/A 10/14/2022    EGD DIAGNOSTIC ONLY performed by Joshua Avila [place] [facility]     FAMILY HISTORY:    Family History   Problem Relation Age of Onset    Heart Disease Father     Heart Attack Father      SOCIAL HISTORY:    Social History     Socioeconomic History    Marital status:      Spouse name: Not on file    Number of children: Not on file    Years of education: Not on file    Highest education level: Not on file   Occupational History    Not on file   Tobacco Use    Smoking status: Former     Current packs/day: 0.00     Types: Cigarettes     Quit date:      Years since quittin.0    Smokeless tobacco: Never   Vaping Use    Vaping Use: Never used   Substance and Sexual  aorta, similar to the prior examination.  Small, fatty periumbilical hernia.  Sclerotic aorta without aneurysm. Bones/Soft Tissues: No acute osseous or body wall soft tissue abnormalities.     1. No acute intra-abdominal or pelvic abnormality. 2. Slight progressive increase in size of the partially calcified left lower pole renal mass. 3. Stable retroperitoneal lymph node mass. 4. Cholelithiasis. 5. Chronic pancreatitis. 6. Diverticulosis. 7. Atherosclerosis. 8. Prostatic enlargement. Correlate PSA. RECOMMENDATIONS: Careful clinical correlation and follow up recommended.     CT CSpine W/O Contrast    Result Date: 1/24/2024  EXAMINATION: CT OF THE CERVICAL SPINE WITHOUT CONTRAST 1/24/2024 9:59 pm TECHNIQUE: CT of the cervical spine was performed without the administration of intravenous contrast. Multiplanar reformatted images are provided for review. Automated exposure control, iterative reconstruction, and/or weight based adjustment of the mA/kV was utilized to reduce the radiation dose to as low as reasonably achievable. COMPARISON: None. HISTORY: ORDERING SYSTEM PROVIDED HISTORY: fall TECHNOLOGIST PROVIDED HISTORY: Reason for exam:->fall Decision Support Exception - unselect if not a suspected or confirmed emergency medical condition->Emergency Medical Condition (MA) What reading provider will be dictating this exam?->CRC FINDINGS: BONES/ALIGNMENT: There is no acute fracture or traumatic malalignment. DEGENERATIVE CHANGES: No significant degenerative changes. SOFT TISSUES: There is no prevertebral soft tissue swelling.  2.6 cm left supraclavicular adenopathy.     1. No acute osseous abnormality of the cervical spine. 2. 2.6 cm left supraclavicular adenopathy. RECOMMENDATIONS: Careful clinical correlation and follow up recommended.     CT LUMBAR SPINE WO CONTRAST    Result Date: 1/24/2024  EXAMINATION: CT OF THE LUMBAR SPINE WITHOUT CONTRAST  1/24/2024 TECHNIQUE: CT of the lumbar spine was performed without the

## 2024-01-25 NOTE — PROGRESS NOTES
4 Eyes Skin Assessment     NAME:  Uzair Fuentes  YOB: 1940  MEDICAL RECORD NUMBER:  [id number]    The patient is being assessed for  Admission    I agree that at least one RN has performed a thorough Head to Toe Skin Assessment on the patient. ALL assessment sites listed below have been assessed.      Areas assessed by both nurses:    Head, Face, Ears, Shoulders, Back, Chest, Arms, Elbows, Hands, Sacrum. Buttock, Coccyx, Ischium, and Legs. Feet and Heels        Does the Patient have a Wound? No noted wound(s)       Jaylen Prevention initiated by RN: Yes  Wound Care Orders initiated by RN: No    Pressure Injury (Stage 3,4, Unstageable, DTI, NWPT, and Complex wounds) if present, place Wound referral order by RN under : Yes    New Ostomies, if present place, Ostomy referral order under : No     Nurse 1 eSignature: Electronically signed by Sheeba Mercado RN on 1/25/24 at 3:57 PM EST    **SHARE this note so that the co-signing nurse can place an eSignature**    Nurse 2 eSignature: Electronically signed by Lauren Frommelt, RN on 1/25/24 at 5:19 PM EST

## 2024-01-25 NOTE — PROGRESS NOTES
Home medication list reviewed. Home medications were with patient at bedside and have been placed in Medication Room on unit.

## 2024-01-25 NOTE — PLAN OF CARE
Problem: Pain  Goal: Verbalizes/displays adequate comfort level or baseline comfort level  Outcome: Progressing     Problem: Skin/Tissue Integrity  Goal: Absence of new skin breakdown  Outcome: Progressing     Problem: Discharge Planning  Goal: Discharge to home or other facility with appropriate resources  Outcome: Progressing

## 2024-01-25 NOTE — PROGRESS NOTES
Patient's GFR is 23 and not in injectable range per CT protocol. Waiver needed prior to contrast injection or changed to without contrast.

## 2024-01-26 LAB
BASOPHILS # BLD: 0 K/UL (ref 0–0.2)
BASOPHILS NFR BLD: 0 % (ref 0–2)
EKG ATRIAL RATE: 91 BPM
EKG ATRIAL RATE: 96 BPM
EKG P AXIS: 39 DEGREES
EKG P AXIS: 45 DEGREES
EKG P-R INTERVAL: 232 MS
EKG P-R INTERVAL: 264 MS
EKG Q-T INTERVAL: 332 MS
EKG Q-T INTERVAL: 360 MS
EKG QRS DURATION: 116 MS
EKG QRS DURATION: 122 MS
EKG QTC CALCULATION (BAZETT): 419 MS
EKG QTC CALCULATION (BAZETT): 442 MS
EKG R AXIS: -31 DEGREES
EKG R AXIS: -33 DEGREES
EKG T AXIS: 46 DEGREES
EKG T AXIS: 61 DEGREES
EKG VENTRICULAR RATE: 91 BPM
EKG VENTRICULAR RATE: 96 BPM
EOSINOPHIL # BLD: 0 K/UL (ref 0.05–0.5)
EOSINOPHILS RELATIVE PERCENT: 0 % (ref 0–6)
ERYTHROCYTE [DISTWIDTH] IN BLOOD BY AUTOMATED COUNT: 16.7 % (ref 11.5–15)
HCT VFR BLD AUTO: 26.6 % (ref 37–54)
HGB BLD-MCNC: 8.8 G/DL (ref 12.5–16.5)
IMM GRANULOCYTES # BLD AUTO: 0.07 K/UL (ref 0–0.58)
IMM GRANULOCYTES NFR BLD: 1 % (ref 0–5)
LYMPHOCYTES NFR BLD: 0.38 K/UL (ref 1.5–4)
LYMPHOCYTES RELATIVE PERCENT: 3 % (ref 20–42)
MCH RBC QN AUTO: 29.6 PG (ref 26–35)
MCHC RBC AUTO-ENTMCNC: 33.1 G/DL (ref 32–34.5)
MCV RBC AUTO: 89.6 FL (ref 80–99.9)
MONOCYTES NFR BLD: 0.73 K/UL (ref 0.1–0.95)
MONOCYTES NFR BLD: 7 % (ref 2–12)
NEUTROPHILS NFR BLD: 89 % (ref 43–80)
NEUTS SEG NFR BLD: 9.89 K/UL (ref 1.8–7.3)
PLATELET # BLD AUTO: 189 K/UL (ref 130–450)
PMV BLD AUTO: 11 FL (ref 7–12)
RBC # BLD AUTO: 2.97 M/UL (ref 3.8–5.8)
RBC # BLD: ABNORMAL 10*6/UL
TROPONIN I SERPL HS-MCNC: 252 NG/L (ref 0–11)
TROPONIN I SERPL HS-MCNC: 258 NG/L (ref 0–11)
TROPONIN I SERPL HS-MCNC: 283 NG/L (ref 0–11)
TROPONIN I SERPL HS-MCNC: 288 NG/L (ref 0–11)
TROPONIN I SERPL HS-MCNC: 290 NG/L (ref 0–11)
TROPONIN I SERPL HS-MCNC: 303 NG/L (ref 0–11)
TROPONIN I SERPL HS-MCNC: 305 NG/L (ref 0–11)
TROPONIN I SERPL HS-MCNC: 307 NG/L (ref 0–11)
TROPONIN I SERPL HS-MCNC: 313 NG/L (ref 0–11)
TROPONIN I SERPL HS-MCNC: 314 NG/L (ref 0–11)
TROPONIN I SERPL HS-MCNC: 348 NG/L (ref 0–11)
WBC OTHER # BLD: 11.1 K/UL (ref 4.5–11.5)

## 2024-01-26 PROCEDURE — 93010 ELECTROCARDIOGRAM REPORT: CPT | Performed by: INTERNAL MEDICINE

## 2024-01-26 PROCEDURE — 6370000000 HC RX 637 (ALT 250 FOR IP): Performed by: INTERNAL MEDICINE

## 2024-01-26 PROCEDURE — 2580000003 HC RX 258: Performed by: INTERNAL MEDICINE

## 2024-01-26 PROCEDURE — 99232 SBSQ HOSP IP/OBS MODERATE 35: CPT | Performed by: INTERNAL MEDICINE

## 2024-01-26 PROCEDURE — 2140000000 HC CCU INTERMEDIATE R&B

## 2024-01-26 PROCEDURE — 36415 COLL VENOUS BLD VENIPUNCTURE: CPT

## 2024-01-26 PROCEDURE — 93005 ELECTROCARDIOGRAM TRACING: CPT | Performed by: INTERNAL MEDICINE

## 2024-01-26 PROCEDURE — 85025 COMPLETE CBC W/AUTO DIFF WBC: CPT

## 2024-01-26 PROCEDURE — 84484 ASSAY OF TROPONIN QUANT: CPT

## 2024-01-26 RX ORDER — SODIUM CHLORIDE 9 MG/ML
INJECTION, SOLUTION INTRAVENOUS CONTINUOUS
Status: DISCONTINUED | OUTPATIENT
Start: 2024-01-26 | End: 2024-01-28

## 2024-01-26 RX ORDER — 0.9 % SODIUM CHLORIDE 0.9 %
500 INTRAVENOUS SOLUTION INTRAVENOUS ONCE
Status: COMPLETED | OUTPATIENT
Start: 2024-01-26 | End: 2024-01-26

## 2024-01-26 RX ORDER — OXYCODONE HYDROCHLORIDE AND ACETAMINOPHEN 5; 325 MG/1; MG/1
1 TABLET ORAL EVERY 4 HOURS PRN
Status: DISCONTINUED | OUTPATIENT
Start: 2024-01-26 | End: 2024-02-02 | Stop reason: HOSPADM

## 2024-01-26 RX ADMIN — Medication 10 ML: at 09:45

## 2024-01-26 RX ADMIN — PANTOPRAZOLE SODIUM 40 MG: 40 TABLET, DELAYED RELEASE ORAL at 17:56

## 2024-01-26 RX ADMIN — FINASTERIDE 5 MG: 5 TABLET, FILM COATED ORAL at 09:34

## 2024-01-26 RX ADMIN — SODIUM CHLORIDE: 9 INJECTION, SOLUTION INTRAVENOUS at 11:24

## 2024-01-26 RX ADMIN — TAMSULOSIN HYDROCHLORIDE 0.4 MG: 0.4 CAPSULE ORAL at 22:39

## 2024-01-26 RX ADMIN — ASPIRIN 81 MG: 81 TABLET, COATED ORAL at 09:34

## 2024-01-26 RX ADMIN — SODIUM CHLORIDE 500 ML: 9 INJECTION, SOLUTION INTRAVENOUS at 13:50

## 2024-01-26 RX ADMIN — PANTOPRAZOLE SODIUM 40 MG: 40 TABLET, DELAYED RELEASE ORAL at 06:14

## 2024-01-26 RX ADMIN — SENNOSIDES 8.6 MG: 8.6 TABLET, FILM COATED ORAL at 09:40

## 2024-01-26 RX ADMIN — OXYCODONE AND ACETAMINOPHEN 1 TABLET: 5; 325 TABLET ORAL at 14:49

## 2024-01-26 RX ADMIN — ACETAMINOPHEN 325MG 650 MG: 325 TABLET ORAL at 09:39

## 2024-01-26 RX ADMIN — ATORVASTATIN CALCIUM 40 MG: 40 TABLET, FILM COATED ORAL at 22:39

## 2024-01-26 ASSESSMENT — PAIN DESCRIPTION - ORIENTATION
ORIENTATION: POSTERIOR
ORIENTATION: POSTERIOR
ORIENTATION: RIGHT;LEFT;MID;LOWER

## 2024-01-26 ASSESSMENT — PAIN - FUNCTIONAL ASSESSMENT
PAIN_FUNCTIONAL_ASSESSMENT: ACTIVITIES ARE NOT PREVENTED

## 2024-01-26 ASSESSMENT — PAIN DESCRIPTION - LOCATION
LOCATION: HEAD
LOCATION: BACK;HEAD

## 2024-01-26 ASSESSMENT — PAIN DESCRIPTION - DESCRIPTORS
DESCRIPTORS: ACHING;DISCOMFORT;SORE

## 2024-01-26 ASSESSMENT — PAIN SCALES - GENERAL
PAINLEVEL_OUTOF10: 7
PAINLEVEL_OUTOF10: 4
PAINLEVEL_OUTOF10: 10

## 2024-01-26 NOTE — PLAN OF CARE
Problem: Skin/Tissue Integrity  Goal: Absence of new skin breakdown  Description: 1.  Monitor for areas of redness and/or skin breakdown  2.  Assess vascular access sites hourly  3.  Every 4-6 hours minimum:  Change oxygen saturation probe site  4.  Every 4-6 hours:  If on nasal continuous positive airway pressure, respiratory therapy assess nares and determine need for appliance change or resting period.  1/26/2024 0317 by Mahnaz Lorenz RN  Outcome: Progressing  1/26/2024 0316 by Mahnaz Lorenz RN  Outcome: Progressing  1/25/2024 1553 by Sheeba Mercado RN  Outcome: Progressing     Problem: Discharge Planning  Goal: Discharge to home or other facility with appropriate resources  1/26/2024 0317 by Mahnaz Lorenz RN  Outcome: Progressing  1/26/2024 0316 by Mahnaz Lorenz RN  Outcome: Progressing  Flowsheets (Taken 1/25/2024 2000)  Discharge to home or other facility with appropriate resources: Identify barriers to discharge with patient and caregiver  1/25/2024 1553 by Sheeba Mercado RN  Outcome: Progressing     Problem: Pain  Goal: Verbalizes/displays adequate comfort level or baseline comfort level  1/26/2024 0317 by Mahnaz Lorenz RN  Outcome: Progressing  1/26/2024 0316 by Mahnaz Lorenz RN  Outcome: Progressing  1/25/2024 1553 by Sheeba Mercado RN  Outcome: Progressing     Problem: Safety - Adult  Goal: Free from fall injury  1/26/2024 0317 by Mahnaz Lorenz RN  Outcome: Progressing  1/26/2024 0316 by Mahnaz Lorenz RN  Outcome: Progressing

## 2024-01-26 NOTE — PROGRESS NOTES
[facility] Hospitalist Progress Note    Admitting Date and Time: 1/24/2024  4:14 PM  Admit Dx: Elevated troponin [R79.89]  Fall, initial encounter [W19.XXXA]  Chronic pancreatitis, unspecified pancreatitis type (HCC) [K86.1]  Chronic kidney disease, unspecified CKD stage [N18.9]    Subjective:  Patient is being followed for Elevated troponin [R79.89]  Fall, initial encounter [W19.XXXA]  Chronic pancreatitis, unspecified pancreatitis type (HCC) [K86.1]  Chronic kidney disease, unspecified CKD stage [N18.9]   Pt feels having tailbone pain.  Per RN: BP low    ROS: denies fever, chills, cp, sob, n/v, HA unless stated above.      sodium chloride  500 mL IntraVENous Once    atorvastatin  40 mg Oral Nightly    aspirin  81 mg Oral Daily    finasteride  5 mg Oral Daily    metoprolol tartrate  75 mg Oral Daily    pantoprazole  40 mg Oral BID AC    tamsulosin  0.4 mg Oral QHS    sodium chloride flush  5-40 mL IntraVENous 2 times per day     sodium chloride flush, 5-40 mL, PRN  sodium chloride, , PRN  ondansetron, 4 mg, Q8H PRN   Or  ondansetron, 4 mg, Q6H PRN  senna, 1 tablet, Daily PRN  aluminum & magnesium hydroxide-simethicone, 30 mL, Q6H PRN  acetaminophen, 650 mg, Q6H PRN   Or  acetaminophen, 650 mg, Q6H PRN         Objective:    BP (!) 84/44   Pulse 87   Temp 98.7 °F (37.1 °C) (Infrared)   Resp 24   Wt 71.3 kg (157 lb 3.2 oz)   SpO2 94%   BMI 21.92 kg/m²     General Appearance:  in no acute distress  Skin: warm and dry  Head: normocephalic and atraumatic  Eyes: pupils equal, round, and reactive to light, extraocular eye movements intact, conjunctivae normal  Neck: neck supple and non tender without mass   Pulmonary/Chest: clear to auscultation bilaterally- no wheezes, rales or rhonchi, normal air movement, no respiratory distress  Cardiovascular: normal rate, normal S1 and S2 and no carotid bruits  Abdomen: soft, non-tender, non-distended, normal bowel sounds, no masses or organomegaly  Extremities: no

## 2024-01-26 NOTE — PLAN OF CARE
Problem: Skin/Tissue Integrity  Goal: Absence of new skin breakdown  Description: 1.  Monitor for areas of redness and/or skin breakdown  2.  Assess vascular access sites hourly  3.  Every 4-6 hours minimum:  Change oxygen saturation probe site  4.  Every 4-6 hours:  If on nasal continuous positive airway pressure, respiratory therapy assess nares and determine need for appliance change or resting period.  1/26/2024 0316 by Mahnaz Lorenz RN  Outcome: Progressing  1/25/2024 1553 by Sheeba Mercado RN  Outcome: Progressing     Problem: Discharge Planning  Goal: Discharge to home or other facility with appropriate resources  1/26/2024 0316 by Mahnaz Lorenz RN  Outcome: Progressing  Flowsheets (Taken 1/25/2024 2000)  Discharge to home or other facility with appropriate resources: Identify barriers to discharge with patient and caregiver  1/25/2024 1553 by Sheeba Mercado RN  Outcome: Progressing     Problem: Pain  Goal: Verbalizes/displays adequate comfort level or baseline comfort level  1/26/2024 0316 by Mahnaz Lorenz RN  Outcome: Progressing  1/25/2024 1553 by Sheeba Mercado RN  Outcome: Progressing     Problem: Safety - Adult  Goal: Free from fall injury  Outcome: Progressing

## 2024-01-26 NOTE — PLAN OF CARE
Problem: Safety - Adult  Goal: Free from fall injury  1/26/2024 1416 by Sheeba Mercado RN  Outcome: Progressing     Problem: Pain  Goal: Verbalizes/displays adequate comfort level or baseline comfort level  1/26/2024 1416 by Sheeba Mercado RN  Outcome: Progressing     Problem: Skin/Tissue Integrity  Goal: Absence of new skin breakdown  1/26/2024 1416 by Sheeba Mercado RN  Outcome: Progressing     Problem: ABCDS Injury Assessment  Goal: Absence of physical injury  Outcome: Progressing     Problem: Discharge Planning  Goal: Discharge to home or other facility with appropriate resources  1/26/2024 1416 by Sheeba Mercado RN  Outcome: Progressing

## 2024-01-26 NOTE — PROGRESS NOTES
Physical Therapy      Name: Uzair Fuentes  : 1940  MRN: [id number]  Date of Service: 2024  Room #:  6503/6503-B    PT order received. PT to be held per nurse due to decreased BP. PT will follow up as able/appropriate.    Henrik Patterson, PT, DPT  [id number]

## 2024-01-26 NOTE — PROGRESS NOTES
Notified Dr. Wharton via [employer] re: Patient is hypotensive again. BP 84/46, HR 87. C/o of pain 7/10. Given tylenol at 0930.    500 mL bolus normal saline ordered per Dr. Wharton.

## 2024-01-26 NOTE — PROGRESS NOTES
Notified Dr. Wharton via [employer] re: Want to make you aware that patient's BP was 84/50 manual; . Patient c/o dizziness. Most recent BP is 92/52; HR 96.

## 2024-01-26 NOTE — PROGRESS NOTES
[facility] Quality Flow/Interdisciplinary Rounds Progress Note        Quality Flow Rounds held on January 26, 2024    Disciplines Attending:  Bedside Nurse, , , and Nursing Unit Leadership    Uzair Fuentes was admitted on 1/24/2024  4:14 PM    Anticipated Discharge Date:       Disposition:    Jaylen Score:  Jaylen Scale Score: 19    Readmission Risk              Risk of Unplanned Readmission:  24           Discussed patient goal for the day, patient clinical progression, and barriers to discharge.  The following Goal(s) of the Day/Commitment(s) have been identified:  Diagnostics - Report Results and Labs - Report Results      Sravanthi Henderson RN  January 26, 2024

## 2024-01-26 NOTE — PROGRESS NOTES
EKG complete. Patient resting comfortbaly in bed. Last VS: T 98.9  HR 96  RR 20  BP 92/52  SpO2 98%

## 2024-01-26 NOTE — PROGRESS NOTES
Notified Dr. Wharton via [employer] re: Patient's BP 92/44, HR 82. Patient still complaining of pain 10/10.

## 2024-01-26 NOTE — CARE COORDINATION
1/26/24  Spoke with patient and ex-wife regarding transition of care as patient has periods of confusion noted and asked that ex-wife be called.  Patient admitted for elevated troponin and fall.  Patient also noted have pancreatitis and chronic kidney disease.  Patient has a history of metastatic renal cell carcinoma.  Patient is on room air.  CT of the head and spine were both negative. Patient lives alone in a 1 story house.  Patient does not have any active home care and does not use any DME.  [facility]   [facility] to [facility] to notify of admit with clinicals faxed.  Discharge goal is home pending course of treatment.  Patient is pending therapy evals to assess function status.  /CM to follow.    Electronically signed by JOSE Segura on 1/26/2024 at 4:02 PM     Case Management Assessment  Initial Evaluation    Date/Time of Evaluation: 1/26/2024 4:02 PM  Assessment Completed by: JOSE Segura    If patient is discharged prior to next notation, then this note serves as note for discharge by case management.    Patient Name: Uzair Fuentes                   YOB: 1940  Diagnosis: Elevated troponin [R79.89]  Fall, initial encounter [W19.XXXA]  Chronic pancreatitis, unspecified pancreatitis type (HCC) [K86.1]  Chronic kidney disease, unspecified CKD stage [N18.9]                   Date / Time: 1/24/2024  4:14 PM    Patient Admission Status: Inpatient   Readmission Risk (Low < 19, Mod (19-27), High > 27): Readmission Risk Score: 24.8    Current PCP: Ken Liriano MD  PCP verified by CM? Yes    Chart Reviewed: Yes      History Provided by: Patient, Other (see comment) (ex wife)  Patient Orientation: Alert and Oriented, Person, Place    Patient Cognition: Short Term Memory Deficit    Hospitalization in the last 30 days (Readmission):  No    If yes, Readmission Assessment in  Navigator will be completed.    Advance Directives:      Code Status: Full Code    Patient's Primary Decision Maker is:      Primary Decision Maker: mariah martinez - Child - [phone]    Discharge Planning:    Patient lives with: Alone Type of Home: House  Primary Care Giver: Self  Patient Support Systems include: Children, Other (Comment) (ex-wife)   Current Financial resources:    Current community resources:    Current services prior to admission: None            Current DME:              Type of Home Care services:  None    ADLS  Prior functional level: Independent in ADLs/IADLs  Current functional level: Independent in ADLs/IADLs    PT AM-PAC:   /24  OT AM-PAC:   /24    Family can provide assistance at DC: No  Would you like Case Management to discuss the discharge plan with any other family members/significant others, and if so, who? Yes (daughter and ex-wife)  Plans to Return to Present Housing: Unknown at present  Other Identified Issues/Barriers to RETURNING to current housing: none noted at this time  Potential Assistance needed at discharge: N/A            Potential DME:    Patient expects to discharge to: House  Plan for transportation at discharge:      Financial    Payor: [employer] / Plan: [employer] / Product Type: *No Product type* /     Does insurance require precert for SNF: Yes    Potential assistance Purchasing Medications:    Meds-to-Beds request: Yes      [facility] - [place], [place] - [address] - P [phone] - F [phone]  [address] [facility] [address]  Phone: [phone] Fax: [phone]      Notes:    Factors facilitating achievement of predicted outcomes: Family support, Cooperative, and Pleasant    Barriers to discharge: hs some confusion.  Will await therapy evals to assess functional status.  Was Independent and driving with no DME prior to admit    Additional Case Management Notes: See above    The Plan for Transition of Care is related to the following treatment goals of Elevated troponin [R79.89]  Fall, initial

## 2024-01-27 ENCOUNTER — APPOINTMENT (OUTPATIENT)
Age: 84
End: 2024-01-27
Attending: INTERNAL MEDICINE
Payer: OTHER GOVERNMENT

## 2024-01-27 PROBLEM — E43 SEVERE PROTEIN-CALORIE MALNUTRITION (HCC): Chronic | Status: ACTIVE | Noted: 2024-01-27

## 2024-01-27 PROBLEM — D47.2 MONOCLONAL GAMMOPATHY: Status: ACTIVE | Noted: 2024-01-27

## 2024-01-27 PROBLEM — I71.40 ABDOMINAL AORTIC ANEURYSM (AAA) WITHOUT RUPTURE (HCC): Status: ACTIVE | Noted: 2024-01-27

## 2024-01-27 PROBLEM — N18.4 STAGE 4 CHRONIC KIDNEY DISEASE (HCC): Status: ACTIVE | Noted: 2024-01-27

## 2024-01-27 PROBLEM — I82.5Y9 CHRONIC DEEP VEIN THROMBOSIS (DVT) OF PROXIMAL VEIN OF LOWER EXTREMITY (HCC): Status: ACTIVE | Noted: 2024-01-27

## 2024-01-27 PROBLEM — N17.9 ACUTE KIDNEY INJURY (HCC): Status: ACTIVE | Noted: 2024-01-27

## 2024-01-27 PROBLEM — I44.0 FIRST DEGREE ATRIOVENTRICULAR BLOCK: Status: ACTIVE | Noted: 2024-01-27
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
## 2024-01-27 LAB
ANION GAP SERPL CALCULATED.3IONS-SCNC: 10 MMOL/L (ref 7–16)
BASOPHILS # BLD: 0 K/UL (ref 0–0.2)
BASOPHILS NFR BLD: 0 % (ref 0–2)
BUN SERPL-MCNC: 35 MG/DL (ref 6–23)
CALCIUM SERPL-MCNC: 10.8 MG/DL (ref 8.6–10.2)
CHLORIDE SERPL-SCNC: 104 MMOL/L (ref 98–107)
CO2 SERPL-SCNC: 18 MMOL/L (ref 22–29)
CREAT SERPL-MCNC: 2 MG/DL (ref 0.7–1.2)
ECHO AV AREA PEAK VELOCITY: 1.5 CM2
ECHO AV AREA VTI: 1.7 CM2
ECHO AV AREA/BSA PEAK VELOCITY: 0.8 CM2/M2
ECHO AV AREA/BSA VTI: 0.9 CM2/M2
ECHO AV CUSP MM: 1.6 CM
ECHO AV MEAN GRADIENT: 7 MMHG
ECHO AV MEAN VELOCITY: 1.2 M/S
ECHO AV PEAK GRADIENT: 13 MMHG
ECHO AV PEAK VELOCITY: 1.8 M/S
ECHO AV VELOCITY RATIO: 0.44
ECHO AV VTI: 28.7 CM
ECHO LA DIAMETER INDEX: 2.42 CM/M2
ECHO LA DIAMETER: 4.6 CM
ECHO LA VOL A-L A2C: 70 ML (ref 18–58)
ECHO LA VOL A-L A4C: 94 ML (ref 18–58)
ECHO LA VOL MOD A2C: 66 ML (ref 18–58)
ECHO LA VOL MOD A4C: 88 ML (ref 18–58)
ECHO LA VOLUME AREA LENGTH: 84 ML
ECHO LA VOLUME INDEX A-L A2C: 37 ML/M2 (ref 16–34)
ECHO LA VOLUME INDEX A-L A4C: 49 ML/M2 (ref 16–34)
ECHO LA VOLUME INDEX AREA LENGTH: 44 ML/M2 (ref 16–34)
ECHO LA VOLUME INDEX MOD A2C: 35 ML/M2 (ref 16–34)
ECHO LA VOLUME INDEX MOD A4C: 46 ML/M2 (ref 16–34)
ECHO LV EJECTION FRACTION A2C: 39 %
ECHO LV EJECTION FRACTION A4C: 40 %
ECHO LV FRACTIONAL SHORTENING: 16 % (ref 28–44)
ECHO LV INTERNAL DIMENSION DIASTOLE INDEX: 3 CM/M2
ECHO LV INTERNAL DIMENSION DIASTOLIC: 5.7 CM (ref 4.2–5.9)
ECHO LV INTERNAL DIMENSION SYSTOLIC INDEX: 2.53 CM/M2
ECHO LV INTERNAL DIMENSION SYSTOLIC: 4.8 CM
ECHO LV IVSD: 1.3 CM (ref 0.6–1)
ECHO LV IVSS: 1.4 CM
ECHO LV MASS 2D: 288.7 G (ref 88–224)
ECHO LV MASS INDEX 2D: 151.9 G/M2 (ref 49–115)
ECHO LV POSTERIOR WALL DIASTOLIC: 1.1 CM (ref 0.6–1)
ECHO LV POSTERIOR WALL SYSTOLIC: 1.2 CM
ECHO LV RELATIVE WALL THICKNESS RATIO: 0.39
ECHO LVOT AREA: 3.1 CM2
ECHO LVOT AV VTI INDEX: 0.53
ECHO LVOT DIAM: 2 CM
ECHO LVOT MEAN GRADIENT: 2 MMHG
ECHO LVOT PEAK GRADIENT: 3 MMHG
ECHO LVOT PEAK VELOCITY: 0.8 M/S
ECHO LVOT STROKE VOLUME INDEX: 25.3 ML/M2
ECHO LVOT SV: 48 ML
ECHO LVOT VTI: 15.3 CM
ECHO MV "A" WAVE DURATION: 152.3 MSEC
ECHO MV A VELOCITY: 1.15 M/S
ECHO MV AREA PHT: 3.4 CM2
ECHO MV AREA VTI: 2.2 CM2
ECHO MV E DECELERATION TIME (DT): 231.1 MS
ECHO MV E VELOCITY: 0.69 M/S
ECHO MV E/A RATIO: 0.6
ECHO MV LVOT VTI INDEX: 1.4
ECHO MV MAX VELOCITY: 1.2 M/S
ECHO MV MEAN GRADIENT: 2 MMHG
ECHO MV MEAN VELOCITY: 0.7 M/S
ECHO MV PEAK GRADIENT: 5 MMHG
ECHO MV PRESSURE HALF TIME (PHT): 64.4 MS
ECHO MV VTI: 21.4 CM
ECHO PV MAX VELOCITY: 1.1 M/S
ECHO PV MEAN GRADIENT: 3 MMHG
ECHO PV MEAN VELOCITY: 0.8 M/S
ECHO PV PEAK GRADIENT: 5 MMHG
ECHO PV VTI: 18.5 CM
ECHO PVEIN A DURATION: 110.7 MS
ECHO PVEIN A VELOCITY: 0.3 M/S
ECHO PVEIN PEAK D VELOCITY: 0.4 M/S
ECHO PVEIN PEAK S VELOCITY: 0.4 M/S
ECHO PVEIN S/D RATIO: 1
ECHO RV INTERNAL DIMENSION: 4 CM
ECHO TV REGURGITANT MAX VELOCITY: 1.46 M/S
ECHO TV REGURGITANT PEAK GRADIENT: 8 MMHG
EOSINOPHIL # BLD: 0 K/UL (ref 0.05–0.5)
EOSINOPHILS RELATIVE PERCENT: 0 % (ref 0–6)
ERYTHROCYTE [DISTWIDTH] IN BLOOD BY AUTOMATED COUNT: 16.4 % (ref 11.5–15)
GFR SERPL CREATININE-BSD FRML MDRD: 33 ML/MIN/1.73M2
GLUCOSE SERPL-MCNC: 87 MG/DL (ref 74–99)
HCT VFR BLD AUTO: 24.4 % (ref 37–54)
HGB BLD-MCNC: 8 G/DL (ref 12.5–16.5)
IMM GRANULOCYTES # BLD AUTO: 0.06 K/UL (ref 0–0.58)
IMM GRANULOCYTES NFR BLD: 1 % (ref 0–5)
LYMPHOCYTES NFR BLD: 0.37 K/UL (ref 1.5–4)
LYMPHOCYTES RELATIVE PERCENT: 4 % (ref 20–42)
MCH RBC QN AUTO: 29.5 PG (ref 26–35)
MCHC RBC AUTO-ENTMCNC: 32.8 G/DL (ref 32–34.5)
MCV RBC AUTO: 90 FL (ref 80–99.9)
MONOCYTES NFR BLD: 0.62 K/UL (ref 0.1–0.95)
MONOCYTES NFR BLD: 6 % (ref 2–12)
NEUTROPHILS NFR BLD: 89 % (ref 43–80)
NEUTS SEG NFR BLD: 8.58 K/UL (ref 1.8–7.3)
PLATELET # BLD AUTO: 150 K/UL (ref 130–450)
PMV BLD AUTO: 10.9 FL (ref 7–12)
POTASSIUM SERPL-SCNC: 3.6 MMOL/L (ref 3.5–5)
RBC # BLD AUTO: 2.71 M/UL (ref 3.8–5.8)
RBC # BLD: ABNORMAL 10*6/UL
SODIUM SERPL-SCNC: 132 MMOL/L (ref 132–146)
TROPONIN I SERPL HS-MCNC: 281 NG/L (ref 0–11)
TROPONIN I SERPL HS-MCNC: 303 NG/L (ref 0–11)
WBC OTHER # BLD: 9.6 K/UL (ref 4.5–11.5)

## 2024-01-27 PROCEDURE — 36415 COLL VENOUS BLD VENIPUNCTURE: CPT

## 2024-01-27 PROCEDURE — 80048 BASIC METABOLIC PNL TOTAL CA: CPT

## 2024-01-27 PROCEDURE — 2580000003 HC RX 258: Performed by: INTERNAL MEDICINE

## 2024-01-27 PROCEDURE — 6370000000 HC RX 637 (ALT 250 FOR IP): Performed by: INTERNAL MEDICINE

## 2024-01-27 PROCEDURE — 93306 TTE W/DOPPLER COMPLETE: CPT

## 2024-01-27 PROCEDURE — 99233 SBSQ HOSP IP/OBS HIGH 50: CPT | Performed by: INTERNAL MEDICINE

## 2024-01-27 PROCEDURE — 99223 1ST HOSP IP/OBS HIGH 75: CPT | Performed by: INTERNAL MEDICINE

## 2024-01-27 PROCEDURE — APPSS60 APP SPLIT SHARED TIME 46-60 MINUTES

## 2024-01-27 PROCEDURE — 2140000000 HC CCU INTERMEDIATE R&B

## 2024-01-27 PROCEDURE — 97530 THERAPEUTIC ACTIVITIES: CPT

## 2024-01-27 PROCEDURE — 93306 TTE W/DOPPLER COMPLETE: CPT | Performed by: INTERNAL MEDICINE

## 2024-01-27 PROCEDURE — 97161 PT EVAL LOW COMPLEX 20 MIN: CPT

## 2024-01-27 PROCEDURE — 85025 COMPLETE CBC W/AUTO DIFF WBC: CPT

## 2024-01-27 PROCEDURE — 84484 ASSAY OF TROPONIN QUANT: CPT

## 2024-01-27 RX ORDER — METOPROLOL SUCCINATE 25 MG/1
25 TABLET, EXTENDED RELEASE ORAL 2 TIMES DAILY
Status: DISCONTINUED | OUTPATIENT
Start: 2024-01-27 | End: 2024-02-02 | Stop reason: HOSPADM

## 2024-01-27 RX ADMIN — PANTOPRAZOLE SODIUM 40 MG: 40 TABLET, DELAYED RELEASE ORAL at 15:52

## 2024-01-27 RX ADMIN — TAMSULOSIN HYDROCHLORIDE 0.4 MG: 0.4 CAPSULE ORAL at 20:26

## 2024-01-27 RX ADMIN — OXYCODONE AND ACETAMINOPHEN 1 TABLET: 5; 325 TABLET ORAL at 12:26

## 2024-01-27 RX ADMIN — ATORVASTATIN CALCIUM 40 MG: 40 TABLET, FILM COATED ORAL at 20:26

## 2024-01-27 RX ADMIN — METOPROLOL TARTRATE 75 MG: 25 TABLET, FILM COATED ORAL at 10:05

## 2024-01-27 RX ADMIN — SODIUM CHLORIDE: 9 INJECTION, SOLUTION INTRAVENOUS at 20:25

## 2024-01-27 RX ADMIN — PANTOPRAZOLE SODIUM 40 MG: 40 TABLET, DELAYED RELEASE ORAL at 06:36

## 2024-01-27 RX ADMIN — FINASTERIDE 5 MG: 5 TABLET, FILM COATED ORAL at 10:06

## 2024-01-27 RX ADMIN — METOPROLOL SUCCINATE 25 MG: 25 TABLET, EXTENDED RELEASE ORAL at 20:26

## 2024-01-27 RX ADMIN — Medication 10 ML: at 20:26

## 2024-01-27 RX ADMIN — ASPIRIN 81 MG: 81 TABLET, COATED ORAL at 10:06

## 2024-01-27 RX ADMIN — SODIUM CHLORIDE: 9 INJECTION, SOLUTION INTRAVENOUS at 00:00

## 2024-01-27 RX ADMIN — SODIUM CHLORIDE: 9 INJECTION, SOLUTION INTRAVENOUS at 09:54

## 2024-01-27 RX ADMIN — OXYCODONE AND ACETAMINOPHEN 1 TABLET: 5; 325 TABLET ORAL at 01:24

## 2024-01-27 ASSESSMENT — PAIN DESCRIPTION - DESCRIPTORS
DESCRIPTORS: DISCOMFORT;DULL;ACHING
DESCRIPTORS: ACHING;DISCOMFORT;SORE
DESCRIPTORS: ACHING;SORE;DISCOMFORT

## 2024-01-27 ASSESSMENT — PAIN SCALES - GENERAL
PAINLEVEL_OUTOF10: 7
PAINLEVEL_OUTOF10: 5
PAINLEVEL_OUTOF10: 0
PAINLEVEL_OUTOF10: 0
PAINLEVEL_OUTOF10: 2
PAINLEVEL_OUTOF10: 6
PAINLEVEL_OUTOF10: 8

## 2024-01-27 ASSESSMENT — PAIN DESCRIPTION - PAIN TYPE: TYPE: ACUTE PAIN

## 2024-01-27 ASSESSMENT — PAIN DESCRIPTION - ORIENTATION
ORIENTATION: LOWER;MID
ORIENTATION: LOWER
ORIENTATION: LOWER

## 2024-01-27 ASSESSMENT — PAIN DESCRIPTION - LOCATION
LOCATION: BACK

## 2024-01-27 ASSESSMENT — PAIN DESCRIPTION - FREQUENCY: FREQUENCY: INTERMITTENT

## 2024-01-27 ASSESSMENT — PAIN - FUNCTIONAL ASSESSMENT: PAIN_FUNCTIONAL_ASSESSMENT: PREVENTS OR INTERFERES SOME ACTIVE ACTIVITIES AND ADLS

## 2024-01-27 NOTE — PLAN OF CARE
Patient on a tap system and told why it is important to reposition every 2 hour and keep heels elevated off bed.      Patient continues to have pain in lower back        Patiient hopes to go home soon

## 2024-01-27 NOTE — PLAN OF CARE
Problem: Skin/Tissue Integrity  Goal: Absence of new skin breakdown  Description: 1.  Monitor for areas of redness and/or skin breakdown  2.  Assess vascular access sites hourly  3.  Every 4-6 hours minimum:  Change oxygen saturation probe site  4.  Every 4-6 hours:  If on nasal continuous positive airway pressure, respiratory therapy assess nares and determine need for appliance change or resting period.  Outcome: Progressing     Problem: Discharge Planning  Goal: Discharge to home or other facility with appropriate resources  Outcome: Progressing  Flowsheets (Taken 1/26/2024 2000)  Discharge to home or other facility with appropriate resources: Identify barriers to discharge with patient and caregiver     Problem: Pain  Goal: Verbalizes/displays adequate comfort level or baseline comfort level  Outcome: Progressing     Problem: Safety - Adult  Goal: Free from fall injury  Outcome: Progressing     Problem: ABCDS Injury Assessment  Goal: Absence of physical injury  Outcome: Progressing

## 2024-01-27 NOTE — PATIENT CARE CONFERENCE
P Quality Flow/Interdisciplinary Rounds Progress Note        Quality Flow Rounds held on January 27, 2024    Disciplines Attending:  Bedside Nurse, , , and Nursing Unit Leadership    Uzair Fuentes was admitted on 1/24/2024  4:14 PM    Anticipated Discharge Date:       Disposition:    Jaylen Score:  Jaylen Scale Score: 17    Readmission Risk              Risk of Unplanned Readmission:  25           Discussed patient goal for the day, patient clinical progression, and barriers to discharge.  The following Goal(s) of the Day/Commitment(s) have been identified:  downgrade      Marly Barrett RN  January 27, 2024

## 2024-01-27 NOTE — PROGRESS NOTES
[facility] Hospitalist Progress Note    Admitting Date and Time: 1/24/2024  4:14 PM  Admit Dx: Elevated troponin [R79.89]  Fall, initial encounter [W19.XXXA]  Chronic pancreatitis, unspecified pancreatitis type (HCC) [K86.1]  Chronic kidney disease, unspecified CKD stage [N18.9]    Subjective:  Patient is being followed for Elevated troponin [R79.89]  Fall, initial encounter [W19.XXXA]  Chronic pancreatitis, unspecified pancreatitis type (HCC) [K86.1]  Chronic kidney disease, unspecified CKD stage [N18.9]   Pt feels: He feels much better today, more conversive and sitting on the chair taking food.  Per RN: BP low    ROS: denies fever, chills, cp, sob, n/v, HA unless stated above.      atorvastatin  40 mg Oral Nightly    aspirin  81 mg Oral Daily    finasteride  5 mg Oral Daily    metoprolol tartrate  75 mg Oral Daily    pantoprazole  40 mg Oral BID AC    tamsulosin  0.4 mg Oral QHS    sodium chloride flush  5-40 mL IntraVENous 2 times per day     perflutren lipid microspheres, 1.5 mL, ONCE PRN  oxyCODONE-acetaminophen, 1 tablet, Q4H PRN  sodium chloride flush, 5-40 mL, PRN  sodium chloride, , PRN  ondansetron, 4 mg, Q8H PRN   Or  ondansetron, 4 mg, Q6H PRN  senna, 1 tablet, Daily PRN  aluminum & magnesium hydroxide-simethicone, 30 mL, Q6H PRN  acetaminophen, 650 mg, Q6H PRN   Or  acetaminophen, 650 mg, Q6H PRN         Objective:    BP (!) 99/54   Pulse 75   Temp 98.2 °F (36.8 °C) (Temporal)   Resp 18   Ht 1.803 m (5' 11\")   Wt 71.4 kg (157 lb 6.4 oz)   SpO2 95%   BMI 21.95 kg/m²     General Appearance:  in no acute distress  Skin: warm and dry  Head: normocephalic and atraumatic  Eyes: pupils equal, round, and reactive to light, extraocular eye movements intact, conjunctivae normal  Neck: neck supple and non tender without mass   Pulmonary/Chest: clear to auscultation bilaterally- no wheezes, rales or rhonchi, normal air movement, no respiratory distress  Cardiovascular: normal rate, normal S1 and S2

## 2024-01-27 NOTE — PROGRESS NOTES
Physical Therapy    Initial Assessment     Name: Uzair Fuentes  : 1940  MRN: [id number]      Date of Service: 2024    Evaluating PT: Arslan Purcell, PT, DPT [id number]      Room #:  6503/6503-B  Diagnosis:  Elevated troponin [R79.89]  Fall, initial encounter [W19.XXXA]  Chronic pancreatitis, unspecified pancreatitis type (HCC) [K86.1]  Chronic kidney disease, unspecified CKD stage [N18.9]  PMHx/PSHx:   has a past medical history of Cancer (HCC), Disease of blood and blood forming organ, and Hypertension.  Precautions:  Fall risk, Cognition, External urinary catheter, Alarms    SUBJECTIVE:    Pt lives alone in a single story house with 3 stair(s) and 1 rail(s) to enter. Pt ambulated without AD prior to admission.    OBJECTIVE:   Initial Evaluation  Date: 24 Treatment Date: Short Term/ Long Term   Goals   AM-PAC 6 Clicks      Was pt agreeable to Eval/treatment? Yes     Does pt have pain? Tailbone pain     Bed Mobility  Rolling: NT  Supine to sit: SBA  Sit to supine: NT  Scooting: SBA to EOB  Rolling: Independent   Supine to sit: Independent   Sit to supine: Independent   Scooting: Independent    Transfers Sit to stand: Chalino  Stand to sit: Chalino  Stand pivot: ModA with WW  Sit to stand: Supervision  Stand to sit: Supervision  Stand pivot: Supervision with WW   Ambulation   30 feet with WW with ModA  >200 feet with WW with Supervision   Stair negotiation: ascended and descended NT  >4 step(s) with 1 rail(s) with SBA   ROM BUE: Refer to OT note  BLE: WFL     Strength BUE: Refer to OT note  BLE: WFL     Balance Sitting EOB: SBA  Dynamic Standing: Chalino with WW  Sitting EOB: Independent   Dynamic Standing: Supervision with WW     Pt is A & O x: 3 to person, place, and month/year. Pt often repeated the same questions and perseverated on his wet gown.  Sensation: Pt denies numbness and tingling of extremities.   Edema: Unremarkable    Patient education  Pt educated on PT role in acute care

## 2024-01-28 ENCOUNTER — APPOINTMENT (OUTPATIENT)
Dept: ULTRASOUND IMAGING | Age: 84
End: 2024-01-28
Payer: OTHER GOVERNMENT

## 2024-01-28 LAB
ANION GAP SERPL CALCULATED.3IONS-SCNC: 8 MMOL/L (ref 7–16)
BASOPHILS # BLD: 0 K/UL (ref 0–0.2)
BASOPHILS NFR BLD: 0 % (ref 0–2)
BUN SERPL-MCNC: 35 MG/DL (ref 6–23)
CALCIUM SERPL-MCNC: 10.6 MG/DL (ref 8.6–10.2)
CHLORIDE SERPL-SCNC: 106 MMOL/L (ref 98–107)
CO2 SERPL-SCNC: 19 MMOL/L (ref 22–29)
CREAT SERPL-MCNC: 1.9 MG/DL (ref 0.7–1.2)
EOSINOPHIL # BLD: 0.01 K/UL (ref 0.05–0.5)
EOSINOPHILS RELATIVE PERCENT: 0 % (ref 0–6)
ERYTHROCYTE [DISTWIDTH] IN BLOOD BY AUTOMATED COUNT: 16.4 % (ref 11.5–15)
GFR SERPL CREATININE-BSD FRML MDRD: 35 ML/MIN/1.73M2
GLUCOSE SERPL-MCNC: 87 MG/DL (ref 74–99)
HCT VFR BLD AUTO: 23.2 % (ref 37–54)
HGB BLD-MCNC: 7.7 G/DL (ref 12.5–16.5)
IMM GRANULOCYTES # BLD AUTO: 0.03 K/UL (ref 0–0.58)
IMM GRANULOCYTES NFR BLD: 0 % (ref 0–5)
LYMPHOCYTES NFR BLD: 0.35 K/UL (ref 1.5–4)
LYMPHOCYTES RELATIVE PERCENT: 4 % (ref 20–42)
MCH RBC QN AUTO: 29.6 PG (ref 26–35)
MCHC RBC AUTO-ENTMCNC: 33.2 G/DL (ref 32–34.5)
MCV RBC AUTO: 89.2 FL (ref 80–99.9)
MONOCYTES NFR BLD: 0.46 K/UL (ref 0.1–0.95)
MONOCYTES NFR BLD: 6 % (ref 2–12)
NEUTROPHILS NFR BLD: 90 % (ref 43–80)
NEUTS SEG NFR BLD: 7.45 K/UL (ref 1.8–7.3)
PLATELET # BLD AUTO: 166 K/UL (ref 130–450)
PMV BLD AUTO: 10.6 FL (ref 7–12)
POTASSIUM SERPL-SCNC: 3.8 MMOL/L (ref 3.5–5)
RBC # BLD AUTO: 2.6 M/UL (ref 3.8–5.8)
RBC # BLD: ABNORMAL 10*6/UL
SODIUM SERPL-SCNC: 133 MMOL/L (ref 132–146)
WBC OTHER # BLD: 8.3 K/UL (ref 4.5–11.5)

## 2024-01-28 PROCEDURE — 2580000003 HC RX 258: Performed by: INTERNAL MEDICINE

## 2024-01-28 PROCEDURE — 2140000000 HC CCU INTERMEDIATE R&B

## 2024-01-28 PROCEDURE — 6370000000 HC RX 637 (ALT 250 FOR IP): Performed by: INTERNAL MEDICINE

## 2024-01-28 PROCEDURE — 76770 US EXAM ABDO BACK WALL COMP: CPT

## 2024-01-28 PROCEDURE — 80048 BASIC METABOLIC PNL TOTAL CA: CPT

## 2024-01-28 PROCEDURE — 85025 COMPLETE CBC W/AUTO DIFF WBC: CPT

## 2024-01-28 PROCEDURE — 99223 1ST HOSP IP/OBS HIGH 75: CPT | Performed by: NURSE PRACTITIONER

## 2024-01-28 PROCEDURE — 99222 1ST HOSP IP/OBS MODERATE 55: CPT | Performed by: STUDENT IN AN ORGANIZED HEALTH CARE EDUCATION/TRAINING PROGRAM

## 2024-01-28 PROCEDURE — 36415 COLL VENOUS BLD VENIPUNCTURE: CPT

## 2024-01-28 PROCEDURE — 99232 SBSQ HOSP IP/OBS MODERATE 35: CPT | Performed by: INTERNAL MEDICINE

## 2024-01-28 PROCEDURE — 99233 SBSQ HOSP IP/OBS HIGH 50: CPT | Performed by: INTERNAL MEDICINE

## 2024-01-28 RX ORDER — SODIUM CHLORIDE, SODIUM LACTATE, POTASSIUM CHLORIDE, CALCIUM CHLORIDE 600; 310; 30; 20 MG/100ML; MG/100ML; MG/100ML; MG/100ML
INJECTION, SOLUTION INTRAVENOUS CONTINUOUS
Status: DISCONTINUED | OUTPATIENT
Start: 2024-01-28 | End: 2024-02-01

## 2024-01-28 RX ADMIN — SODIUM CHLORIDE, POTASSIUM CHLORIDE, SODIUM LACTATE AND CALCIUM CHLORIDE: 600; 310; 30; 20 INJECTION, SOLUTION INTRAVENOUS at 21:39

## 2024-01-28 RX ADMIN — METOPROLOL SUCCINATE 25 MG: 25 TABLET, EXTENDED RELEASE ORAL at 08:37

## 2024-01-28 RX ADMIN — SODIUM CHLORIDE: 9 INJECTION, SOLUTION INTRAVENOUS at 06:28

## 2024-01-28 RX ADMIN — ACETAMINOPHEN 325MG 650 MG: 325 TABLET ORAL at 16:50

## 2024-01-28 RX ADMIN — OXYCODONE AND ACETAMINOPHEN 1 TABLET: 5; 325 TABLET ORAL at 08:38

## 2024-01-28 RX ADMIN — Medication 10 ML: at 21:39

## 2024-01-28 RX ADMIN — ASPIRIN 81 MG: 81 TABLET, COATED ORAL at 08:38

## 2024-01-28 RX ADMIN — SODIUM CHLORIDE, POTASSIUM CHLORIDE, SODIUM LACTATE AND CALCIUM CHLORIDE: 600; 310; 30; 20 INJECTION, SOLUTION INTRAVENOUS at 13:00

## 2024-01-28 RX ADMIN — ATORVASTATIN CALCIUM 40 MG: 40 TABLET, FILM COATED ORAL at 21:40

## 2024-01-28 RX ADMIN — Medication 10 ML: at 08:37

## 2024-01-28 RX ADMIN — METOPROLOL SUCCINATE 25 MG: 25 TABLET, EXTENDED RELEASE ORAL at 21:40

## 2024-01-28 RX ADMIN — TAMSULOSIN HYDROCHLORIDE 0.4 MG: 0.4 CAPSULE ORAL at 21:40

## 2024-01-28 RX ADMIN — PANTOPRAZOLE SODIUM 40 MG: 40 TABLET, DELAYED RELEASE ORAL at 16:27

## 2024-01-28 RX ADMIN — ACETAMINOPHEN 325MG 650 MG: 325 TABLET ORAL at 23:41

## 2024-01-28 RX ADMIN — PANTOPRAZOLE SODIUM 40 MG: 40 TABLET, DELAYED RELEASE ORAL at 06:15

## 2024-01-28 RX ADMIN — FINASTERIDE 5 MG: 5 TABLET, FILM COATED ORAL at 08:37

## 2024-01-28 ASSESSMENT — PAIN DESCRIPTION - FREQUENCY
FREQUENCY: INTERMITTENT
FREQUENCY: INTERMITTENT

## 2024-01-28 ASSESSMENT — PAIN SCALES - GENERAL
PAINLEVEL_OUTOF10: 6
PAINLEVEL_OUTOF10: 5
PAINLEVEL_OUTOF10: 7
PAINLEVEL_OUTOF10: 4
PAINLEVEL_OUTOF10: 2
PAINLEVEL_OUTOF10: 7
PAINLEVEL_OUTOF10: 0
PAINLEVEL_OUTOF10: 5

## 2024-01-28 ASSESSMENT — PAIN DESCRIPTION - ORIENTATION
ORIENTATION: LOWER;MID
ORIENTATION: MID;LOWER
ORIENTATION: LOWER
ORIENTATION: LOWER;MID
ORIENTATION: LEFT

## 2024-01-28 ASSESSMENT — PAIN DESCRIPTION - DESCRIPTORS
DESCRIPTORS: DISCOMFORT;TENDER;SORE
DESCRIPTORS: ACHING;DISCOMFORT
DESCRIPTORS: ACHING;DISCOMFORT;SORE

## 2024-01-28 ASSESSMENT — PAIN DESCRIPTION - LOCATION
LOCATION: BACK
LOCATION: BACK;BUTTOCKS
LOCATION: BUTTOCKS
LOCATION: BACK
LOCATION: SHOULDER

## 2024-01-28 ASSESSMENT — PAIN DESCRIPTION - PAIN TYPE: TYPE: ACUTE PAIN

## 2024-01-28 ASSESSMENT — PAIN - FUNCTIONAL ASSESSMENT: PAIN_FUNCTIONAL_ASSESSMENT: PREVENTS OR INTERFERES SOME ACTIVE ACTIVITIES AND ADLS

## 2024-01-28 NOTE — PATIENT CARE CONFERENCE
P Quality Flow/Interdisciplinary Rounds Progress Note        Quality Flow Rounds held on January 28, 2024    Disciplines Attending:  Bedside Nurse, , , and Nursing Unit Leadership    Uzair Fuentes was admitted on 1/24/2024  4:14 PM    Anticipated Discharge Date:       Disposition:    Jaylen Score:  Jaylen Scale Score: 18    Readmission Risk              Risk of Unplanned Readmission:  26           Discussed patient goal for the day, patient clinical progression, and barriers to discharge.  The following Goal(s) of the Day/Commitment(s) have been identified:  discharge planning       Marly Barrett RN  January 28, 2024

## 2024-01-28 NOTE — CONSULTS
Palliative Care Department  [phone]  Palliative Care Initial Consult  Provider Cristobal Bonds, APRN - CNP     Uzair Fuentes  [id number]  Hospital Day: 5  Date of Initial Consult: 1/28/2024  Referring Provider: Fidelia Lawson MD   Palliative Medicine was consulted for assistance with: Goals of Care    HPI:   Uzair Fuentes is a 83 y.o. with a medical history of HTN, CKD, and renal cell CA who was admitted on 1/24/2024 from home with a CHIEF COMPLAINT of fall.  Patient reportedly slipped on ice.  Patient noted to have MOISE and elevated troponin.  Patient admitted for further management.  Cardiology following.  Patient has a history of metastatic renal cell CA.  Imaging showed periaortic lymph node mass suggesting lymphoma or other malignant process.  Per chart review, there is concern of noncompliance with treatment of malignancy.  Palliative medicine consulted for goals of care.    ASSESSMENT/PLAN:     Pertinent Hospital Diagnoses     Fall  NSTEMI  MOISE  Hx renal cell CA      Palliative Care Encounter / Counseling Regarding Goals of Care  Please see detailed goals of care discussion as below  At this time, Uzair Fuentes, Does Not have capacity for medical decision-making.  Capacity is time limited and situation/question specific  During encounter, Erica, daughter, was surrogate medical decision-maker  Outcome of goals of care meeting:   Continue full code  Continue current management  Code status Full Code  Advanced Directives: no POA or living will in [facility]  Surrogate/Legal NOK:  Erica Myers, child, [phone]  Joan Disla, ex-wife, [phone]    Spiritual assessment: no spiritual distress identified  Bereavement and grief: to be determined  Referrals to: none today  SUBJECTIVE:     Current medical issues leading to Palliative Medicine involvement include   Active Hospital Problems    Diagnosis Date Noted    Anemia [D64.9] 05/05/2022     Priority: Medium    Severe protein-calorie 
[facility]    Inpatient Medical Oncology/Hematology Consult Note      Patient Name: Uzair Fuentes  YOB: 1940    DATE OF ADMISSION: 1/24/2024  DATE OF CONSULTATION: 01/28/24  CONSULTING PROVIDER: Fidelia Lawson MD  REASON FOR CONSULTATION:  \"Metastatic renal cancer, DVT\"  PCP: Ken Liriano    Room: [address] [place] [address]      CHIEF COMPLAINT:  Fall    HISTORY OF PRESENT ILLNESS (01/28/24):   Patient is a 83 y.o. male comes in after a fall. He was at home when he slipped on ice. He recalls laying out in the snow for about an hour before his neighbor assisted him.        ONCOLOGIC HISTORY:  Mr. Fuentes is with a past medical history significant for CKD, MGUS, hyperlipidemia, hypertension, CAD, PVCs, and history of iron deficiency anemia, who had presented with left flank and back pain, he had CT scan of the abdomen the pelvis done on 3/20/2023, revealing retrocrural and retroperitoneal lymphadenopathy, multiple bilateral renal cysts, multiple bilateral hyperdense renal lesions, probable hemorrhagic cysts, CT scan of the lumbar spine had revealed a large conglomeration of soft tissue mass in the retroperitoneum, concerning for malignancy, hyperdense lesions in the kidneys, more on the left side, PET/CT scan was done on 2/12/2023, revealing retroperitoneal lymphadenopathy, measuring up to 8 cm, possible uptake in both parotid glands and left base of the neck, the patient had a CT-guided biopsy of the retroperitoneal lymphadenopathy on August 23, 2023, pathology was consistent with metastatic renal cell carcinoma, could not differentiate between clear and unclear cell carcinoma, [facility] was recommended [facility].  The patient was started on Eliquis for left lower extremity DVT.  He was recently admitted to the hospital with profound anemia, hemoglobin 4.8G/DL, he received packed RBC transfusion, he is doing well at this time, he denies any bleeding.  On 
evaluate the presence or absence of regional wall motion abnormalities with present uncertainty regarding additional assessment particularly in the face of his acute kidney injury and anemia of uncertain origin with reports of diarrhea and prior gastrointestinal hemorrhage as well as that of his malignancy and noncompliance.  He is presently maintained on a beta-blocker to provide ischemia protection and at present would not be a candidate for invasive assessment in the face of his recent acute kidney injury particularly with an increased risk of contrast nephropathy and dialysis needs in the face of his monoclonal gammopathy.  In addition based on his anemia the chance of a false positive myocardial perfusion imaging study is significant.  Presently, unless dictated by the findings of his echocardiogram suggestive of new regional wall motion abnormalities, medical management will be maintained in addition to that of appropriate risk factor modification in the face of his atherosclerotic vascular disease.  Ongoing management of noncardiovascular issues will be deferred to primary care.    I have participated in a substantive portion of the present encounter inclusive of a component of independent history and examination in addition to that of medical decision making regarding patient care.    Thank you for allowing me to participate in your patient's care. Please feel free to contact me if you have any questions or concerns.    Keenan Mccrary MD  [facility] Cardiology

## 2024-01-28 NOTE — PLAN OF CARE
Patient continues to try to ambulate without help, instructed on call button and bed alarm in use.    Patient continues to have pain in lower back and buttock

## 2024-01-28 NOTE — PLAN OF CARE
Problem: Skin/Tissue Integrity  Goal: Absence of new skin breakdown  Description: 1.  Monitor for areas of redness and/or skin breakdown  2.  Assess vascular access sites hourly  3.  Every 4-6 hours minimum:  Change oxygen saturation probe site  4.  Every 4-6 hours:  If on nasal continuous positive airway pressure, respiratory therapy assess nares and determine need for appliance change or resting period.  Outcome: Progressing     Problem: Discharge Planning  Goal: Discharge to home or other facility with appropriate resources  Outcome: Progressing     Problem: Pain  Goal: Verbalizes/displays adequate comfort level or baseline comfort level  Outcome: Progressing     Problem: Safety - Adult  Goal: Free from fall injury  Outcome: Progressing

## 2024-01-28 NOTE — PROGRESS NOTES
INPATIENT CARDIOLOGY FOLLOW-UP    Name: Uzair Fuentes    Age: 83 y.o.    Date of Admission: 1/24/2024  4:14 PM    Date of Service: 1/28/2024    Chief Complaint: Follow-up for abnormal troponin, cardiomyopathy, reported coronary atherosclerosis, chronic obstructive lung disease, hypertension, abdominal aortic aneurysm, monoclonal gammopathy, metastatic renal cell carcinoma, resolving acute kidney injury superimposed upon chronic kidney disease, deep venous thrombosis, anemia    Interim History: The patient while somewhat confused continues to deny symptoms of a cardiovascular origin.  His echocardiogram demonstrates evidence of a dilated left ventricular chamber with global hypokinesis and mild left ventricular systolic dysfunction in the absence of significant valvular abnormalities.  He is presently hemodynamically stable with a low-grade fever and stabilization of renal function with present management in addition to that of mild further decreases of hemoglobin levels.      Review of Systems:   The remainder of a complete multisystem review including consitutional, central nervous, respiratory, circulatory, gastrointestinal, genitourinary, endocrinologic, hematologic, musculoskeletal and psychiatric are negative.    Problem List:  Patient Active Problem List   Diagnosis    Anemia    Rectal bleed    GI bleed    Chest pain    PVC's (premature ventricular contractions)    Coronary artery disease involving native coronary artery of native heart without angina pectoris    Primary hypertension    Hyperlipidemia    Stage 3b chronic kidney disease (HCC)    Renal cell cancer (HCC)    Acute on chronic anemia    Renal cell carcinoma (HCC)    Elevated troponin    Severe protein-calorie malnutrition (HCC)    Fall    First degree atrioventricular block    Abdominal aortic aneurysm (AAA) without rupture (HCC)    Acute kidney injury (HCC)    Stage 4 chronic kidney disease (HCC)    Monoclonal gammopathy    Chronic deep  management will be maintained with additional consideration of ischemic evaluation precluded at this time on the combined basis of his resolving acute kidney injury, anemia and multiple noncardiovascular factors including that of his malignancy with noncompliance with therapy in addition to that of significant concerns regarding exacerbation of his renal function should contrast administration be performed the face of his monoclonal gammopathy and existing chronic kidney disease.  A reduction of fluid administration rates will be initiated to reduce risk of iatrogenic volume overload and with needs of nutritional support to assist fluid mobilization as well as reducing risk of progressive debilitation.  Continued appropriate risk factor modification of blood pressure and serum lipids will remain essential to reducing risk of future atherosclerotic development.  Additional management of noncardiovascular issues will be deferred to primary care.      Note: This report was completed utilizing computer voice recognition software. Every effort has been made to ensure accuracy, however; inadvertent computerized transcription errors may be present.    Keenan Mccrary MD  [facility] Cardiology

## 2024-01-29 PROBLEM — Z71.89 GOALS OF CARE, COUNSELING/DISCUSSION: Status: ACTIVE | Noted: 2024-01-29

## 2024-01-29 PROBLEM — Z51.5 PALLIATIVE CARE BY SPECIALIST: Status: ACTIVE | Noted: 2024-01-29

## 2024-01-29 LAB
ANION GAP SERPL CALCULATED.3IONS-SCNC: 7 MMOL/L (ref 7–16)
BASOPHILS # BLD: 0 K/UL (ref 0–0.2)
BASOPHILS NFR BLD: 0 % (ref 0–2)
BUN SERPL-MCNC: 33 MG/DL (ref 6–23)
CALCIUM SERPL-MCNC: 10.6 MG/DL (ref 8.6–10.2)
CHLORIDE SERPL-SCNC: 106 MMOL/L (ref 98–107)
CO2 SERPL-SCNC: 18 MMOL/L (ref 22–29)
CREAT SERPL-MCNC: 1.7 MG/DL (ref 0.7–1.2)
EOSINOPHIL # BLD: 0.05 K/UL (ref 0.05–0.5)
EOSINOPHILS RELATIVE PERCENT: 1 % (ref 0–6)
ERYTHROCYTE [DISTWIDTH] IN BLOOD BY AUTOMATED COUNT: 16.3 % (ref 11.5–15)
FERRITIN SERPL-MCNC: 276 NG/ML
FOLATE SERPL-MCNC: 11.4 NG/ML (ref 4.8–24.2)
GFR SERPL CREATININE-BSD FRML MDRD: 39 ML/MIN/1.73M2
GLUCOSE SERPL-MCNC: 91 MG/DL (ref 74–99)
HAPTOGLOB SERPL-MCNC: 182 MG/DL (ref 30–200)
HCT VFR BLD AUTO: 22.8 % (ref 37–54)
HGB BLD-MCNC: 7.5 G/DL (ref 12.5–16.5)
IMM GRANULOCYTES # BLD AUTO: <0.03 K/UL (ref 0–0.58)
IMM GRANULOCYTES NFR BLD: 0 % (ref 0–5)
IMM RETICS NFR: 10.1 % (ref 2.3–13.4)
IRON SATN MFR SERPL: 8 % (ref 20–55)
IRON SERPL-MCNC: 9 UG/DL (ref 59–158)
LACTATE BLDV-SCNC: 0.6 MMOL/L (ref 0.5–2.2)
LYMPHOCYTES NFR BLD: 0.34 K/UL (ref 1.5–4)
LYMPHOCYTES RELATIVE PERCENT: 5 % (ref 20–42)
MCH RBC QN AUTO: 30 PG (ref 26–35)
MCHC RBC AUTO-ENTMCNC: 32.9 G/DL (ref 32–34.5)
MCV RBC AUTO: 91.2 FL (ref 80–99.9)
MONOCYTES NFR BLD: 0.41 K/UL (ref 0.1–0.95)
MONOCYTES NFR BLD: 6 % (ref 2–12)
NEUTROPHILS NFR BLD: 87 % (ref 43–80)
NEUTS SEG NFR BLD: 5.74 K/UL (ref 1.8–7.3)
PLATELET, FLUORESCENCE: 161 K/UL (ref 130–450)
PMV BLD AUTO: 10.9 FL (ref 7–12)
POTASSIUM SERPL-SCNC: 3.7 MMOL/L (ref 3.5–5)
RBC # BLD AUTO: 2.5 M/UL (ref 3.8–5.8)
RBC # BLD: ABNORMAL 10*6/UL
RETIC HEMOGLOBIN: 27 PG (ref 28.2–36.6)
RETICS # AUTO: 0.02 M/UL
RETICS/RBC NFR AUTO: 0.7 % (ref 0.4–1.9)
SODIUM SERPL-SCNC: 131 MMOL/L (ref 132–146)
TIBC SERPL-MCNC: 108 UG/DL (ref 250–450)
VIT B12 SERPL-MCNC: 564 PG/ML (ref 211–946)
WBC OTHER # BLD: 6.6 K/UL (ref 4.5–11.5)

## 2024-01-29 PROCEDURE — 99232 SBSQ HOSP IP/OBS MODERATE 35: CPT | Performed by: CLINICAL NURSE SPECIALIST

## 2024-01-29 PROCEDURE — 97530 THERAPEUTIC ACTIVITIES: CPT

## 2024-01-29 PROCEDURE — 2140000000 HC CCU INTERMEDIATE R&B

## 2024-01-29 PROCEDURE — 2580000003 HC RX 258: Performed by: INTERNAL MEDICINE

## 2024-01-29 PROCEDURE — 2580000003 HC RX 258: Performed by: CLINICAL NURSE SPECIALIST

## 2024-01-29 PROCEDURE — 82607 VITAMIN B-12: CPT

## 2024-01-29 PROCEDURE — 99232 SBSQ HOSP IP/OBS MODERATE 35: CPT | Performed by: INTERNAL MEDICINE

## 2024-01-29 PROCEDURE — 83605 ASSAY OF LACTIC ACID: CPT

## 2024-01-29 PROCEDURE — 99231 SBSQ HOSP IP/OBS SF/LOW 25: CPT

## 2024-01-29 PROCEDURE — 85025 COMPLETE CBC W/AUTO DIFF WBC: CPT

## 2024-01-29 PROCEDURE — 82525 ASSAY OF COPPER: CPT

## 2024-01-29 PROCEDURE — 83010 ASSAY OF HAPTOGLOBIN QUANT: CPT

## 2024-01-29 PROCEDURE — 97166 OT EVAL MOD COMPLEX 45 MIN: CPT

## 2024-01-29 PROCEDURE — 83550 IRON BINDING TEST: CPT

## 2024-01-29 PROCEDURE — 6360000002 HC RX W HCPCS: Performed by: CLINICAL NURSE SPECIALIST

## 2024-01-29 PROCEDURE — 36415 COLL VENOUS BLD VENIPUNCTURE: CPT

## 2024-01-29 PROCEDURE — 82746 ASSAY OF FOLIC ACID SERUM: CPT

## 2024-01-29 PROCEDURE — 6370000000 HC RX 637 (ALT 250 FOR IP): Performed by: INTERNAL MEDICINE

## 2024-01-29 PROCEDURE — 85045 AUTOMATED RETICULOCYTE COUNT: CPT

## 2024-01-29 PROCEDURE — 82728 ASSAY OF FERRITIN: CPT

## 2024-01-29 PROCEDURE — 84630 ASSAY OF ZINC: CPT

## 2024-01-29 PROCEDURE — 80048 BASIC METABOLIC PNL TOTAL CA: CPT

## 2024-01-29 PROCEDURE — 83540 ASSAY OF IRON: CPT

## 2024-01-29 RX ADMIN — SODIUM CHLORIDE 125 MG: 9 INJECTION, SOLUTION INTRAVENOUS at 17:57

## 2024-01-29 RX ADMIN — APIXABAN 2.5 MG: 2.5 TABLET, FILM COATED ORAL at 12:17

## 2024-01-29 RX ADMIN — PANTOPRAZOLE SODIUM 40 MG: 40 TABLET, DELAYED RELEASE ORAL at 05:42

## 2024-01-29 RX ADMIN — Medication 10 ML: at 22:47

## 2024-01-29 RX ADMIN — TAMSULOSIN HYDROCHLORIDE 0.4 MG: 0.4 CAPSULE ORAL at 22:47

## 2024-01-29 RX ADMIN — SODIUM CHLORIDE, POTASSIUM CHLORIDE, SODIUM LACTATE AND CALCIUM CHLORIDE: 600; 310; 30; 20 INJECTION, SOLUTION INTRAVENOUS at 05:45

## 2024-01-29 RX ADMIN — Medication 10 ML: at 08:51

## 2024-01-29 RX ADMIN — ATORVASTATIN CALCIUM 40 MG: 40 TABLET, FILM COATED ORAL at 22:47

## 2024-01-29 RX ADMIN — FINASTERIDE 5 MG: 5 TABLET, FILM COATED ORAL at 08:50

## 2024-01-29 RX ADMIN — METOPROLOL SUCCINATE 25 MG: 25 TABLET, EXTENDED RELEASE ORAL at 22:47

## 2024-01-29 RX ADMIN — PANTOPRAZOLE SODIUM 40 MG: 40 TABLET, DELAYED RELEASE ORAL at 17:55

## 2024-01-29 RX ADMIN — METOPROLOL SUCCINATE 25 MG: 25 TABLET, EXTENDED RELEASE ORAL at 08:50

## 2024-01-29 RX ADMIN — APIXABAN 2.5 MG: 2.5 TABLET, FILM COATED ORAL at 22:47

## 2024-01-29 RX ADMIN — ASPIRIN 81 MG: 81 TABLET, COATED ORAL at 08:50

## 2024-01-29 ASSESSMENT — PAIN SCALES - GENERAL: PAINLEVEL_OUTOF10: 0

## 2024-01-29 NOTE — PROGRESS NOTES
[facility] Quality Flow/Interdisciplinary Rounds Progress Note        Quality Flow Rounds held on January 29, 2024    Disciplines Attending:  Bedside Nurse, , , and Nursing Unit Leadership    Uzair Fuentes was admitted on 1/24/2024  4:14 PM    Anticipated Discharge Date:       Disposition:    Jaylen Score:  Jaylen Scale Score: 18    Readmission Risk              Risk of Unplanned Readmission:  26           Discussed patient goal for the day, patient clinical progression, and barriers to discharge.  The following Goal(s) of the Day/Commitment(s) have been identified:  Diagnostics - Report Results and Labs - Report Results      Sravanthi Henderson RN  January 29, 2024

## 2024-01-29 NOTE — PROGRESS NOTES
Department of [facility] Med Oncology  Consult Follow Up Note    SUBJECTIVE:  Overall clinical picture improving today, no overnight issues receiving IV fluids, sitting up in chair    OBJECTIVE:   Appears in no acute distress, discussed with patient Ferrlecit therapy for low iron studies     Current Medications:   apixaban (ELIQUIS) tablet 2.5 mg, BID  lactated ringers IV soln infusion, Continuous  perflutren lipid microspheres (DEFINITY) injection 1.5 mL, ONCE PRN  metoprolol succinate (TOPROL XL) extended release tablet 25 mg, BID  oxyCODONE-acetaminophen (PERCOCET) 5-325 MG per tablet 1 tablet, Q4H PRN  atorvastatin (LIPITOR) tablet 40 mg, Nightly  finasteride (PROSCAR) tablet 5 mg, Daily  pantoprazole (PROTONIX) tablet 40 mg, BID AC  tamsulosin (FLOMAX) capsule 0.4 mg, QHS  sodium chloride flush 0.9 % injection 5-40 mL, 2 times per day  sodium chloride flush 0.9 % injection 5-40 mL, PRN  0.9 % sodium chloride infusion, PRN  ondansetron (ZOFRAN-ODT) disintegrating tablet 4 mg, Q8H PRN   Or  ondansetron (ZOFRAN) injection 4 mg, Q6H PRN  senna (SENOKOT) tablet 8.6 mg, Daily PRN  aluminum & magnesium hydroxide-simethicone (MAALOX) 200-200-20 MG/5ML suspension 30 mL, Q6H PRN  acetaminophen (TYLENOL) tablet 650 mg, Q6H PRN   Or  acetaminophen (TYLENOL) suppository 650 mg, Q6H PRN        Allergies: No Known Allergies     PHYSICAL EXAM:   VITALS:   Vitals:    01/29/24 1520   BP: 124/64   Pulse: 98   Resp: 24   Temp: 98.7 °F (37.1 °C)   SpO2: 94%     GENERAL APPEARANCE: Well developed, well nourished 83 y.o. yo male in no acute distress.  ECOG PERFORMANCE STATUS:  HEENT: Head: Nornmocephalic, artaumatic.  Eyes: PERRL; EOMI. oropharynx clear, no evidence of mucositis or thrush. Ears: no abnormalities on inspection. Nose: no abnormal discharge or ulcerations.  LUNGS:  Clear to auscultation bilaterally. No wheezes, rhonchi, or rales.  CARDIOVASCULAR:  Regular rate. No murmurs, rubs or gallops.  ABDOMEN:  Soft, non tender, non  hematuria around that time which has cleared up since then. He is unsure what his stool color was.      For his anemia, consider may be multifactorial in the setting of malignancy, inflammation, possible nutritional deficiency.  Also consider component of hemodilution as well as he is on IV fluids.     PLAN      On Eliquis 2.5 mg BID at home; on hold per primary team in light of his anemia  Consider restarting Eliquis tomorrow if Hgb stable and no evidence of bleed  Pt had BM while I was in conversation with him; stool appeared brown, no blood observed  FOBT ordered per primary, awaiting results  B12/folate 564 11.4, reticulocytes check hemoglobin 27, haptoglobin 182, zinc, copper pending  Urine is clear but if he has hematuria, consult urology  Retroperitoneal US ordered per primary, awaiting results  Hold nivo while inpatient  Fe  sat 8% ,fe 9, TIBC 108 , iron deficiency anemia will add Ferrlecit x 1 today      JONATHAN Vazquez,ACNS-BC,AGACNP-BC  HEMATOLOGY/MEDICAL ONCOLOGY  [facility] SPECIAL[facility] MED ONCOLOGY  [address] [address]  Dept: [phone]  Loc: [phone]

## 2024-01-29 NOTE — PROGRESS NOTES
[facility] Hospitalist Progress Note    Admitting Date and Time: 1/24/2024  4:14 PM  Admit Dx: Elevated troponin [R79.89]  Fall, initial encounter [W19.XXXA]  Chronic pancreatitis, unspecified pancreatitis type (HCC) [K86.1]  Chronic kidney disease, unspecified CKD stage [N18.9]    Synopsis  83-year-old gentleman with past medical history of metastatic renal cancer, known history of noncompliance to treatment, DVT, GI bleed.  He presented to ER for evaluation s/p fall.  Has been hypotensive, has prerenal MOISE, likely in setting of volume contraction, receiving IV fluids.  Hemoglobin dropped, no active bleed, resuming Eliquis today.  He comes from home, currently plans to return home, PT/OT evaluate.  Seen by palliative care.    Subjective:  Patient is being followed for Elevated troponin [R79.89]  Fall, initial encounter [W19.XXXA]  Chronic pancreatitis, unspecified pancreatitis type (HCC) [K86.1]  Chronic kidney disease, unspecified CKD stage [N18.9]     BP improved today, receiving IV fluids  Denies any acute complains  No overnight complaints reported  Palliative care saw him yesterday, discussed with family, remains full code.    ROS: denies fever, chills, cp, sob, n/v, HA unless stated above.      metoprolol succinate  25 mg Oral BID    atorvastatin  40 mg Oral Nightly    aspirin  81 mg Oral Daily    finasteride  5 mg Oral Daily    pantoprazole  40 mg Oral BID AC    tamsulosin  0.4 mg Oral QHS    sodium chloride flush  5-40 mL IntraVENous 2 times per day     perflutren lipid microspheres, 1.5 mL, ONCE PRN  oxyCODONE-acetaminophen, 1 tablet, Q4H PRN  sodium chloride flush, 5-40 mL, PRN  sodium chloride, , PRN  ondansetron, 4 mg, Q8H PRN   Or  ondansetron, 4 mg, Q6H PRN  senna, 1 tablet, Daily PRN  aluminum & magnesium hydroxide-simethicone, 30 mL, Q6H PRN  acetaminophen, 650 mg, Q6H PRN   Or  acetaminophen, 650 mg, Q6H PRN         Objective:    BP (!) 106/56   Pulse 74   Temp 98 °F (36.7 °C) (Temporal)

## 2024-01-29 NOTE — PROGRESS NOTES
Palliative Care Department  [phone]  Palliative Care Progress Note  Provider Adri Angel, APRN - CNP     Uzair Fuentes  [id number]  Hospital Day: 6  Date of Initial Consult: 1/28/2024  Referring Provider: Fidelia Lawson MD   Palliative Medicine was consulted for assistance with: Goals of Care    HPI:   Uzair Fuentes is a 83 y.o. with a medical history of DVT on Eliquis, GI bleed, HTN, CKD, and renal cell CA who was admitted on 1/24/2024 from home with a CHIEF COMPLAINT of fall.  Patient reportedly slipped on ice.  Patient noted to have MOISE and elevated troponin.  Patient admitted for further management.  Cardiology following.  Patient has a history of metastatic renal cell CA.  Imaging showed periaortic lymph node mass suggesting lymphoma or other malignant process.  Per chart review, there is concern of noncompliance with treatment of malignancy.  Palliative medicine consulted for goals of care.    ASSESSMENT/PLAN:     Pertinent Hospital Diagnoses     Fall  NSTEMI  MOISE  Hx GI bleed  Hx renal cell CA    Palliative Care Encounter / Counseling Regarding Goals of Care  Please see detailed goals of care discussion as below  At this time, Uzair Fuentes, Does have capacity for medical decision-making.  Capacity is time limited and situation/question specific  During encounter, Erica, daughter, was surrogate medical decision-maker  Outcome of goals of care meeting:   Continue full code  Continue current management  Code status Full Code  Advanced Directives: no POA or living will in [facility]  Surrogate/Legal NOK:  Erica Myers, child, [phone]  Joan Disla, ex-wife, [phone]    Spiritual assessment: no spiritual distress identified  Bereavement and grief: to be determined  Referrals to: none today  SUBJECTIVE:     Current medical issues leading to Palliative Medicine involvement include   Active Hospital Problems    Diagnosis Date Noted    Normocytic anemia [D64.9] 05/05/2022

## 2024-01-29 NOTE — PROGRESS NOTES
01/29/24 0948   Encounter Summary   Encounter Overview/Reason  Initial Encounter;Palliative Care   Service Provided For: Patient   Referral/Consult From: [facility]   Support System Children   Last Encounter  01/29/24  (DL)   Complexity of Encounter High   Spiritual/Emotional needs   Type Spiritual Support   Palliative Care   Type Palliative Care, Initial/Spiritual Assessment   Assessment/Intervention/Outcome   Assessment Calm;Coping   Intervention Active listening;Discussed belief system/Episcopalian practices/joshua;Discussed illness injury and it’s impact;Explored/Affirmed feelings, thoughts, concerns;Prayer (assurance of)/Jenners;Nurtured Hope;Discussed meaning/purpose;Discussed relationship with God   Outcome Comfort;Engaged in conversation;Expressed feelings, needs, and concerns;Expressed Gratitude     Uzair was sitting up in a chair and engaged in conversation with me. He spoke of not having enough support and trying to get someone to come into the home. He states he does have people that  supplies for him. When asked if he has thought about going somewhere to get more help, he said that he has thought about it, but can not abandon the home. He has a daughter close. When communicating about his medical care all he would say is his body is giving out. Uzair does have a Caodaism joshua belief and at this point he no longer attends a Jew. He states \"God's will, will be done.\" He welcomed the prayer support.

## 2024-01-29 NOTE — PROGRESS NOTES
Return call received from daughter who lives out of state.  She expressed concern for pt at home. He often calls and says he cannot drive himself to the hospital but she can see he gets out to eat fast food. He sees Dr Chance Eagle for oncology but has missed recent appointments  or cancels because he feels he cannot get there. She is not sure he completely understands his diagnosis despite conversations about it and reason for treatment. She states her brother is estranged from pt and her sister lives in [place] but does not help. Her mother, pts ex wife, does check on him if Lester needs her to. We discussed having Lester speak to the care coordinator to discuss discharge plans as well as having Palliative Medicine NP follow up with pt to attempt and discuss his goals of care if he can have that conversation.  Lester is agreeable to a follow up call tomorrow.

## 2024-01-29 NOTE — PROGRESS NOTES
Physical Therapy  Treatment    Name: Uzair Fuentes  : 1940  MRN: [id number]      Date of Service: 2024    Evaluating PT: Arslan Purcell, PT, DPT [id number]      Room #:  6503/6503-B  Diagnosis:  Elevated troponin [R79.89]  Fall, initial encounter [W19.XXXA]  Chronic pancreatitis, unspecified pancreatitis type (HCC) [K86.1]  Chronic kidney disease, unspecified CKD stage [N18.9]  PMHx/PSHx:   has a past medical history of Cancer (HCC), Disease of blood and blood forming organ, and Hypertension.   has a past surgical history that includes Abdominal aortic aneurysm repair; Upper gastrointestinal endoscopy (N/A, 10/14/2022); Total knee arthroplasty (Bilateral); Tonsillectomy; and Colonoscopy.  Procedure/Surgery:  None  Precautions:  Fall risk, cognition  Equipment Needs: TBD    SUBJECTIVE:    Pt lives alone in a single story house with 3 stair(s) and 1 rail(s) to enter. Pt ambulated without AD prior to admission.    OBJECTIVE:   Initial Evaluation  Date: 24 Treatment Date:  2024 Short Term/ Long Term   Goals   AM-PAC 6 Clicks     Was pt agreeable to Eval/treatment? Yes Yes    Does pt have pain? Tailbone pain Moderate abdomen, rectum    Bed Mobility  Rolling: NT  Supine to sit: SBA  Sit to supine: NT  Scooting: SBA to EOB Rolling: NT  Supine to sit: NT  Sit to supine: NT  Scooting: NT Rolling: Independent   Supine to sit: Independent   Sit to supine: Independent   Scooting: Independent    Transfers Sit to stand: Chalino  Stand to sit: Chalino  Stand pivot: ModA with WW Sit to stand: ModA  Stand to sit: ModA  Stand pivot: NT Sit to stand: Supervision  Stand to sit: Supervision  Stand pivot: Supervision with WW   Ambulation   30 feet with WW with ModA 30 feet x2 with WW ModA >200 feet with WW with Supervision   Stair negotiation: ascended and descended NT NT >4 step(s) with 1 rail(s) with SBA   ROM BUE: Refer to OT note  BLE: WFL     Strength BUE: Refer to OT note  BLE: WFL     Balance Sitting

## 2024-01-29 NOTE — PROGRESS NOTES
activity tolerance , balance , coordination, strength , and cognition. Pt demonstrated decreased independence during ADLs and functional mobility. Pt would benefit from continued skilled OT to increase safety and independence with completion of ADL tasks and functional mobility for improved quality of life     Treatment: OT treatment provided this date includes:  OT edu pt/family on role of OT in the acute care setting. Pt verbalized understanding; however, is intermittently confused throughout session   Therapist facilitated and instructed pt on adapted techniques & compensatory strategies to improve safety and independence with ADLs and functional mobility as noted above to allow pt to achieve highest level of independence and safely. Pt provided verbal instructions, cuing, extended time, and encouragement in order to promote maximum level of independence.   OT edu Pt on use of call button and how to press at start of session 2/2 confusion trying to call for help with tele. Pt verbalized understanding, but demo'd poor tech back and recall at end of session.    Pt edu on use of call light and waiting until staff present to attempt any functional mobility. Pt verbalized understanding and demonstrated ability to locate and press button with supervision. Pt confused throughout session, poor STM from beginning to end of session, Pt needed re-edu.       Rehab Potential:  Good for established goals     Patient / Family Goal: to get better      Patient and/or family were instructed on functional diagnosis, prognosis/goals and OT plan of care. Pt/family demonstrated understanding.      Eval Complexity:      Description  Performance deficits  Clinical decision making  Co-morbidities affecting occupational performance  Modification or assistance to complete eval    Low Complexity   1 to 3 []  Low []  None []  None []   Moderate Complexity   3 to 5 [x]  Mod []  Maybe []  Min to Mod [x]   High Complexity   5 or more []  High  [x]  Yes [x]  Max []     The above evaluation is classified as moderate complexity based off the noted performance deficits, personal factors, co-morbidities, assistance required, and other factors as noted in the clinical evaluation and functional testing.     Time In: 1005  Time Out: 1028  Total Treatment Time: 8 minutes    Min Units   OT Eval Low 74077       OT Eval Medium 97166  x 1   OT Eval High 15127       OT Re-Eval 40536       Therapeutic Ex 40079       Therapeutic Activities 82882  8 1   ADL/Self Care 42446      Orthotic Management 15462       Neuro Re-Ed 73574       Non-Billable Time          Evaluation Time includes thorough review of current medical information, gathering information on past medical history/social history and prior level of function, completion of standardized testing/informal observation of tasks, assessment of data and education on plan of care and goals.          Damien Arteaga OTR/L [id number]

## 2024-01-29 NOTE — PLAN OF CARE
Problem: Skin/Tissue Integrity  Goal: Absence of new skin breakdown  Description: 1.  Monitor for areas of redness and/or skin breakdown  2.  Assess vascular access sites hourly  3.  Every 4-6 hours minimum:  Change oxygen saturation probe site  4.  Every 4-6 hours:  If on nasal continuous positive airway pressure, respiratory therapy assess nares and determine need for appliance change or resting period.  1/29/2024 0921 by Frommelt, Lauren, RN  Outcome: Progressing     Problem: Pain  Goal: Verbalizes/displays adequate comfort level or baseline comfort level  1/29/2024 0921 by Frommelt, Lauren, RN  Outcome: Progressing     Problem: Safety - Adult  Goal: Free from fall injury  1/29/2024 0921 by Frommelt, Lauren, RN  Outcome: Progressing     Problem: ABCDS Injury Assessment  Goal: Absence of physical injury  Outcome: Progressing     Problem: Nutrition Deficit:  Goal: Optimize nutritional status  Outcome: Progressing

## 2024-01-29 NOTE — CARE COORDINATION
1/29/24  Transition of Care update. Patient admitted for elevated troponin and fall. ECHO complete with EF of 45-50% with no further cardiac testing recommended. Hgb is 7.5 today and sodium is low today.  Patient noted to have AM-PAC of 14/24 for PT on 1/27 and 15/24 for OT today.  Reviewed a ELISA stay with patient and family.  Patient is wanting to go home rather than to a facility.  A home care referral has been made to [facility] who is reviewing.  Daughter  also unsure she wants to place cancer treatments on hold for patient to go to a nursing facility. A ELISA list was emailed  to daughter Saran at ([email or web address]) to review as patient lives alone and would benefit most from strengthening.  Spoke with Cynthia  [phone] ext [phone] at [facility] to update .  Cynthia states patient has no service connection and no benefits for a rehab stay under the [facility].  Cynthia states patient local [facility]  is [facility] [phone] ext [phone].  Attempted to call [facility] 3 times but unable to reach.  Cynthia did state that the [facility] has that patient is active with  [facility] Medicare policy # [id number] . Will attempt to use united health care insurance if confirmed by [facility] as active. DOMINIQUE/NUHA to follow.     Electronically signed by JOSE Segura on 1/29/2024 at 4:01 PM

## 2024-01-29 NOTE — PROGRESS NOTES
INPATIENT CARDIOLOGY FOLLOW-UP    Name: Uzair Fuentes    Age: 83 y.o.    Date of Admission: 1/24/2024  4:14 PM    Date of Service: 1/29/2024    Chief Complaint: Follow-up for  Follow-up for abnormal troponin, cardiomyopathy, reported coronary atherosclerosis, chronic obstructive lung disease, hypertension, abdominal aortic aneurysm, monoclonal gammopathy, metastatic renal cell carcinoma, resolving acute kidney injury superimposed upon chronic kidney disease, deep venous thrombosis, an     Interim History:  Patient feeling much better denies any cardiac symptoms at this time.  Currently with no complaints of CP, SOB, palpitations, dizziness, or lightheadedness. SR with occasional PVC on telemetry.    Review of Systems:   Cardiac: As per HPI  General: No fever, chills  Pulmonary: As per HPI  HEENT: No visual disturbances, difficult swallowing  GI: No nausea, vomiting  Endocrine: No thyroid disease or DM  Musculoskeletal: FRAZIER x 4, no focal motor deficits  Skin: Intact, no rashes  Neuro/Psych: No headache or seizures    Problem List:  Patient Active Problem List   Diagnosis    Normocytic anemia    Rectal bleed    GI bleed    Chest pain    PVC's (premature ventricular contractions)    Coronary artery disease involving native coronary artery of native heart without angina pectoris    Primary hypertension    Hyperlipidemia    Stage 3b chronic kidney disease (HCC)    Renal cell cancer (HCC)    Acute on chronic anemia    Renal cell carcinoma (HCC)    Elevated troponin    Severe protein-calorie malnutrition (HCC)    Fall    First degree atrioventricular block    Abdominal aortic aneurysm (AAA) without rupture (HCC)    Acute kidney injury (HCC)    Stage 4 chronic kidney disease (HCC)    Monoclonal gammopathy    Chronic deep vein thrombosis (DVT) of proximal vein of lower extremity (HCC)    Goals of care, counseling/discussion    Palliative care by specialist       Allergies:  No Known Allergies    Current  independent

## 2024-01-30 LAB
ANION GAP SERPL CALCULATED.3IONS-SCNC: 10 MMOL/L (ref 7–16)
BASOPHILS # BLD: 0 K/UL (ref 0–0.2)
BASOPHILS NFR BLD: 0 % (ref 0–2)
BUN SERPL-MCNC: 27 MG/DL (ref 6–23)
CALCIUM SERPL-MCNC: 10.9 MG/DL (ref 8.6–10.2)
CHLORIDE SERPL-SCNC: 105 MMOL/L (ref 98–107)
CO2 SERPL-SCNC: 18 MMOL/L (ref 22–29)
CREAT SERPL-MCNC: 1.5 MG/DL (ref 0.7–1.2)
EOSINOPHIL # BLD: 0.05 K/UL (ref 0.05–0.5)
EOSINOPHILS RELATIVE PERCENT: 1 % (ref 0–6)
ERYTHROCYTE [DISTWIDTH] IN BLOOD BY AUTOMATED COUNT: 16 % (ref 11.5–15)
GFR SERPL CREATININE-BSD FRML MDRD: 48 ML/MIN/1.73M2
GLUCOSE SERPL-MCNC: 94 MG/DL (ref 74–99)
HCT VFR BLD AUTO: 24.9 % (ref 37–54)
HGB BLD-MCNC: 8 G/DL (ref 12.5–16.5)
LYMPHOCYTES NFR BLD: 0.27 K/UL (ref 1.5–4)
LYMPHOCYTES RELATIVE PERCENT: 4 % (ref 20–42)
MCH RBC QN AUTO: 29.5 PG (ref 26–35)
MCHC RBC AUTO-ENTMCNC: 32.1 G/DL (ref 32–34.5)
MCV RBC AUTO: 91.9 FL (ref 80–99.9)
MONOCYTES NFR BLD: 0.22 K/UL (ref 0.1–0.95)
MONOCYTES NFR BLD: 4 % (ref 2–12)
NEUTROPHILS NFR BLD: 91 % (ref 43–80)
NEUTS SEG NFR BLD: 5.66 K/UL (ref 1.8–7.3)
PLATELET # BLD AUTO: 175 K/UL (ref 130–450)
PMV BLD AUTO: 10.4 FL (ref 7–12)
POTASSIUM SERPL-SCNC: 4 MMOL/L (ref 3.5–5)
RBC # BLD AUTO: 2.71 M/UL (ref 3.8–5.8)
RBC # BLD: ABNORMAL 10*6/UL
SODIUM SERPL-SCNC: 133 MMOL/L (ref 132–146)
WBC OTHER # BLD: 6.2 K/UL (ref 4.5–11.5)

## 2024-01-30 PROCEDURE — 85025 COMPLETE CBC W/AUTO DIFF WBC: CPT

## 2024-01-30 PROCEDURE — 2140000000 HC CCU INTERMEDIATE R&B

## 2024-01-30 PROCEDURE — 6370000000 HC RX 637 (ALT 250 FOR IP): Performed by: INTERNAL MEDICINE

## 2024-01-30 PROCEDURE — 2580000003 HC RX 258: Performed by: INTERNAL MEDICINE

## 2024-01-30 PROCEDURE — 36415 COLL VENOUS BLD VENIPUNCTURE: CPT

## 2024-01-30 PROCEDURE — 97530 THERAPEUTIC ACTIVITIES: CPT

## 2024-01-30 PROCEDURE — 99232 SBSQ HOSP IP/OBS MODERATE 35: CPT | Performed by: INTERNAL MEDICINE

## 2024-01-30 PROCEDURE — 80048 BASIC METABOLIC PNL TOTAL CA: CPT

## 2024-01-30 PROCEDURE — 6360000002 HC RX W HCPCS: Performed by: INTERNAL MEDICINE

## 2024-01-30 RX ORDER — SODIUM BICARBONATE 650 MG/1
650 TABLET ORAL 3 TIMES DAILY
Status: DISCONTINUED | OUTPATIENT
Start: 2024-01-30 | End: 2024-02-02 | Stop reason: HOSPADM

## 2024-01-30 RX ADMIN — APIXABAN 2.5 MG: 2.5 TABLET, FILM COATED ORAL at 08:42

## 2024-01-30 RX ADMIN — SODIUM BICARBONATE 650 MG: 650 TABLET ORAL at 20:06

## 2024-01-30 RX ADMIN — Medication 10 ML: at 20:06

## 2024-01-30 RX ADMIN — SODIUM CHLORIDE 125 MG: 9 INJECTION, SOLUTION INTRAVENOUS at 20:14

## 2024-01-30 RX ADMIN — SODIUM BICARBONATE 650 MG: 650 TABLET ORAL at 16:42

## 2024-01-30 RX ADMIN — OXYCODONE AND ACETAMINOPHEN 1 TABLET: 5; 325 TABLET ORAL at 01:42

## 2024-01-30 RX ADMIN — FINASTERIDE 5 MG: 5 TABLET, FILM COATED ORAL at 08:42

## 2024-01-30 RX ADMIN — TAMSULOSIN HYDROCHLORIDE 0.4 MG: 0.4 CAPSULE ORAL at 20:05

## 2024-01-30 RX ADMIN — APIXABAN 2.5 MG: 2.5 TABLET, FILM COATED ORAL at 20:05

## 2024-01-30 RX ADMIN — PETROLATUM: 420 OINTMENT TOPICAL at 23:00

## 2024-01-30 RX ADMIN — PANTOPRAZOLE SODIUM 40 MG: 40 TABLET, DELAYED RELEASE ORAL at 16:42

## 2024-01-30 RX ADMIN — METOPROLOL SUCCINATE 25 MG: 25 TABLET, EXTENDED RELEASE ORAL at 20:06

## 2024-01-30 RX ADMIN — ACETAMINOPHEN 325MG 650 MG: 325 TABLET ORAL at 14:47

## 2024-01-30 RX ADMIN — ATORVASTATIN CALCIUM 40 MG: 40 TABLET, FILM COATED ORAL at 20:05

## 2024-01-30 RX ADMIN — METOPROLOL SUCCINATE 25 MG: 25 TABLET, EXTENDED RELEASE ORAL at 08:42

## 2024-01-30 RX ADMIN — ACETAMINOPHEN 325MG 650 MG: 325 TABLET ORAL at 23:19

## 2024-01-30 RX ADMIN — PANTOPRAZOLE SODIUM 40 MG: 40 TABLET, DELAYED RELEASE ORAL at 07:12

## 2024-01-30 RX ADMIN — SODIUM BICARBONATE 650 MG: 650 TABLET ORAL at 09:50

## 2024-01-30 RX ADMIN — Medication 10 ML: at 08:42

## 2024-01-30 ASSESSMENT — PAIN DESCRIPTION - DESCRIPTORS
DESCRIPTORS: DISCOMFORT;ACHING;TENDER
DESCRIPTORS: ACHING;DISCOMFORT;SHARP
DESCRIPTORS: ACHING;DISCOMFORT;SORE

## 2024-01-30 ASSESSMENT — PAIN DESCRIPTION - LOCATION
LOCATION: BACK
LOCATION: ABDOMEN
LOCATION: HIP

## 2024-01-30 ASSESSMENT — PAIN DESCRIPTION - ORIENTATION
ORIENTATION: LOWER
ORIENTATION: RIGHT
ORIENTATION: LEFT;LOWER;RIGHT

## 2024-01-30 ASSESSMENT — PAIN SCALES - GENERAL
PAINLEVEL_OUTOF10: 7
PAINLEVEL_OUTOF10: 6
PAINLEVEL_OUTOF10: 7

## 2024-01-30 NOTE — PROGRESS NOTES
INPATIENT CARDIOLOGY FOLLOW-UP    Name: Uzair Fuentes    Age: 83 y.o.    Date of Admission: 1/24/2024  4:14 PM    Date of Service: 1/30/2024    Chief Complaint: Follow-up for  Follow-up for abnormal troponin, cardiomyopathy, reported coronary atherosclerosis, chronic obstructive lung disease, hypertension, abdominal aortic aneurysm, monoclonal gammopathy, metastatic renal cell carcinoma, resolving acute kidney injury superimposed upon chronic kidney disease, deep venous thrombosis, an     Interim History:  Patient feeling much better denies any cardiac symptoms at this time.  Patient complaining of intermittent right lower quadrant abdominal pain and denies any constipation.  Currently with no complaints of CP, SOB, palpitations, dizziness, or lightheadedness. SR with occasional PVC on telemetry.    Review of Systems:   Cardiac: As per HPI  General: No fever, chills  Pulmonary: As per HPI  HEENT: No visual disturbances, difficult swallowing  GI: No nausea, vomiting  Endocrine: No thyroid disease or DM  Musculoskeletal: FRAZIER x 4, no focal motor deficits  Skin: Intact, no rashes  Neuro/Psych: No headache or seizures    Problem List:  Patient Active Problem List   Diagnosis    Normocytic anemia    Rectal bleed    GI bleed    Chest pain    PVC's (premature ventricular contractions)    Coronary artery disease involving native coronary artery of native heart without angina pectoris    Primary hypertension    Hyperlipidemia    Stage 3b chronic kidney disease (HCC)    Renal cell cancer (HCC)    Acute on chronic anemia    Renal cell carcinoma (HCC)    Elevated troponin    Severe protein-calorie malnutrition (HCC)    Fall    First degree atrioventricular block    Abdominal aortic aneurysm (AAA) without rupture (HCC)    Acute kidney injury (HCC)    Stage 4 chronic kidney disease (HCC)    Monoclonal gammopathy    Chronic deep vein thrombosis (DVT) of proximal vein of lower extremity (HCC)    Goals of care,  however, inadvertent computerized transcription errors may be present.     Arabella Kay MD., FACC, ALEXA.  [facility]

## 2024-01-30 NOTE — PROGRESS NOTES
[facility] Hospitalist Progress Note    Admitting Date and Time: 1/24/2024  4:14 PM  Admit Dx: Elevated troponin [R79.89]  Fall, initial encounter [W19.XXXA]  Chronic pancreatitis, unspecified pancreatitis type (HCC) [K86.1]  Chronic kidney disease, unspecified CKD stage [N18.9]    Synopsis  83-year-old gentleman with past medical history of metastatic renal cancer, known history of noncompliance to treatment, DVT, GI bleed.  He presented to ER for evaluation s/p fall.  Has been hypotensive, has prerenal MOISE, likely in setting of volume contraction, receiving IV fluids.  Hemoglobin dropped, no active bleed, resuming Eliquis today.  He comes from home, currently plans to return home, PT/OT evaluate.  Seen by palliative care.    Subjective:  Patient is being followed for Elevated troponin [R79.89]  Fall, initial encounter [W19.XXXA]  Chronic pancreatitis, unspecified pancreatitis type (HCC) [K86.1]  Chronic kidney disease, unspecified CKD stage [N18.9]     BP improved today, receiving IV fluids  Denies any acute complains  No overnight complaints reported  Palliative care saw him , discussed with family, remains full code.  Hemoglobin stable.    ROS: denies fever, chills, cp, sob, n/v, HA unless stated above.      sodium bicarbonate  650 mg Oral TID    apixaban  2.5 mg Oral BID    metoprolol succinate  25 mg Oral BID    atorvastatin  40 mg Oral Nightly    finasteride  5 mg Oral Daily    pantoprazole  40 mg Oral BID AC    tamsulosin  0.4 mg Oral QHS    sodium chloride flush  5-40 mL IntraVENous 2 times per day     perflutren lipid microspheres, 1.5 mL, ONCE PRN  oxyCODONE-acetaminophen, 1 tablet, Q4H PRN  sodium chloride flush, 5-40 mL, PRN  sodium chloride, , PRN  ondansetron, 4 mg, Q8H PRN   Or  ondansetron, 4 mg, Q6H PRN  senna, 1 tablet, Daily PRN  aluminum & magnesium hydroxide-simethicone, 30 mL, Q6H PRN  acetaminophen, 650 mg, Q6H PRN   Or  acetaminophen, 650 mg, Q6H PRN         Objective:    /62    Pulse 68   Temp 97.4 °F (36.3 °C) (Temporal)   Resp 19   Ht 1.803 m (5' 11\")   Wt 72.7 kg (160 lb 3.2 oz)   SpO2 96%   BMI 22.34 kg/m²     General Appearance:  in no acute distress  Skin: warm and dry  Head: normocephalic and atraumatic  Eyes: pupils equal, round, and reactive to light, extraocular eye movements intact, conjunctivae normal  Neck: neck supple and non tender without mass   Pulmonary/Chest: clear to auscultation bilaterally- no wheezes, rales or rhonchi, normal air movement, no respiratory distress  Cardiovascular: normal rate, normal S1 and S2 and no carotid bruits  Abdomen: soft, non-tender, non-distended, normal bowel sounds, no masses or organomegaly  Extremities: no cyanosis, no clubbing and no edema  Neurologic: Pleasantly confused, nonfocal exam.        Recent Labs     01/28/24  0610 01/29/24  0554 01/30/24  0537    131* 133   K 3.8 3.7 4.0    106 105   CO2 19* 18* 18*   BUN 35* 33* 27*   CREATININE 1.9* 1.7* 1.5*   GLUCOSE 87 91 94   CALCIUM 10.6* 10.6* 10.9*         Recent Labs     01/28/24  0610 01/29/24  0554 01/30/24  0537   WBC 8.3 6.6 6.2   RBC 2.60* 2.50* 2.71*   HGB 7.7* 7.5* 8.0*   HCT 23.2* 22.8* 24.9*   MCV 89.2 91.2 91.9   MCH 29.6 30.0 29.5   MCHC 33.2 32.9 32.1   RDW 16.4* 16.3* 16.0*     --  175   MPV 10.6 10.9 10.4         Radiology:     Assessment:    Principal Problem:    Elevated troponin  Active Problems:    Normocytic anemia    Severe protein-calorie malnutrition (HCC)    Fall    First degree atrioventricular block    Abdominal aortic aneurysm (AAA) without rupture (HCC)    Acute kidney injury (HCC)    Stage 4 chronic kidney disease (HCC)    Monoclonal gammopathy    Chronic deep vein thrombosis (DVT) of proximal vein of lower extremity (HCC)    Goals of care, counseling/discussion    Palliative care by specialist  Resolved Problems:    * No resolved hospital problems. *      Plan:    S/p fall.  Slipped on ice.  PT/OT eval.   for

## 2024-01-30 NOTE — PLAN OF CARE
Problem: Skin/Tissue Integrity  Goal: Absence of new skin breakdown  Description: 1.  Monitor for areas of redness and/or skin breakdown  2.  Assess vascular access sites hourly  3.  Every 4-6 hours minimum:  Change oxygen saturation probe site  4.  Every 4-6 hours:  If on nasal continuous positive airway pressure, respiratory therapy assess nares and determine need for appliance change or resting period.  Outcome: Progressing     Problem: Discharge Planning  Goal: Discharge to home or other facility with appropriate resources  Outcome: Progressing     Problem: Pain  Goal: Verbalizes/displays adequate comfort level or baseline comfort level  Outcome: Progressing     Problem: Safety - Adult  Goal: Free from fall injury  Outcome: Progressing     Problem: ABCDS Injury Assessment  Goal: Absence of physical injury  Outcome: Progressing     Problem: Nutrition Deficit:  Goal: Optimize nutritional status  Outcome: Progressing

## 2024-01-30 NOTE — PLAN OF CARE
Problem: Skin/Tissue Integrity  Goal: Absence of new skin breakdown  Description: 1.  Monitor for areas of redness and/or skin breakdown  2.  Assess vascular access sites hourly  3.  Every 4-6 hours minimum:  Change oxygen saturation probe site  4.  Every 4-6 hours:  If on nasal continuous positive airway pressure, respiratory therapy assess nares and determine need for appliance change or resting period.  1/30/2024 0907 by Frommelt, Lauren, RN  Outcome: Progressing     Problem: Pain  Goal: Verbalizes/displays adequate comfort level or baseline comfort level  1/30/2024 0907 by Frommelt, Lauren, RN  Outcome: Progressing     Problem: Safety - Adult  Goal: Free from fall injury  1/30/2024 0907 by Frommelt, Lauren, RN  Outcome: Progressing     Problem: ABCDS Injury Assessment  Goal: Absence of physical injury  1/30/2024 0907 by Frommelt, Lauren, RN  Outcome: Progressing     Problem: Nutrition Deficit:  Goal: Optimize nutritional status  1/30/2024 0907 by Frommelt, Lauren, RN  Outcome: Progressing

## 2024-01-30 NOTE — PROGRESS NOTES
Physical Therapy  Treatment    Name: Uzair Fuentes  : 1940  MRN: [id number]      Date of Service: 2024    Evaluating PT: Arslan Purcell, PT, DPT [id number]      Room #:  6503/6503-B  Diagnosis:  Elevated troponin [R79.89]  Fall, initial encounter [W19.XXXA]  Chronic pancreatitis, unspecified pancreatitis type (HCC) [K86.1]  Chronic kidney disease, unspecified CKD stage [N18.9]  PMHx/PSHx:   has a past medical history of Cancer (HCC), Disease of blood and blood forming organ, and Hypertension.   has a past surgical history that includes Abdominal aortic aneurysm repair; Upper gastrointestinal endoscopy (N/A, 10/14/2022); Total knee arthroplasty (Bilateral); Tonsillectomy; and Colonoscopy.  Procedure/Surgery:  None  Precautions:  Fall risk, cognition, external catheter  Equipment Needs: FWW; TBD    SUBJECTIVE:    Pt lives alone in a single story house with 3 stair(s) and 1 rail(s) to enter. Pt ambulated without AD prior to admission.    OBJECTIVE:   Initial Evaluation  Date: 24 Treatment Date:  2024 Short Term/ Long Term   Goals   AM-PAC 6 Clicks     Was pt agreeable to Eval/treatment? Yes Yes    Does pt have pain? Tailbone pain Coccyx    Bed Mobility  Rolling: NT  Supine to sit: SBA  Sit to supine: NT  Scooting: SBA to EOB Rolling: NT  Supine to sit: Chalino  Sit to supine: Chalino  Scooting: NT Rolling: Independent   Supine to sit: Independent   Sit to supine: Independent   Scooting: Independent    Transfers Sit to stand: Chalino  Stand to sit: Chalino  Stand pivot: ModA with WW Sit to stand: Chalino  Stand to sit: Chalino  Stand pivot: NT Sit to stand: Supervision  Stand to sit: Supervision  Stand pivot: Supervision with WW   Ambulation   30 feet with WW with ModA 30 feet x2 with WW ModA >200 feet with WW with Supervision   Stair negotiation: ascended and descended NT NT >4 step(s) with 1 rail(s) with SBA   ROM BUE: Refer to OT note  BLE: WFL     Strength BUE: Refer to OT note  BLE: WFL

## 2024-01-30 NOTE — PROGRESS NOTES
Department of [facility] Oncology  Consult Follow Up Note    SUBJECTIVE:  No overnight events, the patient denies any bleeding, he has loose stools occasionally.  D/W RN.    Current Medications:   sodium bicarbonate tablet 650 mg, TID  apixaban (ELIQUIS) tablet 2.5 mg, BID  lactated ringers IV soln infusion, Continuous  perflutren lipid microspheres (DEFINITY) injection 1.5 mL, ONCE PRN  metoprolol succinate (TOPROL XL) extended release tablet 25 mg, BID  oxyCODONE-acetaminophen (PERCOCET) 5-325 MG per tablet 1 tablet, Q4H PRN  atorvastatin (LIPITOR) tablet 40 mg, Nightly  finasteride (PROSCAR) tablet 5 mg, Daily  pantoprazole (PROTONIX) tablet 40 mg, BID AC  tamsulosin (FLOMAX) capsule 0.4 mg, QHS  sodium chloride flush 0.9 % injection 5-40 mL, 2 times per day  sodium chloride flush 0.9 % injection 5-40 mL, PRN  0.9 % sodium chloride infusion, PRN  ondansetron (ZOFRAN-ODT) disintegrating tablet 4 mg, Q8H PRN   Or  ondansetron (ZOFRAN) injection 4 mg, Q6H PRN  senna (SENOKOT) tablet 8.6 mg, Daily PRN  aluminum & magnesium hydroxide-simethicone (MAALOX) 200-200-20 MG/5ML suspension 30 mL, Q6H PRN  acetaminophen (TYLENOL) tablet 650 mg, Q6H PRN   Or  acetaminophen (TYLENOL) suppository 650 mg, Q6H PRN        Allergies: No Known Allergies     PHYSICAL EXAM:   VITALS:   Vitals:    01/30/24 1530   BP: 121/63   Pulse: 62   Resp: 18   Temp: 98 °F (36.7 °C)   SpO2: 96%     GENERAL APPEARANCE: Well developed, well nourished 83 y.o. yo male in no acute distress.  ECOG PERFORMANCE STATUS:  HEENT: Head: Nornmocephalic, artaumatic.  Eyes: PERRL; EOMI. oropharynx clear, no evidence of mucositis or thrush. Ears: no abnormalities on inspection. Nose: no abnormal discharge or ulcerations.  LUNGS:  Clear to auscultation bilaterally. No wheezes, rhonchi, or rales.  CARDIOVASCULAR:  Regular rate. No murmurs, rubs or gallops.  ABDOMEN:  Soft, non tender, non distended.  No ascites.  No hepatosplenomegaly.  EXTREMITIES: without clubbing,

## 2024-01-30 NOTE — PATIENT CARE CONFERENCE
P Quality Flow/Interdisciplinary Rounds Progress Note        Quality Flow Rounds held on January 30, 2024    Disciplines Attending:  Bedside Nurse, , , and Nursing Unit Leadership    Uzair Fuentes was admitted on 1/24/2024  4:14 PM    Anticipated Discharge Date:       Disposition:    Jaylen Score:  Jaylen Scale Score: 18    Readmission Risk              Risk of Unplanned Readmission:  27           Discussed patient goal for the day, patient clinical progression, and barriers to discharge.  The following Goal(s) of the Day/Commitment(s) have been identified:   remain safe       Elise Tejeda RN  January 30, 2024

## 2024-01-30 NOTE — CARE COORDINATION
Care Coordination:  Following for discharge planning.  Chart reviewed, met with   patient and spoke to daughter Erica and to x wife on phone.  Daughter has received ELISA list.  She lives in [place] and wants her mother to be involved in choice. Following for discharge plan if home vs ELISA.  Reviewed functional status with both ex and daughter. They do not feel it is safe for him to return home.  Ex will be coming to the hospital today to discuss with patient and SW re plan.  [facility] is following and their office confirmed that [employer] Medicare will be effective on 2/1. Sent text to ask if they will accept if plan is home.   Neither patient or family could provide information regarding the [employer] plan.  The plan number in previous note.   Wife will go to apartment and attempt to find a card or communication to verify.  As noted patient is not service connected and will need to use his health insurance . Ex states patient is more confused than baseline.  She also reports that he has a \"difficult\" personality and is often known to be secretive . She is concerned regarding the treatment plan for the metastatic renal CA.  Hem/onc is following.   Palliative consult complete , states patient has capacity for decision making.   He has not had treatment since early in Jan.  Will follow.   1520 :   Spoke with ex wife.  Reviewed the ELISA list and explained benefit.  She provided 2 insurance cards, one for [employer] that we believe will term on 1/31.   She also provided a cared for Rudy Corbin.  Cards copied.  Previous note indicate there is a [employer] plan that goes in effect 2/1.  CM assistant asked to review information and confirm active plan.  Family to provide ELISA choices.  Will need insurance prior to making referrals.

## 2024-01-31 LAB
ANION GAP SERPL CALCULATED.3IONS-SCNC: 12 MMOL/L (ref 7–16)
BASOPHILS # BLD: 0 K/UL (ref 0–0.2)
BASOPHILS NFR BLD: 0 % (ref 0–2)
BUN SERPL-MCNC: 18 MG/DL (ref 6–23)
CALCIUM SERPL-MCNC: 9.5 MG/DL (ref 8.6–10.2)
CHLORIDE SERPL-SCNC: 105 MMOL/L (ref 98–107)
CO2 SERPL-SCNC: 17 MMOL/L (ref 22–29)
COPPER SERPL-MCNC: 62 UG/DL (ref 70–140)
CREAT SERPL-MCNC: 1.2 MG/DL (ref 0.7–1.2)
EOSINOPHIL # BLD: 0 K/UL (ref 0.05–0.5)
EOSINOPHILS RELATIVE PERCENT: 0 % (ref 0–6)
ERYTHROCYTE [DISTWIDTH] IN BLOOD BY AUTOMATED COUNT: 15.9 % (ref 11.5–15)
GFR SERPL CREATININE-BSD FRML MDRD: >60 ML/MIN/1.73M2
GLUCOSE SERPL-MCNC: 81 MG/DL (ref 74–99)
HCT VFR BLD AUTO: 20.9 % (ref 37–54)
HCT VFR BLD AUTO: 24.6 % (ref 37–54)
HGB BLD-MCNC: 6.7 G/DL (ref 12.5–16.5)
HGB BLD-MCNC: 8 G/DL (ref 12.5–16.5)
LYMPHOCYTES NFR BLD: 0.21 K/UL (ref 1.5–4)
LYMPHOCYTES RELATIVE PERCENT: 4 % (ref 20–42)
MCH RBC QN AUTO: 29.5 PG (ref 26–35)
MCHC RBC AUTO-ENTMCNC: 32.1 G/DL (ref 32–34.5)
MCV RBC AUTO: 92.1 FL (ref 80–99.9)
MONOCYTES NFR BLD: 0.13 K/UL (ref 0.1–0.95)
MONOCYTES NFR BLD: 3 % (ref 2–12)
NEUTROPHILS NFR BLD: 93 % (ref 43–80)
NEUTS SEG NFR BLD: 4.46 K/UL (ref 1.8–7.3)
PLATELET # BLD AUTO: 173 K/UL (ref 130–450)
PMV BLD AUTO: 10.6 FL (ref 7–12)
POTASSIUM SERPL-SCNC: 4 MMOL/L (ref 3.5–5)
RBC # BLD AUTO: 2.27 M/UL (ref 3.8–5.8)
RBC # BLD: ABNORMAL 10*6/UL
SODIUM SERPL-SCNC: 134 MMOL/L (ref 132–146)
WBC OTHER # BLD: 4.8 K/UL (ref 4.5–11.5)
ZINC SERPL-MCNC: 31.4 UG/DL (ref 60–120)

## 2024-01-31 PROCEDURE — 6370000000 HC RX 637 (ALT 250 FOR IP): Performed by: INTERNAL MEDICINE

## 2024-01-31 PROCEDURE — 85025 COMPLETE CBC W/AUTO DIFF WBC: CPT

## 2024-01-31 PROCEDURE — 2580000003 HC RX 258: Performed by: INTERNAL MEDICINE

## 2024-01-31 PROCEDURE — 80048 BASIC METABOLIC PNL TOTAL CA: CPT

## 2024-01-31 PROCEDURE — 97530 THERAPEUTIC ACTIVITIES: CPT

## 2024-01-31 PROCEDURE — 99232 SBSQ HOSP IP/OBS MODERATE 35: CPT | Performed by: CLINICAL NURSE SPECIALIST

## 2024-01-31 PROCEDURE — 99232 SBSQ HOSP IP/OBS MODERATE 35: CPT | Performed by: INTERNAL MEDICINE

## 2024-01-31 PROCEDURE — 85014 HEMATOCRIT: CPT

## 2024-01-31 PROCEDURE — 97535 SELF CARE MNGMENT TRAINING: CPT

## 2024-01-31 PROCEDURE — 36415 COLL VENOUS BLD VENIPUNCTURE: CPT

## 2024-01-31 PROCEDURE — 1200000000 HC SEMI PRIVATE

## 2024-01-31 PROCEDURE — 85018 HEMOGLOBIN: CPT

## 2024-01-31 RX ADMIN — TAMSULOSIN HYDROCHLORIDE 0.4 MG: 0.4 CAPSULE ORAL at 21:21

## 2024-01-31 RX ADMIN — APIXABAN 2.5 MG: 2.5 TABLET, FILM COATED ORAL at 09:04

## 2024-01-31 RX ADMIN — METOPROLOL SUCCINATE 25 MG: 25 TABLET, EXTENDED RELEASE ORAL at 09:04

## 2024-01-31 RX ADMIN — METOPROLOL SUCCINATE 25 MG: 25 TABLET, EXTENDED RELEASE ORAL at 21:21

## 2024-01-31 RX ADMIN — APIXABAN 2.5 MG: 2.5 TABLET, FILM COATED ORAL at 21:21

## 2024-01-31 RX ADMIN — Medication 10 ML: at 09:04

## 2024-01-31 RX ADMIN — FINASTERIDE 5 MG: 5 TABLET, FILM COATED ORAL at 09:04

## 2024-01-31 RX ADMIN — SODIUM BICARBONATE 650 MG: 650 TABLET ORAL at 21:21

## 2024-01-31 RX ADMIN — SODIUM BICARBONATE 650 MG: 650 TABLET ORAL at 14:44

## 2024-01-31 RX ADMIN — Medication 10 ML: at 21:00

## 2024-01-31 RX ADMIN — OXYCODONE AND ACETAMINOPHEN 1 TABLET: 5; 325 TABLET ORAL at 21:22

## 2024-01-31 RX ADMIN — PANTOPRAZOLE SODIUM 40 MG: 40 TABLET, DELAYED RELEASE ORAL at 07:07

## 2024-01-31 RX ADMIN — SODIUM CHLORIDE, POTASSIUM CHLORIDE, SODIUM LACTATE AND CALCIUM CHLORIDE: 600; 310; 30; 20 INJECTION, SOLUTION INTRAVENOUS at 21:20

## 2024-01-31 RX ADMIN — PETROLATUM: 420 OINTMENT TOPICAL at 09:05

## 2024-01-31 RX ADMIN — SODIUM BICARBONATE 650 MG: 650 TABLET ORAL at 09:04

## 2024-01-31 RX ADMIN — ATORVASTATIN CALCIUM 40 MG: 40 TABLET, FILM COATED ORAL at 21:21

## 2024-01-31 RX ADMIN — PANTOPRAZOLE SODIUM 40 MG: 40 TABLET, DELAYED RELEASE ORAL at 15:58

## 2024-01-31 ASSESSMENT — PAIN DESCRIPTION - LOCATION: LOCATION: ABDOMEN

## 2024-01-31 ASSESSMENT — PAIN SCALES - GENERAL
PAINLEVEL_OUTOF10: 2
PAINLEVEL_OUTOF10: 4

## 2024-01-31 ASSESSMENT — PAIN DESCRIPTION - DESCRIPTORS: DESCRIPTORS: STABBING

## 2024-01-31 ASSESSMENT — PAIN - FUNCTIONAL ASSESSMENT: PAIN_FUNCTIONAL_ASSESSMENT: PREVENTS OR INTERFERES SOME ACTIVE ACTIVITIES AND ADLS

## 2024-01-31 ASSESSMENT — PAIN DESCRIPTION - ORIENTATION: ORIENTATION: RIGHT

## 2024-01-31 NOTE — PROGRESS NOTES
recommendations for increased independence, safety, and fall prevention  * Patient/Family education to increase follow through with safety techniques and functional independence  * Recommendation of environmental modifications for increased safety with functional transfers/mobility and ADLs  * Cognitive retraining/development of therapeutic activities to improve problem solving, judgement, memory, and attention for increased safety/participation in ADL/IADL tasks  * Therapeutic exercise to improve motor endurance, ROM, and functional strength for ADLs/functional transfers  * Therapeutic activities to facilitate/challenge dynamic balance, stand tolerance for increased safety and independence with ADLs  * Positioning to improve skin integrity, interaction with environment and functional independence  * Delirium prevention/treatment     Recommended Adaptive Equipment: TBD       Home Living:  Pt lives alone  in a 1 story house with 3 step(s) to enter and 1 rail(s)  Bedroom setup: main level standard bed    Bathroom setup: main level tub shower    Equipment owned: none      Prior Level of Function:  Pt I with  ADLs , A with some IADLs, and completed functional mobility with no AD.   Falls: yes   Driving: no   Occupation/leisure: used to enjoy golfing   Pt reports his nephew checks in on him occasionally and brings him groceries. Pt reports otherwise he calls and elderly service that brings him meals      Pain Level: denies pain      Cognition: A&O: 2-3/4; pleasant & cooperative, mild confusion at times               Follows single step directions: good               Memory:  fair               Sequencing:  fair               Problem solving:  fair               Judgement/safety:  fair-     Functional Assessment:   AM-PAC Daily Activity Raw Score: 15/24       Initial Eval Status  Date: 1/29/2024   Treatment Status  Date:  1/31/24 STG=LTG  Time frame: 10-14 days   Feeding Set up  Set up  Seated in chair to  cup of  out treatment regarding proper technique & safety with bed mobility, functional transfers & light mobility, walker management & ADL compensatory strategies to ease tasks, improve safety & prevent falls to return home safely.      Comments: Upon arrival pt was in bed & agreeable for therapy. At end of session pt was seated in chair, chair alarm set, all lines and tubes intact, call light within reach.     Pt has made Fair progress towards set goals.   Continue with current plan of care    Treatment Time In: 2:55           Treatment Time Out: 3:25           Treatment Charges: Mins Units   Ther Ex  53756     Manual Therapy 17716     Thera Activities 38109 10 1   ADL/Home Mgt 67182 25 2   Neuro Re-ed 12060     Group Therapy      Orthotic manage/training  58526     Non-Billable Time     Total Timed Treatment 30 2       GAUTAM Lr/INDIA [id number]

## 2024-01-31 NOTE — PROGRESS NOTES
[facility] [place] Hospitalist Progress Note    Admitting Date and Time: 1/24/2024  4:14 PM  Admit Dx: Elevated troponin [R79.89]  Fall, initial encounter [W19.XXXA]  Chronic pancreatitis, unspecified pancreatitis type (HCC) [K86.1]  Chronic kidney disease, unspecified CKD stage [N18.9]    Synopsis  83-year-old gentleman with past medical history of metastatic renal cancer, known history of noncompliance to treatment, DVT, GI bleed.  He presented to ER for evaluation s/p fall.  Has been hypotensive, has prerenal MOISE, likely in setting of volume contraction, receiving IV fluids.  Hemoglobin dropped, no active bleed, resumed Eliquis.  Low PT score, case management discussing with family regarding ELISA placement.    Subjective:  Patient is being followed for Elevated troponin [R79.89]  Fall, initial encounter [W19.XXXA]  Chronic pancreatitis, unspecified pancreatitis type (HCC) [K86.1]  Chronic kidney disease, unspecified CKD stage [N18.9]     BP is now stable  Denies any acute complains  No overnight complaints reported  Palliative care saw him , discussed with family, remains full code.  Hemoglobin reported 6.7, likely in error, discussed with RN draw was very close to the IV line, repeat draw was obtained and stable at 8.  No bleeding reported.    ROS: denies fever, chills, cp, sob, n/v, HA unless stated above.      sodium bicarbonate  650 mg Oral TID    white petrolatum   Topical BID    miconazole   Topical BID    apixaban  2.5 mg Oral BID    metoprolol succinate  25 mg Oral BID    atorvastatin  40 mg Oral Nightly    finasteride  5 mg Oral Daily    pantoprazole  40 mg Oral BID AC    tamsulosin  0.4 mg Oral QHS    sodium chloride flush  5-40 mL IntraVENous 2 times per day     perflutren lipid microspheres, 1.5 mL, ONCE PRN  oxyCODONE-acetaminophen, 1 tablet, Q4H PRN  sodium chloride flush, 5-40 mL, PRN  sodium chloride, , PRN  ondansetron, 4 mg, Q8H PRN   Or  ondansetron, 4 mg, Q6H PRN  senna, 1 tablet, Daily

## 2024-01-31 NOTE — PATIENT CARE CONFERENCE
P Quality Flow/Interdisciplinary Rounds Progress Note        Quality Flow Rounds held on January 31, 2024    Disciplines Attending:  Bedside Nurse, , , and Nursing Unit Leadership    Uzair Fuentes was admitted on 1/24/2024  4:14 PM    Anticipated Discharge Date:       Disposition:    Jaylen Score:  Jaylen Scale Score: 18    Readmission Risk              Risk of Unplanned Readmission:  28           Discussed patient goal for the day, patient clinical progression, and barriers to discharge.  The following Goal(s) of the Day/Commitment(s) have been identified:  downgrade      Marly Barrett RN  January 31, 2024

## 2024-01-31 NOTE — PLAN OF CARE
Problem: Skin/Tissue Integrity  Goal: Absence of new skin breakdown  Description: 1.  Monitor for areas of redness and/or skin breakdown  2.  Assess vascular access sites hourly  3.  Every 4-6 hours minimum:  Change oxygen saturation probe site  4.  Every 4-6 hours:  If on nasal continuous positive airway pressure, respiratory therapy assess nares and determine need for appliance change or resting period.  1/31/2024 0921 by Frommelt, Lauren, RN  Outcome: Progressing     Problem: Pain  Goal: Verbalizes/displays adequate comfort level or baseline comfort level  1/31/2024 0921 by Frommelt, Lauren, RN  Outcome: Progressing     Problem: Safety - Adult  Goal: Free from fall injury  1/31/2024 0921 by Frommelt, Lauren, RN  Outcome: Progressing     Problem: ABCDS Injury Assessment  Goal: Absence of physical injury  1/31/2024 0921 by Frommelt, Lauren, RN  Outcome: Progressing     Problem: Nutrition Deficit:  Goal: Optimize nutritional status  1/31/2024 0921 by Frommelt, Lauren, RN  Outcome: Progressing

## 2024-01-31 NOTE — CARE COORDINATION
1/31/24  Transition of Care Update.  Spoke with patients x-wife who states patient is changing health insurance to [facility] on 2/1 with a Policy number of [id number].  Patient is currently active with [employer] member [id number] and [employer] policy number [id number]. Patient has zero service connection for VA benefits in a ELISA. AM-PAC scores are 14/24 for PT and 15/24 for OT. A referral was made to [facility] who is following but patient and family now considering a ELISA stay as patient lives alone. Family was choiced with referrals made to 1. [facility], 2. [facility] and 3. [facility].  [facility] and [facility] reviewing but [facility] has no beds available at [place].  SW/NUHA requested updated OT note. Pre-cert would need to be started once insurance is active tomorrow.  Will await accepting facility for discharge planning. Ambulette started and on the soft chart.  PASRR also started and will need finished on ELISA facility is determined.    Electronically signed by JOSE Segura on 1/31/2024 at 1:22 PM

## 2024-01-31 NOTE — PLAN OF CARE
Problem: Skin/Tissue Integrity  Goal: Absence of new skin breakdown  Description: 1.  Monitor for areas of redness and/or skin breakdown  2.  Assess vascular access sites hourly  3.  Every 4-6 hours minimum:  Change oxygen saturation probe site  4.  Every 4-6 hours:  If on nasal continuous positive airway pressure, respiratory therapy assess nares and determine need for appliance change or resting period.  1/31/2024 1736 by Larissa Blandon, RN  Outcome: Progressing  1/31/2024 0921 by Frommelt, Lauren, RN  Outcome: Progressing     Problem: Discharge Planning  Goal: Discharge to home or other facility with appropriate resources  Outcome: Progressing

## 2024-01-31 NOTE — PROGRESS NOTES
Department of [facility] Med Oncology  Consult Follow Up Note    SUBJECTIVE:  No overnight events, the patient denies any bleeding, he has loose stools occasionally.  D/W RN.  Hopeful for discharge soon    Current Medications:   sodium bicarbonate tablet 650 mg, TID  white petrolatum ointment, BID  miconazole (MICOTIN) 2 % powder, BID  apixaban (ELIQUIS) tablet 2.5 mg, BID  lactated ringers IV soln infusion, Continuous  perflutren lipid microspheres (DEFINITY) injection 1.5 mL, ONCE PRN  metoprolol succinate (TOPROL XL) extended release tablet 25 mg, BID  oxyCODONE-acetaminophen (PERCOCET) 5-325 MG per tablet 1 tablet, Q4H PRN  atorvastatin (LIPITOR) tablet 40 mg, Nightly  finasteride (PROSCAR) tablet 5 mg, Daily  pantoprazole (PROTONIX) tablet 40 mg, BID AC  tamsulosin (FLOMAX) capsule 0.4 mg, QHS  sodium chloride flush 0.9 % injection 5-40 mL, 2 times per day  sodium chloride flush 0.9 % injection 5-40 mL, PRN  0.9 % sodium chloride infusion, PRN  ondansetron (ZOFRAN-ODT) disintegrating tablet 4 mg, Q8H PRN   Or  ondansetron (ZOFRAN) injection 4 mg, Q6H PRN  senna (SENOKOT) tablet 8.6 mg, Daily PRN  aluminum & magnesium hydroxide-simethicone (MAALOX) 200-200-20 MG/5ML suspension 30 mL, Q6H PRN  acetaminophen (TYLENOL) tablet 650 mg, Q6H PRN   Or  acetaminophen (TYLENOL) suppository 650 mg, Q6H PRN        Allergies: No Known Allergies     PHYSICAL EXAM:   VITALS:   Vitals:    01/31/24 1215   BP: 137/70   Pulse: 80   Resp: 20   Temp: 97.5 °F (36.4 °C)   SpO2: 96%     GENERAL APPEARANCE: Well developed, well nourished 83 y.o. yo male in no acute distress.  ECOG PERFORMANCE STATUS:  HEENT: Head: Nornmocephalic, artaumatic.  Eyes: PERRL; EOMI. oropharynx clear, no evidence of mucositis or thrush. Ears: no abnormalities on inspection. Nose: no abnormal discharge or ulcerations.  LUNGS:  Clear to auscultation bilaterally. No wheezes, rhonchi, or rales.  CARDIOVASCULAR:  Regular rate. No murmurs, rubs or gallops.  ABDOMEN:   Soft, non tender, non distended.  No ascites.  No hepatosplenomegaly.  EXTREMITIES: without clubbing, cyanosis, or edema.  NEUROLOGIC:  Alert, awake, oriented to time, place and person. No focal deficits.  LYMPHATICS: No cervical, axillary or inguinal lymphadenopathy.  SKIN: No rash or bruises.      Intake/Output Summary (Last 24 hours) at 1/31/2024 1253  Last data filed at 1/31/2024 1252  Gross per 24 hour   Intake 540 ml   Output 1000 ml   Net -460 ml         DIAGNOSTIC DATA:     Lab Results   Component Value Date    WBC 4.8 01/31/2024    HGB 8.0 (L) 01/31/2024    HCT 24.6 (L) 01/31/2024    MCV 92.1 01/31/2024     01/31/2024     Lab Results   Component Value Date    CREATININE 1.2 01/31/2024    BUN 18 01/31/2024     01/31/2024    K 4.0 01/31/2024     01/31/2024    CO2 17 (L) 01/31/2024     Lab Results   Component Value Date    ALT 43 (H) 01/24/2024    AST 89 (H) 01/24/2024    ALKPHOS 110 01/24/2024    BILITOT 0.4 01/24/2024               ASSESSMENT      Fall  Possible bright red blood per rectum?  Possible hematuria?  Chronic normocytic anemia  Metastatic renal cell cancer  H/o left DVT on 10/10/23, on Eliquis  History of GI bleed     01/28/24  The patient is a 83 y.o. male who comes in for a fall. He is known to us for metastatic renal cell cancer. He is currently on single agent, having started on 11/21/23, having received 4 doses, last being 1/2/24. Does not appear to be on schedule with his treatment.     For this admission, he came in for a fall. We are on consult due his diagnosis of metastatic renal cancer as well as worsening anemia in the setting of chronic Eliquis use for DVT in Oct. 2023. He also had an admission for GI bleed in Oct. 2022 a year prior. He is chronically anemic. S/p EGD on 10/14/23, noted mild antral gastritis.     Per chart review, here is concern he may have had blood in his stool around the time of his fall. However, upon obtaining history from the patient, he

## 2024-02-01 PROBLEM — E60 LOW ZINC LEVEL: Status: ACTIVE | Noted: 2024-02-01

## 2024-02-01 PROBLEM — E61.1 IRON DEFICIENCY: Status: ACTIVE | Noted: 2024-02-01

## 2024-02-01 LAB
ANION GAP SERPL CALCULATED.3IONS-SCNC: 10 MMOL/L (ref 7–16)
BASOPHILS # BLD: 0 K/UL (ref 0–0.2)
BASOPHILS NFR BLD: 0 % (ref 0–2)
BUN SERPL-MCNC: 20 MG/DL (ref 6–23)
CALCIUM SERPL-MCNC: 11 MG/DL (ref 8.6–10.2)
CHLORIDE SERPL-SCNC: 105 MMOL/L (ref 98–107)
CO2 SERPL-SCNC: 17 MMOL/L (ref 22–29)
CREAT SERPL-MCNC: 1.4 MG/DL (ref 0.7–1.2)
EOSINOPHIL # BLD: 0 K/UL (ref 0.05–0.5)
EOSINOPHILS RELATIVE PERCENT: 0 % (ref 0–6)
ERYTHROCYTE [DISTWIDTH] IN BLOOD BY AUTOMATED COUNT: 15.9 % (ref 11.5–15)
GFR SERPL CREATININE-BSD FRML MDRD: 49 ML/MIN/1.73M2
GLUCOSE SERPL-MCNC: 78 MG/DL (ref 74–99)
HCT VFR BLD AUTO: 26.6 % (ref 37–54)
HGB BLD-MCNC: 8.3 G/DL (ref 12.5–16.5)
LYMPHOCYTES NFR BLD: 0.13 K/UL (ref 1.5–4)
LYMPHOCYTES RELATIVE PERCENT: 4 % (ref 20–42)
MCH RBC QN AUTO: 29.1 PG (ref 26–35)
MCHC RBC AUTO-ENTMCNC: 31.2 G/DL (ref 32–34.5)
MCV RBC AUTO: 93.3 FL (ref 80–99.9)
MONOCYTES NFR BLD: 0.09 K/UL (ref 0.1–0.95)
MONOCYTES NFR BLD: 3 % (ref 2–12)
NEUTROPHILS NFR BLD: 94 % (ref 43–80)
NEUTS SEG NFR BLD: 3.38 K/UL (ref 1.8–7.3)
PLATELET # BLD AUTO: 193 K/UL (ref 130–450)
PMV BLD AUTO: 9.9 FL (ref 7–12)
POTASSIUM SERPL-SCNC: 4.1 MMOL/L (ref 3.5–5)
RBC # BLD AUTO: 2.85 M/UL (ref 3.8–5.8)
RBC # BLD: ABNORMAL 10*6/UL
SODIUM SERPL-SCNC: 132 MMOL/L (ref 132–146)
WBC # BLD: ABNORMAL 10*3/UL
WBC OTHER # BLD: 3.6 K/UL (ref 4.5–11.5)

## 2024-02-01 PROCEDURE — 80048 BASIC METABOLIC PNL TOTAL CA: CPT

## 2024-02-01 PROCEDURE — 2500000003 HC RX 250 WO HCPCS: Performed by: INTERNAL MEDICINE

## 2024-02-01 PROCEDURE — 6370000000 HC RX 637 (ALT 250 FOR IP): Performed by: INTERNAL MEDICINE

## 2024-02-01 PROCEDURE — 1200000000 HC SEMI PRIVATE

## 2024-02-01 PROCEDURE — 97530 THERAPEUTIC ACTIVITIES: CPT

## 2024-02-01 PROCEDURE — 2580000003 HC RX 258: Performed by: STUDENT IN AN ORGANIZED HEALTH CARE EDUCATION/TRAINING PROGRAM

## 2024-02-01 PROCEDURE — 85025 COMPLETE CBC W/AUTO DIFF WBC: CPT

## 2024-02-01 PROCEDURE — 99232 SBSQ HOSP IP/OBS MODERATE 35: CPT | Performed by: STUDENT IN AN ORGANIZED HEALTH CARE EDUCATION/TRAINING PROGRAM

## 2024-02-01 PROCEDURE — 2580000003 HC RX 258: Performed by: INTERNAL MEDICINE

## 2024-02-01 PROCEDURE — 36415 COLL VENOUS BLD VENIPUNCTURE: CPT

## 2024-02-01 RX ORDER — SODIUM CHLORIDE, SODIUM LACTATE, POTASSIUM CHLORIDE, CALCIUM CHLORIDE 600; 310; 30; 20 MG/100ML; MG/100ML; MG/100ML; MG/100ML
INJECTION, SOLUTION INTRAVENOUS CONTINUOUS
Status: ACTIVE | OUTPATIENT
Start: 2024-02-01 | End: 2024-02-02

## 2024-02-01 RX ORDER — ZINC SULFATE 50(220)MG
50 CAPSULE ORAL DAILY
Status: DISCONTINUED | OUTPATIENT
Start: 2024-02-02 | End: 2024-02-02 | Stop reason: HOSPADM

## 2024-02-01 RX ADMIN — PANTOPRAZOLE SODIUM 40 MG: 40 TABLET, DELAYED RELEASE ORAL at 16:44

## 2024-02-01 RX ADMIN — SODIUM CHLORIDE, POTASSIUM CHLORIDE, SODIUM LACTATE AND CALCIUM CHLORIDE: 600; 310; 30; 20 INJECTION, SOLUTION INTRAVENOUS at 13:52

## 2024-02-01 RX ADMIN — TAMSULOSIN HYDROCHLORIDE 0.4 MG: 0.4 CAPSULE ORAL at 21:06

## 2024-02-01 RX ADMIN — OXYCODONE AND ACETAMINOPHEN 1 TABLET: 5; 325 TABLET ORAL at 04:13

## 2024-02-01 RX ADMIN — METOPROLOL SUCCINATE 25 MG: 25 TABLET, EXTENDED RELEASE ORAL at 21:06

## 2024-02-01 RX ADMIN — SODIUM BICARBONATE 650 MG: 650 TABLET ORAL at 21:06

## 2024-02-01 RX ADMIN — METOPROLOL SUCCINATE 25 MG: 25 TABLET, EXTENDED RELEASE ORAL at 08:49

## 2024-02-01 RX ADMIN — APIXABAN 2.5 MG: 2.5 TABLET, FILM COATED ORAL at 21:06

## 2024-02-01 RX ADMIN — ANTI-FUNGAL POWDER MICONAZOLE NITRATE TALC FREE: 1.42 POWDER TOPICAL at 12:05

## 2024-02-01 RX ADMIN — ACETAMINOPHEN 325MG 650 MG: 325 TABLET ORAL at 16:43

## 2024-02-01 RX ADMIN — FINASTERIDE 5 MG: 5 TABLET, FILM COATED ORAL at 08:49

## 2024-02-01 RX ADMIN — SENNOSIDES 8.6 MG: 8.6 TABLET, FILM COATED ORAL at 05:16

## 2024-02-01 RX ADMIN — ANTI-FUNGAL POWDER MICONAZOLE NITRATE TALC FREE: 1.42 POWDER TOPICAL at 21:04

## 2024-02-01 RX ADMIN — Medication 10 ML: at 21:20

## 2024-02-01 RX ADMIN — PETROLATUM: 420 OINTMENT TOPICAL at 12:05

## 2024-02-01 RX ADMIN — SODIUM BICARBONATE 650 MG: 650 TABLET ORAL at 08:49

## 2024-02-01 RX ADMIN — ATORVASTATIN CALCIUM 40 MG: 40 TABLET, FILM COATED ORAL at 21:06

## 2024-02-01 RX ADMIN — SODIUM BICARBONATE 650 MG: 650 TABLET ORAL at 13:58

## 2024-02-01 RX ADMIN — PETROLATUM: 420 OINTMENT TOPICAL at 21:06

## 2024-02-01 RX ADMIN — PANTOPRAZOLE SODIUM 40 MG: 40 TABLET, DELAYED RELEASE ORAL at 05:02

## 2024-02-01 RX ADMIN — APIXABAN 2.5 MG: 2.5 TABLET, FILM COATED ORAL at 08:49

## 2024-02-01 RX ADMIN — OXYCODONE AND ACETAMINOPHEN 1 TABLET: 5; 325 TABLET ORAL at 21:11

## 2024-02-01 RX ADMIN — Medication 10 ML: at 08:50

## 2024-02-01 ASSESSMENT — PAIN SCALES - GENERAL
PAINLEVEL_OUTOF10: 6
PAINLEVEL_OUTOF10: 8
PAINLEVEL_OUTOF10: 7
PAINLEVEL_OUTOF10: 6
PAINLEVEL_OUTOF10: 6

## 2024-02-01 ASSESSMENT — PAIN - FUNCTIONAL ASSESSMENT
PAIN_FUNCTIONAL_ASSESSMENT: PREVENTS OR INTERFERES SOME ACTIVE ACTIVITIES AND ADLS
PAIN_FUNCTIONAL_ASSESSMENT: ACTIVITIES ARE NOT PREVENTED

## 2024-02-01 ASSESSMENT — PAIN DESCRIPTION - LOCATION
LOCATION: ABDOMEN
LOCATION: ABDOMEN
LOCATION: COCCYX

## 2024-02-01 ASSESSMENT — PAIN DESCRIPTION - DESCRIPTORS
DESCRIPTORS: BURNING
DESCRIPTORS: ACHING;CRAMPING
DESCRIPTORS: ACHING;STABBING

## 2024-02-01 ASSESSMENT — PAIN SCALES - WONG BAKER
WONGBAKER_NUMERICALRESPONSE: 0
WONGBAKER_NUMERICALRESPONSE: 0

## 2024-02-01 ASSESSMENT — PAIN DESCRIPTION - ORIENTATION
ORIENTATION: RIGHT;LEFT;MID
ORIENTATION: MID

## 2024-02-01 NOTE — PROGRESS NOTES
4 Eyes Skin Assessment     NAME:  Uzair Fuentes  YOB: 1940  MEDICAL RECORD NUMBER:  [id number]    The patient is being assessed for  Transfer to New Unit    I agree that at least one RN has performed a thorough Head to Toe Skin Assessment on the patient. ALL assessment sites listed below have been assessed.      Areas assessed by both nurses:    Head, Face, Ears, Shoulders, Back, Chest, Arms, Elbows, Hands, Sacrum. Buttock, Coccyx, Ischium, Legs. Feet and Heels, and Under Medical Devices         Does the Patient have a Wound?   Nonblanchable/darkened area on coccyx  Pink, blanchable, boggy bilateral heels         Jaylen Prevention initiated by RN: Yes  Wound Care Orders initiated by RN: No    Pressure Injury (Stage 3,4, Unstageable, DTI, NWPT, and Complex wounds) if present, place Wound referral order by RN under : No    New Ostomies, if present place, Ostomy referral order under : No     Nurse 1 eSignature: Electronically signed by Larissa Blandon RN on 1/31/24 at 7:19 PM EST    **SHARE this note so that the co-signing nurse can place an eSignature**    Nurse 2 eSignature: Electronically signed by Constance De Jesus RN on 2/1/24 at 7:04 AM EST

## 2024-02-01 NOTE — PROGRESS NOTES
Physical Therapy  Treatment    Name: Uzair Fuentes  : 1940  MRN: [id number]      Date of Service: 2024    Evaluating PT: Arslan Purcell, PT, DPT [id number]      Room #:  8410/8410-A  Diagnosis:  Elevated troponin [R79.89]  Fall, initial encounter [W19.XXXA]  Chronic pancreatitis, unspecified pancreatitis type (HCC) [K86.1]  Chronic kidney disease, unspecified CKD stage [N18.9]  PMHx/PSHx:   has a past medical history of Cancer (HCC), Disease of blood and blood forming organ, and Hypertension.   has a past surgical history that includes Abdominal aortic aneurysm repair; Upper gastrointestinal endoscopy (N/A, 10/14/2022); Total knee arthroplasty (Bilateral); Tonsillectomy; and Colonoscopy.  Procedure/Surgery:  None  Precautions:  Fall risk, cognition, external catheter  Equipment Needs: FWW; TBD    SUBJECTIVE:    Pt lives alone in a single story house with 3 stair(s) and 1 rail(s) to enter. Pt ambulated without AD prior to admission.    OBJECTIVE:   Initial Evaluation  Date: 24 Treatment Date:  24 Short Term/ Long Term   Goals   AM-PAC 6 Clicks     Was pt agreeable to Eval/treatment? Yes Yes    Does pt have pain? Tailbone pain No c/o pain    Bed Mobility  Rolling: NT  Supine to sit: SBA  Sit to supine: NT  Scooting: SBA to EOB Rolling: MaxA  Supine to sit: MaxA  Sit to supine: MaxA  Scooting: MaxA Rolling: Independent   Supine to sit: Independent   Sit to supine: Independent   Scooting: Independent    Transfers Sit to stand: Chal[place]  Stand to sit: Chalino  Stand pivot: ModA with WW Sit to stand: ModA  Stand to sit: ModA  Stand pivot: NT Sit to stand: Supervision  Stand to sit: Supervision  Stand pivot: Supervision with WW   Ambulation   30 feet with WW with ModA 5' ModA WW >200 feet with WW with Supervision   Stair negotiation: ascended and descended NT NT >4 step(s) with 1 rail(s) with SBA   ROM BUE: Refer to OT note  BLE: WFL     Strength BUE: Refer to OT note  BLE: WFL     Balance Sitting

## 2024-02-01 NOTE — PROGRESS NOTES
Physician Progress Note      PATIENT:               MANI GALLEGO  CSN #:                  [id number]  :                       1940  ADMIT DATE:       2024 4:14 PM  DISCH DATE:  RESPONDING  PROVIDER #:        Celso Lester MD          QUERY TEXT:    Patient admitted with elevated troponins and MOISE and found to have acute on   chronic anemia. NSTEMI documented by palliative care. If possible, please   document in the progress notes and discharge summary if you are evaluating   and/or treating any of the following:    The medical record reflects the following:  Risk Factors: anemia, CAD  Clinical Indicators: Per palliative consult note \"...NSTEMI...\", Per   cardiology progress note on  \"...Elevated troponin secondary to MOISE and   also underlying acute anemia...\", Per internal med progress note on    \"...he patient does not have any chest pain or no EKG abnormalities...\",   Initial Troponins 228>206>240>232 and up to 348, TTE on  \"...global   hypokinesis...\"  Treatment: EKG, TTE, serial troponins, cardiology consult, plan for outpatient   stress test, telemetry monitoring    Thank you,  Caitlin Omalley, RN, BSN, CDIS  Clinical Documentation Improvement  [email or web address]  Options provided:  -- NSTEMI  -- Type 2 MI. NSTEMI ruled out  -- Non-ischemic myocardial injury. NSTEMI ruled out  -- Other - I will add my own diagnosis  -- Disagree - Not applicable / Not valid  -- Disagree - Clinically unable to determine / Unknown  -- Refer to Clinical Documentation Reviewer    PROVIDER RESPONSE TEXT:    This patient has an NSTEMI.    Query created by: Caitlin Omalley on 2024 12:49 PM      Electronically signed by:  Celso Lester MD 2024 12:51 PM

## 2024-02-01 NOTE — CARE COORDINATION
CM update note.  Patient was a transfer from [facility].  Admitted after a fall at home.  Per attending, pt is not ready for discharge today.  BP running on the low side and wants to keep IVF going for another day.  IVF decreased today from 125 to 100 ml/hr.  Per Josefina with [facility].  They do no have any beds.  Per Mihir with [facility], she needs updated PT notes before she can make decision.  She was able to verify the [facility] insurance.  Called [phone] to request updated therapy notes today.  CM/SW to follow.  Rob Rodriguez RN CM

## 2024-02-01 NOTE — PROGRESS NOTES
Department of [facility] Med Oncology  Consult Follow Up Note    SUBJECTIVE:  No major. Pt states he had stool and/or blood in stool. He is unsure when.   Per RN, no melena/hematocheza was observed.   Pt is tired but states he otherwise feels better.     Current Medications:   lactated ringers IV soln infusion, Continuous  sodium bicarbonate tablet 650 mg, TID  white petrolatum ointment, BID  miconazole (MICOTIN) 2 % powder, BID  apixaban (ELIQUIS) tablet 2.5 mg, BID  perflutren lipid microspheres (DEFINITY) injection 1.5 mL, ONCE PRN  metoprolol succinate (TOPROL XL) extended release tablet 25 mg, BID  oxyCODONE-acetaminophen (PERCOCET) 5-325 MG per tablet 1 tablet, Q4H PRN  atorvastatin (LIPITOR) tablet 40 mg, Nightly  finasteride (PROSCAR) tablet 5 mg, Daily  pantoprazole (PROTONIX) tablet 40 mg, BID AC  tamsulosin (FLOMAX) capsule 0.4 mg, QHS  sodium chloride flush 0.9 % injection 5-40 mL, 2 times per day  sodium chloride flush 0.9 % injection 5-40 mL, PRN  0.9 % sodium chloride infusion, PRN  ondansetron (ZOFRAN-ODT) disintegrating tablet 4 mg, Q8H PRN   Or  ondansetron (ZOFRAN) injection 4 mg, Q6H PRN  senna (SENOKOT) tablet 8.6 mg, Daily PRN  aluminum & magnesium hydroxide-simethicone (MAALOX) 200-200-20 MG/5ML suspension 30 mL, Q6H PRN  acetaminophen (TYLENOL) tablet 650 mg, Q6H PRN   Or  acetaminophen (TYLENOL) suppository 650 mg, Q6H PRN        Allergies: No Known Allergies     PHYSICAL EXAM:   VITALS:   Vitals:    02/01/24 0736   BP: (!) 109/54   Pulse: 61   Resp: 18   Temp: 97.9 °F (36.6 °C)   SpO2: 92%     GENERAL APPEARANCE: Well developed, well nourished 83 y.o. yo male in no acute distress.  ECOG PERFORMANCE STATUS:  HEENT: Head: Nornmocephalic, artaumatic.  Eyes: EOMI. oropharynx clear, no evidence of mucositis or thrush. Ears: no abnormalities on inspection. Nose: no abnormal discharge or ulcerations.  LUNGS:  Clear to auscultation bilaterally. No wheezes, rhonchi, or rales.  CARDIOVASCULAR:  Regular

## 2024-02-01 NOTE — PROGRESS NOTES
[facility] Hospitalist Progress Note    Admitting Date and Time: 1/24/2024  4:14 PM  Admit Dx: Elevated troponin [R79.89]  Fall, initial encounter [W19.XXXA]  Chronic pancreatitis, unspecified pancreatitis type (HCC) [K86.1]  Chronic kidney disease, unspecified CKD stage [N18.9]    Synopsis  83-year-old gentleman with past medical history of metastatic renal cancer, known history of noncompliance to treatment, DVT, GI bleed.  He presented to ER for evaluation s/p fall.  Has been hypotensive, has prerenal MOISE, likely in setting of volume contraction, receiving IV fluids.  Hemoglobin dropped, no active bleed, resumed Eliquis.  Low PT score, case management discussing with family regarding ELISA placement.    Subjective:  Patient is being followed for Elevated troponin [R79.89]  Fall, initial encounter [W19.XXXA]  Chronic pancreatitis, unspecified pancreatitis type (HCC) [K86.1]  Chronic kidney disease, unspecified CKD stage [N18.9]     BP is now stable  Denies any acute complains  No overnight complaints reported  Palliative care saw him , discussed with family, remains full code.  Repeat hemoglobin has been stable around 8.    ROS: denies fever, chills, cp, sob, n/v, HA unless stated above.      sodium bicarbonate  650 mg Oral TID    white petrolatum   Topical BID    miconazole   Topical BID    apixaban  2.5 mg Oral BID    metoprolol succinate  25 mg Oral BID    atorvastatin  40 mg Oral Nightly    finasteride  5 mg Oral Daily    pantoprazole  40 mg Oral BID AC    tamsulosin  0.4 mg Oral QHS    sodium chloride flush  5-40 mL IntraVENous 2 times per day     perflutren lipid microspheres, 1.5 mL, ONCE PRN  oxyCODONE-acetaminophen, 1 tablet, Q4H PRN  sodium chloride flush, 5-40 mL, PRN  sodium chloride, , PRN  ondansetron, 4 mg, Q8H PRN   Or  ondansetron, 4 mg, Q6H PRN  senna, 1 tablet, Daily PRN  aluminum & magnesium hydroxide-simethicone, 30 mL, Q6H PRN  acetaminophen, 650 mg, Q6H PRN   Or  acetaminophen, 650  resolved hospital problems. *      Plan:    S/p fall.  Slipped on ice.  PT/OT eval.   for discharge planning.  Anticipate ELISA at this time.   Home Eliquis restarted    Hypotension  Has received intermittent IV fluid boluses.  Likely hypotensive in setting of volume contraction.  Continue IV fluids for 24 hours  Lactic acid 0.6  UA negative.  Encourage oral intake.   Repeat BMP in AM      MOISE on CKD  Metastatic renal cancer  Continue with IV fluid for next 24 hours.  Renal ultrasound-negative for hydronephrosis, numerous kidney cysts.      Elevated troponin.  Cardiology following.  Echocardiogram-EF 45 to 50%, this has decreased from EF of 55% in 2022.  No further cardiac testing recommended at this time.    Anemia  History of GI bleed  Chronic.  Chronically runs around 10.  No bleeding reported.  Check Hemoccult  Recent admission with profound anemia, hemoglobin was 4.8 on admission.  Hemoglobin  now stable, hemoglobin 6.7 this a.m. in error, repeat stable at 8  Appreciate hematology recommendations, resumed Eliquis.    History of DVT.  Eliquis held on admission.  Appreciate hematology note, resume Eliquis today.  Monitor H&H.  Hemoglobin stable at 8.3    Metastatic renal cancer  He is following with oncologist as an outpatient.  CT lumbar spine-showing periaortic lymph node mass suggesting lymphoma or other malignant process.  Concern of noncompliance with treatment of malignancy.  Palliative care is now following.    Dispo-subacute rehab once blood pressure and kidney numbers stabilize      NOTE: This report was transcribed using voice recognition software. Every effort was made to ensure accuracy; however, inadvertent computerized transcription errors may be present.  Electronically signed by Celso Lester MD on 2/1/2024 at 11:26 AM

## 2024-02-01 NOTE — PROGRESS NOTES
Comprehensive Nutrition Assessment    Type and Reason for Visit:  Reassess    Nutrition Recommendations/Plan:   Continue current diet w/ ONS to optimize PO/nutrient intake. Will continue to monitor.     Malnutrition Assessment:  Malnutrition Status:  Severe malnutrition (01/27/24 1448)    Context:  Social/Environmental Circumstances     Findings of the 6 clinical characteristics of malnutrition:  Energy Intake:  Less than 75% estimated energy requirements for 3 months or longer  Weight Loss:  Mild weight loss (specify amount and time period) (10% x 1 year)     Body Fat Loss:  Severe body fat loss Orbital, Triceps   Muscle Mass Loss:  Severe muscle mass loss Temples (temporalis), Clavicles (pectoralis & deltoids)  Fluid Accumulation:  No significant fluid accumulation     Strength:  Not Performed    Nutrition Assessment:    Pt. remains at risk d/t severe malnutrition and ongoing varied/suboptimal PO intake. Admit s/p fall on ice found to have elevated troponin and MOISE on CKD. PMHx CAD, AAA repair, CKD, Metastatic Renal CA, Dementia, & Chronic pancreatitis. Pt lives alone PTA. Will continue ONS and monitor.    Nutrition Related Findings:    A&OX4, disoriented at times, -I/O(3.1L), +2 edema, active BS, soft/flat/tender abd, elevated Cr, Wound Type:  (x1 coccyx per doc flow)       Current Nutrition Intake & Therapies:    Average Meal Intake: %, 26-50% (varied)  Average Supplements Intake: None Ordered  ADULT DIET; Regular; Low Fat/Low Chol/High Fiber/2 gm Na  ADULT ORAL NUTRITION SUPPLEMENT; Breakfast, Dinner; Diabetic Oral Supplement    Anthropometric Measures:  Height: 180.3 cm (5' 11\")  Ideal Body Weight (IBW): 172 lbs (78 kg)    Admission Body Weight: 71.3 kg (157 lb 3.2 oz) (1/26 first measured)  Current Body Weight: 71.4 kg (157 lb 6.4 oz) (1/27 measured), 91.5 % IBW.    Current BMI (kg/m2): 22  Usual Body Weight: 79.4 kg (175 lb) (12/5/23 EMR measured wt x ~1 year ago)  % Weight Change (Calculated):

## 2024-02-02 VITALS
WEIGHT: 161.1 LBS | RESPIRATION RATE: 18 BRPM | DIASTOLIC BLOOD PRESSURE: 68 MMHG | HEART RATE: 60 BPM | SYSTOLIC BLOOD PRESSURE: 139 MMHG | TEMPERATURE: 98.2 F | BODY MASS INDEX: 22.55 KG/M2 | OXYGEN SATURATION: 94 % | HEIGHT: 71 IN

## 2024-02-02 LAB
ANION GAP SERPL CALCULATED.3IONS-SCNC: 8 MMOL/L (ref 7–16)
BASOPHILS # BLD: 0 K/UL (ref 0–0.2)
BASOPHILS NFR BLD: 0 % (ref 0–2)
BUN SERPL-MCNC: 21 MG/DL (ref 6–23)
CALCIUM SERPL-MCNC: 10.9 MG/DL (ref 8.6–10.2)
CHLORIDE SERPL-SCNC: 104 MMOL/L (ref 98–107)
CO2 SERPL-SCNC: 20 MMOL/L (ref 22–29)
CREAT SERPL-MCNC: 1.5 MG/DL (ref 0.7–1.2)
EOSINOPHIL # BLD: 0.03 K/UL (ref 0.05–0.5)
EOSINOPHILS RELATIVE PERCENT: 1 % (ref 0–6)
ERYTHROCYTE [DISTWIDTH] IN BLOOD BY AUTOMATED COUNT: 15.9 % (ref 11.5–15)
GFR SERPL CREATININE-BSD FRML MDRD: 48 ML/MIN/1.73M2
GLUCOSE SERPL-MCNC: 80 MG/DL (ref 74–99)
HCT VFR BLD AUTO: 26.6 % (ref 37–54)
HGB BLD-MCNC: 8.3 G/DL (ref 12.5–16.5)
LYMPHOCYTES NFR BLD: 0.13 K/UL (ref 1.5–4)
LYMPHOCYTES RELATIVE PERCENT: 4 % (ref 20–42)
MCH RBC QN AUTO: 29.2 PG (ref 26–35)
MCHC RBC AUTO-ENTMCNC: 31.2 G/DL (ref 32–34.5)
MCV RBC AUTO: 93.7 FL (ref 80–99.9)
MONOCYTES NFR BLD: 0.1 K/UL (ref 0.1–0.95)
MONOCYTES NFR BLD: 3 % (ref 2–12)
NEUTROPHILS NFR BLD: 93 % (ref 43–80)
NEUTS SEG NFR BLD: 3.53 K/UL (ref 1.8–7.3)
PLATELET # BLD AUTO: 205 K/UL (ref 130–450)
PMV BLD AUTO: 10.5 FL (ref 7–12)
POTASSIUM SERPL-SCNC: 4.1 MMOL/L (ref 3.5–5)
RBC # BLD AUTO: 2.84 M/UL (ref 3.8–5.8)
RBC # BLD: ABNORMAL 10*6/UL
SODIUM SERPL-SCNC: 132 MMOL/L (ref 132–146)
WBC OTHER # BLD: 3.8 K/UL (ref 4.5–11.5)

## 2024-02-02 PROCEDURE — 6370000000 HC RX 637 (ALT 250 FOR IP): Performed by: INTERNAL MEDICINE

## 2024-02-02 PROCEDURE — 80048 BASIC METABOLIC PNL TOTAL CA: CPT

## 2024-02-02 PROCEDURE — 36415 COLL VENOUS BLD VENIPUNCTURE: CPT

## 2024-02-02 PROCEDURE — 2580000003 HC RX 258: Performed by: INTERNAL MEDICINE

## 2024-02-02 PROCEDURE — 85025 COMPLETE CBC W/AUTO DIFF WBC: CPT

## 2024-02-02 PROCEDURE — 6370000000 HC RX 637 (ALT 250 FOR IP): Performed by: STUDENT IN AN ORGANIZED HEALTH CARE EDUCATION/TRAINING PROGRAM

## 2024-02-02 PROCEDURE — 2580000003 HC RX 258: Performed by: STUDENT IN AN ORGANIZED HEALTH CARE EDUCATION/TRAINING PROGRAM

## 2024-02-02 PROCEDURE — 6360000002 HC RX W HCPCS: Performed by: STUDENT IN AN ORGANIZED HEALTH CARE EDUCATION/TRAINING PROGRAM

## 2024-02-02 PROCEDURE — 99239 HOSP IP/OBS DSCHRG MGMT >30: CPT | Performed by: STUDENT IN AN ORGANIZED HEALTH CARE EDUCATION/TRAINING PROGRAM

## 2024-02-02 RX ORDER — ZINC SULFATE 50(220)MG
50 CAPSULE ORAL DAILY
Qty: 30 CAPSULE | Refills: 3 | DISCHARGE
Start: 2024-02-03

## 2024-02-02 RX ORDER — METOPROLOL SUCCINATE 25 MG/1
25 TABLET, EXTENDED RELEASE ORAL 2 TIMES DAILY
Qty: 30 TABLET | Refills: 3 | Status: SHIPPED | OUTPATIENT
Start: 2024-02-02 | End: 2024-02-02

## 2024-02-02 RX ORDER — METOPROLOL SUCCINATE 25 MG/1
25 TABLET, EXTENDED RELEASE ORAL 2 TIMES DAILY
Qty: 30 TABLET | Refills: 3 | DISCHARGE
Start: 2024-02-02

## 2024-02-02 RX ORDER — OXYCODONE HYDROCHLORIDE AND ACETAMINOPHEN 5; 325 MG/1; MG/1
1 TABLET ORAL EVERY 4 HOURS PRN
Qty: 10 TABLET | Refills: 0 | Status: SHIPPED | OUTPATIENT
Start: 2024-02-02 | End: 2024-02-07

## 2024-02-02 RX ORDER — SODIUM BICARBONATE 650 MG/1
650 TABLET ORAL 3 TIMES DAILY
Qty: 90 TABLET | Refills: 0 | DISCHARGE
Start: 2024-02-02 | End: 2024-03-03

## 2024-02-02 RX ORDER — ZINC SULFATE 50(220)MG
50 CAPSULE ORAL DAILY
Qty: 30 CAPSULE | Refills: 3 | COMMUNITY
Start: 2024-02-03 | End: 2024-02-02

## 2024-02-02 RX ORDER — OXYCODONE HYDROCHLORIDE AND ACETAMINOPHEN 5; 325 MG/1; MG/1
1 TABLET ORAL EVERY 4 HOURS PRN
Qty: 10 TABLET | Refills: 0 | Status: SHIPPED | OUTPATIENT
Start: 2024-02-02 | End: 2024-02-02

## 2024-02-02 RX ADMIN — METOPROLOL SUCCINATE 25 MG: 25 TABLET, EXTENDED RELEASE ORAL at 08:24

## 2024-02-02 RX ADMIN — FINASTERIDE 5 MG: 5 TABLET, FILM COATED ORAL at 08:24

## 2024-02-02 RX ADMIN — OXYCODONE AND ACETAMINOPHEN 1 TABLET: 5; 325 TABLET ORAL at 13:28

## 2024-02-02 RX ADMIN — PANTOPRAZOLE SODIUM 40 MG: 40 TABLET, DELAYED RELEASE ORAL at 06:42

## 2024-02-02 RX ADMIN — SODIUM BICARBONATE 650 MG: 650 TABLET ORAL at 08:24

## 2024-02-02 RX ADMIN — PETROLATUM: 420 OINTMENT TOPICAL at 08:24

## 2024-02-02 RX ADMIN — SODIUM BICARBONATE 650 MG: 650 TABLET ORAL at 13:28

## 2024-02-02 RX ADMIN — ANTI-FUNGAL POWDER MICONAZOLE NITRATE TALC FREE: 1.42 POWDER TOPICAL at 08:25

## 2024-02-02 RX ADMIN — Medication 10 ML: at 08:24

## 2024-02-02 RX ADMIN — APIXABAN 2.5 MG: 2.5 TABLET, FILM COATED ORAL at 08:24

## 2024-02-02 RX ADMIN — ZINC SULFATE 220 MG (50 MG) CAPSULE 50 MG: CAPSULE at 08:24

## 2024-02-02 RX ADMIN — SODIUM CHLORIDE 125 MG: 9 INJECTION, SOLUTION INTRAVENOUS at 00:44

## 2024-02-02 ASSESSMENT — PAIN SCALES - GENERAL
PAINLEVEL_OUTOF10: 9
PAINLEVEL_OUTOF10: 6

## 2024-02-02 ASSESSMENT — PAIN DESCRIPTION - ORIENTATION: ORIENTATION: MID

## 2024-02-02 ASSESSMENT — PAIN - FUNCTIONAL ASSESSMENT: PAIN_FUNCTIONAL_ASSESSMENT: ACTIVITIES ARE NOT PREVENTED

## 2024-02-02 ASSESSMENT — PAIN DESCRIPTION - DESCRIPTORS: DESCRIPTORS: ACHING

## 2024-02-02 ASSESSMENT — PAIN DESCRIPTION - LOCATION: LOCATION: BUTTOCKS

## 2024-02-02 NOTE — PLAN OF CARE
Problem: Skin/Tissue Integrity  Goal: Absence of new skin breakdown  Description: 1.  Monitor for areas of redness and/or skin breakdown  2.  Assess vascular access sites hourly  3.  Every 4-6 hours minimum:  Change oxygen saturation probe site  4.  Every 4-6 hours:  If on nasal continuous positive airway pressure, respiratory therapy assess nares and determine need for appliance change or resting period.  2/2/2024 1430 by Tiffany Borjas RN  Outcome: Adequate for Discharge  2/2/2024 1103 by Augusta Fields RN  Outcome: Progressing  2/2/2024 0117 by Radha Hernandez RN  Outcome: Progressing     Problem: Discharge Planning  Goal: Discharge to home or other facility with appropriate resources  2/2/2024 1430 by Tiffany Borjas RN  Outcome: Adequate for Discharge  2/2/2024 1103 by Augusta Fields RN  Outcome: Progressing  2/2/2024 0117 by Radha Hernandez RN  Outcome: Progressing     Problem: Pain  Goal: Verbalizes/displays adequate comfort level or baseline comfort level  2/2/2024 1430 by Tiffany Borjas RN  Outcome: Adequate for Discharge  2/2/2024 1103 by Augusta Fields RN  Outcome: Progressing  2/2/2024 0117 by Radha Hernandez RN  Outcome: Progressing     Problem: Safety - Adult  Goal: Free from fall injury  2/2/2024 1430 by Tiffany Borjas RN  Outcome: Adequate for Discharge  2/2/2024 1103 by Augusta Fields RN  Outcome: Progressing  2/2/2024 0117 by Radha Hernandez RN  Outcome: Progressing     Problem: ABCDS Injury Assessment  Goal: Absence of physical injury  2/2/2024 1430 by Tiffany Borjas RN  Outcome: Adequate for Discharge  2/2/2024 1103 by Augusta Fields RN  Outcome: Progressing  2/2/2024 0117 by Radha Hernandez RN  Outcome: Progressing     Problem: Nutrition Deficit:  Goal: Optimize nutritional status  2/2/2024 1430 by Tiffany Borjas RN  Outcome: Adequate for Discharge  2/2/2024 1103 by Augusta Fields RN  Outcome: Progressing  2/2/2024 0117 by Radha Hernandez RN  Outcome: Progressing  Flowsheets (Taken 2/1/2024 1159 by

## 2024-02-02 NOTE — DISCHARGE INSTR - COC
Continuity of Care Form    Patient Name: Uzair Fuentes   :  1940  MRN:  [id number]    Admit date:  2024  Discharge date:  24      Code Status Order: Full Code   Advance Directives:     Admitting Physician:  Kelsi Wharton MD  PCP: Ken Liriano MD    Discharging Nurse: Augusta Fields  Discharging Hospital Unit/Room#: 8410/8410-A  Discharging Unit Phone Number: [phone]    Emergency Contact:   Extended Emergency Contact Information  Primary Emergency Contact: mariah martinez  Address: [address] of [facility], [place]  Home Phone: [phone]  Mobile Phone: [phone]  Relation: Child   needed? No  Secondary Emergency Contact: NaifJoan  Home Phone: [phone]  Relation: None  Preferred language: English   needed? No    Past Surgical History:  Past Surgical History:   Procedure Laterality Date    ABDOMINAL AORTIC ANEURYSM REPAIR      COLONOSCOPY      TONSILLECTOMY      TOTAL KNEE ARTHROPLASTY Bilateral     UPPER GASTROINTESTINAL ENDOSCOPY N/A 10/14/2022    EGD DIAGNOSTIC ONLY performed by Joshua Avila MD at [facility]       Immunization History:     There is no immunization history on file for this patient.    Active Problems:  Patient Active Problem List   Diagnosis Code    Normocytic anemia D64.9    Rectal bleed K62.5    GI bleed K92.2    Chest pain R07.9    PVC's (premature ventricular contractions) I49.3    Coronary artery disease involving native coronary artery of native heart without angina pectoris I25.10    Primary hypertension I10    Hyperlipidemia E78.5    Stage 3b chronic kidney disease (HCC) N18.32    Renal cell cancer (HCC) C64.9    Acute on chronic anemia D64.9    Renal cell carcinoma (HCC) C64.9    Elevated troponin R79.89    Severe protein-calorie malnutrition (HCC) E43    Fall W19.XXXA    First degree atrioventricular block I44.0    Abdominal aortic aneurysm (AAA) without rupture (HCC)

## 2024-02-02 NOTE — PLAN OF CARE
Problem: Skin/Tissue Integrity  Goal: Absence of new skin breakdown  Description: 1.  Monitor for areas of redness and/or skin breakdown  2.  Assess vascular access sites hourly  3.  Every 4-6 hours minimum:  Change oxygen saturation probe site  4.  Every 4-6 hours:  If on nasal continuous positive airway pressure, respiratory therapy assess nares and determine need for appliance change or resting period.  2/2/2024 0117 by Radha Hernandez RN  Outcome: Progressing  2/1/2024 1132 by Augusta Fields RN  Outcome: Progressing     Problem: Discharge Planning  Goal: Discharge to home or other facility with appropriate resources  2/2/2024 0117 by Radha Hernandez RN  Outcome: Progressing  2/1/2024 1132 by Augusta Fields RN  Outcome: Progressing     Problem: Pain  Goal: Verbalizes/displays adequate comfort level or baseline comfort level  2/2/2024 0117 by Radha Hernandez RN  Outcome: Progressing  2/1/2024 1132 by Augusta Fields RN  Outcome: Progressing

## 2024-02-02 NOTE — CARE COORDINATION
Discharge order noted. Per Tiffanie at [facility], precert has been obtained. Transportation set up via [facility] for 3 pm today. Charge nurse, RN, liaison, patient, daughter Lester and ex wife Joan all notified. [place]/Destination complete. Passr completed and placed in envelope in soft chart. Completed and signed Ambulance from in envelope in soft chart.  Lois Leblanc RN   [phone]

## 2024-02-02 NOTE — PLAN OF CARE
Problem: Skin/Tissue Integrity  Goal: Absence of new skin breakdown  Description: 1.  Monitor for areas of redness and/or skin breakdown  2.  Assess vascular access sites hourly  3.  Every 4-6 hours minimum:  Change oxygen saturation probe site  4.  Every 4-6 hours:  If on nasal continuous positive airway pressure, respiratory therapy assess nares and determine need for appliance change or resting period.  2/2/2024 1103 by Augusta Fields RN  Outcome: Progressing  2/2/2024 0117 by Radha Hernandez RN  Outcome: Progressing     Problem: Discharge Planning  Goal: Discharge to home or other facility with appropriate resources  2/2/2024 1103 by Augusta Fields RN  Outcome: Progressing  2/2/2024 0117 by Radha Hernandez RN  Outcome: Progressing     Problem: Pain  Goal: Verbalizes/displays adequate comfort level or baseline comfort level  2/2/2024 1103 by Augusta Fields RN  Outcome: Progressing  2/2/2024 0117 by Radha Hernandez RN  Outcome: Progressing     Problem: Safety - Adult  Goal: Free from fall injury  2/2/2024 1103 by Augusta Fields RN  Outcome: Progressing  2/2/2024 0117 by Radha Hernandez RN  Outcome: Progressing     Problem: ABCDS Injury Assessment  Goal: Absence of physical injury  2/2/2024 1103 by Augusta Fields RN  Outcome: Progressing  2/2/2024 0117 by Radha Hernandez RN  Outcome: Progressing     Problem: Nutrition Deficit:  Goal: Optimize nutritional status  2/2/2024 1103 by Augusta Fields RN  Outcome: Progressing  2/2/2024 0117 by Radha Hernandez RN  Outcome: Progressing  Flowsheets (Taken 2/1/2024 1159 by Roger Camacho, RD)  Nutrient intake appropriate for improving, restoring, or maintaining nutritional needs:   Monitor oral intake, labs, and treatment plans   Recommend appropriate diets, oral nutritional supplements, and vitamin/mineral supplements

## 2024-02-02 NOTE — DISCHARGE SUMMARY
fall TECHNOLOGIST PROVIDED HISTORY: Reason for exam:->fall Has a \"code stroke\" or \"stroke alert\" been called?->Yes Decision Support Exception - unselect if not a suspected or confirmed emergency medical condition->Emergency Medical Condition (MA) What reading provider will be dictating this exam?->CRC FINDINGS: BRAIN/VENTRICLES: There is no acute intracranial hemorrhage, mass effect or midline shift.  No abnormal extra-axial fluid collection.  The gray-white differentiation is maintained without evidence of an acute infarct.  There is no evidence of hydrocephalus. Moderate atrophy and periventricular white matter degenerative change. ORBITS: The visualized portion of the orbits demonstrate no acute abnormality. SINUSES: The visualized paranasal sinuses and mastoid air cells demonstrate no acute abnormality. SOFT TISSUES/SKULL:  No acute abnormality of the visualized skull or soft tissues.     No acute intracranial abnormality. RECOMMENDATIONS: Careful clinical correlation and follow up recommended.     XR CHEST PORTABLE    Result Date: 1/24/2024  EXAMINATION: ONE XRAY VIEW OF THE CHEST 1/24/2024 6:04 pm COMPARISON: 12/07/2023 HISTORY: ORDERING SYSTEM PROVIDED HISTORY: fall TECHNOLOGIST PROVIDED HISTORY: Reason for exam:->fall What reading provider will be dictating this exam?->CRC FINDINGS: The lungs are without acute focal process.  There is no effusion or pneumothorax. The cardiomediastinal silhouette is without acute process. The osseous structures are without acute process.  There are several scattered calcified granulomata within each lung compatible with granulomatous disease exposure     No acute process.       Discharge Medications:      Medication List        START taking these medications      metoprolol succinate 25 MG extended release tablet  Commonly known as: TOPROL XL  Take 1 tablet by mouth 2 times daily     miconazole 2 % powder  Commonly known as: MICOTIN  Apply topically 2 times daily.    KENALOG  Apply topically 2 times daily.     urea 20 % cream  Commonly known as: CARMOL            STOP taking these medications      empagliflozin 25 MG tablet  Commonly known as: JARDIANCE     furosemide 20 MG tablet  Commonly known as: LASIX     metoprolol tartrate 50 MG tablet  Commonly known as: LOPRESSOR               Where to Get Your Medications        You can get these medications from any pharmacy    Bring a paper prescription for each of these medications  oxyCODONE-acetaminophen 5-325 MG per tablet       Information about where to get these medications is not yet available    Ask your nurse or doctor about these medications  metoprolol succinate 25 MG extended release tablet  miconazole 2 % powder  sodium bicarbonate 650 MG tablet  white petrolatum Oint ointment  zinc sulfate 220 (50 Zn)  mg capsule - elemental zinc         Time Spent on discharge is more than 45 minutes in the examination, evaluation, counseling and review of medications and discharge plan.    +++++++++++++++++++++++++++++++++++++++++++++++++  Celso Lester MD  [facility] - [place], [place]  +++++++++++++++++++++++++++++++++++++++++++++++++  NOTE: This report was transcribed using voice recognition software. Every effort was made to ensure accuracy; however, inadvertent computerized transcription errors may be present.

## 2024-02-03 ENCOUNTER — OUTSIDE SERVICES (OUTPATIENT)
Dept: FAMILY MEDICINE CLINIC | Age: 84
End: 2024-02-03

## 2024-02-03 DIAGNOSIS — I50.20 HFREF (HEART FAILURE WITH REDUCED EJECTION FRACTION) (HCC): ICD-10-CM
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
## 2024-02-03 DIAGNOSIS — N17.9 AKI (ACUTE KIDNEY INJURY) (HCC): ICD-10-CM

## 2024-02-03 DIAGNOSIS — R79.89 ELEVATED TROPONIN: ICD-10-CM

## 2024-02-03 DIAGNOSIS — C64.9 METASTATIC RENAL CELL CARCINOMA, UNSPECIFIED LATERALITY (HCC): ICD-10-CM

## 2024-02-03 DIAGNOSIS — R53.1 GENERALIZED WEAKNESS: ICD-10-CM

## 2024-02-03 DIAGNOSIS — E86.1 HYPOTENSION DUE TO HYPOVOLEMIA: ICD-10-CM

## 2024-02-03 DIAGNOSIS — Z91.81 AT MAXIMUM RISK FOR FALL: ICD-10-CM

## 2024-02-03 DIAGNOSIS — Z86.718 HISTORY OF DVT (DEEP VEIN THROMBOSIS): ICD-10-CM

## 2024-02-03 DIAGNOSIS — I95.89 HYPOTENSION DUE TO HYPOVOLEMIA: ICD-10-CM

## 2024-02-03 DIAGNOSIS — R53.81 PHYSICAL DECONDITIONING: ICD-10-CM

## 2024-02-03 DIAGNOSIS — D64.9 CHRONIC ANEMIA: ICD-10-CM

## 2024-02-03 LAB
ALBUMIN SERPL-MCNC: 2.1 G/DL (ref 3.5–5.2)
ALP SERPL-CCNC: 107 U/L (ref 40–129)
ALT SERPL-CCNC: 23 U/L (ref 0–40)
ANION GAP SERPL CALCULATED.3IONS-SCNC: 6 MMOL/L (ref 7–16)
AST SERPL-CCNC: 24 U/L (ref 0–39)
BILIRUB SERPL-MCNC: 0.2 MG/DL (ref 0–1.2)
BUN SERPL-MCNC: 17 MG/DL (ref 6–23)
CALCIUM SERPL-MCNC: 10.9 MG/DL (ref 8.6–10.2)
CHLORIDE SERPL-SCNC: 109 MMOL/L (ref 98–107)
CHOLEST SERPL-MCNC: 77 MG/DL
CO2 SERPL-SCNC: 20 MMOL/L (ref 22–29)
CREAT SERPL-MCNC: 1.5 MG/DL (ref 0.7–1.2)
ERYTHROCYTE [DISTWIDTH] IN BLOOD BY AUTOMATED COUNT: 16.4 % (ref 11.5–15)
GFR SERPL CREATININE-BSD FRML MDRD: 46 ML/MIN/1.73M2
GLUCOSE SERPL-MCNC: 82 MG/DL (ref 74–99)
HCT VFR BLD AUTO: 27.1 % (ref 37–54)
HDLC SERPL-MCNC: 27 MG/DL
HGB BLD-MCNC: 8.2 G/DL (ref 12.5–16.5)
LDLC SERPL CALC-MCNC: 34 MG/DL
MCH RBC QN AUTO: 29.7 PG (ref 26–35)
MCHC RBC AUTO-ENTMCNC: 30.3 G/DL (ref 32–34.5)
MCV RBC AUTO: 98.2 FL (ref 80–99.9)
PLATELET # BLD AUTO: 240 K/UL (ref 130–450)
PMV BLD AUTO: 10.7 FL (ref 7–12)
POTASSIUM SERPL-SCNC: 4.3 MMOL/L (ref 3.5–5)
PROT SERPL-MCNC: 4.3 G/DL (ref 6.4–8.3)
RBC # BLD AUTO: 2.76 M/UL (ref 3.8–5.8)
SODIUM SERPL-SCNC: 135 MMOL/L (ref 132–146)
TRIGL SERPL-MCNC: 81 MG/DL
TSH SERPL DL<=0.05 MIU/L-ACNC: 0.65 UIU/ML (ref 0.27–4.2)
VLDLC SERPL CALC-MCNC: 16 MG/DL
WBC OTHER # BLD: 3.7 K/UL (ref 4.5–11.5)

## 2024-02-03 NOTE — PROGRESS NOTES
rhythm.      Heart sounds: Normal heart sounds.   Pulmonary:      Effort: Pulmonary effort is normal. No respiratory distress.      Breath sounds: No wheezing.      Comments: Coarse and diminished bilaterally  Abdominal:      General: Bowel sounds are normal. There is no distension.      Palpations: Abdomen is soft.      Tenderness: There is no abdominal tenderness.   Musculoskeletal:         General: Tenderness present.      Cervical back: Normal range of motion and neck supple.   Skin:     General: Skin is warm and dry.      Findings: Bruising present.   Neurological:      Mental Status: He is alert.   Psychiatric:         Behavior: Behavior normal.         Recent Labs     02/03/24  0526 02/02/24  0433 02/01/24  0438   WBC 3.7* 3.8* 3.6*   HGB 8.2* 8.3* 8.3*   HCT 27.1* 26.6* 26.6*   MCV 98.2 93.7 93.3    205 193      Lab Results   Component Value Date     02/03/2024    K 4.3 02/03/2024     (H) 02/03/2024    CO2 20 (L) 02/03/2024    BUN 17 02/03/2024    CREATININE 1.5 (H) 02/03/2024    GLUCOSE 82 02/03/2024    CALCIUM 10.9 (H) 02/03/2024    PROT 4.3 (L) 02/03/2024    LABALBU 2.1 (L) 02/03/2024    BILITOT 0.2 02/03/2024    ALKPHOS 107 02/03/2024    AST 24 02/03/2024    ALT 23 02/03/2024    LABGLOM 46 (L) 02/03/2024    GFRAA 44 10/17/2022      No results found for: \"LABA1C\"  Lab Results   Component Value Date    CHOL 77 02/03/2024     Lab Results   Component Value Date    TRIG 81 02/03/2024     Lab Results   Component Value Date    HDL 27 (L) 02/03/2024     Lab Results   Component Value Date    LDLCHOLESTEROL 34 02/03/2024     Lab Results   Component Value Date    VLDL 16 02/03/2024     No results found for: \"CHOLHDLRATIO\"  Lab Results   Component Value Date    TSH 0.65 02/03/2024               ASSESSMENT/PLAN:  1. Fall, sequela  2. Hypotension due to hypovolemia  3. MOISE (acute kidney injury) (HCC)  4. Elevated troponin  5. HFrEF (heart failure with reduced ejection fraction) (HCC)  6. Metastatic

## 2024-02-04 LAB
ALBUMIN SERPL-MCNC: 2.3 G/DL (ref 3.5–5.2)
ALP SERPL-CCNC: 109 U/L (ref 40–129)
ALT SERPL-CCNC: 22 U/L (ref 0–40)
ANION GAP SERPL CALCULATED.3IONS-SCNC: 5 MMOL/L (ref 7–16)
AST SERPL-CCNC: 22 U/L (ref 0–39)
BILIRUB SERPL-MCNC: 0.2 MG/DL (ref 0–1.2)
BUN SERPL-MCNC: 16 MG/DL (ref 6–23)
CALCIUM SERPL-MCNC: 11 MG/DL (ref 8.6–10.2)
CHLORIDE SERPL-SCNC: 108 MMOL/L (ref 98–107)
CO2 SERPL-SCNC: 23 MMOL/L (ref 22–29)
CREAT SERPL-MCNC: 1.6 MG/DL (ref 0.7–1.2)
ERYTHROCYTE [DISTWIDTH] IN BLOOD BY AUTOMATED COUNT: 16.1 % (ref 11.5–15)
GFR SERPL CREATININE-BSD FRML MDRD: 44 ML/MIN/1.73M2
GLUCOSE SERPL-MCNC: 114 MG/DL (ref 74–99)
HCT VFR BLD AUTO: 23.8 % (ref 37–54)
HGB BLD-MCNC: 7.5 G/DL (ref 12.5–16.5)
MCH RBC QN AUTO: 29 PG (ref 26–35)
MCHC RBC AUTO-ENTMCNC: 31.5 G/DL (ref 32–34.5)
MCV RBC AUTO: 91.9 FL (ref 80–99.9)
PLATELET # BLD AUTO: 234 K/UL (ref 130–450)
PMV BLD AUTO: 10.5 FL (ref 7–12)
POTASSIUM SERPL-SCNC: 3.8 MMOL/L (ref 3.5–5)
PROT SERPL-MCNC: 4.4 G/DL (ref 6.4–8.3)
RBC # BLD AUTO: 2.59 M/UL (ref 3.8–5.8)
SODIUM SERPL-SCNC: 136 MMOL/L (ref 132–146)
WBC OTHER # BLD: 3.8 K/UL (ref 4.5–11.5)

## 2024-02-05 ENCOUNTER — TELEPHONE (OUTPATIENT)
Dept: CARDIOLOGY CLINIC | Age: 84
End: 2024-02-05

## 2024-02-05 LAB
ERYTHROCYTE [DISTWIDTH] IN BLOOD BY AUTOMATED COUNT: 16 % (ref 11.5–15)
HCT VFR BLD AUTO: 24.2 % (ref 37–54)
HGB BLD-MCNC: 7.8 G/DL (ref 12.5–16.5)
MCH RBC QN AUTO: 29.4 PG (ref 26–35)
MCHC RBC AUTO-ENTMCNC: 32.2 G/DL (ref 32–34.5)
MCV RBC AUTO: 91.3 FL (ref 80–99.9)
PLATELET # BLD AUTO: 232 K/UL (ref 130–450)
PMV BLD AUTO: 9.8 FL (ref 7–12)
RBC # BLD AUTO: 2.65 M/UL (ref 3.8–5.8)
WBC OTHER # BLD: 4.3 K/UL (ref 4.5–11.5)

## 2024-02-08 LAB
ALBUMIN SERPL-MCNC: 2.5 G/DL (ref 3.5–5.2)
ALP SERPL-CCNC: 117 U/L (ref 40–129)
ALT SERPL-CCNC: 20 U/L (ref 0–40)
ANION GAP SERPL CALCULATED.3IONS-SCNC: 5 MMOL/L (ref 7–16)
AST SERPL-CCNC: 21 U/L (ref 0–39)
BILIRUB SERPL-MCNC: 0.2 MG/DL (ref 0–1.2)
BUN SERPL-MCNC: 25 MG/DL (ref 6–23)
CALCIUM SERPL-MCNC: 11.2 MG/DL (ref 8.6–10.2)
CHLORIDE SERPL-SCNC: 110 MMOL/L (ref 98–107)
CO2 SERPL-SCNC: 23 MMOL/L (ref 22–29)
CREAT SERPL-MCNC: 1.7 MG/DL (ref 0.7–1.2)
ERYTHROCYTE [DISTWIDTH] IN BLOOD BY AUTOMATED COUNT: 16.7 % (ref 11.5–15)
GFR SERPL CREATININE-BSD FRML MDRD: 39 ML/MIN/1.73M2
GLUCOSE SERPL-MCNC: 81 MG/DL (ref 74–99)
HCT VFR BLD AUTO: 21.3 % (ref 37–54)
HGB BLD-MCNC: 6.8 G/DL (ref 12.5–16.5)
MCH RBC QN AUTO: 29.8 PG (ref 26–35)
MCHC RBC AUTO-ENTMCNC: 31.9 G/DL (ref 32–34.5)
MCV RBC AUTO: 93.4 FL (ref 80–99.9)
PLATELET # BLD AUTO: 285 K/UL (ref 130–450)
PMV BLD AUTO: 10.2 FL (ref 7–12)
POTASSIUM SERPL-SCNC: 4.2 MMOL/L (ref 3.5–5)
PROT SERPL-MCNC: 4.7 G/DL (ref 6.4–8.3)
RBC # BLD AUTO: 2.28 M/UL (ref 3.8–5.8)
SODIUM SERPL-SCNC: 138 MMOL/L (ref 132–146)
WBC OTHER # BLD: 5.1 K/UL (ref 4.5–11.5)

## 2024-02-09 ENCOUNTER — HOSPITAL ENCOUNTER (INPATIENT)
Age: 84
LOS: 4 days | Discharge: NURSING, REHAB OR LONG-TERM CARE | DRG: 813 | End: 2024-02-13
Attending: EMERGENCY MEDICINE | Admitting: INTERNAL MEDICINE
Payer: OTHER GOVERNMENT

## 2024-02-09 ENCOUNTER — ANESTHESIA EVENT (OUTPATIENT)
Dept: OPERATING ROOM | Age: 84
End: 2024-02-09
Payer: OTHER GOVERNMENT

## 2024-02-09 ENCOUNTER — APPOINTMENT (OUTPATIENT)
Dept: ULTRASOUND IMAGING | Age: 84
DRG: 813 | End: 2024-02-09
Payer: OTHER GOVERNMENT

## 2024-02-09 ENCOUNTER — APPOINTMENT (OUTPATIENT)
Dept: CT IMAGING | Age: 84
DRG: 813 | End: 2024-02-09
Payer: OTHER GOVERNMENT

## 2024-02-09 DIAGNOSIS — K92.2 GASTROINTESTINAL HEMORRHAGE, UNSPECIFIED GASTROINTESTINAL HEMORRHAGE TYPE: Primary | ICD-10-CM

## 2024-02-09 DIAGNOSIS — D64.9 SYMPTOMATIC ANEMIA: ICD-10-CM

## 2024-02-09 PROBLEM — D62 ACUTE BLOOD LOSS ANEMIA: Status: ACTIVE | Noted: 2024-02-09

## 2024-02-09 LAB
ALBUMIN SERPL-MCNC: 2.4 G/DL (ref 3.5–5.2)
ALBUMIN SERPL-MCNC: 2.6 G/DL (ref 3.5–5.2)
ALP SERPL-CCNC: 105 U/L (ref 40–129)
ALP SERPL-CCNC: 107 U/L (ref 40–129)
ALT SERPL-CCNC: 18 U/L (ref 0–40)
ALT SERPL-CCNC: 20 U/L (ref 0–40)
ANION GAP SERPL CALCULATED.3IONS-SCNC: 4 MMOL/L (ref 7–16)
ANION GAP SERPL CALCULATED.3IONS-SCNC: 6 MMOL/L (ref 7–16)
AST SERPL-CCNC: 18 U/L (ref 0–39)
AST SERPL-CCNC: 22 U/L (ref 0–39)
BASOPHILS # BLD: 0.02 K/UL (ref 0–0.2)
BASOPHILS NFR BLD: 1 % (ref 0–2)
BILIRUB SERPL-MCNC: 0.2 MG/DL (ref 0–1.2)
BILIRUB SERPL-MCNC: <0.2 MG/DL (ref 0–1.2)
BUN SERPL-MCNC: 24 MG/DL (ref 6–23)
BUN SERPL-MCNC: 25 MG/DL (ref 6–23)
CALCIUM SERPL-MCNC: 10.7 MG/DL (ref 8.6–10.2)
CALCIUM SERPL-MCNC: 11 MG/DL (ref 8.6–10.2)
CHLORIDE SERPL-SCNC: 109 MMOL/L (ref 98–107)
CHLORIDE SERPL-SCNC: 110 MMOL/L (ref 98–107)
CO2 SERPL-SCNC: 23 MMOL/L (ref 22–29)
CO2 SERPL-SCNC: 24 MMOL/L (ref 22–29)
CREAT SERPL-MCNC: 1.6 MG/DL (ref 0.7–1.2)
CREAT SERPL-MCNC: 1.7 MG/DL (ref 0.7–1.2)
EKG ATRIAL RATE: 67 BPM
EKG P AXIS: 38 DEGREES
EKG P-R INTERVAL: 272 MS
EKG Q-T INTERVAL: 394 MS
EKG QRS DURATION: 116 MS
EKG QTC CALCULATION (BAZETT): 416 MS
EKG R AXIS: -28 DEGREES
EKG T AXIS: 17 DEGREES
EKG VENTRICULAR RATE: 67 BPM
EOSINOPHIL # BLD: 0.14 K/UL (ref 0.05–0.5)
EOSINOPHILS RELATIVE PERCENT: 4 % (ref 0–6)
ERYTHROCYTE [DISTWIDTH] IN BLOOD BY AUTOMATED COUNT: 17.1 % (ref 11.5–15)
ERYTHROCYTE [DISTWIDTH] IN BLOOD BY AUTOMATED COUNT: 17.2 % (ref 11.5–15)
GFR SERPL CREATININE-BSD FRML MDRD: 39 ML/MIN/1.73M2
GFR SERPL CREATININE-BSD FRML MDRD: 42 ML/MIN/1.73M2
GLUCOSE SERPL-MCNC: 81 MG/DL (ref 74–99)
GLUCOSE SERPL-MCNC: 94 MG/DL (ref 74–99)
HCT VFR BLD AUTO: 18.7 % (ref 37–54)
HCT VFR BLD AUTO: 20.3 % (ref 37–54)
HCT VFR BLD AUTO: 23.6 % (ref 37–54)
HCT VFR BLD AUTO: 25.1 % (ref 37–54)
HGB BLD-MCNC: 5.8 G/DL (ref 12.5–16.5)
HGB BLD-MCNC: 6.1 G/DL (ref 12.5–16.5)
HGB BLD-MCNC: 7.4 G/DL (ref 12.5–16.5)
HGB BLD-MCNC: 7.9 G/DL (ref 12.5–16.5)
IMM GRANULOCYTES # BLD AUTO: <0.03 K/UL (ref 0–0.58)
IMM GRANULOCYTES NFR BLD: 1 % (ref 0–5)
INR PPP: 1.3
LYMPHOCYTES NFR BLD: 0.54 K/UL (ref 1.5–4)
LYMPHOCYTES RELATIVE PERCENT: 14 % (ref 20–42)
MCH RBC QN AUTO: 28.5 PG (ref 26–35)
MCH RBC QN AUTO: 29.4 PG (ref 26–35)
MCHC RBC AUTO-ENTMCNC: 30 G/DL (ref 32–34.5)
MCHC RBC AUTO-ENTMCNC: 31 G/DL (ref 32–34.5)
MCV RBC AUTO: 94.9 FL (ref 80–99.9)
MCV RBC AUTO: 94.9 FL (ref 80–99.9)
MONOCYTES NFR BLD: 0.31 K/UL (ref 0.1–0.95)
MONOCYTES NFR BLD: 8 % (ref 2–12)
NEUTROPHILS NFR BLD: 74 % (ref 43–80)
NEUTS SEG NFR BLD: 2.91 K/UL (ref 1.8–7.3)
PLATELET # BLD AUTO: 247 K/UL (ref 130–450)
PLATELET # BLD AUTO: 269 K/UL (ref 130–450)
PMV BLD AUTO: 10 FL (ref 7–12)
PMV BLD AUTO: 10.3 FL (ref 7–12)
POTASSIUM SERPL-SCNC: 4 MMOL/L (ref 3.5–5)
POTASSIUM SERPL-SCNC: 4.1 MMOL/L (ref 3.5–5)
PROT SERPL-MCNC: 4.4 G/DL (ref 6.4–8.3)
PROT SERPL-MCNC: 4.6 G/DL (ref 6.4–8.3)
PROTHROMBIN TIME: 14.4 SEC (ref 9.3–12.4)
RBC # BLD AUTO: 1.97 M/UL (ref 3.8–5.8)
RBC # BLD AUTO: 2.14 M/UL (ref 3.8–5.8)
RBC # BLD: ABNORMAL 10*6/UL
SODIUM SERPL-SCNC: 137 MMOL/L (ref 132–146)
SODIUM SERPL-SCNC: 139 MMOL/L (ref 132–146)
TROPONIN I SERPL HS-MCNC: 90 NG/L (ref 0–11)
WBC OTHER # BLD: 3.9 K/UL (ref 4.5–11.5)
WBC OTHER # BLD: 4.1 K/UL (ref 4.5–11.5)

## 2024-02-09 PROCEDURE — 93005 ELECTROCARDIOGRAM TRACING: CPT | Performed by: EMERGENCY MEDICINE

## 2024-02-09 PROCEDURE — 84484 ASSAY OF TROPONIN QUANT: CPT

## 2024-02-09 PROCEDURE — C9113 INJ PANTOPRAZOLE SODIUM, VIA: HCPCS

## 2024-02-09 PROCEDURE — 2580000003 HC RX 258: Performed by: HOSPITALIST

## 2024-02-09 PROCEDURE — 86900 BLOOD TYPING SEROLOGIC ABO: CPT

## 2024-02-09 PROCEDURE — A4216 STERILE WATER/SALINE, 10 ML: HCPCS

## 2024-02-09 PROCEDURE — 36430 TRANSFUSION BLD/BLD COMPNT: CPT

## 2024-02-09 PROCEDURE — 85025 COMPLETE CBC W/AUTO DIFF WBC: CPT

## 2024-02-09 PROCEDURE — 86923 COMPATIBILITY TEST ELECTRIC: CPT

## 2024-02-09 PROCEDURE — 85018 HEMOGLOBIN: CPT

## 2024-02-09 PROCEDURE — 6360000002 HC RX W HCPCS: Performed by: HOSPITALIST

## 2024-02-09 PROCEDURE — A4216 STERILE WATER/SALINE, 10 ML: HCPCS | Performed by: HOSPITALIST

## 2024-02-09 PROCEDURE — 80053 COMPREHEN METABOLIC PANEL: CPT

## 2024-02-09 PROCEDURE — 2580000003 HC RX 258

## 2024-02-09 PROCEDURE — 86901 BLOOD TYPING SEROLOGIC RH(D): CPT

## 2024-02-09 PROCEDURE — 85610 PROTHROMBIN TIME: CPT

## 2024-02-09 PROCEDURE — C9113 INJ PANTOPRAZOLE SODIUM, VIA: HCPCS | Performed by: HOSPITALIST

## 2024-02-09 PROCEDURE — 85014 HEMATOCRIT: CPT

## 2024-02-09 PROCEDURE — 2580000003 HC RX 258: Performed by: INTERNAL MEDICINE

## 2024-02-09 PROCEDURE — 93970 EXTREMITY STUDY: CPT

## 2024-02-09 PROCEDURE — P9016 RBC LEUKOCYTES REDUCED: HCPCS

## 2024-02-09 PROCEDURE — 96374 THER/PROPH/DIAG INJ IV PUSH: CPT

## 2024-02-09 PROCEDURE — 93010 ELECTROCARDIOGRAM REPORT: CPT | Performed by: INTERNAL MEDICINE

## 2024-02-09 PROCEDURE — 1200000000 HC SEMI PRIVATE

## 2024-02-09 PROCEDURE — 99223 1ST HOSP IP/OBS HIGH 75: CPT | Performed by: INTERNAL MEDICINE

## 2024-02-09 PROCEDURE — 30233N1 TRANSFUSION OF NONAUTOLOGOUS RED BLOOD CELLS INTO PERIPHERAL VEIN, PERCUTANEOUS APPROACH: ICD-10-PCS | Performed by: EMERGENCY MEDICINE

## 2024-02-09 PROCEDURE — 6360000002 HC RX W HCPCS

## 2024-02-09 PROCEDURE — 99285 EMERGENCY DEPT VISIT HI MDM: CPT

## 2024-02-09 PROCEDURE — 6370000000 HC RX 637 (ALT 250 FOR IP)

## 2024-02-09 PROCEDURE — 6370000000 HC RX 637 (ALT 250 FOR IP): Performed by: INTERNAL MEDICINE

## 2024-02-09 PROCEDURE — 74176 CT ABD & PELVIS W/O CONTRAST: CPT

## 2024-02-09 PROCEDURE — 86850 RBC ANTIBODY SCREEN: CPT

## 2024-02-09 RX ORDER — ONDANSETRON 4 MG/1
4 TABLET, ORALLY DISINTEGRATING ORAL EVERY 8 HOURS PRN
Status: DISCONTINUED | OUTPATIENT
Start: 2024-02-09 | End: 2024-02-13 | Stop reason: HOSPADM

## 2024-02-09 RX ORDER — POTASSIUM CHLORIDE 7.45 MG/ML
10 INJECTION INTRAVENOUS PRN
Status: DISCONTINUED | OUTPATIENT
Start: 2024-02-09 | End: 2024-02-13 | Stop reason: HOSPADM

## 2024-02-09 RX ORDER — TAMSULOSIN HYDROCHLORIDE 0.4 MG/1
0.4 CAPSULE ORAL
Status: DISCONTINUED | OUTPATIENT
Start: 2024-02-09 | End: 2024-02-13 | Stop reason: HOSPADM

## 2024-02-09 RX ORDER — FINASTERIDE 5 MG/1
5 TABLET, FILM COATED ORAL DAILY
Status: DISCONTINUED | OUTPATIENT
Start: 2024-02-09 | End: 2024-02-13 | Stop reason: HOSPADM

## 2024-02-09 RX ORDER — MAGNESIUM SULFATE IN WATER 40 MG/ML
2000 INJECTION, SOLUTION INTRAVENOUS PRN
Status: DISCONTINUED | OUTPATIENT
Start: 2024-02-09 | End: 2024-02-13 | Stop reason: HOSPADM

## 2024-02-09 RX ORDER — SODIUM CHLORIDE 0.9 % (FLUSH) 0.9 %
5-40 SYRINGE (ML) INJECTION EVERY 12 HOURS SCHEDULED
Status: DISCONTINUED | OUTPATIENT
Start: 2024-02-09 | End: 2024-02-13 | Stop reason: HOSPADM

## 2024-02-09 RX ORDER — POLYETHYLENE GLYCOL 3350 17 G/17G
17 POWDER, FOR SOLUTION ORAL DAILY PRN
Status: DISCONTINUED | OUTPATIENT
Start: 2024-02-09 | End: 2024-02-13 | Stop reason: HOSPADM

## 2024-02-09 RX ORDER — OXYCODONE HYDROCHLORIDE AND ACETAMINOPHEN 5; 325 MG/1; MG/1
1 TABLET ORAL EVERY 4 HOURS PRN
COMMUNITY

## 2024-02-09 RX ORDER — SODIUM CHLORIDE 0.9 % (FLUSH) 0.9 %
5-40 SYRINGE (ML) INJECTION PRN
Status: DISCONTINUED | OUTPATIENT
Start: 2024-02-09 | End: 2024-02-13 | Stop reason: HOSPADM

## 2024-02-09 RX ORDER — POTASSIUM CHLORIDE 20 MEQ/1
40 TABLET, EXTENDED RELEASE ORAL PRN
Status: DISCONTINUED | OUTPATIENT
Start: 2024-02-09 | End: 2024-02-13 | Stop reason: HOSPADM

## 2024-02-09 RX ORDER — DIPHENOXYLATE HYDROCHLORIDE AND ATROPINE SULFATE 2.5; .025 MG/1; MG/1
2 TABLET ORAL EVERY 6 HOURS PRN
COMMUNITY

## 2024-02-09 RX ORDER — ENOXAPARIN SODIUM 100 MG/ML
40 INJECTION SUBCUTANEOUS DAILY
Status: DISCONTINUED | OUTPATIENT
Start: 2024-02-09 | End: 2024-02-11

## 2024-02-09 RX ORDER — SODIUM CHLORIDE 9 MG/ML
INJECTION, SOLUTION INTRAVENOUS PRN
Status: DISCONTINUED | OUTPATIENT
Start: 2024-02-09 | End: 2024-02-11

## 2024-02-09 RX ORDER — ACETAMINOPHEN 650 MG/1
650 SUPPOSITORY RECTAL EVERY 6 HOURS PRN
Status: DISCONTINUED | OUTPATIENT
Start: 2024-02-09 | End: 2024-02-13 | Stop reason: HOSPADM

## 2024-02-09 RX ORDER — METOPROLOL SUCCINATE 25 MG/1
25 TABLET, EXTENDED RELEASE ORAL 2 TIMES DAILY
Status: DISCONTINUED | OUTPATIENT
Start: 2024-02-09 | End: 2024-02-11

## 2024-02-09 RX ORDER — POLYETHYLENE GLYCOL 3350 17 G/17G
17 POWDER, FOR SOLUTION ORAL DAILY PRN
Status: ON HOLD | COMMUNITY
End: 2024-02-09 | Stop reason: ALTCHOICE

## 2024-02-09 RX ORDER — SODIUM BICARBONATE 650 MG/1
650 TABLET ORAL 3 TIMES DAILY
Status: DISCONTINUED | OUTPATIENT
Start: 2024-02-09 | End: 2024-02-13 | Stop reason: HOSPADM

## 2024-02-09 RX ORDER — SODIUM CHLORIDE 9 MG/ML
INJECTION, SOLUTION INTRAVENOUS PRN
Status: DISCONTINUED | OUTPATIENT
Start: 2024-02-09 | End: 2024-02-13 | Stop reason: HOSPADM

## 2024-02-09 RX ORDER — ACETAMINOPHEN 325 MG/1
650 TABLET ORAL EVERY 6 HOURS PRN
COMMUNITY

## 2024-02-09 RX ORDER — HYDROXYZINE PAMOATE 25 MG/1
25 CAPSULE ORAL 3 TIMES DAILY PRN
Status: DISCONTINUED | OUTPATIENT
Start: 2024-02-09 | End: 2024-02-13 | Stop reason: HOSPADM

## 2024-02-09 RX ORDER — NYSTATIN 100000 [USP'U]/G
POWDER TOPICAL 2 TIMES DAILY
COMMUNITY

## 2024-02-09 RX ORDER — LANOLIN ALCOHOL/MO/W.PET/CERES
1000 CREAM (GRAM) TOPICAL DAILY
Status: DISCONTINUED | OUTPATIENT
Start: 2024-02-09 | End: 2024-02-13 | Stop reason: HOSPADM

## 2024-02-09 RX ORDER — OXYCODONE HYDROCHLORIDE AND ACETAMINOPHEN 5; 325 MG/1; MG/1
1 TABLET ORAL EVERY 6 HOURS PRN
Status: DISCONTINUED | OUTPATIENT
Start: 2024-02-09 | End: 2024-02-13 | Stop reason: HOSPADM

## 2024-02-09 RX ORDER — SUCRALFATE 1 G/1
1 TABLET ORAL 4 TIMES DAILY
Status: DISCONTINUED | OUTPATIENT
Start: 2024-02-09 | End: 2024-02-13 | Stop reason: HOSPADM

## 2024-02-09 RX ORDER — ONDANSETRON 2 MG/ML
4 INJECTION INTRAMUSCULAR; INTRAVENOUS EVERY 6 HOURS PRN
Status: DISCONTINUED | OUTPATIENT
Start: 2024-02-09 | End: 2024-02-13 | Stop reason: HOSPADM

## 2024-02-09 RX ORDER — ATORVASTATIN CALCIUM 40 MG/1
40 TABLET, FILM COATED ORAL NIGHTLY
Status: DISCONTINUED | OUTPATIENT
Start: 2024-02-09 | End: 2024-02-13 | Stop reason: HOSPADM

## 2024-02-09 RX ORDER — ACETAMINOPHEN 325 MG/1
650 TABLET ORAL EVERY 6 HOURS PRN
Status: DISCONTINUED | OUTPATIENT
Start: 2024-02-09 | End: 2024-02-13 | Stop reason: HOSPADM

## 2024-02-09 RX ADMIN — PANTOPRAZOLE SODIUM 80 MG: 40 INJECTION, POWDER, FOR SOLUTION INTRAVENOUS at 10:36

## 2024-02-09 RX ADMIN — HYDROXYZINE PAMOATE 25 MG: 25 CAPSULE ORAL at 23:16

## 2024-02-09 RX ADMIN — METOPROLOL SUCCINATE 25 MG: 25 TABLET, EXTENDED RELEASE ORAL at 18:35

## 2024-02-09 RX ADMIN — PANTOPRAZOLE SODIUM 40 MG: 40 INJECTION, POWDER, FOR SOLUTION INTRAVENOUS at 21:11

## 2024-02-09 RX ADMIN — TAMSULOSIN HYDROCHLORIDE 0.4 MG: 0.4 CAPSULE ORAL at 21:10

## 2024-02-09 RX ADMIN — METOPROLOL SUCCINATE 25 MG: 25 TABLET, EXTENDED RELEASE ORAL at 21:10

## 2024-02-09 RX ADMIN — Medication 10 ML: at 21:11

## 2024-02-09 RX ADMIN — ATORVASTATIN CALCIUM 40 MG: 40 TABLET, FILM COATED ORAL at 21:10

## 2024-02-09 RX ADMIN — SUCRALFATE 1 G: 1 TABLET ORAL at 18:35

## 2024-02-09 RX ADMIN — SODIUM BICARBONATE 650 MG: 650 TABLET ORAL at 21:10

## 2024-02-09 RX ADMIN — SODIUM BICARBONATE 650 MG: 650 TABLET ORAL at 18:35

## 2024-02-09 RX ADMIN — PANTOPRAZOLE SODIUM 40 MG: 40 INJECTION, POWDER, FOR SOLUTION INTRAVENOUS at 18:35

## 2024-02-09 RX ADMIN — SUCRALFATE 1 G: 1 TABLET ORAL at 21:11

## 2024-02-09 RX ADMIN — FINASTERIDE 5 MG: 5 TABLET, FILM COATED ORAL at 19:27

## 2024-02-09 RX ADMIN — CYANOCOBALAMIN TAB 1000 MCG 1000 MCG: 1000 TAB at 18:35

## 2024-02-09 RX ADMIN — OXYCODONE HYDROCHLORIDE AND ACETAMINOPHEN 1 TABLET: 5; 325 TABLET ORAL at 23:16

## 2024-02-09 RX ADMIN — ACETAMINOPHEN 650 MG: 325 TABLET ORAL at 21:11

## 2024-02-09 ASSESSMENT — PAIN DESCRIPTION - ORIENTATION
ORIENTATION: MID
ORIENTATION: RIGHT;LEFT

## 2024-02-09 ASSESSMENT — PAIN DESCRIPTION - LOCATION
LOCATION: LEG
LOCATION: BUTTOCKS
LOCATION: SACRUM

## 2024-02-09 ASSESSMENT — PAIN DESCRIPTION - DESCRIPTORS
DESCRIPTORS: ACHING;DISCOMFORT;SORE
DESCRIPTORS: ACHING;DISCOMFORT;SORE

## 2024-02-09 ASSESSMENT — PAIN SCALES - GENERAL
PAINLEVEL_OUTOF10: 3
PAINLEVEL_OUTOF10: 9
PAINLEVEL_OUTOF10: 7

## 2024-02-09 NOTE — ED PROVIDER NOTES
hemorrhage, unspecified gastrointestinal hemorrhage type    2. Symptomatic anemia        Disposition:  Patient's disposition: Admit to telemetry  Patient's condition is stable.      Attending was present and available throughout encounter including all critical portions; See Attending Note/Attestation for Final Plan

## 2024-02-09 NOTE — PROGRESS NOTES
Database initiated. Patient is alert with some forgetfulness. He comes in from [facility] he has been ambulating with a walker and is RA at baseline. He has fallen recently.

## 2024-02-09 NOTE — ED NOTES
ED to Inpatient Handoff Report    Notified floor that electronic handoff available and patient ready for transport to room 543.    Safety Risks: Memory Problems, Difficulty with daily activities, and Risk of falls    Patient in Restraints: no    Constant Observer or Patient : no    Telemetry Monitoring Ordered :Yes           Order to transfer to unit without monitor:NO    Last MEWS: 1 Time completed: 1647    Deterioration Index Score:   Predictive Model Details          22 (Normal)  Factor Value    Calculated 2/9/2024 16:48 64% Age 83 years old    Deterioration Index Model 15% Hematocrit abnormal (20.3 %)     11% Systolic 148     5% BUN abnormal (24 mg/dL)     3% Potassium 4.1 mmol/L     2% WBC count abnormal (3.9 k/uL)     1% Pulse oximetry 97 %     1% Temperature 97.4 °F (36.3 °C)     0% Respiratory rate 16     0% Pulse 75     0% Sodium 139 mmol/L        Vitals:    02/09/24 1502 02/09/24 1515 02/09/24 1517 02/09/24 1647   BP: 136/73  129/65 (!) 148/78   Pulse: 70  66 75   Resp: 16  17 16   Temp: 97.8 °F (36.6 °C)   97.4 °F (36.3 °C)   TempSrc:    Oral   SpO2: 97% 98% 98% 97%   Weight:       Height:             Opportunity for questions and clarification was provided.

## 2024-02-09 NOTE — PROGRESS NOTES
4 Eyes Skin Assessment     NAME:  Uzair Fuentes  YOB: 1940  MEDICAL RECORD NUMBER:  [id number]    The patient is being assessed for  Admission    I agree that at least one RN has performed a thorough Head to Toe Skin Assessment on the patient. ALL assessment sites listed below have been assessed.      Areas assessed by both nurses:    Head, Face, Ears, Shoulders, Back, Chest, Arms, Elbows, Hands, Sacrum. Buttock, Coccyx, Ischium, Legs. Feet and Heels, and Under Medical Devices         Does the Patient have a Wound? Yes wound(s) were present on assessment. LDA wound assessment was Initiated and completed by RN       Jaylen Prevention initiated by RN: Yes  Wound Care Orders initiated by RN: No    Pressure Injury (Stage 3,4, Unstageable, DTI, NWPT, and Complex wounds) if present, place Wound referral order by RN under : No    New Ostomies, if present place, Ostomy referral order under : No     Nurse 1 eSignature: Electronically signed by Chela Betancur RN on 2/9/24 at 6:55 PM EST    **SHARE this note so that the co-signing nurse can place an eSignature**    Nurse 2 eSignature: {Esignature:[id number]}

## 2024-02-09 NOTE — CONSULTS
CONSULT  Law Daniel M.D.  [facility]  Dr. Leopoldo Contreras M.D.,  Dr. Abraham Cuello M.D.,  Dr. Franc Diaz D.O.,  Dr. Dave Heredia D.O. ,  Dr. Sánchez Ho M.D.,          Uzair Fuentes  83 y.o.  male      Re: \"Lower GI bleed on Eliquis.  History of renal cell carcinoma with \"  Requesting physician: Dr. Neff/emergency department  Admitting physician Dr. Nicole  Date:3:19 PM 2/9/2024          HPI: 83-year-old male patient seen in the hospital for above described issue.  Patient presents to the emergency department with chief complaint of low hemoglobin.  Patient has history of chronic kidney disease, chronic anemia, coronary artery disease, AAA, iron deficiency and metastatic renal cancer..  Patient is also on oral anticoagulation secondary to history of blood clots.  Patient reports dark stool for at least 1 to 2 weeks.  Patient was recently in the hospital and appears to have been discharged on 2 February after hospital stay for fall/acute renal failure, etc.  At that time patient hemoglobin was around 7 to 8 g/dL with hemoglobin of 7.8 g/dL on 5 February.  His hemoglobin on presentation yesterday was 6.8 g/dL and today 5.8 g/dL when our service was consulted.  According to the emergency department resident patient has somewhat dark stool with fecal occult blood positive test.  Patient himself denies abdominal pain.  He denies nausea vomiting.  He reports that he took his medication yesterday but not this morning.    Information sources:   -Patient  -medical record  -health care team    PMHx:  Past Medical History:   Diagnosis Date    Cancer (HCC) 2023    RENAL    Disease of blood and blood forming organ     Hypertension        PSHx:  Past Surgical History:   Procedure Laterality Date    ABDOMINAL AORTIC ANEURYSM REPAIR      COLONOSCOPY      TONSILLECTOMY      TOTAL KNEE ARTHROPLASTY Bilateral     UPPER GASTROINTESTINAL ENDOSCOPY N/A 10/14/2022    EGD DIAGNOSTIC ONLY performed by   Take 1 capsule by mouth nightly      urea (CARMOL) 20 % cream Apply topically daily as needed (apply to hands)      acetaminophen (TYLENOL) 325 MG tablet Take 3 tablets by mouth every 12 hours as needed for Pain      aspirin 81 MG EC tablet Take 1 tablet by mouth daily      atorvastatin (LIPITOR) 40 MG tablet Take 1 tablet by mouth nightly      brinzolamide-brimonidine 1-0.2 % SUSP Place 1 drop into both eyes 2 times daily      latanoprost (XALATAN) 0.005 % ophthalmic solution Place 1 drop into both eyes nightly          SocHx:  Social History     Socioeconomic History    Marital status:      Spouse name: Not on file    Number of children: Not on file    Years of education: Not on file    Highest education level: Not on file   Occupational History    Not on file   Tobacco Use    Smoking status: Former     Current packs/day: 0.00     Types: Cigarettes     Quit date:      Years since quittin.1    Smokeless tobacco: Never   Vaping Use    Vaping Use: Never used   Substance and Sexual Activity    Alcohol use: Never    Drug use: Never    Sexual activity: Not on file   Other Topics Concern    Not on file   Social History Narrative    Not on file     Social Determinants of Health     Financial Resource Strain: Not on file   Food Insecurity: No Food Insecurity (2024)    Hunger Vital Sign     Worried About Running Out of Food in the Last Year: Never true     Ran Out of Food in the Last Year: Never true   Transportation Needs: No Transportation Needs (2024)    PRAPARE - Transportation     Lack of Transportation (Medical): No     Lack of Transportation (Non-Medical): No   Physical Activity: Not on file   Stress: Not on file   Social Connections: Not on file   Intimate Partner Violence: Not on file   Housing Stability: Low Risk  (2024)    Housing Stability Vital Sign     Unable to Pay for Housing in the Last Year: No     Number of Places Lived in the Last Year: 1     Unstable Housing in the Last

## 2024-02-09 NOTE — ED NOTES
Patient presents to ED from [facility] for low hemoglobin of 5.8, patient is asymptomatic only complaining of chronic pain on his sacrum. Patient is a&ox 3 with short term memory problems. Patient can normally ambulate with 1x assist. Patient does have a history of gi bleeds. Denies chest pain, SOB, or dizziness.

## 2024-02-09 NOTE — H&P
[facility] Hospitalist Group History and Physical    CHIEF COMPLAINT:  low hgb from [facility]    History of Present Illness:      This is an 83 year old male with history of normocytic anemia, stage III kidney disease, CAD, AAA, iron deficiency, history of clots on Eliquis, metastatic renal cell carcinoma. He presents to ER from [facility] for low hemoglobin levels on exam. Fecal occult positive per ER physician. Patient was recently admitted to [facility] for fall, hypotension, MOISE, anemia. Vitals stable on room air. Labs reveal Cr 1.6, Calcium 11, Troponin 90, WBC 3.9, Hgb 6.1. EKG Sinus rhythm with 1st degree AV block. CT imaging negative for GI hemorrhage, retroperitoneal adenopathy decreased, chronic pancreatitis, mixed renal cystic lesions with hyperdense cysts, colonic stool burden. Patient ordered 2 units PRBC. Given Protonix in ER and IVF and decision to admit. GI consulted.     Informant(s) for H&P: patient     REVIEW OF SYSTEMS:  A comprehensive review of systems was negative except for: what is in the HPI    PMH:  Past Medical History:   Diagnosis Date    Cancer (HCC) 2023    RENAL    Disease of blood and blood forming organ     Hypertension        Surgical History:  Past Surgical History:   Procedure Laterality Date    ABDOMINAL AORTIC ANEURYSM REPAIR      COLONOSCOPY      TONSILLECTOMY      TOTAL KNEE ARTHROPLASTY Bilateral     UPPER GASTROINTESTINAL ENDOSCOPY N/A 10/14/2022    EGD DIAGNOSTIC ONLY performed by Joshua Avila MD at [facility] ENDOSCOPY       Medications Prior to Admission:    Prior to Admission medications    Medication Sig Start Date End Date Taking? Authorizing Provider   sodium bicarbonate 650 MG tablet Take 1 tablet by mouth 3 times daily 2/2/24 3/3/24  Celso Lester MD   white petrolatum OINT ointment Apply topically in the morning and at bedtime 2/2/24   Celso Lester MD   metoprolol succinate (TOPROL XL) 25 MG extended release tablet Take 1 tablet by mouth 2 times    BUN 25* 25* 24*   CREATININE 1.7* 1.7* 1.6*   GLUCOSE 81 81 94   CALCIUM 11.2* 10.7* 11.0*       Recent Labs     02/08/24  0647 02/09/24  0239 02/09/24  0955   WBC 5.1 4.1* 3.9*   RBC 2.28* 1.97* 2.14*   HGB 6.8* 5.8* 6.1*   HCT 21.3* 18.7* 20.3*   MCV 93.4 94.9 94.9   MCH 29.8 29.4 28.5   MCHC 31.9* 31.0* 30.0*   RDW 16.7* 17.2* 17.1*    247 269   MPV 10.2 10.3 10.0       No results for input(s): \"POCGLU\" in the last 72 hours.    Radiology:   CT ABDOMEN PELVIS WO CONTRAST Additional Contrast? None   Final Result   1. No CT evidence for acute gastrointestinal hemorrhage.   2. Bulky retroperitoneal adenopathy is decreased from prior suggesting   improvement or decreased conglomerate of retroperitoneal adenopathy encasing   the aorta.   3. Cholelithiasis.   4. Chronic pancreatitis.   5. Bilateral mixed renal cystic lesions with hyperdense cysts and   intermediate density focus left inferior pole as seen on prior without   interval growth.   6. Mild-to-moderate colonic stool burden throughout the rectosigmoid colon   without evidence for perforation or abscess.   7. Trace left pleural effusion.             EKG: reviewed     ASSESSMENT:      Active Problems:    * No active hospital problems. *  Resolved Problems:    * No resolved hospital problems. *      PLAN:    Acute blood loss anemia vs iron deficiency anemia - hold Eliquis for now. Continue PPI and IVF. Hgb 6.1, will get 2 units PRBC. Monitor h&h.   GI bleed - occult positive. Continue PPI, GI consulted. CT negative for GI hemorrhage.   Metastatic renal cell carcinoma   CKD stage III - at baseline. Monitor labs. Avoid nephrotoxins.   History of provoked DVT - on Eliquis, hold. US DVT pending, may be a candidate for IVC filter.   Hld - continue statin   GERD - continue carafate   BPH - continue proscar and flomax     Code Status: full   DVT prophylaxis: SCDs     35 minutes time spent reviewing patient chart, assessing patient, discussing plan of care with

## 2024-02-10 ENCOUNTER — ANESTHESIA (OUTPATIENT)
Dept: OPERATING ROOM | Age: 84
End: 2024-02-10
Payer: OTHER GOVERNMENT

## 2024-02-10 PROBLEM — I42.9 CARDIOMYOPATHY (HCC): Status: ACTIVE | Noted: 2024-02-10

## 2024-02-10 PROBLEM — I44.1 AV BLOCK, MOBITZ 1: Status: ACTIVE | Noted: 2024-02-10

## 2024-02-10 PROBLEM — Z79.01 ON APIXABAN THERAPY: Status: ACTIVE | Noted: 2024-02-10

## 2024-02-10 LAB
ANION GAP SERPL CALCULATED.3IONS-SCNC: 4 MMOL/L (ref 7–16)
BUN SERPL-MCNC: 25 MG/DL (ref 6–23)
CALCIUM SERPL-MCNC: 10.4 MG/DL (ref 8.6–10.2)
CHLORIDE SERPL-SCNC: 111 MMOL/L (ref 98–107)
CO2 SERPL-SCNC: 23 MMOL/L (ref 22–29)
CREAT SERPL-MCNC: 1.7 MG/DL (ref 0.7–1.2)
GFR SERPL CREATININE-BSD FRML MDRD: 40 ML/MIN/1.73M2
GLUCOSE SERPL-MCNC: 82 MG/DL (ref 74–99)
HCT VFR BLD AUTO: 23.1 % (ref 37–54)
HCT VFR BLD AUTO: 24.2 % (ref 37–54)
HCT VFR BLD AUTO: 25.5 % (ref 37–54)
HCT VFR BLD AUTO: 26.5 % (ref 37–54)
HGB BLD-MCNC: 7.2 G/DL (ref 12.5–16.5)
HGB BLD-MCNC: 7.8 G/DL (ref 12.5–16.5)
HGB BLD-MCNC: 8 G/DL (ref 12.5–16.5)
HGB BLD-MCNC: 8.2 G/DL (ref 12.5–16.5)
POTASSIUM SERPL-SCNC: 4 MMOL/L (ref 3.5–5)
SODIUM SERPL-SCNC: 138 MMOL/L (ref 132–146)
TROPONIN I SERPL HS-MCNC: 83 NG/L (ref 0–11)

## 2024-02-10 PROCEDURE — 2580000003 HC RX 258: Performed by: HOSPITALIST

## 2024-02-10 PROCEDURE — C9113 INJ PANTOPRAZOLE SODIUM, VIA: HCPCS | Performed by: HOSPITALIST

## 2024-02-10 PROCEDURE — 2709999900 HC NON-CHARGEABLE SUPPLY: Performed by: INTERNAL MEDICINE

## 2024-02-10 PROCEDURE — 0DJ08ZZ INSPECTION OF UPPER INTESTINAL TRACT, VIA NATURAL OR ARTIFICIAL OPENING ENDOSCOPIC: ICD-10-PCS | Performed by: INTERNAL MEDICINE

## 2024-02-10 PROCEDURE — 99222 1ST HOSP IP/OBS MODERATE 55: CPT | Performed by: INTERNAL MEDICINE

## 2024-02-10 PROCEDURE — 99233 SBSQ HOSP IP/OBS HIGH 50: CPT | Performed by: INTERNAL MEDICINE

## 2024-02-10 PROCEDURE — 3700000000 HC ANESTHESIA ATTENDED CARE: Performed by: INTERNAL MEDICINE

## 2024-02-10 PROCEDURE — 93005 ELECTROCARDIOGRAM TRACING: CPT | Performed by: ANESTHESIOLOGY

## 2024-02-10 PROCEDURE — 3600007501: Performed by: INTERNAL MEDICINE

## 2024-02-10 PROCEDURE — 7100000011 HC PHASE II RECOVERY - ADDTL 15 MIN: Performed by: INTERNAL MEDICINE

## 2024-02-10 PROCEDURE — 80048 BASIC METABOLIC PNL TOTAL CA: CPT

## 2024-02-10 PROCEDURE — 7100000010 HC PHASE II RECOVERY - FIRST 15 MIN: Performed by: INTERNAL MEDICINE

## 2024-02-10 PROCEDURE — 93005 ELECTROCARDIOGRAM TRACING: CPT | Performed by: INTERNAL MEDICINE

## 2024-02-10 PROCEDURE — 85014 HEMATOCRIT: CPT

## 2024-02-10 PROCEDURE — 2580000003 HC RX 258

## 2024-02-10 PROCEDURE — 6360000002 HC RX W HCPCS: Performed by: HOSPITALIST

## 2024-02-10 PROCEDURE — 6370000000 HC RX 637 (ALT 250 FOR IP)

## 2024-02-10 PROCEDURE — 6360000002 HC RX W HCPCS

## 2024-02-10 PROCEDURE — 6370000000 HC RX 637 (ALT 250 FOR IP): Performed by: INTERNAL MEDICINE

## 2024-02-10 PROCEDURE — 85018 HEMOGLOBIN: CPT

## 2024-02-10 PROCEDURE — 2580000003 HC RX 258: Performed by: INTERNAL MEDICINE

## 2024-02-10 PROCEDURE — 84484 ASSAY OF TROPONIN QUANT: CPT

## 2024-02-10 PROCEDURE — A4216 STERILE WATER/SALINE, 10 ML: HCPCS | Performed by: HOSPITALIST

## 2024-02-10 PROCEDURE — 1200000000 HC SEMI PRIVATE

## 2024-02-10 RX ORDER — SODIUM CHLORIDE 9 MG/ML
INJECTION, SOLUTION INTRAVENOUS CONTINUOUS PRN
Status: DISCONTINUED | OUTPATIENT
Start: 2024-02-10 | End: 2024-02-10 | Stop reason: SDUPTHER

## 2024-02-10 RX ORDER — PROPOFOL 10 MG/ML
INJECTION, EMULSION INTRAVENOUS PRN
Status: DISCONTINUED | OUTPATIENT
Start: 2024-02-10 | End: 2024-02-10 | Stop reason: SDUPTHER

## 2024-02-10 RX ORDER — NITROGLYCERIN 0.4 MG/1
0.4 TABLET SUBLINGUAL EVERY 5 MIN PRN
Status: DISCONTINUED | OUTPATIENT
Start: 2024-02-10 | End: 2024-02-13 | Stop reason: HOSPADM

## 2024-02-10 RX ADMIN — SODIUM BICARBONATE 650 MG: 650 TABLET ORAL at 21:27

## 2024-02-10 RX ADMIN — HYDROXYZINE PAMOATE 25 MG: 25 CAPSULE ORAL at 06:25

## 2024-02-10 RX ADMIN — OXYCODONE HYDROCHLORIDE AND ACETAMINOPHEN 1 TABLET: 5; 325 TABLET ORAL at 22:06

## 2024-02-10 RX ADMIN — PROPOFOL 1 MG: 10 INJECTION, EMULSION INTRAVENOUS at 15:53

## 2024-02-10 RX ADMIN — OXYCODONE HYDROCHLORIDE AND ACETAMINOPHEN 1 TABLET: 5; 325 TABLET ORAL at 06:24

## 2024-02-10 RX ADMIN — TAMSULOSIN HYDROCHLORIDE 0.4 MG: 0.4 CAPSULE ORAL at 21:27

## 2024-02-10 RX ADMIN — Medication 10 ML: at 18:43

## 2024-02-10 RX ADMIN — SODIUM CHLORIDE: 9 INJECTION, SOLUTION INTRAVENOUS at 15:46

## 2024-02-10 RX ADMIN — PROPOFOL 100 MG: 10 INJECTION, EMULSION INTRAVENOUS at 15:48

## 2024-02-10 RX ADMIN — SUCRALFATE 1 G: 1 TABLET ORAL at 18:40

## 2024-02-10 RX ADMIN — ATORVASTATIN CALCIUM 40 MG: 40 TABLET, FILM COATED ORAL at 21:27

## 2024-02-10 RX ADMIN — SUCRALFATE 1 G: 1 TABLET ORAL at 21:27

## 2024-02-10 RX ADMIN — Medication 10 ML: at 21:27

## 2024-02-10 RX ADMIN — PANTOPRAZOLE SODIUM 40 MG: 40 INJECTION, POWDER, FOR SOLUTION INTRAVENOUS at 03:22

## 2024-02-10 ASSESSMENT — PAIN - FUNCTIONAL ASSESSMENT
PAIN_FUNCTIONAL_ASSESSMENT: PREVENTS OR INTERFERES SOME ACTIVE ACTIVITIES AND ADLS
PAIN_FUNCTIONAL_ASSESSMENT: 0-10
PAIN_FUNCTIONAL_ASSESSMENT: PREVENTS OR INTERFERES SOME ACTIVE ACTIVITIES AND ADLS
PAIN_FUNCTIONAL_ASSESSMENT: 0-10

## 2024-02-10 ASSESSMENT — PAIN DESCRIPTION - LOCATION
LOCATION: BACK;BUTTOCKS
LOCATION: ABDOMEN;COCCYX
LOCATION: COCCYX;ABDOMEN

## 2024-02-10 ASSESSMENT — PAIN DESCRIPTION - PAIN TYPE
TYPE: ACUTE PAIN
TYPE: ACUTE PAIN

## 2024-02-10 ASSESSMENT — PAIN SCALES - GENERAL
PAINLEVEL_OUTOF10: 7
PAINLEVEL_OUTOF10: 0
PAINLEVEL_OUTOF10: 6
PAINLEVEL_OUTOF10: 0
PAINLEVEL_OUTOF10: 8

## 2024-02-10 ASSESSMENT — PAIN DESCRIPTION - DESCRIPTORS
DESCRIPTORS: DISCOMFORT
DESCRIPTORS: DISCOMFORT
DESCRIPTORS: ACHING

## 2024-02-10 ASSESSMENT — LIFESTYLE VARIABLES: SMOKING_STATUS: 0

## 2024-02-10 ASSESSMENT — PAIN SCALES - WONG BAKER: WONGBAKER_NUMERICALRESPONSE: 0

## 2024-02-10 NOTE — CONSULTS
INPATIENT CARDIOLOGY CONSULT    Name: Uzair Fuentes    Age: 83 y.o.    Date of Admission: 2/9/2024  9:19 AM    Date of Service: 2/10/2024    Reason for Consultation: ?  Second-degree AV block Mobitz II    Referring Physician: Corine Nicole DO    Primary Cardiologist: Known to [facility] through inpatient only seen by Dr. Patterson 2022 most recently seen by Keenan Benson 1/2024    History of Present Illness:   Uzair Fuentes is a 83 y.o. male who presented on 2/9/2024 for further evaluation of abnormal labs from nursing home, severely low hemoglobin 6.8, found to be 5.8 here.  On dose adjusted apixaban (provoked DVT in setting of RCC) as well as low-dose aspirin for unclear reasons.    He is a poor historian.  Denies chest pain, shortness of breath, palpitations.  He received a blood transfusion.    Was noted to have some pauses on the monitor that were reported as Mobitz 2.  He was seen by GI and is supposed to have an EGD today given severe anemia.    Recent admission 1/2024 seen by cardiology, had elevated troponin in the 300s and mild LV dysfunction, recommendation was for outpatient pharmacologic stress test to rule out CAD.    Review of Systems:   Complete review of systems negative except as described above.    Past Medical History:  Past Medical History:   Diagnosis Date    Cancer (HCC) 2023    RENAL    Disease of blood and blood forming organ     Hypertension        Past Surgical History:  Past Surgical History:   Procedure Laterality Date    ABDOMINAL AORTIC ANEURYSM REPAIR      COLONOSCOPY      TONSILLECTOMY      TOTAL KNEE ARTHROPLASTY Bilateral     UPPER GASTROINTESTINAL ENDOSCOPY N/A 10/14/2022    EGD DIAGNOSTIC ONLY performed by Joshua Avila MD at [facility]COPY       Family History:  Family History   Problem Relation Age of Onset    Heart Disease Father     Heart Attack Father        Social History:  Social History     Tobacco Use    Smoking status: Former     Current packs/day: 0.00      Temp 97.9 °F (36.6 °C)   Resp 18   Ht 1.803 m (5' 11\")   Wt 73 kg (161 lb)   SpO2 100%   BMI 22.45 kg/m²   Wt Readings from Last 3 Encounters:   02/09/24 73 kg (161 lb)   02/02/24 73.1 kg (161 lb 1.6 oz)   01/12/24 78.5 kg (173 lb)     Appearance: Elderly gentleman, awake, alert, no acute respiratory distress  Skin: Intact, no rash  Head: Normocephalic, atraumatic  Eyes: EOMI, no conjunctival erythema  ENMT: No pharyngeal erythema, MMM, no rhinorrhea  Neck: Supple, no elevated JVP, no carotid bruits  Lungs: Clear to auscultation bilaterally. No wheezes, rales, or rhonchi.  Cardiac: PMI nondisplaced, regular rhythm with a normal rate, normal S1 & S2, no murmurs  Abdomen: Soft, nontender, +bowel sounds  Extremities: Moves all extremities x 4, no lower extremity edema  Neurologic: No focal motor deficits apparent, normal mood and affect  Peripheral Pulses: Intact posterior tibial pulses bilaterally    Intake/Output:    Intake/Output Summary (Last 24 hours) at 2/10/2024 1339  Last data filed at 2/10/2024 0942  Gross per 24 hour   Intake 1050 ml   Output 375 ml   Net 675 ml     I/O this shift:  In: 0   Out: 375 [Urine:375]    Laboratory Tests:  Recent Labs     02/09/24  0239 02/09/24  0955 02/10/24  0327    139 138   K 4.0 4.1 4.0   * 110* 111*   CO2 24 23 23   BUN 25* 24* 25*   CREATININE 1.7* 1.6* 1.7*   GLUCOSE 81 94 82   CALCIUM 10.7* 11.0* 10.4*     Lab Results   Component Value Date/Time    MG 2.0 01/24/2024 06:14 PM     Recent Labs     02/08/24  0647 02/09/24  0239 02/09/24  0955   ALKPHOS 117 105 107   ALT 20 18 20   AST 21 18 22   PROT 4.7* 4.4* 4.6*   BILITOT 0.2 <0.2 0.2   LABALBU 2.5* 2.4* 2.6*     Recent Labs     02/08/24  0647 02/09/24  0239 02/09/24  0955 02/09/24  1706 02/09/24  2125 02/10/24  0315 02/10/24  0957   WBC 5.1 4.1* 3.9*  --   --   --   --    RBC 2.28* 1.97* 2.14*  --   --   --   --    HGB 6.8* 5.8* 6.1*   < > 7.4* 7.2* 8.0*   HCT 21.3* 18.7* 20.3*   < > 23.6* 23.1* 25.5*

## 2024-02-10 NOTE — PROGRESS NOTES
in sodium chloride (PF) 0.9 % 10 mL injection  40 mg IntraVENous Q6H     Infusion Meds    sodium chloride      sodium chloride      sodium chloride       PRN Meds sodium chloride, sodium chloride flush, sodium chloride, potassium chloride **OR** potassium alternative oral replacement **OR** potassium chloride, magnesium sulfate, ondansetron **OR** ondansetron, polyethylene glycol, acetaminophen **OR** acetaminophen, sodium chloride, oxyCODONE-acetaminophen, hydrOXYzine pamoate    Laboratory Data  Recent Results (from the past 24 hour(s))   CBC with Auto Differential    Collection Time: 02/09/24  9:55 AM   Result Value Ref Range    WBC 3.9 (L) 4.5 - 11.5 k/uL    RBC 2.14 (L) 3.80 - 5.80 m/uL    Hemoglobin 6.1 (LL) 12.5 - 16.5 g/dL    Hematocrit 20.3 (L) 37.0 - 54.0 %    MCV 94.9 80.0 - 99.9 fL    MCH 28.5 26.0 - 35.0 pg    MCHC 30.0 (L) 32.0 - 34.5 g/dL    RDW 17.1 (H) 11.5 - 15.0 %    Platelets 269 130 - 450 k/uL    MPV 10.0 7.0 - 12.0 fL    Neutrophils % 74 43.0 - 80.0 %    Lymphocytes % 14 (L) 20.0 - 42.0 %    Monocytes % 8 2.0 - 12.0 %    Eosinophils % 4 0 - 6 %    Basophils % 1 0.0 - 2.0 %    Immature Granulocytes 1 0.0 - 5.0 %    Neutrophils Absolute 2.91 1.80 - 7.30 k/uL    Lymphocytes Absolute 0.54 (L) 1.50 - 4.00 k/uL    Monocytes Absolute 0.31 0.10 - 0.95 k/uL    Eosinophils Absolute 0.14 0.05 - 0.50 k/uL    Basophils Absolute 0.02 0.00 - 0.20 k/uL    Absolute Immature Granulocyte <0.03 0.00 - 0.58 k/uL    RBC Morphology 2+ ACANTHOCYTES     RBC Morphology 1+ ANISOCYTOSIS     RBC Morphology 2+ HYPOCHROMIA     RBC Morphology 1+ OVALOCYTES     RBC Morphology 2+ POIKILOCYTOSIS     RBC Morphology 1+ SCHISTOCYTES    CMP    Collection Time: 02/09/24  9:55 AM   Result Value Ref Range    Sodium 139 132 - 146 mmol/L    Potassium 4.1 3.5 - 5.0 mmol/L    Chloride 110 (H) 98 - 107 mmol/L    CO2 23 22 - 29 mmol/L    Anion Gap 6 (L) 7 - 16 mmol/L    Glucose 94 74 - 99 mg/dL    BUN 24 (H) 6 - 23 mg/dL    Creatinine 1.6 (H)  is no effusion or pneumothorax. The cardiomediastinal silhouette is without acute process. The osseous structures are without acute process.  There are several scattered calcified granulomata within each lung compatible with granulomatous disease exposure     No acute process.         Assessment and Plan  Patient is a 83 y.o. male on consult for GI bleed.    1.  Anemia/GI bleed  -Acute on chronic anemia  -Normocytic  -Positive FOBT  -Receiving blood transfusion so obtaining of iron studies will be of limited clinical value  -Likely precipitated/exacerbated by oral anticoagulation  -High-dose IV PPI  -Elevate head of bed  -Monitor H&H; defer transfusion to admitting  -Oxygen nasal cannula  -EGD today  -Continue holding of oral anticoagulation - hold oral anticoagulation for 48 to 72 hours prior to  endoscopy     2.  Metastatic malignancy  -Per admitting/pertinent consultants     3.  Multiple comorbidities  -Per admitting / pertinent consultants      Patient previously transfused with appropriate response.  Hgb drifting down since transfusion.  Continue PPI.  Further transfusion per admitting.  EGD today.  GI will follow.      Edmond Weston, APRN - CNP  5:19 AM  2/10/2024

## 2024-02-10 NOTE — PROGRESS NOTES
Notified Add[place]ott APRN-CNP of patient complaints of 7/10 pain and itchiness in legs, see new orders.

## 2024-02-10 NOTE — PROGRESS NOTES
EKG and tele strips reviewed with Dr. Nicole - new consult for cardio, completed at this time.     Electronically signed by Esthela Wiseman RN on 2/10/2024 at 10:28 AM    GAMA CASTILLO notified of need for cardio consult and irregular rhythm. Hold off on EGD.     Spoke with imelda Leigh to proceed with EGD as scheduled. GAMA Weston notified and plan to proceed.     Electronically signed by Esthela Wiseman RN on 2/10/2024 at 1:45 PM

## 2024-02-10 NOTE — PROGRESS NOTES
EKG obtained and shown to Dr Romero. Ok to transfer patient back to his room. Patient states chest pain no longer present.  RN 5 South updated.

## 2024-02-10 NOTE — ANESTHESIA PRE PROCEDURE
Department of Anesthesiology  Preprocedure Note       Name:  Uzair Fuentes   Age:  83 y.o.  :  1940                                          MRN:  [id number]         Date:  2/10/2024      Surgeon: Surgeon(s):  Dave Heredia DO    Procedure: Procedure(s):  EGD ESOPHAGOGASTRODUODENOSCOPY    Medications prior to admission:   Prior to Admission medications    Medication Sig Start Date End Date Taking? Authorizing Provider   acetaminophen (TYLENOL) 325 MG tablet Take 2 tablets by mouth every 6 hours as needed for Fever (TEMP >100)   Yes Trevor Gibson MD   diphenoxylate-atropine (LOMOTIL) 2.5-0.025 MG per tablet Take 2 tablets by mouth every 6 hours as needed for Diarrhea.   Yes Trevor Gibson MD   nystatin (MYCOSTATIN) 961934 UNIT/GM powder Apply topically 2 times daily -ABD FOLDS-   Yes Trevor Gibson MD   oxyCODONE-acetaminophen (PERCOCET) 5-325 MG per tablet Take 1 tablet by mouth every 4 hours as needed for Pain.   Yes Trevor Gibson MD   sodium bicarbonate 650 MG tablet Take 1 tablet by mouth 3 times daily 2/2/24 3/3/24  Celso Lester MD   metoprolol succinate (TOPROL XL) 25 MG extended release tablet Take 1 tablet by mouth 2 times daily 24   Celso Lester MD   zinc sulfate (ZINCATE) 220 (50 Zn)  mg capsule - elemental zinc Take 1 capsule by mouth daily 2/3/24   Celso Lester MD   apixaban (ELIQUIS) 2.5 MG TABS tablet Take 1 tablet by mouth 2 times daily 23   Sonya Hartley MD   pantoprazole (PROTONIX) 40 MG tablet Take 1 tablet by mouth 2 times daily (before meals) 23   Codi Britt MD   sucralfate (CARAFATE) 1 GM tablet Take 1 tablet by mouth 4 times daily 23   Codi Britt MD   cyanocobalamin 1000 MCG tablet Take 1 tablet by mouth daily    Trevor Gibson MD   finasteride (PROSCAR) 5 MG tablet Take 1 tablet by mouth daily    Trevor Gibson MD   lidocaine (LMX) 4 % cream Apply topically daily as needed for

## 2024-02-10 NOTE — PLAN OF CARE
Problem: ABCDS Injury Assessment  Goal: Absence of physical injury  2/10/2024 0208 by Bre Love RN  Outcome: Progressing  2/9/2024 1941 by Chela Betancur RN  Outcome: Progressing     Problem: Skin/Tissue Integrity  Goal: Absence of new skin breakdown  Description: 1.  Monitor for areas of redness and/or skin breakdown  2.  Assess vascular access sites hourly  3.  Every 4-6 hours minimum:  Change oxygen saturation probe site  4.  Every 4-6 hours:  If on nasal continuous positive airway pressure, respiratory therapy assess nares and determine need for appliance change or resting period.  2/10/2024 0208 by Bre Love RN  Outcome: Progressing  2/9/2024 1941 by Chela Betancur RN  Outcome: Progressing     Problem: Discharge Planning  Goal: Discharge to home or other facility with appropriate resources  2/10/2024 0208 by Bre Love RN  Outcome: Progressing  2/9/2024 1941 by Chela Betancur RN  Outcome: Progressing  Flowsheets (Taken 2/9/2024 1436 by Marian Horner, RN)  Discharge to home or other facility with appropriate resources: Refer to discharge planning if patient needs post-hospital services based on physician order or complex needs related to functional status, cognitive ability or social support system     Problem: Pain  Goal: Verbalizes/displays adequate comfort level or baseline comfort level  2/10/2024 0208 by Bre Love RN  Outcome: Progressing  2/9/2024 1941 by Chela Betancur RN  Outcome: Progressing     Problem: Safety - Adult  Goal: Free from fall injury  Outcome: Progressing

## 2024-02-10 NOTE — PLAN OF CARE
Problem: ABCDS Injury Assessment  Goal: Absence of physical injury  Outcome: Progressing     Problem: Skin/Tissue Integrity  Goal: Absence of new skin breakdown  Description: 1.  Monitor for areas of redness and/or skin breakdown  2.  Assess vascular access sites hourly  3.  Every 4-6 hours minimum:  Change oxygen saturation probe site  4.  Every 4-6 hours:  If on nasal continuous positive airway pressure, respiratory therapy assess nares and determine need for appliance change or resting period.  Outcome: Progressing     Problem: Discharge Planning  Goal: Discharge to home or other facility with appropriate resources  Outcome: Progressing  Flowsheets (Taken 2/9/2024 1436 by Marian Horner, RN)  Discharge to home or other facility with appropriate resources: Refer to discharge planning if patient needs post-hospital services based on physician order or complex needs related to functional status, cognitive ability or social support system     Problem: Pain  Goal: Verbalizes/displays adequate comfort level or baseline comfort level  Outcome: Progressing

## 2024-02-10 NOTE — ANESTHESIA POSTPROCEDURE EVALUATION
Department of Anesthesiology  Postprocedure Note    Patient: Uzair Fuentes  MRN: [id number]  YOB: 1940  Date of evaluation: 2/10/2024    Procedure Summary       Date: 02/10/24 Room / Location: [address]    Anesthesia Start: 1546 Anesthesia Stop: 1554    Procedure: EGD ESOPHAGOGASTRODUODENOSCOPY Diagnosis:       Anemia, unspecified type      (Anemia, unspecified type [D64.9])    Surgeons: Dave Heredia DO Responsible Provider: Argenis Romero MD    Anesthesia Type: MAC ASA Status: 4            Anesthesia Type: MAC    Willian Phase I:      Willian Phase II: Willian Score: 9    Anesthesia Post Evaluation    Patient location during evaluation: PACU  Patient participation: complete - patient participated  Level of consciousness: awake and alert  Pain score: 0  Airway patency: patent  Nausea & Vomiting: no vomiting and no nausea  Cardiovascular status: hemodynamically stable  Respiratory status: spontaneous ventilation  Hydration status: stable  Comments: Patient complains of chest pain -  mid sternal - lasting less than 1 minute.  EKG done - NSR, 1st degree AV block.  According to today's cardiology note, the patient is supposed to have troponins drawn today.  By the time the EKG was completed, the patient didn't know anything about having chest pain.  He started complaining of pain in his right hip and pain in his tail bone.  His primary concerns was ordering breakfast.  Patient's nurse on the floor to check with cardiology to see if patient needs anything other than the previously ordered troponin.  Pain management: adequate        No notable events documented.

## 2024-02-10 NOTE — PROGRESS NOTES
[facility] Hospitalist Progress Note    Admitting Date and Time: 2/9/2024  9:19 AM  Admit Dx: Acute blood loss anemia [D62]  Symptomatic anemia [D64.9]  Gastrointestinal hemorrhage, unspecified gastrointestinal hemorrhage type [K92.2]    Subjective:  Patient is being followed for Acute blood loss anemia [D62]  Symptomatic anemia [D64.9]  Gastrointestinal hemorrhage, unspecified gastrointestinal hemorrhage type [K92.2]     Was notified by RN that patient is getting into heart block. He remains asymptomatic.    ROS: denies fever, chills, cp, sob, n/v, HA unless stated above.      atorvastatin  40 mg Oral Nightly    cyanocobalamin  1,000 mcg Oral Daily    finasteride  5 mg Oral Daily    [Held by provider] metoprolol succinate  25 mg Oral BID    sodium bicarbonate  650 mg Oral TID    sucralfate  1 g Oral 4x Daily    tamsulosin  0.4 mg Oral QHS    sodium chloride flush  5-40 mL IntraVENous 2 times per day    [Held by provider] enoxaparin  40 mg SubCUTAneous Daily    pantoprazole (PROTONIX) 40 mg in sodium chloride (PF) 0.9 % 10 mL injection  40 mg IntraVENous Q6H     sodium chloride, , PRN  sodium chloride flush, 5-40 mL, PRN  sodium chloride, , PRN  potassium chloride, 40 mEq, PRN   Or  potassium alternative oral replacement, 40 mEq, PRN   Or  potassium chloride, 10 mEq, PRN  magnesium sulfate, 2,000 mg, PRN  ondansetron, 4 mg, Q8H PRN   Or  ondansetron, 4 mg, Q6H PRN  polyethylene glycol, 17 g, Daily PRN  acetaminophen, 650 mg, Q6H PRN   Or  acetaminophen, 650 mg, Q6H PRN  sodium chloride, , PRN  oxyCODONE-acetaminophen, 1 tablet, Q6H PRN  hydrOXYzine pamoate, 25 mg, TID PRN         Objective:    BP (!) 117/54   Pulse 69   Temp 97.9 °F (36.6 °C)   Resp 18   Ht 1.803 m (5' 11\")   Wt 73 kg (161 lb)   SpO2 100%   BMI 22.45 kg/m²     General Appearance: alert and oriented to person, place and time and in no acute distress  Skin: warm and dry  Head: normocephalic and atraumatic  Eyes: pupils equal, round,

## 2024-02-11 DIAGNOSIS — I82.402 ACUTE DEEP VEIN THROMBOSIS (DVT) OF LEFT LOWER EXTREMITY, UNSPECIFIED VEIN (HCC): Primary | ICD-10-CM

## 2024-02-11 LAB
ANION GAP SERPL CALCULATED.3IONS-SCNC: 7 MMOL/L (ref 7–16)
BASOPHILS # BLD: 0.04 K/UL (ref 0–0.2)
BASOPHILS NFR BLD: 1 % (ref 0–2)
BUN SERPL-MCNC: 22 MG/DL (ref 6–23)
CALCIUM SERPL-MCNC: 10.4 MG/DL (ref 8.6–10.2)
CHLORIDE SERPL-SCNC: 112 MMOL/L (ref 98–107)
CO2 SERPL-SCNC: 22 MMOL/L (ref 22–29)
CREAT SERPL-MCNC: 1.7 MG/DL (ref 0.7–1.2)
EOSINOPHIL # BLD: 0.04 K/UL (ref 0.05–0.5)
EOSINOPHILS RELATIVE PERCENT: 1 % (ref 0–6)
ERYTHROCYTE [DISTWIDTH] IN BLOOD BY AUTOMATED COUNT: 17.3 % (ref 11.5–15)
GFR SERPL CREATININE-BSD FRML MDRD: 41 ML/MIN/1.73M2
GLUCOSE SERPL-MCNC: 97 MG/DL (ref 74–99)
HCT VFR BLD AUTO: 24 % (ref 37–54)
HCT VFR BLD AUTO: 25.1 % (ref 37–54)
HGB BLD-MCNC: 7.5 G/DL (ref 12.5–16.5)
HGB BLD-MCNC: 8 G/DL (ref 12.5–16.5)
LYMPHOCYTES NFR BLD: 0.08 K/UL (ref 1.5–4)
LYMPHOCYTES RELATIVE PERCENT: 2 % (ref 20–42)
MCH RBC QN AUTO: 29.4 PG (ref 26–35)
MCHC RBC AUTO-ENTMCNC: 31.9 G/DL (ref 32–34.5)
MCV RBC AUTO: 92.3 FL (ref 80–99.9)
MONOCYTES NFR BLD: 0.08 K/UL (ref 0.1–0.95)
MONOCYTES NFR BLD: 2 % (ref 2–12)
NEUTROPHILS NFR BLD: 95 % (ref 43–80)
NEUTS SEG NFR BLD: 4.17 K/UL (ref 1.8–7.3)
PLATELET # BLD AUTO: 204 K/UL (ref 130–450)
PMV BLD AUTO: 9.5 FL (ref 7–12)
POTASSIUM SERPL-SCNC: 4.1 MMOL/L (ref 3.5–5)
RBC # BLD AUTO: 2.72 M/UL (ref 3.8–5.8)
RBC # BLD: ABNORMAL 10*6/UL
SODIUM SERPL-SCNC: 141 MMOL/L (ref 132–146)
WBC OTHER # BLD: 4.4 K/UL (ref 4.5–11.5)

## 2024-02-11 PROCEDURE — 6370000000 HC RX 637 (ALT 250 FOR IP): Performed by: NURSE PRACTITIONER

## 2024-02-11 PROCEDURE — 6370000000 HC RX 637 (ALT 250 FOR IP): Performed by: INTERNAL MEDICINE

## 2024-02-11 PROCEDURE — 6370000000 HC RX 637 (ALT 250 FOR IP)

## 2024-02-11 PROCEDURE — 36415 COLL VENOUS BLD VENIPUNCTURE: CPT

## 2024-02-11 PROCEDURE — 99223 1ST HOSP IP/OBS HIGH 75: CPT | Performed by: INTERNAL MEDICINE

## 2024-02-11 PROCEDURE — 99232 SBSQ HOSP IP/OBS MODERATE 35: CPT | Performed by: INTERNAL MEDICINE

## 2024-02-11 PROCEDURE — 80048 BASIC METABOLIC PNL TOTAL CA: CPT

## 2024-02-11 PROCEDURE — 2580000003 HC RX 258: Performed by: INTERNAL MEDICINE

## 2024-02-11 PROCEDURE — A4216 STERILE WATER/SALINE, 10 ML: HCPCS | Performed by: INTERNAL MEDICINE

## 2024-02-11 PROCEDURE — C9113 INJ PANTOPRAZOLE SODIUM, VIA: HCPCS | Performed by: INTERNAL MEDICINE

## 2024-02-11 PROCEDURE — 6360000002 HC RX W HCPCS: Performed by: INTERNAL MEDICINE

## 2024-02-11 PROCEDURE — 85025 COMPLETE CBC W/AUTO DIFF WBC: CPT

## 2024-02-11 PROCEDURE — 85018 HEMOGLOBIN: CPT

## 2024-02-11 PROCEDURE — 85014 HEMATOCRIT: CPT

## 2024-02-11 PROCEDURE — 1200000000 HC SEMI PRIVATE

## 2024-02-11 PROCEDURE — 99233 SBSQ HOSP IP/OBS HIGH 50: CPT | Performed by: INTERNAL MEDICINE

## 2024-02-11 RX ORDER — ZINC GLUCONATE 50 MG
50 TABLET ORAL DAILY
Status: DISCONTINUED | OUTPATIENT
Start: 2024-02-12 | End: 2024-02-11 | Stop reason: CLARIF

## 2024-02-11 RX ORDER — MULTIVITAMIN WITH IRON
1 TABLET ORAL DAILY
Status: DISCONTINUED | OUTPATIENT
Start: 2024-02-12 | End: 2024-02-13 | Stop reason: HOSPADM

## 2024-02-11 RX ORDER — METOPROLOL SUCCINATE 25 MG/1
25 TABLET, EXTENDED RELEASE ORAL DAILY
Status: DISCONTINUED | OUTPATIENT
Start: 2024-02-12 | End: 2024-02-13 | Stop reason: HOSPADM

## 2024-02-11 RX ORDER — BISACODYL 5 MG/1
10 TABLET, DELAYED RELEASE ORAL ONCE
Status: COMPLETED | OUTPATIENT
Start: 2024-02-11 | End: 2024-02-11

## 2024-02-11 RX ADMIN — SODIUM BICARBONATE 650 MG: 650 TABLET ORAL at 08:11

## 2024-02-11 RX ADMIN — CYANOCOBALAMIN TAB 1000 MCG 1000 MCG: 1000 TAB at 08:11

## 2024-02-11 RX ADMIN — ATORVASTATIN CALCIUM 40 MG: 40 TABLET, FILM COATED ORAL at 19:46

## 2024-02-11 RX ADMIN — PANTOPRAZOLE SODIUM 40 MG: 40 INJECTION, POWDER, FOR SOLUTION INTRAVENOUS at 08:11

## 2024-02-11 RX ADMIN — SODIUM BICARBONATE 650 MG: 650 TABLET ORAL at 19:46

## 2024-02-11 RX ADMIN — POLYETHYLENE GLYCOL-3350 AND ELECTROLYTES 4000 ML: 236; 6.74; 5.86; 2.97; 22.74 POWDER, FOR SOLUTION ORAL at 17:10

## 2024-02-11 RX ADMIN — SUCRALFATE 1 G: 1 TABLET ORAL at 17:12

## 2024-02-11 RX ADMIN — Medication 10 ML: at 08:13

## 2024-02-11 RX ADMIN — SUCRALFATE 1 G: 1 TABLET ORAL at 08:11

## 2024-02-11 RX ADMIN — HYDROXYZINE PAMOATE 25 MG: 25 CAPSULE ORAL at 19:46

## 2024-02-11 RX ADMIN — BISACODYL 10 MG: 5 TABLET, COATED ORAL at 11:30

## 2024-02-11 RX ADMIN — FINASTERIDE 5 MG: 5 TABLET, FILM COATED ORAL at 08:11

## 2024-02-11 RX ADMIN — TAMSULOSIN HYDROCHLORIDE 0.4 MG: 0.4 CAPSULE ORAL at 19:46

## 2024-02-11 RX ADMIN — OXYCODONE HYDROCHLORIDE AND ACETAMINOPHEN 1 TABLET: 5; 325 TABLET ORAL at 19:46

## 2024-02-11 RX ADMIN — Medication 10 ML: at 19:46

## 2024-02-11 RX ADMIN — SUCRALFATE 1 G: 1 TABLET ORAL at 11:30

## 2024-02-11 RX ADMIN — SODIUM BICARBONATE 650 MG: 650 TABLET ORAL at 15:37

## 2024-02-11 RX ADMIN — OXYCODONE HYDROCHLORIDE AND ACETAMINOPHEN 1 TABLET: 5; 325 TABLET ORAL at 14:31

## 2024-02-11 RX ADMIN — SUCRALFATE 1 G: 1 TABLET ORAL at 19:46

## 2024-02-11 ASSESSMENT — PAIN SCALES - GENERAL
PAINLEVEL_OUTOF10: 3
PAINLEVEL_OUTOF10: 7
PAINLEVEL_OUTOF10: 1
PAINLEVEL_OUTOF10: 4
PAINLEVEL_OUTOF10: 6

## 2024-02-11 ASSESSMENT — PAIN - FUNCTIONAL ASSESSMENT
PAIN_FUNCTIONAL_ASSESSMENT: PREVENTS OR INTERFERES SOME ACTIVE ACTIVITIES AND ADLS
PAIN_FUNCTIONAL_ASSESSMENT: PREVENTS OR INTERFERES SOME ACTIVE ACTIVITIES AND ADLS

## 2024-02-11 ASSESSMENT — PAIN DESCRIPTION - ORIENTATION
ORIENTATION: LEFT
ORIENTATION: RIGHT;LEFT
ORIENTATION: RIGHT;LEFT

## 2024-02-11 ASSESSMENT — PAIN DESCRIPTION - LOCATION
LOCATION: ABDOMEN;FLANK
LOCATION: ABDOMEN;BUTTOCKS
LOCATION: BUTTOCKS
LOCATION: HIP;BUTTOCKS

## 2024-02-11 ASSESSMENT — PAIN DESCRIPTION - DESCRIPTORS
DESCRIPTORS: DISCOMFORT;SORE
DESCRIPTORS: TENDER
DESCRIPTORS: ACHING;DISCOMFORT;SORE

## 2024-02-11 ASSESSMENT — PAIN DESCRIPTION - PAIN TYPE
TYPE: ACUTE PAIN
TYPE: ACUTE PAIN

## 2024-02-11 NOTE — PROGRESS NOTES
intact, conjunctivae normal  Pulmonary/Chest: clear to auscultation bilaterally- no wheezes, rales or rhonchi, normal air movement, no respiratory distress  Cardiovascular: normal rate, normal S1 and S2   Abdomen: soft, non-tender, non-distended, normal bowel sounds,   Extremities: no cyanosis, no clubbing and no edema  Neurologic: no cranial nerve deficit and speech normal      Recent Labs     02/09/24 0239 02/09/24  0955 02/10/24  0327    139 138   K 4.0 4.1 4.0   * 110* 111*   CO2 24 23 23   BUN 25* 24* 25*   CREATININE 1.7* 1.6* 1.7*   GLUCOSE 81 94 82   CALCIUM 10.7* 11.0* 10.4*       Recent Labs     02/09/24 0239 02/09/24  0955 02/09/24  1706 02/10/24  1725 02/10/24  2115 02/11/24  0455   WBC 4.1* 3.9*  --   --   --   --    RBC 1.97* 2.14*  --   --   --   --    HGB 5.8* 6.1*   < > 8.2* 7.8* 7.5*   HCT 18.7* 20.3*   < > 26.5* 24.2* 24.0*   MCV 94.9 94.9  --   --   --   --    MCH 29.4 28.5  --   --   --   --    MCHC 31.0* 30.0*  --   --   --   --    RDW 17.2* 17.1*  --   --   --   --     269  --   --   --   --    MPV 10.3 10.0  --   --   --   --     < > = values in this interval not displayed.     Assessment:    Principal Problem:    Acute blood loss anemia  Active Problems:    First degree AV block    Cardiomyopathy (HCC)    AV block, Mobitz 1    On apixaban therapy  Resolved Problems:    * No resolved hospital problems. *      Plan:    Acute blood loss anemia S/P 2 units PRBC - Keep hemoglobin greater than 7. He was treated for iron deficiency anemia in the past  Acute GIB - GI has been consulted. Continue PPI. Hold eliquis. CT A/P is unremarkable. NPO for EGD today  Mobits II, type I - Cardiology on board. BB held. No further intervention at this time. Okay to proceed with EGD.  CKD III a/b - Renal functions are at baseline  H/O Renal cell carcinoma - He follows up with Dr Chance Eagle  H/O Provoked DVT - Venous US is negative. I have consulted Dr Chance Eagle to see if patient still needs eliquis  or would benefit from IVF filter. I have resumed adjusted dose of eliquis for now as his DVT was provoked from cancer.   Essential HTN - Stable on toprol  HLD - Continue lipitor  DVT prophylaxis - Eliquis    Pending STAT CBC/BMP    NOTE: This report was transcribed using voice recognition software. Every effort was made to ensure accuracy; however, inadvertent computerized transcription errors may be present.    Electronically signed by Corine Nicole DO on 2/11/2024 at 8:54 AM

## 2024-02-11 NOTE — PROGRESS NOTES
Large Black BM during shift change, patient cleaned up and info passed on to nightshift RN     Electronically signed by Esthela Wiseman RN on 2/10/2024 at 7:38 PM

## 2024-02-11 NOTE — PROGRESS NOTES
Name:  Uzair Fuentes  :  1940  MRN:  [id number]  Room:  [address] [place] [address]  DOS:  2024    [facility]  [facility]  Dr. Leopoldo Contreras M.D.  Dr. Abraham Cuello M.D.  Dr. Franc Diaz D.O.  Dr. Sánchez Ho M.D.  Dr. Dave Heredia D.O.    -NP Progress Note-    PCP:  Guilherme Salgado DO  Admitting Physician:  Corine Nicole DO  Chief Complaint:    Chief Complaint   Patient presents with    Abnormal Lab     Hemoglobin 5.8 at nh, hx of GI bleed        Subjective  Patient sitting up in bed.  Tolerating diet.  Denies abdominal pain.    Physical Examination  Vitals:  BP (!) 144/62   Pulse 73   Temp 98 °F (36.7 °C) (Oral)   Resp 16   Ht 1.803 m (5' 11\")   Wt 81.8 kg (180 lb 6 oz)   SpO2 96%   BMI 25.16 kg/m²   General Appearance:  awake, alert, and oriented to person, place, time, and purpose; appears stated age and cooperative; no apparent distress no labored breathing  HEENT:  PERRL; EOMI; sclera clear; buccal mucosa moist  Neck:  supple; trachea midline; no thyromegaly; no JVD; no bruits  Heart:  rhythm regular; rate controlled; no murmurs  Lungs:  symmetrical; clear to auscultation bilaterally; no wheezes; no rhonchi; no rales  Abdomen:  soft, non-tender, non-distended; bowel sounds positive; no organomegaly or masses; no pain on palpation  Extremities:  peripheral pulses present; (+) BLE edema; no ulcers, decreased muscle tone and bulk  Neurologic:  alert and oriented x 3; no focal deficit; cranial nerves grossly intact  Skin:  no petechia; no hemorrhage; no wounds    Medications  Scheduled Meds    pantoprazole (PROTONIX) 40 mg in sodium chloride (PF) 0.9 % 10 mL injection  40 mg IntraVENous Daily    atorvastatin  40 mg Oral Nightly    cyanocobalamin  1,000 mcg Oral Daily    finasteride  5 mg Oral Daily    [Held by provider] metoprolol succinate  25 mg Oral BID    sodium bicarbonate  650 mg Oral TID    sucralfate  1 g Oral 4x Daily    tamsulosin  0.4 mg Oral QHS    sodium chloride flush   is seen. DEGENERATIVE CHANGES: Diffuse moderate lumbar spondylosis.  No significant central stenosis. SOFT TISSUES/RETROPERITONEUM: 10.8 x 6.5 x 9.8 cm periaortic lymph node mass suggesting lymphoma or other malignant process.  Bilateral renal cysts. Partially calcified mass of the left renal lower pole.     1. No acute osseous abnormality of the lumbar spine. 2. 10.8 x 6.5 x 9.8 cm periaortic lymph node mass suggesting lymphoma or other malignant process. 3. Partially calcified mass of the left renal lower pole incompletely visualized. RECOMMENDATIONS: Careful clinical correlation and follow up recommended.     CT HEAD WO CONTRAST    Result Date: 1/24/2024  EXAMINATION: CT OF THE HEAD WITHOUT CONTRAST  1/24/2024 9:59 pm TECHNIQUE: CT of the head was performed without the administration of intravenous contrast. Automated exposure control, iterative reconstruction, and/or weight based adjustment of the mA/kV was utilized to reduce the radiation dose to as low as reasonably achievable. COMPARISON: None. HISTORY: ORDERING SYSTEM PROVIDED HISTORY: fall TECHNOLOGIST PROVIDED HISTORY: Reason for exam:->fall Has a \"code stroke\" or \"stroke alert\" been called?->Yes Decision Support Exception - unselect if not a suspected or confirmed emergency medical condition->Emergency Medical Condition (MA) What reading provider will be dictating this exam?->CRC FINDINGS: BRAIN/VENTRICLES: There is no acute intracranial hemorrhage, mass effect or midline shift.  No abnormal extra-axial fluid collection.  The gray-white differentiation is maintained without evidence of an acute infarct.  There is no evidence of hydrocephalus. Moderate atrophy and periventricular white matter degenerative change. ORBITS: The visualized portion of the orbits demonstrate no acute abnormality. SINUSES: The visualized paranasal sinuses and mastoid air cells demonstrate no acute abnormality. SOFT TISSUES/SKULL:  No acute abnormality of the visualized skull or

## 2024-02-11 NOTE — CONSULTS
[facility]  [facility] MED SURG/TELE  [address] [facility] [address]  Dept: [phone]  Loc: [phone]  Attending Consult Note      Reason for Visit:   Patient with metastatic RCC, patient was on eliquis for provoked dvt. however venous us are negative. does he still need eliquis or would need IVC filter?    Referring Physician:  Corine Nicole DO    PCP:  Guilherme Salgado DO    History of Present Illness:     Mr. Fuenets is a pleasant 83-year-old gentleman, with a past medical history significant for CKD, MGUS, hyperlipidemia, hypertension, CAD, PVCs, and history of iron deficiency anemia, who had presented with left flank and back pain, he had CT scan of the abdomen the pelvis done on 3/20/2023, revealing retrocrural and retroperitoneal lymphadenopathy, multiple bilateral renal cysts, multiple bilateral hyperdense renal lesions, probable hemorrhagic cysts, CT scan of the lumbar spine had revealed a large conglomeration of soft tissue mass in the retroperitoneum, concerning for malignancy, hyperdense lesions in the kidneys, more on the left side, PET/CT scan was done on 2/12/2023, revealing retroperitoneal lymphadenopathy, measuring up to 8 cm, possible uptake in both parotid glands and left base of the neck, the patient had a CT-guided biopsy of the retroperitoneal lymphadenopathy on August 23, 2023, pathology was consistent with metastatic renal cell carcinoma, could not differentiate between clear and unclear cell carcinoma, [facility], was recommended Opdivo.  The patient was started on Eliquis for left lower extremity DVT.  He was recently admitted to the hospital with profound anemia, hemoglobin 4.8G/DL, he received packed RBC transfusion, he is doing well at this time, he denies any bleeding.  On 11/21/2023, the patient was started on systemic therapy, Opdivo, received Opdivo last on 1/2/2024.  The patient was brought to the ED on 2/9/2024 due

## 2024-02-11 NOTE — PROGRESS NOTES
INPATIENT CARDIOLOGY FOLLOW-UP    Name: Uzair Fuentes    Age: 83 y.o.    Date of Admission: 2/9/2024  9:19 AM    Date of Service: 2/11/2024    Primary Cardiologist: Known to [facility] through inpatient only seen by Dr. Patterson 2022 most recently seen by Keenan Benson 1/2024     Chief Complaint: Follow-up for Mobitz 1 AV block, cardiomyopathy    Interim History:  Feels well today denies chest pain shortness of breath palpitations.  EGD yesterday showed gastritis, is scheduled for colonoscopy tomorrow.    Review of Systems:   Negative except as described above    Problem List:  Patient Active Problem List   Diagnosis    Normocytic anemia    Rectal bleed    GI bleed    Chest pain    PVC's (premature ventricular contractions)    Coronary artery disease involving native coronary artery of native heart without angina pectoris    Primary hypertension    Hyperlipidemia    Stage 3b chronic kidney disease (HCC)    Renal cell cancer (HCC)    Acute on chronic anemia    Renal cell carcinoma (HCC)    Elevated troponin    Severe protein-calorie malnutrition (HCC)    Fall    First degree AV block    Abdominal aortic aneurysm (AAA) without rupture (HCC)    Acute kidney injury (HCC)    Stage 4 chronic kidney disease (HCC)    Monoclonal gammopathy    Chronic deep vein thrombosis (DVT) of proximal vein of lower extremity (HCC)    Goals of care, counseling/discussion    Palliative care by specialist    Iron deficiency    Low zinc level    Acute blood loss anemia    Cardiomyopathy (HCC)    AV block, Mobitz 1    On apixaban therapy       Current Medications:    Current Facility-Administered Medications:     [Held by provider] apixaban (ELIQUIS) tablet 2.5 mg, 2.5 mg, Oral, BID, Corine Nicole DO    polyethylene glycol (GoLYTELY) solution 4,000 mL, 4,000 mL, Oral, Once, Masters, Edmond CORLEY, APRN - CNP    pantoprazole (PROTONIX) 40 mg in sodium chloride (PF) 0.9 % 10 mL injection, 40 mg, IntraVENous, Daily, Dave Heredia DO, 40 mg

## 2024-02-12 ENCOUNTER — TELEPHONE (OUTPATIENT)
Dept: VASCULAR SURGERY | Age: 84
End: 2024-02-12

## 2024-02-12 ENCOUNTER — ANESTHESIA EVENT (OUTPATIENT)
Dept: ENDOSCOPY | Age: 84
DRG: 813 | End: 2024-02-12
Payer: OTHER GOVERNMENT

## 2024-02-12 ENCOUNTER — ANESTHESIA (OUTPATIENT)
Dept: ENDOSCOPY | Age: 84
DRG: 813 | End: 2024-02-12
Payer: OTHER GOVERNMENT

## 2024-02-12 PROBLEM — E43 SEVERE PROTEIN-CALORIE MALNUTRITION (HCC): Chronic | Status: ACTIVE | Noted: 2024-02-12

## 2024-02-12 LAB
ANION GAP SERPL CALCULATED.3IONS-SCNC: 9 MMOL/L (ref 7–16)
BASOPHILS # BLD: 0.02 K/UL (ref 0–0.2)
BASOPHILS NFR BLD: 0 % (ref 0–2)
BUN SERPL-MCNC: 17 MG/DL (ref 6–23)
CALCIUM SERPL-MCNC: 10.9 MG/DL (ref 8.6–10.2)
CHLORIDE SERPL-SCNC: 112 MMOL/L (ref 98–107)
CO2 SERPL-SCNC: 20 MMOL/L (ref 22–29)
CREAT SERPL-MCNC: 1.3 MG/DL (ref 0.7–1.2)
EKG ATRIAL RATE: 60 BPM
EKG ATRIAL RATE: 63 BPM
EKG P AXIS: 31 DEGREES
EKG P AXIS: 40 DEGREES
EKG P-R INTERVAL: 272 MS
EKG P-R INTERVAL: 328 MS
EKG Q-T INTERVAL: 404 MS
EKG Q-T INTERVAL: 408 MS
EKG QRS DURATION: 114 MS
EKG QRS DURATION: 114 MS
EKG QTC CALCULATION (BAZETT): 404 MS
EKG QTC CALCULATION (BAZETT): 417 MS
EKG R AXIS: -27 DEGREES
EKG R AXIS: -33 DEGREES
EKG T AXIS: 7 DEGREES
EKG T AXIS: 9 DEGREES
EKG VENTRICULAR RATE: 60 BPM
EKG VENTRICULAR RATE: 63 BPM
EOSINOPHIL # BLD: 0.1 K/UL (ref 0.05–0.5)
EOSINOPHILS RELATIVE PERCENT: 2 % (ref 0–6)
ERYTHROCYTE [DISTWIDTH] IN BLOOD BY AUTOMATED COUNT: 17.3 % (ref 11.5–15)
GFR SERPL CREATININE-BSD FRML MDRD: 54 ML/MIN/1.73M2
GLUCOSE SERPL-MCNC: 77 MG/DL (ref 74–99)
HCT VFR BLD AUTO: 26.6 % (ref 37–54)
HGB BLD-MCNC: 8.4 G/DL (ref 12.5–16.5)
IMM GRANULOCYTES # BLD AUTO: <0.03 K/UL (ref 0–0.58)
IMM GRANULOCYTES NFR BLD: 0 % (ref 0–5)
LYMPHOCYTES NFR BLD: 0.52 K/UL (ref 1.5–4)
LYMPHOCYTES RELATIVE PERCENT: 10 % (ref 20–42)
MAGNESIUM SERPL-MCNC: 1.8 MG/DL (ref 1.6–2.6)
MCH RBC QN AUTO: 29.1 PG (ref 26–35)
MCHC RBC AUTO-ENTMCNC: 31.6 G/DL (ref 32–34.5)
MCV RBC AUTO: 92 FL (ref 80–99.9)
MONOCYTES NFR BLD: 0.34 K/UL (ref 0.1–0.95)
MONOCYTES NFR BLD: 7 % (ref 2–12)
NEUTROPHILS NFR BLD: 81 % (ref 43–80)
NEUTS SEG NFR BLD: 4.13 K/UL (ref 1.8–7.3)
PLATELET # BLD AUTO: 247 K/UL (ref 130–450)
PMV BLD AUTO: 10.1 FL (ref 7–12)
POTASSIUM SERPL-SCNC: 3.4 MMOL/L (ref 3.5–5)
RBC # BLD AUTO: 2.89 M/UL (ref 3.8–5.8)
RBC # BLD: ABNORMAL 10*6/UL
SODIUM SERPL-SCNC: 141 MMOL/L (ref 132–146)
WBC OTHER # BLD: 5.1 K/UL (ref 4.5–11.5)

## 2024-02-12 PROCEDURE — 99231 SBSQ HOSP IP/OBS SF/LOW 25: CPT | Performed by: INTERNAL MEDICINE

## 2024-02-12 PROCEDURE — 93010 ELECTROCARDIOGRAM REPORT: CPT | Performed by: INTERNAL MEDICINE

## 2024-02-12 PROCEDURE — 3700000000 HC ANESTHESIA ATTENDED CARE: Performed by: INTERNAL MEDICINE

## 2024-02-12 PROCEDURE — 2580000003 HC RX 258

## 2024-02-12 PROCEDURE — A4216 STERILE WATER/SALINE, 10 ML: HCPCS | Performed by: INTERNAL MEDICINE

## 2024-02-12 PROCEDURE — 6370000000 HC RX 637 (ALT 250 FOR IP): Performed by: INTERNAL MEDICINE

## 2024-02-12 PROCEDURE — 85025 COMPLETE CBC W/AUTO DIFF WBC: CPT

## 2024-02-12 PROCEDURE — 3700000001 HC ADD 15 MINUTES (ANESTHESIA): Performed by: INTERNAL MEDICINE

## 2024-02-12 PROCEDURE — 1200000000 HC SEMI PRIVATE

## 2024-02-12 PROCEDURE — 6360000002 HC RX W HCPCS: Performed by: INTERNAL MEDICINE

## 2024-02-12 PROCEDURE — 0DJD8ZZ INSPECTION OF LOWER INTESTINAL TRACT, VIA NATURAL OR ARTIFICIAL OPENING ENDOSCOPIC: ICD-10-PCS | Performed by: INTERNAL MEDICINE

## 2024-02-12 PROCEDURE — 7100000011 HC PHASE II RECOVERY - ADDTL 15 MIN: Performed by: INTERNAL MEDICINE

## 2024-02-12 PROCEDURE — 2580000003 HC RX 258: Performed by: INTERNAL MEDICINE

## 2024-02-12 PROCEDURE — 36415 COLL VENOUS BLD VENIPUNCTURE: CPT

## 2024-02-12 PROCEDURE — 7100000010 HC PHASE II RECOVERY - FIRST 15 MIN: Performed by: INTERNAL MEDICINE

## 2024-02-12 PROCEDURE — 80048 BASIC METABOLIC PNL TOTAL CA: CPT

## 2024-02-12 PROCEDURE — 3609027000 HC COLONOSCOPY: Performed by: INTERNAL MEDICINE

## 2024-02-12 PROCEDURE — 6360000002 HC RX W HCPCS

## 2024-02-12 PROCEDURE — 2709999900 HC NON-CHARGEABLE SUPPLY: Performed by: INTERNAL MEDICINE

## 2024-02-12 PROCEDURE — C9113 INJ PANTOPRAZOLE SODIUM, VIA: HCPCS | Performed by: INTERNAL MEDICINE

## 2024-02-12 PROCEDURE — 83735 ASSAY OF MAGNESIUM: CPT

## 2024-02-12 PROCEDURE — 2580000003 HC RX 258: Performed by: STUDENT IN AN ORGANIZED HEALTH CARE EDUCATION/TRAINING PROGRAM

## 2024-02-12 RX ORDER — SODIUM CHLORIDE 9 MG/ML
INJECTION, SOLUTION INTRAVENOUS PRN
Status: DISCONTINUED | OUTPATIENT
Start: 2024-02-12 | End: 2024-02-12 | Stop reason: HOSPADM

## 2024-02-12 RX ORDER — SODIUM CHLORIDE 9 MG/ML
INJECTION, SOLUTION INTRAVENOUS CONTINUOUS PRN
Status: DISCONTINUED | OUTPATIENT
Start: 2024-02-12 | End: 2024-02-12 | Stop reason: SDUPTHER

## 2024-02-12 RX ORDER — LABETALOL HYDROCHLORIDE 5 MG/ML
10 INJECTION, SOLUTION INTRAVENOUS
Status: DISCONTINUED | OUTPATIENT
Start: 2024-02-12 | End: 2024-02-12 | Stop reason: HOSPADM

## 2024-02-12 RX ORDER — OXYCODONE HYDROCHLORIDE 5 MG/1
5 TABLET ORAL
Status: DISCONTINUED | OUTPATIENT
Start: 2024-02-12 | End: 2024-02-12 | Stop reason: HOSPADM

## 2024-02-12 RX ORDER — ONDANSETRON 2 MG/ML
4 INJECTION INTRAMUSCULAR; INTRAVENOUS
Status: DISCONTINUED | OUTPATIENT
Start: 2024-02-12 | End: 2024-02-12 | Stop reason: HOSPADM

## 2024-02-12 RX ORDER — PROPOFOL 10 MG/ML
INJECTION, EMULSION INTRAVENOUS PRN
Status: DISCONTINUED | OUTPATIENT
Start: 2024-02-12 | End: 2024-02-12 | Stop reason: SDUPTHER

## 2024-02-12 RX ORDER — ACETAMINOPHEN 325 MG/1
650 TABLET ORAL
Status: DISCONTINUED | OUTPATIENT
Start: 2024-02-12 | End: 2024-02-12 | Stop reason: HOSPADM

## 2024-02-12 RX ORDER — SODIUM CHLORIDE 0.9 % (FLUSH) 0.9 %
5-40 SYRINGE (ML) INJECTION EVERY 12 HOURS SCHEDULED
Status: DISCONTINUED | OUTPATIENT
Start: 2024-02-12 | End: 2024-02-12 | Stop reason: HOSPADM

## 2024-02-12 RX ORDER — SODIUM CHLORIDE, SODIUM LACTATE, POTASSIUM CHLORIDE, CALCIUM CHLORIDE 600; 310; 30; 20 MG/100ML; MG/100ML; MG/100ML; MG/100ML
INJECTION, SOLUTION INTRAVENOUS CONTINUOUS
Status: DISCONTINUED | OUTPATIENT
Start: 2024-02-12 | End: 2024-02-13 | Stop reason: HOSPADM

## 2024-02-12 RX ORDER — DIPHENHYDRAMINE HYDROCHLORIDE 50 MG/ML
12.5 INJECTION INTRAMUSCULAR; INTRAVENOUS
Status: DISCONTINUED | OUTPATIENT
Start: 2024-02-12 | End: 2024-02-12 | Stop reason: HOSPADM

## 2024-02-12 RX ORDER — DEXTROSE MONOHYDRATE 100 MG/ML
INJECTION, SOLUTION INTRAVENOUS CONTINUOUS PRN
Status: DISCONTINUED | OUTPATIENT
Start: 2024-02-12 | End: 2024-02-12 | Stop reason: HOSPADM

## 2024-02-12 RX ORDER — HYDRALAZINE HYDROCHLORIDE 20 MG/ML
10 INJECTION INTRAMUSCULAR; INTRAVENOUS
Status: DISCONTINUED | OUTPATIENT
Start: 2024-02-12 | End: 2024-02-12 | Stop reason: HOSPADM

## 2024-02-12 RX ORDER — SODIUM CHLORIDE 0.9 % (FLUSH) 0.9 %
5-40 SYRINGE (ML) INJECTION PRN
Status: DISCONTINUED | OUTPATIENT
Start: 2024-02-12 | End: 2024-02-12 | Stop reason: HOSPADM

## 2024-02-12 RX ORDER — DROPERIDOL 2.5 MG/ML
0.62 INJECTION, SOLUTION INTRAMUSCULAR; INTRAVENOUS
Status: DISCONTINUED | OUTPATIENT
Start: 2024-02-12 | End: 2024-02-12 | Stop reason: HOSPADM

## 2024-02-12 RX ADMIN — PANTOPRAZOLE SODIUM 40 MG: 40 INJECTION, POWDER, FOR SOLUTION INTRAVENOUS at 09:17

## 2024-02-12 RX ADMIN — ATORVASTATIN CALCIUM 40 MG: 40 TABLET, FILM COATED ORAL at 21:29

## 2024-02-12 RX ADMIN — SODIUM CHLORIDE: 9 INJECTION, SOLUTION INTRAVENOUS at 14:28

## 2024-02-12 RX ADMIN — Medication 10 ML: at 09:00

## 2024-02-12 RX ADMIN — SODIUM CHLORIDE 25 MG: 9 INJECTION, SOLUTION INTRAVENOUS at 09:23

## 2024-02-12 RX ADMIN — SUCRALFATE 1 G: 1 TABLET ORAL at 17:12

## 2024-02-12 RX ADMIN — SODIUM BICARBONATE 650 MG: 650 TABLET ORAL at 09:17

## 2024-02-12 RX ADMIN — SODIUM CHLORIDE, POTASSIUM CHLORIDE, SODIUM LACTATE AND CALCIUM CHLORIDE: 600; 310; 30; 20 INJECTION, SOLUTION INTRAVENOUS at 18:28

## 2024-02-12 RX ADMIN — TAMSULOSIN HYDROCHLORIDE 0.4 MG: 0.4 CAPSULE ORAL at 21:29

## 2024-02-12 RX ADMIN — CYANOCOBALAMIN TAB 1000 MCG 1000 MCG: 1000 TAB at 09:17

## 2024-02-12 RX ADMIN — PROPOFOL 130 MG: 10 INJECTION, EMULSION INTRAVENOUS at 14:56

## 2024-02-12 RX ADMIN — METOPROLOL SUCCINATE 25 MG: 25 TABLET, EXTENDED RELEASE ORAL at 09:17

## 2024-02-12 RX ADMIN — SUCRALFATE 1 G: 1 TABLET ORAL at 21:29

## 2024-02-12 RX ADMIN — MULTIVITAMIN TABLET 1 TABLET: TABLET at 09:17

## 2024-02-12 RX ADMIN — SODIUM CHLORIDE 100 MG: 9 INJECTION, SOLUTION INTRAVENOUS at 11:57

## 2024-02-12 RX ADMIN — SODIUM BICARBONATE 650 MG: 650 TABLET ORAL at 21:29

## 2024-02-12 NOTE — PROGRESS NOTES
Physician Progress Note      PATIENT:               MANI GALLEGO  [facility] #:                  [id number]  :                       1940  ADMIT DATE:       2024 9:19 AM  DISCH DATE:  RESPONDING  PROVIDER #:        Chris Mancini MD          QUERY TEXT:    Patient admitted with Acute GIB.  Noted documentation of Acute Kidney Injury   in Cardiology notes 2/10-.  In order to support the diagnosis of MOISE,   please include additional clinical indicators in your documentation.? Or   please document if the diagnosis of MOISE has been ruled out after further   study.    The medical record reflects the following:  Risk Factors: CKD stage III  Clinical Indicators: BUN/CR/GFR 25/1.7/39 on admission -> 24/1.6/42 ->   17/1.3/54, Per H&P \"...CKD stage III - at baseline...\", per Cardiology   \"...MOISE/CKD creatinine 1.7...\"  Treatment: IVF, Serial lab    Defined by Kidney Disease Improving Global Outcomes (KDIGO) clinical practice   guideline for acute kidney injury:  -Increase in SCr by greater than or equal to 0.3 mg/dl within 48 hours; or  -Increase or decrease in SCr to greater than or equal to 1.5 times baseline,   which is known or presumed to have occurred within the prior 7 days; or  -Urine volume < 0.5ml/kg/h for 6 hours.    Thank you,  Kerrie sEtrada RN, BSN, CDIS  Clinical Documentation Integrity  [email or web address]  Options provided:  -- Acute kidney injury evidenced by, Please document evidence as well as a   numerical baseline creatinine, if known.  -- Acute kidney injury has been ruled out, CKD stage III  -- Other - I will add my own diagnosis  -- Disagree - Not applicable / Not valid  -- Disagree - Clinically unable to determine / Unknown  -- Refer to Clinical Documentation Reviewer    PROVIDER RESPONSE TEXT:    Provider disagreed with this query.  Happy to clarify any cardiac diagnoses, otherwise defer to primary/relevant   specialists.    Query created by: Kerrie Estrada on 2024 12:15

## 2024-02-12 NOTE — ACP (ADVANCE CARE PLANNING)
2/12/2024  Advance Care Planning   Healthcare Decision Maker:    Primary Decision Maker: mariah martinez - [facility] - [phone]    Secondary Decision Maker: Joan Disla - [phone]    Click here to complete Healthcare Decision Makers including selection of the Healthcare Decision Maker Relationship (ie \"Primary\").

## 2024-02-12 NOTE — PROGRESS NOTES
Comprehensive Nutrition Assessment    Type and Reason for Visit:  Initial, Positive Nutrition Screen    Nutrition Recommendations/Plan:   Continue Regular diet- Low Fiber/soft if does not tolerate  Added ONS STD BID  Will monitor.     Malnutrition Assessment:  Malnutrition Status:  Severe malnutrition (02/12/24 1832)    Context:  Chronic Illness     Findings of the 6 clinical characteristics of malnutrition:  Energy Intake:  Mild decrease in energy intake (Comment) (PTA)  Weight Loss:  No significant weight loss     Body Fat Loss:  Severe body fat loss (UTO-colonoscopy when visited) Orbital, Buccal region   Muscle Mass Loss:  Severe muscle mass loss Temples (temporalis)  Fluid Accumulation:  No significant fluid accumulation     Strength:  Not Performed    Nutrition Assessment:    ADM:Acute blood loss anemia, GIB. PMH:Metastatic renal cell carcinoma, Stage 3b CKD, dementia, Severe PCM, Chronic Pancreatitis. Pt w/severe PCM prev admission (GIB) earlier this month. Has hx significant wt loss (per prev nut'l assessments), but slowly regaining wt back. Subjective report decreased appetite w/cramping/diarrhea on admission. EGD 2/10-mod gastritis. Had colonoscopy today.  Diet just advanced. Appetite good yesterday before Cl liquids. Meets criteria for severe PCM. Will add ONS BID & monitor.    Nutrition Related Findings:    Oriented/disoriented, soft/round abd, diarrhea/cramping, active BS, +1 pitting edema, muscle wasting, fat depletion, 54/1.3 GFR/Cr Wound Type: None       Current Nutrition Intake & Therapies:    Average Meal Intake: % (reported on intake sheet yesterday)  Average Supplements Intake: None Ordered  ADULT DIET; Regular  ADULT ORAL NUTRITION SUPPLEMENT; Breakfast, Dinner; Standard 4 oz Oral Supplement    Anthropometric Measures:  Height: 180.3 cm (5' 11\")  Ideal Body Weight (IBW): 172 lbs (78 kg)    Admission Body Weight: 81.8 kg (180 lb 6 oz) (2/11 bed)  Current Body Weight: 82 kg (180 lb 12.4

## 2024-02-12 NOTE — PROGRESS NOTES
Report called to 5th floor rn. Vs stable. Denies pain. Family waiting called. No family present at this time .

## 2024-02-12 NOTE — PROGRESS NOTES
Pharmacy Note    Uzair Fuentes was ordered Zinc Gluconate.  As per the [facility] Formulary Committee Policy, herbals and certain dietary supplements will be discontinued.  The herbal or dietary supplement may be continued after discharge from the hospital.  Khoa Hackett RPH  2/11/2024  11:19 PM

## 2024-02-12 NOTE — PROGRESS NOTES
Progress  Note  Chief Complaint   Patient presents with    Abnormal Lab     Hemoglobin 5.8 at nh, hx of GI bleed      Historical Issues:  Current Facility-Administered Medications   Medication Dose Route Frequency Provider Last Rate Last Admin    [Held by provider] apixaban (ELIQUIS) tablet 2.5 mg  2.5 mg Oral BID Corine Nicole DO        metoprolol succinate (TOPROL XL) extended release tablet 25 mg  25 mg Oral Daily Chris Mancini MD   25 mg at 02/12/24 0917    multivitamin 1 tablet  1 tablet Oral Daily Sonya Hartley MD   1 tablet at 02/12/24 0917    pantoprazole (PROTONIX) 40 mg in sodium chloride (PF) 0.9 % 10 mL injection  40 mg IntraVENous Daily Dave Heredia DO   40 mg at 02/12/24 0917    nitroGLYCERIN (NITROSTAT) SL tablet 0.4 mg  0.4 mg SubLINGual Q5 Min PRN Argenis Romero MD        atorvastatin (LIPITOR) tablet 40 mg  40 mg Oral Nightly Corine Nicole DO   40 mg at 02/11/24 1946    vitamin B-12 (CYANOCOBALAMIN) tablet 1,000 mcg  1,000 mcg Oral Daily Corine Nicole DO   1,000 mcg at 02/12/24 0917    finasteride (PROSCAR) tablet 5 mg  5 mg Oral Daily Corine Nicole DO   5 mg at 02/11/24 0811    sodium bicarbonate tablet 650 mg  650 mg Oral TID Corine Nicole DO   650 mg at 02/12/24 0917    sucralfate (CARAFATE) tablet 1 g  1 g Oral 4x Daily Corine Nicole DO   1 g at 02/11/24 1946    tamsulosin (FLOMAX) capsule 0.4 mg  0.4 mg Oral QHS Corine Nicole DO   0.4 mg at 02/11/24 1946    sodium chloride flush 0.9 % injection 5-40 mL  5-40 mL IntraVENous 2 times per day Corine Nicole DO   10 mL at 02/11/24 1946    sodium chloride flush 0.9 % injection 5-40 mL  5-40 mL IntraVENous PRN Corine Nicole DO        0.9 % sodium chloride infusion   IntraVENous PRN Corine Nicole DO        potassium chloride (KLOR-CON M) extended release tablet 40 mEq  40 mEq Oral PRN Corine Nicole DO        Or    potassium bicarb-citric acid (EFFER-K) effervescent tablet 40 mEq  40 mEq Oral PRN Corine Nicole,

## 2024-02-12 NOTE — ANESTHESIA PRE PROCEDURE
Department of Anesthesiology  Preprocedure Note       Name:  Uzair Fuentes   Age:  83 y.o.  :  1940                                          MRN:  [id number]         Date:  2024      Surgeon: Surgeon(s):  Dave Heredia DO    Procedure: Procedure(s):  COLONOSCOPY DIAGNOSTIC    Medications prior to admission:   Prior to Admission medications    Medication Sig Start Date End Date Taking? Authorizing Provider   acetaminophen (TYLENOL) 325 MG tablet Take 2 tablets by mouth every 6 hours as needed for Fever (TEMP >100)   Yes Trevor Gibson MD   diphenoxylate-atropine (LOMOTIL) 2.5-0.025 MG per tablet Take 2 tablets by mouth every 6 hours as needed for Diarrhea.   Yes Trevor Gibson MD   nystatin (MYCOSTATIN) 973988 UNIT/GM powder Apply topically 2 times daily -ABD FOLDS-   Yes Trevor Gibson MD   oxyCODONE-acetaminophen (PERCOCET) 5-325 MG per tablet Take 1 tablet by mouth every 4 hours as needed for Pain.   Yes Trevor Gibson MD   sodium bicarbonate 650 MG tablet Take 1 tablet by mouth 3 times daily 2/2/24 3/3/24  Celso Lester MD   metoprolol succinate (TOPROL XL) 25 MG extended release tablet Take 1 tablet by mouth 2 times daily 24   Celso Lester MD   zinc sulfate (ZINCATE) 220 (50 Zn)  mg capsule - elemental zinc Take 1 capsule by mouth daily 2/3/24   Celso Lester MD   apixaban (ELIQUIS) 2.5 MG TABS tablet Take 1 tablet by mouth 2 times daily 23   Sonya Hartley MD   pantoprazole (PROTONIX) 40 MG tablet Take 1 tablet by mouth 2 times daily (before meals) 23   Codi Britt MD   sucralfate (CARAFATE) 1 GM tablet Take 1 tablet by mouth 4 times daily 23   Codi Britt MD   cyanocobalamin 1000 MCG tablet Take 1 tablet by mouth daily    Trevor Gibson MD   finasteride (PROSCAR) 5 MG tablet Take 1 tablet by mouth daily    Trevor Gibson MD   lidocaine (LMX) 4 % cream Apply topically daily as needed for Pain 
left ventricular chamber appears dilated both at end systole and end diastole.  Endocardial surfaces appear at the upper limits of normal in thickness.  Abnormal septal motion is present suggesting the presence of conduction system disease.  No regional wall motion appetizer identified with global hypokinesis and mild left ventricular systolic dysfunction.  An estimated ejection fraction of 45-50% is present.  Indeterminant diastolic function is present.  ·  Aortic Valve: The aortic valve is trileaflet with mild leaflet thickening and no evidence of aortic stenosis.  ·  Mitral Valve: Mitral leaflets are structurally normal with mild mitral annular calcification and no mitral significant regurgitation.  ·  Left Atrium: The left atrium is moderately dilated with a volume index of 44 mL/m².  In the emergency room the intra-atrial septum is present with previous documentation of a patent foramen ovale.       Neuro/Psych:   Negative Neuro/Psych ROS              GI/Hepatic/Renal: Neg GI/Hepatic/Renal ROS            Endo/Other:    (+) blood dyscrasia (Eliquis): anemia and anticoagulation therapy:., malignancy/cancer (renal cell carcinoma).                  ROS comment: Monoclonal gammopathy Abdominal:             Vascular: negative vascular ROS.         Other Findings:             Anesthesia Plan      MAC     ASA 4     (GA as back up)  Induction: intravenous.      Anesthetic plan and risks discussed with patient and child/children (daughter).      Plan discussed with CRNA.                    JONATHAN Quigley - CRNA   2/12/2024  SPOKE WITH DAUGHTER CASPER. CONSENT OBTAINED.

## 2024-02-12 NOTE — PROGRESS NOTES
Patient seen and examined prior to procedure.  Pertinent notes/labs reviewed.  H&P reviewed -no new changes except as described above.  Vitals:    02/12/24 0716   BP: 126/67   Pulse: 74   Resp: 17   Temp: 97.3 °F (36.3 °C)   SpO2: 100%      Plan: Proceed with colonoscopy later today.  Please, see orders for plan of care    Law Daniel MD

## 2024-02-12 NOTE — CARE COORDINATION
2/12/2024 Social Work Discharge Planning: Anemia. Colonoscopy today.This worker met with Pt to discuss  role and transition of care/discharge planning.Pt is from [facility] and can return pending bed availability. Would need a precert.

## 2024-02-12 NOTE — PROGRESS NOTES
Date:2024  Patient Name: Uzair Fuentes  MRN: [id number]  : 1940  ROOM #: [id number]    Occupational Therapy order received, chart reviewed and evaluation attempted this PM. Patient is unavailable for OT evaluation due to off floor for procedure. Will re-attempt OT evaluation at a later time. Thank you.     Sheeba Robb OTR/L #[id number]

## 2024-02-12 NOTE — CARE COORDINATION
2/12/2024  Social Work Discharge Planning: Anemia. Colonoscopy today.This worker met with Pt to discuss  role and transition of care/discharge planning.Pt is from [facility] and can return pending bed availability-not a bed hold. Would need a precert.  Awaiting therapy evals. [place] and transport form are completed. Electronically signed by JOSE Ruiz on 2/12/2024 at 1:39 PM

## 2024-02-12 NOTE — DISCHARGE INSTR - COC
Continuity of Care Form    Patient Name: Uzair Fuentes   :  1940  MRN:  [id number]    Admit date:  2024  Discharge date:  2024      Code Status Order: Full Code   Advance Directives:   Advance Care Flowsheet Documentation       Date/Time Healthcare Directive Type of Healthcare Directive Copy in Chart Healthcare Agent Appointed Healthcare Agent's Name Healthcare Agent's Phone Number    24 0029 No, patient does not have an advance directive for healthcare treatment -- -- -- -- --    02/10/24 0827 No, patient does not have an advance directive for healthcare treatment -- -- -- -- --            Admitting Physician:  Corine Nicole DO  PCP: Guilherme Salgado DO    Discharging Nurse: LG Haro  Discharging Hospital Unit/Room#: 0543/0543-A  Discharging Unit Phone Number: [phone]    Emergency Contact:   Extended Emergency Contact Information  Primary Emergency Contact: mariah martinez  Address: [address] [place] [address]  Home Phone: [phone]  Mobile Phone: [phone]  Relation: Child   needed? No  Secondary Emergency Contact: Joan Disla  Home Phone: [phone]  Relation: None  Preferred language: English   needed? No    Past Surgical History:  Past Surgical History:   Procedure Laterality Date    ABDOMINAL AORTIC ANEURYSM REPAIR      COLONOSCOPY      TONSILLECTOMY      TOTAL KNEE ARTHROPLASTY Bilateral     UPPER GASTROINTESTINAL ENDOSCOPY N/A 10/14/2022    EGD DIAGNOSTIC ONLY performed by Joshua Avila MD at [facility] ENDOSCOPY    UPPER GASTROINTESTINAL ENDOSCOPY N/A 2/10/2024    EGD ESOPHAGOGASTRODUODENOSCOPY performed by Dave Heredia DO at [facility] OR       Immunization History:     There is no immunization history on file for this patient.    Active Problems:  Patient Active Problem List   Diagnosis Code    Normocytic anemia D64.9    Rectal bleed K62.5    GI bleed K92.2    Chest pain R07.9     PVC's (premature ventricular contractions) I49.3    Coronary artery disease involving native coronary artery of native heart without angina pectoris I25.10    Primary hypertension I10    Hyperlipidemia E78.5    Stage 3b chronic kidney disease (HCC) N18.32    Renal cell cancer (HCC) C64.9    Acute on chronic anemia D64.9    Metastatic renal cell carcinoma (HCC) C64.9    Elevated troponin R79.89    Severe protein-calorie malnutrition (HCC) E43    Fall W19.XXXA    First degree AV block I44.0    Abdominal aortic aneurysm (AAA) without rupture (HCC) I71.40    Acute kidney injury (HCC) N17.9    Stage 4 chronic kidney disease (HCC) N18.4    Monoclonal gammopathy D47.2    Chronic deep vein thrombosis (DVT) of proximal vein of lower extremity (HCC) I82.5Y9    Goals of care, counseling/discussion Z71.89    Palliative care by specialist Z51.5    Iron deficiency E61.1    Low zinc level E60    Acute blood loss anemia D62    Cardiomyopathy (HCC) I42.9    AV block, Mobitz 1 I44.1    On apixaban therapy Z79.01    Deep vein thrombosis (DVT) of left lower extremity (HCC) I82.402       Isolation/Infection:   Isolation            No Isolation          Patient Infection Status       None to display                     Nurse Assessment:  Last Vital Signs: /67   Pulse 74   Temp 97.3 °F (36.3 °C)   Resp 17   Ht 1.803 m (5' 11\")   Wt 81.8 kg (180 lb 6 oz)   SpO2 100%   BMI 25.16 kg/m²     Last documented pain score (0-10 scale): Pain Level: 4  Last Weight:   Wt Readings from Last 1 Encounters:   02/11/24 81.8 kg (180 lb 6 oz)     Mental Status:  oriented, alert, and coherent    IV Access:  - None    Nursing Mobility/ADLs:  Walking   Assisted  Transfer  Assisted  Bathing  Assisted  Dressing  Assisted  Toileting  Assisted  Feeding  Assisted  Med Admin  Assisted  Med Delivery   whole    Wound Care Documentation and Therapy:  Wound 01/30/24 Coccyx Mid (Active)   Dressing Status New dressing applied 02/10/24 0738   Wound Cleansed

## 2024-02-12 NOTE — PLAN OF CARE
Problem: Skin/Tissue Integrity  Goal: Absence of new skin breakdown  Description: 1.  Monitor for areas of redness and/or skin breakdown  2.  Assess vascular access sites hourly  3.  Every 4-6 hours minimum:  Change oxygen saturation probe site  4.  Every 4-6 hours:  If on nasal continuous positive airway pressure, respiratory therapy assess nares and determine need for appliance change or resting period.  2/12/2024 0024 by Mary Beth Wallace, RN  Outcome: Progressing  2/11/2024 1828 by Nicole Walsh, RN  Outcome: Progressing     Problem: Pain  Goal: Verbalizes/displays adequate comfort level or baseline comfort level  Outcome: Progressing     Problem: Safety - Adult  Goal: Free from fall injury  Outcome: Progressing

## 2024-02-13 VITALS
SYSTOLIC BLOOD PRESSURE: 140 MMHG | HEART RATE: 73 BPM | OXYGEN SATURATION: 99 % | TEMPERATURE: 97.7 F | DIASTOLIC BLOOD PRESSURE: 65 MMHG | RESPIRATION RATE: 16 BRPM | BODY MASS INDEX: 25.31 KG/M2 | WEIGHT: 180.78 LBS | HEIGHT: 71 IN

## 2024-02-13 LAB
ABO/RH: NORMAL
ANION GAP SERPL CALCULATED.3IONS-SCNC: 7 MMOL/L (ref 7–16)
ANTIBODY SCREEN: NEGATIVE
ARM BAND NUMBER: NORMAL
BASOPHILS # BLD: 0.02 K/UL (ref 0–0.2)
BASOPHILS NFR BLD: 1 % (ref 0–2)
BLOOD BANK BLOOD PRODUCT EXPIRATION DATE: NORMAL
BLOOD BANK BLOOD PRODUCT EXPIRATION DATE: NORMAL
BLOOD BANK DISPENSE STATUS: NORMAL
BLOOD BANK ISBT PRODUCT BLOOD TYPE: 5100
BLOOD BANK ISBT PRODUCT BLOOD TYPE: 5100
BLOOD BANK PRODUCT CODE: NORMAL
BLOOD BANK PRODUCT CODE: NORMAL
BLOOD BANK SAMPLE EXPIRATION: NORMAL
BLOOD BANK UNIT TYPE AND RH: NORMAL
BLOOD BANK UNIT TYPE AND RH: NORMAL
BPU ID: NORMAL
BUN SERPL-MCNC: 14 MG/DL (ref 6–23)
CALCIUM SERPL-MCNC: 10.2 MG/DL (ref 8.6–10.2)
CHLORIDE SERPL-SCNC: 112 MMOL/L (ref 98–107)
CO2 SERPL-SCNC: 22 MMOL/L (ref 22–29)
COMPONENT: NORMAL
CREAT SERPL-MCNC: 1.5 MG/DL (ref 0.7–1.2)
CROSSMATCH RESULT: NORMAL
EOSINOPHIL # BLD: 0.13 K/UL (ref 0.05–0.5)
EOSINOPHILS RELATIVE PERCENT: 4 % (ref 0–6)
ERYTHROCYTE [DISTWIDTH] IN BLOOD BY AUTOMATED COUNT: 17.5 % (ref 11.5–15)
GFR SERPL CREATININE-BSD FRML MDRD: 46 ML/MIN/1.73M2
GLUCOSE SERPL-MCNC: 87 MG/DL (ref 74–99)
HCT VFR BLD AUTO: 26.4 % (ref 37–54)
HGB BLD-MCNC: 8.3 G/DL (ref 12.5–16.5)
IMM GRANULOCYTES # BLD AUTO: <0.03 K/UL (ref 0–0.58)
IMM GRANULOCYTES NFR BLD: 0 % (ref 0–5)
LYMPHOCYTES NFR BLD: 0.56 K/UL (ref 1.5–4)
LYMPHOCYTES RELATIVE PERCENT: 16 % (ref 20–42)
MCH RBC QN AUTO: 29.5 PG (ref 26–35)
MCHC RBC AUTO-ENTMCNC: 31.4 G/DL (ref 32–34.5)
MCV RBC AUTO: 94 FL (ref 80–99.9)
MONOCYTES NFR BLD: 0.34 K/UL (ref 0.1–0.95)
MONOCYTES NFR BLD: 10 % (ref 2–12)
NEUTROPHILS NFR BLD: 69 % (ref 43–80)
NEUTS SEG NFR BLD: 2.38 K/UL (ref 1.8–7.3)
PLATELET # BLD AUTO: 221 K/UL (ref 130–450)
PMV BLD AUTO: 10.2 FL (ref 7–12)
POTASSIUM SERPL-SCNC: 3.6 MMOL/L (ref 3.5–5)
RBC # BLD AUTO: 2.81 M/UL (ref 3.8–5.8)
RBC # BLD: ABNORMAL 10*6/UL
SODIUM SERPL-SCNC: 141 MMOL/L (ref 132–146)
TRANSFUSION STATUS: NORMAL
UNIT DIVISION: 0
UNIT ISSUE DATE/TIME: NORMAL
UNIT ISSUE DATE/TIME: NORMAL
WBC OTHER # BLD: 3.4 K/UL (ref 4.5–11.5)

## 2024-02-13 PROCEDURE — 97165 OT EVAL LOW COMPLEX 30 MIN: CPT

## 2024-02-13 PROCEDURE — 6370000000 HC RX 637 (ALT 250 FOR IP): Performed by: INTERNAL MEDICINE

## 2024-02-13 PROCEDURE — 97535 SELF CARE MNGMENT TRAINING: CPT

## 2024-02-13 PROCEDURE — 99231 SBSQ HOSP IP/OBS SF/LOW 25: CPT | Performed by: STUDENT IN AN ORGANIZED HEALTH CARE EDUCATION/TRAINING PROGRAM

## 2024-02-13 PROCEDURE — 85025 COMPLETE CBC W/AUTO DIFF WBC: CPT

## 2024-02-13 PROCEDURE — 97161 PT EVAL LOW COMPLEX 20 MIN: CPT

## 2024-02-13 PROCEDURE — 80048 BASIC METABOLIC PNL TOTAL CA: CPT

## 2024-02-13 PROCEDURE — 6360000002 HC RX W HCPCS: Performed by: INTERNAL MEDICINE

## 2024-02-13 PROCEDURE — 99239 HOSP IP/OBS DSCHRG MGMT >30: CPT | Performed by: INTERNAL MEDICINE

## 2024-02-13 PROCEDURE — 2580000003 HC RX 258: Performed by: INTERNAL MEDICINE

## 2024-02-13 PROCEDURE — C9113 INJ PANTOPRAZOLE SODIUM, VIA: HCPCS | Performed by: INTERNAL MEDICINE

## 2024-02-13 PROCEDURE — A4216 STERILE WATER/SALINE, 10 ML: HCPCS | Performed by: INTERNAL MEDICINE

## 2024-02-13 PROCEDURE — 2580000003 HC RX 258: Performed by: STUDENT IN AN ORGANIZED HEALTH CARE EDUCATION/TRAINING PROGRAM

## 2024-02-13 PROCEDURE — 36415 COLL VENOUS BLD VENIPUNCTURE: CPT

## 2024-02-13 RX ORDER — MULTIVITAMIN WITH IRON
1 TABLET ORAL DAILY
Qty: 30 TABLET | Refills: 0 | Status: SHIPPED | OUTPATIENT
Start: 2024-02-14 | End: 2024-03-15

## 2024-02-13 RX ORDER — HYDROXYZINE PAMOATE 25 MG/1
25 CAPSULE ORAL 3 TIMES DAILY PRN
Qty: 42 CAPSULE | Refills: 0 | Status: SHIPPED | OUTPATIENT
Start: 2024-02-13 | End: 2024-02-27

## 2024-02-13 RX ORDER — NITROGLYCERIN 0.4 MG/1
0.4 TABLET SUBLINGUAL EVERY 5 MIN PRN
Qty: 25 TABLET | Refills: 3 | Status: SHIPPED | OUTPATIENT
Start: 2024-02-13

## 2024-02-13 RX ADMIN — SODIUM CHLORIDE, POTASSIUM CHLORIDE, SODIUM LACTATE AND CALCIUM CHLORIDE: 600; 310; 30; 20 INJECTION, SOLUTION INTRAVENOUS at 02:37

## 2024-02-13 RX ADMIN — SODIUM BICARBONATE 650 MG: 650 TABLET ORAL at 15:03

## 2024-02-13 RX ADMIN — MULTIVITAMIN TABLET 1 TABLET: TABLET at 09:33

## 2024-02-13 RX ADMIN — SUCRALFATE 1 G: 1 TABLET ORAL at 09:33

## 2024-02-13 RX ADMIN — PANTOPRAZOLE SODIUM 40 MG: 40 INJECTION, POWDER, FOR SOLUTION INTRAVENOUS at 09:33

## 2024-02-13 RX ADMIN — SODIUM BICARBONATE 650 MG: 650 TABLET ORAL at 09:33

## 2024-02-13 RX ADMIN — CYANOCOBALAMIN TAB 1000 MCG 1000 MCG: 1000 TAB at 09:33

## 2024-02-13 RX ADMIN — METOPROLOL SUCCINATE 25 MG: 25 TABLET, EXTENDED RELEASE ORAL at 09:33

## 2024-02-13 RX ADMIN — FINASTERIDE 5 MG: 5 TABLET, FILM COATED ORAL at 09:34

## 2024-02-13 RX ADMIN — SUCRALFATE 1 G: 1 TABLET ORAL at 15:00

## 2024-02-13 NOTE — PROGRESS NOTES
OCCUPATIONAL THERAPY INITIAL EVALUATION    [facility]   [address] [facility]        Date:2024                                                  Patient Name: Uzair Fuentes    MRN: [id number]    : 1940    Room: [address] [place] [address]     Evaluating OT: Sheeba Robb OTR/L #[id number]    Referring Provider:  Dave Heredia DO     Specific Provider Orders/Date:  OT Eval and Treat , 2024     Diagnosis:   1. Gastrointestinal hemorrhage, unspecified gastrointestinal hemorrhage type    2. Symptomatic anemia         Surgery: None      Pertinent Medical History: Metastatic renal cell carcinoma, HTN      Precautions:  Fall Risk, falls and alarm      Assessment of current deficits    [x] Functional mobility  [x]ADLs  [x] Strength               [x]Cognition    [x] Functional transfers   [x] IADLs         [x] Safety Awareness   [x]Endurance    [] Fine Coordination              [x] Balance      [] Vision/perception   []Sensation     []Gross Motor Coordination  [] ROM  [] Delirium                   [] Motor Control     OT PLAN OF CARE   OT POC based on physician orders, patient diagnosis and results of clinical assessment    Frequency/Duration 1-3 days/wk for 2 weeks PRN     Specific OT Treatment Interventions to include:   * Instruction/training on adapted ADL techniques and AE recommendations to increase functional independence within precautions       * Training on energy conservation strategies, correct breathing pattern and techniques to improve independence/tolerance for self-care routine  * Functional transfer/mobility training/DME recommendations for increased independence, safety, and fall prevention  * Patient/Family education to increase follow through with safety techniques and functional independence  * Recommendation of environmental modifications for increased safety with functional transfers/mobility and ADLs  * Cognitive  retraining/development of therapeutic activities to improve problem solving, judgement, memory, and attention for increased safety/participation in ADL/IADL tasks  * Therapeutic exercise to improve motor endurance, ROM, and functional strength for ADLs/functional transfers  * Therapeutic activities to facilitate/challenge dynamic balance, stand tolerance for increased safety and independence with ADLs  * Therapeutic activities to facilitate gross/fine motor skills for increased independence with ADLs  * Positioning to improve skin integrity, interaction with environment and functional independence    Recommended Adaptive Equipment: TBD      Home Living: Lives alone, single family home, 1 story.         Equipment owned: Wheeled walker     Prior Level of Function: Pt independent with ADLs and mobility at baseline. Patient presented from [facility] and has been requiring assist with ADLs and using fww for mobility.     Pain Level: Tailbone pain - chronic     Cognition: A&O: 3/4; Follows 3 step directions   Memory: fair- ; mild confusion    Sequencing: fair    Problem solving: fair-   Judgement/safety: fair-     Functional Assessment: AM-PAC Daily Activity Raw Score: 14/24   Initial Eval Status  Date: 2/13/24   Treatment Status  Date:  STGs = LTGs  Time frame: 10-14 days   Feeding Supervision     Independent    Grooming Minimal Assist     Supervision    UB Dressing Minimal Assist    Supervision    LB Dressing Moderate Assist   To don/doff briefs over hips     Supervision    Bathing Moderate Assist     Supervision    Toileting Maximal/Dependent Assist   For rear hygiene standing at commode. Loose/liquid stool noted upon standing initially from EOB - briefs donned for mobility and doffed prior to RTB.      Supervision    Bed Mobility  Supine to sit: Minimal Assist   Sit to supine: Minimal Assist     Supine to sit: Independent   Sit to supine: Independent    Functional Transfers Sit to stand: Minimal Assist   Stand to sit:

## 2024-02-13 NOTE — PROGRESS NOTES
PROGRESS NOTE  By Law Daniel M.D.    [facility]  Dr. Leopoldo Contreras M.D.,  Dr. Abraham Cuello M.D.,   Dr. Franc Diaz D.O.,  Dr. Sánchez Ho M.D.,  Dr. Dave Heredia D.O.,          Uzair Fuentes  83 y.o.  male    SUBJECTIVE:  Denies abdominal pain.  Denies nausea or vomiting    OBJECTIVE:    BP (!) 140/65   Pulse 73   Temp 97.7 °F (36.5 °C) (Oral)   Resp 16   Ht 1.803 m (5' 11\")   Wt 82 kg (180 lb 12.4 oz)   SpO2 99%   BMI 25.21 kg/m²     General: NAD/older adult  male.  HEENT: Anicteric sclera/moist oral mucosa  Neck: Supple with trachea midline  Chest: Symmetrical excursion/nonlabored respirations  Cor: Regular  Abd.: Soft.  Appears nontender and nondistended  Extr.:  Decreased muscle tone and bulk, consistent with age and condition  Skin: Warm and dry/anicteric      DATA:    Monitor data reviewed -sinus rhythm noted.       Lab Results   Component Value Date/Time    WBC 3.4 02/13/2024 04:33 AM    RBC 2.81 02/13/2024 04:33 AM    HGB 8.3 02/13/2024 04:33 AM    HCT 26.4 02/13/2024 04:33 AM    MCV 94.0 02/13/2024 04:33 AM    MCH 29.5 02/13/2024 04:33 AM    MCHC 31.4 02/13/2024 04:33 AM    RDW 17.5 02/13/2024 04:33 AM     02/13/2024 04:33 AM    MPV 10.2 02/13/2024 04:33 AM     Lab Results   Component Value Date/Time     02/13/2024 04:33 AM    K 3.6 02/13/2024 04:33 AM    K 4.4 10/18/2022 04:50 AM     02/13/2024 04:33 AM    CO2 22 02/13/2024 04:33 AM    BUN 14 02/13/2024 04:33 AM    CREATININE 1.5 02/13/2024 04:33 AM    CALCIUM 10.2 02/13/2024 04:33 AM    PROT 4.6 02/09/2024 09:55 AM    LABALBU 2.6 02/09/2024 09:55 AM    BILITOT 0.2 02/09/2024 09:55 AM    ALKPHOS 107 02/09/2024 09:55 AM    AST 22 02/09/2024 09:55 AM    ALT 20 02/09/2024 09:55 AM     No results found for: \"LIPASE\"  No results found for: \"AMYLASE\"      ASSESSMENT/PLAN:  Patient Active Problem List   Diagnosis    Normocytic anemia    Rectal bleed    GI bleed    Chest pain    PVC's (premature

## 2024-02-13 NOTE — DISCHARGE SUMMARY
Discharge Summary    Date: 2/13/2024  Patient Name: Uzair Fuentes    YOB: 1940     Age: 83 y.o.    Admit Date: 2/9/2024  Discharge Date: 2/13/2024  Discharge Condition: Fair    Admission Diagnosis  Acute blood loss anemia [D62];Symptomatic anemia [D64.9];Gastrointestinal hemorrhage, unspecified gastrointestinal hemorrhage type [K92.2]      Discharge Diagnosis  Principal Problem:    Acute blood loss anemia  Active Problems:    Metastatic renal cell carcinoma (HCC)    First degree AV block    Cardiomyopathy (HCC)    AV block, Mobitz 1    On apixaban therapy    Deep vein thrombosis (DVT) of left lower extremity (HCC)  Resolved Problems:    * No resolved hospital problems. *      Hospital Stay  Narrative of Hospital Course:  GI blood loss with non diagnostic results. GI service feels that anticoagulants may be the issue.    Consultants:  IP CONSULT TO GI  IP CONSULT TO FAMILY MEDICINE  IP CONSULT TO CARDIOLOGY  IP CONSULT TO ONCOLOGY    Surgeries/procedures Performed:    -EGD 2/10/2024 by Dr. Heredia showing normal examined esophagus, moderate gastritis, normal examined proximal small bowel  -Colonoscopy 2/12/2024 with diverticular disease transverse/descending/sigmoid colon otherwise unremarkable with fair prep       Treatments:    Procedures    Other    Discharge Plan/Disposition:  To [facility]    Hospital/Incidental Findings Requiring Follow Up:    Patient Instructions:    Diet: Regular Diet    Activity:Activity as Tolerated  For number of days (if applicable):      Other Instructions:    Provider Follow-Up:   No follow-ups on file.     Significant Diagnostic Studies:    Recent Labs:  Admission on 02/09/2024  No results displayed because visit has over 200 results.    ------------    Radiology last 7 days:  Vascular duplex lower extremity venous bilateral    Result Date: 2/9/2024  No evidence of DVT in either lower extremity.     CT ABDOMEN PELVIS WO CONTRAST Additional Contrast?  0    metoprolol succinate (TOPROL XL) 25 MG extended release tablet  Take 1 tablet by mouth 2 times daily  Qty: 30 tablet Refills: 3    zinc sulfate (ZINCATE) 220 (50 Zn)  mg capsule - elemental zinc  Take 1 capsule by mouth daily  Qty: 30 capsule Refills: 3    apixaban (ELIQUIS) 2.5 MG TABS tablet  Take 1 tablet by mouth 2 times daily  Qty: 60 tablet Refills: 5    pantoprazole (PROTONIX) 40 MG tablet  Take 1 tablet by mouth 2 times daily (before meals)  Qty: 60 tablet Refills: 0    sucralfate (CARAFATE) 1 GM tablet  Take 1 tablet by mouth 4 times daily  Qty: 120 tablet Refills: 0    cyanocobalamin 1000 MCG tablet  Take 1 tablet by mouth daily    finasteride (PROSCAR) 5 MG tablet  Take 1 tablet by mouth daily    lidocaine (LMX) 4 % cream  Apply topically daily as needed for Pain (BACL, KNEES, SHOULDER)    loratadine (CLARITIN) 10 MG tablet  Take 1 tablet by mouth daily    senna (SENOKOT) 8.6 MG tablet  Take 1 tablet by mouth daily as needed for Constipation    tamsulosin (FLOMAX) 0.4 MG capsule  Take 1 capsule by mouth nightly    urea (CARMOL) 20 % cream  Apply topically every 12 hours as needed (HAND PAIN)    aspirin 81 MG chewable tablet  Take 1 tablet by mouth daily    atorvastatin (LIPITOR) 40 MG tablet  Take 1 tablet by mouth nightly    brinzolamide-brimonidine 1-0.2 % SUSP  Place 1 drop into both eyes 2 times daily    latanoprost (XALATAN) 0.005 % ophthalmic solution  Place 1 drop into both eyes nightly          Current Discharge Medication List    STOP taking these medications    MENTHOL-METHYL SALICYLATE EX  Comments:  Reason for Stopping:    Multiple Vitamins-Minerals (THERAPEUTIC MULTIVITAMIN-MINERALS) tablet  Comments:  Reason for Stopping:          Time Spent on Discharge:  32 minutes were spent in patient examination, evaluation, counseling as well as medication reconciliation, prescriptions for required medications, discharge plan, and follow up.    Electronically signed by Sven Dyer MD on

## 2024-02-13 NOTE — PROGRESS NOTES
[facility]  [facility]  [address] [facility] [address]  Dept: [phone]  Loc: [phone]  Attending Progress Note      Reason for Visit:   Patient with metastatic RCC, patient was on eliquis for provoked dvt. however venous us are negative. does he still need eliquis or would need IVC filter?    Referring Physician:  Corine Nicole DO    PCP:  Guilherme Salgado DO          Subjective:  No major events. He had colonoscopy done yesterday.   Denies of any bleeding.     HPI from Initial Inpatient Consultation (2/11/24):   Mr. Fuentes is a pleasant 83-year-old gentleman, with a past medical history significant for CKD, MGUS, hyperlipidemia, hypertension, CAD, PVCs, and history of iron deficiency anemia, who had presented with left flank and back pain, he had CT scan of the abdomen the pelvis done on 3/20/2023, revealing retrocrural and retroperitoneal lymphadenopathy, multiple bilateral renal cysts, multiple bilateral hyperdense renal lesions, probable hemorrhagic cysts, CT scan of the lumbar spine had revealed a large conglomeration of soft tissue mass in the retroperitoneum, concerning for malignancy, hyperdense lesions in the kidneys, more on the left side, PET/CT scan was done on 2/12/2023, revealing retroperitoneal lymphadenopathy, measuring up to 8 cm, possible uptake in both parotid glands and left base of the neck, the patient had a CT-guided biopsy of the retroperitoneal lymphadenopathy on August 23, 2023, pathology was consistent with metastatic renal cell carcinoma, could not differentiate between clear and unclear cell carcinoma, [facility] was [facility] Opdivo.  The patient was started on Eliquis for left lower extremity DVT.  He was recently admitted to the hospital with profound anemia, hemoglobin 4.8G/DL, he received packed RBC transfusion, he is doing well at this time, he denies any bleeding.  On 11/21/2023, the patient was  CT-guided biopsy of the retroperitoneal lymphadenopathy on August 23, 2023, pathology was consistent with metastatic renal cell carcinoma, could not differentiate between clear and unclear cell carcinoma, the patient was [facility], was recommended Opdivo.  The patient was started on Eliquis for left lower extremity DVT.  He was recently admitted to the hospital with profound anemia, hemoglobin 4.8G/DL, he received packed RBC transfusion, he is doing well at this time, he denies any bleeding.  On 11/21/2023, the patient was started on systemic therapy, Opdivo, received Opdivo last on 1/2/2024.  The patient was brought to the ED on 2/9/2024 due to anemia on routine blood work, hemoglobin 6.1, white count 3.9, creatinine 1.6, calcium 11, CT scan of the abdomen the pelvis was done, revealing decrease in the size of the bulky retroperitoneal adenopathy, chronic pancreatitis, bilateral lower extremities venous ultrasound was negative for DVTs.  The patient had an EGD done revealing gastritis, no bleeding.    -Patient with metastatic RCC, on systemic palliative therapy with Opdivo, CT scan reviewed, revealing positive response to treatment with decrease of the size of the lymph nodes.  -Recurrent anemia, suspected GI bleed in the setting of anticoagulation, EGD was negative  -colonoscopy done yesterday (02/13/24); largely unremarkable apart from finding diverticula  -Anemia workup reviewed, has evidence of iron deficiency, iron 9, TIBC 108, TSAT 8%, also zinc and copper deficiency  -given Ferrlecit yesterday  - copper and zinc supplement.   - Continue to monitor his counts, transfuse with packed RBCs to keep his hemoglobin greater than 7G/DL.  -Left lower extremity DVT, diagnosed on 10/10/2023, was provoked by decreased mobility and malignancy, repeat venous ultrasound was negative for residual thrombosis, agree with holding of anticoagulation in the setting of recurrent anemia, in light of the metastatic

## 2024-02-13 NOTE — ANESTHESIA POSTPROCEDURE EVALUATION
Department of Anesthesiology  Postprocedure Note    Patient: Uzair Fuentes  MRN: [id number]  YOB: 1940  Date of evaluation: 2/13/2024    Procedure Summary       Date: 02/12/24 Room / Location: [place] [address] / [facility]    Anesthesia Start: 1450 Anesthesia Stop: 1507    Procedure: COLONOSCOPY DIAGNOSTIC Diagnosis:       Anemia, unspecified type      (Anemia, unspecified type [D64.9])    Surgeons: Dave Heredia DO Responsible Provider: Hector Hernandez MD    Anesthesia Type: MAC ASA Status: 4            Anesthesia Type: No value filed.    Willian Phase I: Willian Score: 10    Willian Phase II: Willian Score: 10    Anesthesia Post Evaluation    Patient location during evaluation: PACU  Patient participation: complete - patient participated  Level of consciousness: awake and alert  Pain score: 2  Airway patency: patent  Nausea & Vomiting: no nausea and no vomiting  Cardiovascular status: blood pressure returned to baseline  Respiratory status: acceptable  Hydration status: euvolemic    No notable events documented.

## 2024-02-13 NOTE — PLAN OF CARE

## 2024-02-13 NOTE — CARE COORDINATION
2/13/2024  Social Work Discharge Planning: Pt is from [facility] and can return pending bed availability. Will need a precert -awaiting therapy evals. Electronically signed by JOSE Ruiz on 2/13/2024 at 9:10 AM      2/13/2024  Social Work Discharge Planning:SW prompted [facility] liaison to start precert. Electronically signed by JOSE Ruiz on 2/13/2024 at 12:19 PM    2/13/2024  Social Work Discharge Planning:Auth received for Pt to go to [facility]. DOMINIQUE set up ambulette transport via [facility] for Pt to go between 4:30-5pm today.Sw notified [facility] liaison, nurse here and daughter Lester. Electronically signed by JOSE Ruiz on 2/13/2024 at 3:22 PM

## 2024-02-13 NOTE — PROGRESS NOTES
Physical Therapy  Facility/Department: [address] MED SURG/TELE  Physical Therapy Initial Assessment    Name: Uzair Fuentes  : 1940  MRN: [id number]  Date of Service: 2024          Patient Diagnosis(es): The primary encounter diagnosis was Gastrointestinal hemorrhage, unspecified gastrointestinal hemorrhage type. A diagnosis of Symptomatic anemia was also pertinent to this visit.  Past Medical History:  has a past medical history of Cancer (HCC), Disease of blood and blood forming organ, and Hypertension.  Past Surgical History:  has a past surgical history that includes Abdominal aortic aneurysm repair; Upper gastrointestinal endoscopy (N/A, 10/14/2022); Total knee arthroplasty (Bilateral); Tonsillectomy; Colonoscopy; Upper gastrointestinal endoscopy (N/A, 2/10/2024); and Colonoscopy (N/A, 2024).         Requires PT Follow-Up: Yes   Evaluating Therapist: Alina Gan PT     Referring Provider:  Sven Dyer MD     PT order : PT eval and treat     Room #: 543  DIAGNOSIS: The primary encounter diagnosis was Gastrointestinal hemorrhage, unspecified gastrointestinal hemorrhage type. A diagnosis of Symptomatic anemia was also pertinent to this visit.  PRECAUTIONS: falls     Social:  Pt lives alone  in a  1  floor plan , per pt   Prior to admission pt walked with ww.    Pt admitted from SNF      Initial Evaluation  Date:  2024  Treatment      Short Term/ Long Term   Goals   Was pt agreeable to Eval/treatment?  Yes      Does pt have pain? Back and tailbone pain      Bed Mobility  Rolling:  SBA with rails   Supine to sit:  min assist   Sit to supine:  min assist   Scooting:  min assist in sit    SBA    Transfers Sit to stand:  min assist   Stand to sit: min assist   Stand pivot:  NT    SBA    Ambulation   NT    50  feet with  ww  with  SBA        Stair negotiation: ascended and descended NT   TBA    LE ROM  WFL     LE strength  4-/ 5   4/ 5    AM- PAC RAW score   15/ 24            Pt is alert    [] ROM to improve independence with functional mobility   [x] Balance Training to improve static/dynamic balance and to reduce fall risk  [x] Endurance Training to improve activity tolerance during functional mobility   [x] Transfer Training to improve safety and independence with all functional transfers   [x] Gait Training to improve gait mechanics, endurance and assess need for appropriate assistive device  [] Stair Training in preparation for safe discharge home and/or into the community   [x] Positioning to prevent skin breakdown and contractures  [] Safety and Education Training   [] Patient/Caregiver Education   [] HEP  [] Other     Frequency of treatments will be 2-5x/week x 3-10 days.    Time in:  1006   Time out:  1023       Evaluation Time includes thorough review of current medical information, gathering information on past medical history/social history and prior level of function, completion of standardized testing/informal observation of tasks, assessment of data and education on plan of care and goals.    CPT codes:  [x] Low Complexity PT evaluation 00705  [] Moderate Complexity PT evaluation 66811  [] High Complexity PT evaluation 73735  [] PT Re-evaluation 97307  [] Gait training 01320  minutes  [] Therapeutic activities 94720  minutes  [] Therapeutic exercises 60311  minutes  [] Neuromuscular reeducation 26336  minutes       Alina Gan  License number:  [id number]

## 2024-02-14 LAB
ALBUMIN SERPL-MCNC: 2.6 G/DL (ref 3.5–5.2)
ALP SERPL-CCNC: 116 U/L (ref 40–129)
ALT SERPL-CCNC: 16 U/L (ref 0–40)
ANION GAP SERPL CALCULATED.3IONS-SCNC: 10 MMOL/L (ref 7–16)
AST SERPL-CCNC: 19 U/L (ref 0–39)
BILIRUB SERPL-MCNC: 0.3 MG/DL (ref 0–1.2)
BUN SERPL-MCNC: 14 MG/DL (ref 6–23)
CALCIUM SERPL-MCNC: 10.6 MG/DL (ref 8.6–10.2)
CHLORIDE SERPL-SCNC: 113 MMOL/L (ref 98–107)
CHOLEST SERPL-MCNC: 102 MG/DL
CO2 SERPL-SCNC: 21 MMOL/L (ref 22–29)
CREAT SERPL-MCNC: 1.5 MG/DL (ref 0.7–1.2)
ERYTHROCYTE [DISTWIDTH] IN BLOOD BY AUTOMATED COUNT: 18 % (ref 11.5–15)
GFR SERPL CREATININE-BSD FRML MDRD: 45 ML/MIN/1.73M2
GLUCOSE SERPL-MCNC: 77 MG/DL (ref 74–99)
HCT VFR BLD AUTO: 23.8 % (ref 37–54)
HDLC SERPL-MCNC: 41 MG/DL
HGB BLD-MCNC: 7.4 G/DL (ref 12.5–16.5)
LDLC SERPL CALC-MCNC: 49 MG/DL
MCH RBC QN AUTO: 29.4 PG (ref 26–35)
MCHC RBC AUTO-ENTMCNC: 31.1 G/DL (ref 32–34.5)
MCV RBC AUTO: 94.4 FL (ref 80–99.9)
PLATELET # BLD AUTO: 186 K/UL (ref 130–450)
PMV BLD AUTO: 10.7 FL (ref 7–12)
POTASSIUM SERPL-SCNC: 3.4 MMOL/L (ref 3.5–5)
PROT SERPL-MCNC: 4.6 G/DL (ref 6.4–8.3)
RBC # BLD AUTO: 2.52 M/UL (ref 3.8–5.8)
SODIUM SERPL-SCNC: 144 MMOL/L (ref 132–146)
TRIGL SERPL-MCNC: 61 MG/DL
VLDLC SERPL CALC-MCNC: 12 MG/DL
WBC OTHER # BLD: 3.5 K/UL (ref 4.5–11.5)

## 2024-02-15 ENCOUNTER — OUTSIDE SERVICES (OUTPATIENT)
Dept: PRIMARY CARE CLINIC | Age: 84
End: 2024-02-15
Payer: OTHER GOVERNMENT

## 2024-02-15 DIAGNOSIS — R53.1 GENERALIZED WEAKNESS: ICD-10-CM

## 2024-02-15 DIAGNOSIS — D62 ACUTE BLOOD LOSS ANEMIA: Primary | ICD-10-CM

## 2024-02-15 DIAGNOSIS — C64.9 METASTATIC RENAL CELL CARCINOMA, UNSPECIFIED LATERALITY (HCC): ICD-10-CM

## 2024-02-15 DIAGNOSIS — Z86.718 HX OF DEEP VENOUS THROMBOSIS: ICD-10-CM

## 2024-02-15 DIAGNOSIS — D64.9 ACUTE ON CHRONIC ANEMIA: ICD-10-CM

## 2024-02-15 DIAGNOSIS — Z91.81 AT MODERATE RISK FOR FALL: ICD-10-CM

## 2024-02-15 DIAGNOSIS — C64.9 RENAL CELL CARCINOMA, UNSPECIFIED LATERALITY (HCC): ICD-10-CM

## 2024-02-15 DIAGNOSIS — L89.309 PRESSURE INJURY OF SKIN OF BUTTOCK, UNSPECIFIED INJURY STAGE, UNSPECIFIED LATERALITY: ICD-10-CM

## 2024-02-15 DIAGNOSIS — E87.6 HYPOKALEMIA: ICD-10-CM

## 2024-02-15 DIAGNOSIS — R53.81 PHYSICAL DECONDITIONING: ICD-10-CM

## 2024-02-15 DIAGNOSIS — I10 PRIMARY HYPERTENSION: ICD-10-CM

## 2024-02-15 DIAGNOSIS — K92.2 GASTROINTESTINAL HEMORRHAGE, UNSPECIFIED GASTROINTESTINAL HEMORRHAGE TYPE: ICD-10-CM

## 2024-02-15 DIAGNOSIS — N18.32 STAGE 3B CHRONIC KIDNEY DISEASE (HCC): ICD-10-CM

## 2024-02-15 DIAGNOSIS — Z51.5 PALLIATIVE CARE BY SPECIALIST: ICD-10-CM

## 2024-02-15 DIAGNOSIS — I44.0 FIRST DEGREE AV BLOCK: ICD-10-CM

## 2024-02-15 LAB
ALBUMIN SERPL-MCNC: 2.7 G/DL (ref 3.5–5.2)
ALP SERPL-CCNC: 120 U/L (ref 40–129)
ALT SERPL-CCNC: 15 U/L (ref 0–40)
ANION GAP SERPL CALCULATED.3IONS-SCNC: 8 MMOL/L (ref 7–16)
AST SERPL-CCNC: 20 U/L (ref 0–39)
BILIRUB SERPL-MCNC: 0.2 MG/DL (ref 0–1.2)
BUN SERPL-MCNC: 16 MG/DL (ref 6–23)
CALCIUM SERPL-MCNC: 10 MG/DL (ref 8.6–10.2)
CHLORIDE SERPL-SCNC: 112 MMOL/L (ref 98–107)
CO2 SERPL-SCNC: 21 MMOL/L (ref 22–29)
CREAT SERPL-MCNC: 1.5 MG/DL (ref 0.7–1.2)
ERYTHROCYTE [DISTWIDTH] IN BLOOD BY AUTOMATED COUNT: 18.3 % (ref 11.5–15)
GFR SERPL CREATININE-BSD FRML MDRD: 48 ML/MIN/1.73M2
GLUCOSE SERPL-MCNC: 84 MG/DL (ref 74–99)
HCT VFR BLD AUTO: 23.9 % (ref 37–54)
HGB BLD-MCNC: 7.5 G/DL (ref 12.5–16.5)
MCH RBC QN AUTO: 29.8 PG (ref 26–35)
MCHC RBC AUTO-ENTMCNC: 31.4 G/DL (ref 32–34.5)
MCV RBC AUTO: 94.8 FL (ref 80–99.9)
PLATELET # BLD AUTO: 172 K/UL (ref 130–450)
PMV BLD AUTO: 10.9 FL (ref 7–12)
POTASSIUM SERPL-SCNC: 3.6 MMOL/L (ref 3.5–5)
PROT SERPL-MCNC: 4.7 G/DL (ref 6.4–8.3)
RBC # BLD AUTO: 2.52 M/UL (ref 3.8–5.8)
SODIUM SERPL-SCNC: 141 MMOL/L (ref 132–146)
WBC OTHER # BLD: 3.8 K/UL (ref 4.5–11.5)

## 2024-02-15 PROCEDURE — 99305 1ST NF CARE MODERATE MDM 35: CPT | Performed by: STUDENT IN AN ORGANIZED HEALTH CARE EDUCATION/TRAINING PROGRAM

## 2024-02-15 NOTE — PROGRESS NOTES
Uzair Fuentes (:  1940) is a 83 y.o. male.    Subjective   SUBJECTIVE/OBJECTIVE:  Past Medical History:   Diagnosis Date    Cancer (HCC)     RENAL    Disease of blood and blood forming organ     Hypertension       Past Surgical History:   Procedure Laterality Date    ABDOMINAL AORTIC ANEURYSM REPAIR      COLONOSCOPY      COLONOSCOPY N/A 2024    COLONOSCOPY DIAGNOSTIC performed by Dave Heredia DO at [facility] ENDOSCOPY    TONSILLECTOMY      TOTAL KNEE ARTHROPLASTY Bilateral     UPPER GASTROINTESTINAL ENDOSCOPY N/A 10/14/2022    EGD DIAGNOSTIC ONLY performed by Joshua Avila MD at [facility] ENDOSCOPY    UPPER GASTROINTESTINAL ENDOSCOPY N/A 2/10/2024    EGD ESOPHAGOGASTRODUODENOSCOPY performed by Dave Heredia DO at [facility] OR      Family History   Problem Relation Age of Onset    Heart Disease Father     Heart Attack Father       Social History     Socioeconomic History    Marital status:    Tobacco Use    Smoking status: Former     Current packs/day: 0.00     Types: Cigarettes     Quit date:      Years since quittin.1    Smokeless tobacco: Never   Vaping Use    Vaping Use: Never used   Substance and Sexual Activity    Alcohol use: Never    Drug use: Never     Social Determinants of Health     Food Insecurity: No Food Insecurity (2024)    Hunger Vital Sign     Worried About Running Out of Food in the Last Year: Never true     Ran Out of Food in the Last Year: Never true   Transportation Needs: No Transportation Needs (2024)    PRAPARE - Transportation     Lack of Transportation (Medical): No     Lack of Transportation (Non-Medical): No   Housing Stability: Low Risk  (2024)    Housing Stability Vital Sign     Unable to Pay for Housing in the Last Year: No     Number of Places Lived in the Last Year: 1     Unstable Housing in the Last Year: No        HPI    Uzair Fuentes is a very pleasant and cooperative 83 year old male.  Hospital course discussed.  Patient states

## 2024-02-16 ENCOUNTER — TELEPHONE (OUTPATIENT)
Dept: CASE MANAGEMENT | Age: 84
End: 2024-02-16

## 2024-02-16 ENCOUNTER — HOSPITAL ENCOUNTER (OUTPATIENT)
Dept: INFUSION THERAPY | Age: 84
Discharge: HOME | End: 2024-02-16
Payer: OTHER GOVERNMENT

## 2024-02-16 ENCOUNTER — OFFICE VISIT (OUTPATIENT)
Dept: ONCOLOGY | Age: 84
End: 2024-02-16
Payer: OTHER GOVERNMENT

## 2024-02-16 VITALS
DIASTOLIC BLOOD PRESSURE: 68 MMHG | RESPIRATION RATE: 18 BRPM | OXYGEN SATURATION: 100 % | HEART RATE: 70 BPM | TEMPERATURE: 97.5 F | SYSTOLIC BLOOD PRESSURE: 138 MMHG

## 2024-02-16 VITALS
DIASTOLIC BLOOD PRESSURE: 65 MMHG | TEMPERATURE: 96.8 F | HEART RATE: 70 BPM | SYSTOLIC BLOOD PRESSURE: 137 MMHG | OXYGEN SATURATION: 100 % | WEIGHT: 173 LBS | BODY MASS INDEX: 24.22 KG/M2 | HEIGHT: 71 IN

## 2024-02-16 DIAGNOSIS — C64.9 RENAL CELL CARCINOMA, UNSPECIFIED LATERALITY (HCC): Primary | ICD-10-CM

## 2024-02-16 DIAGNOSIS — D64.9 ANEMIA, UNSPECIFIED TYPE: ICD-10-CM

## 2024-02-16 DIAGNOSIS — I82.402 ACUTE DEEP VEIN THROMBOSIS (DVT) OF LEFT LOWER EXTREMITY, UNSPECIFIED VEIN (HCC): ICD-10-CM

## 2024-02-16 PROCEDURE — 3078F DIAST BP <80 MM HG: CPT | Performed by: INTERNAL MEDICINE

## 2024-02-16 PROCEDURE — 99214 OFFICE O/P EST MOD 30 MIN: CPT | Performed by: INTERNAL MEDICINE

## 2024-02-16 PROCEDURE — 3075F SYST BP GE 130 - 139MM HG: CPT | Performed by: INTERNAL MEDICINE

## 2024-02-16 PROCEDURE — 2580000003 HC RX 258: Performed by: INTERNAL MEDICINE

## 2024-02-16 PROCEDURE — 6360000002 HC RX W HCPCS: Performed by: INTERNAL MEDICINE

## 2024-02-16 PROCEDURE — 96413 CHEMO IV INFUSION 1 HR: CPT

## 2024-02-16 PROCEDURE — 1123F ACP DISCUSS/DSCN MKR DOCD: CPT | Performed by: INTERNAL MEDICINE

## 2024-02-16 RX ORDER — ACETAMINOPHEN 325 MG/1
650 TABLET ORAL
Status: CANCELLED | OUTPATIENT
Start: 2024-02-16

## 2024-02-16 RX ORDER — SODIUM CHLORIDE 9 MG/ML
INJECTION, SOLUTION INTRAVENOUS CONTINUOUS
Status: CANCELLED | OUTPATIENT
Start: 2024-02-16

## 2024-02-16 RX ORDER — ONDANSETRON 2 MG/ML
8 INJECTION INTRAMUSCULAR; INTRAVENOUS
Status: CANCELLED | OUTPATIENT
Start: 2024-02-16

## 2024-02-16 RX ORDER — ALBUTEROL SULFATE 90 UG/1
4 AEROSOL, METERED RESPIRATORY (INHALATION) PRN
Status: CANCELLED | OUTPATIENT
Start: 2024-02-16

## 2024-02-16 RX ORDER — FAMOTIDINE 10 MG/ML
20 INJECTION, SOLUTION INTRAVENOUS
Status: CANCELLED | OUTPATIENT
Start: 2024-02-16

## 2024-02-16 RX ORDER — SODIUM CHLORIDE 9 MG/ML
5-250 INJECTION, SOLUTION INTRAVENOUS PRN
Status: CANCELLED | OUTPATIENT
Start: 2024-02-16

## 2024-02-16 RX ORDER — SODIUM CHLORIDE 9 MG/ML
5-250 INJECTION, SOLUTION INTRAVENOUS PRN
Status: DISCONTINUED | OUTPATIENT
Start: 2024-02-16 | End: 2024-02-17 | Stop reason: HOSPADM

## 2024-02-16 RX ORDER — SODIUM CHLORIDE 0.9 % (FLUSH) 0.9 %
5-40 SYRINGE (ML) INJECTION PRN
Status: CANCELLED | OUTPATIENT
Start: 2024-02-16

## 2024-02-16 RX ORDER — HEPARIN SODIUM (PORCINE) LOCK FLUSH IV SOLN 100 UNIT/ML 100 UNIT/ML
500 SOLUTION INTRAVENOUS PRN
Status: CANCELLED | OUTPATIENT
Start: 2024-02-16

## 2024-02-16 RX ORDER — EPINEPHRINE 1 MG/ML
0.3 INJECTION, SOLUTION, CONCENTRATE INTRAVENOUS PRN
Status: CANCELLED | OUTPATIENT
Start: 2024-02-16

## 2024-02-16 RX ORDER — MEPERIDINE HYDROCHLORIDE 50 MG/ML
12.5 INJECTION INTRAMUSCULAR; INTRAVENOUS; SUBCUTANEOUS PRN
Status: CANCELLED | OUTPATIENT
Start: 2024-02-16

## 2024-02-16 RX ORDER — SODIUM CHLORIDE 0.9 % (FLUSH) 0.9 %
5-40 SYRINGE (ML) INJECTION PRN
Status: DISCONTINUED | OUTPATIENT
Start: 2024-02-16 | End: 2024-02-17 | Stop reason: HOSPADM

## 2024-02-16 RX ORDER — DIPHENHYDRAMINE HYDROCHLORIDE 50 MG/ML
50 INJECTION INTRAMUSCULAR; INTRAVENOUS
Status: CANCELLED | OUTPATIENT
Start: 2024-02-16

## 2024-02-16 RX ADMIN — SODIUM CHLORIDE 50 ML/HR: 9 INJECTION, SOLUTION INTRAVENOUS at 15:02

## 2024-02-16 RX ADMIN — SODIUM CHLORIDE 240 MG: 9 INJECTION, SOLUTION INTRAVENOUS at 15:03

## 2024-02-16 NOTE — PROGRESS NOTES
[facility] PHYSICIANS [facility] MED ONCOLOGY  [address]  [facility] [address]  Dept: [phone]  Loc: [phone]  Attending progress note      Reason for Visit:   Metastatic renal cell carcinoma.    Referring Physician:  [facility]    PCP:  Guilherme Salgado DO    History of Present Illness:    Mr. Fuentes is a pleasant 83-year-old gentleman, with a past medical history significant for CKD, MGUS, hyperlipidemia, hypertension, CAD, PVCs, and history of iron deficiency anemia, who had presented with left flank and back pain, he had CT scan of the abdomen the pelvis done on 3/20/2023, revealing retrocrural and retroperitoneal lymphadenopathy, multiple bilateral renal cysts, multiple bilateral hyperdense renal lesions, probable hemorrhagic cysts, CT scan of the lumbar spine had revealed a large conglomeration of soft tissue mass in the retroperitoneum, concerning for malignancy, hyperdense lesions in the kidneys, more on the left side, PET/CT scan was done on 2/12/2023, revealing retroperitoneal lymphadenopathy, measuring up to 8 cm, possible uptake in both parotid glands and left base of the neck, the patient had a CT-guided biopsy of the retroperitoneal lymphadenopathy on August 23, 2023, pathology was consistent with metastatic renal cell carcinoma, could not differentiate between clear and unclear cell carcinoma, [facility] Opdivo.  The patient was started on Eliquis for left lower extremity DVT.   On 11/21/2023, the patient was started on systemic therapy, Opdivo.    The patient was admitted to the hospital earlier this month with acute on chronic anemia, EGD and colonoscopy were done and were negative for bleeding, hospitalization records reviewed.    The patient is feeling better at this time, he is at the facility, he denies any bleeding.    Review of Systems;  CONSTITUTIONAL: No fever, chills.  Decreased appetite in the

## 2024-02-16 NOTE — TELEPHONE ENCOUNTER
Met with patient during his infusion appointment today.  Patient states that he is doing ok and that he hasn't had diarrhea lately.  He states that he eats ok most of the time.  He denies any questions or concerns at this time.  Patient encouraged to reach out if any questions or issues arise.

## 2024-02-16 NOTE — PROGRESS NOTES
Physician Progress Note      PATIENT:               MANI GALLEGO  CSN #:                  [id number]  :                       1940  ADMIT DATE:       2024 9:19 AM  DISCH DATE:        2024 4:45 PM  RESPONDING  PROVIDER #:        Sven Dyer MD          QUERY TEXT:    Patient admitted with GIB. Noted to have severe malnutrition per dietitian   note . If possible, please document in progress notes and discharge   summary if you are evaluating and/or treating any of the following:    The medical record reflects the following:  Risk Factors: advanced age  Clinical Indicators: per dietitian \"...Severe malnutrition, In context of   chronic illness related to altered GI structure (cramping/diarrhea/GIB) as   evidenced by Criteria as identified in malnutrition assessment...\"  Treatment: ONS with meals    ASPEN Criteria:    https://aspenjournals.onlinelibrary.gr.com/doi/full/10.1177/702946792661121  5    Thank you,  Kerrie Estrada RN, BSN, CDIS  Clinical Documentation Integrity  [email or web address]  Options provided:  -- Protein calorie malnutrition severe  -- Other - I will add my own diagnosis  -- Disagree - Not applicable / Not valid  -- Disagree - Clinically unable to determine / Unknown  -- Refer to Clinical Documentation Reviewer    PROVIDER RESPONSE TEXT:    This patient has severe protein calorie malnutrition.    Query created by: Kerrie Estrada on 2/15/2024 6:36 AM      Electronically signed by:  Sven Dyer MD 2024 7:34 AM

## 2024-02-19 LAB
C DIFF GDH + TOXINS A+B STL QL IA.RAPID: NEGATIVE
SPECIMEN DESCRIPTION: NORMAL

## 2024-02-19 NOTE — PROGRESS NOTES
Patient did NOT stop at check out window, left without AVS.  Per [facility] to fax AVS to their facility as they set up patient's transportation.  Faxed AVS with confirmation 02/19/24.

## 2024-02-21 ENCOUNTER — HOSPITAL ENCOUNTER (OUTPATIENT)
Dept: INFUSION THERAPY | Age: 84
End: 2024-02-21

## 2024-02-21 ENCOUNTER — TELEPHONE (OUTPATIENT)
Dept: ONCOLOGY | Age: 84
End: 2024-02-21

## 2024-02-21 LAB
ALBUMIN SERPL-MCNC: 2.4 G/DL (ref 3.5–5.2)
ALBUMIN SERPL-MCNC: 2.8 G/DL (ref 3.5–5.2)
ALP SERPL-CCNC: 114 U/L (ref 40–129)
ALP SERPL-CCNC: 123 U/L (ref 40–129)
ALT SERPL-CCNC: 18 U/L (ref 0–40)
ALT SERPL-CCNC: 21 U/L (ref 0–40)
ANION GAP SERPL CALCULATED.3IONS-SCNC: 12 MMOL/L (ref 7–16)
ANION GAP SERPL CALCULATED.3IONS-SCNC: 4 MMOL/L (ref 7–16)
AST SERPL-CCNC: 22 U/L (ref 0–39)
AST SERPL-CCNC: 23 U/L (ref 0–39)
BILIRUB SERPL-MCNC: 0.2 MG/DL (ref 0–1.2)
BILIRUB SERPL-MCNC: <0.2 MG/DL (ref 0–1.2)
BUN SERPL-MCNC: 19 MG/DL (ref 6–23)
BUN SERPL-MCNC: 20 MG/DL (ref 6–23)
CALCIUM SERPL-MCNC: 9.1 MG/DL (ref 8.6–10.2)
CALCIUM SERPL-MCNC: 9.6 MG/DL (ref 8.6–10.2)
CHLORIDE SERPL-SCNC: 111 MMOL/L (ref 98–107)
CHLORIDE SERPL-SCNC: 111 MMOL/L (ref 98–107)
CO2 SERPL-SCNC: 19 MMOL/L (ref 22–29)
CO2 SERPL-SCNC: 24 MMOL/L (ref 22–29)
CREAT SERPL-MCNC: 1.5 MG/DL (ref 0.7–1.2)
CREAT SERPL-MCNC: 1.6 MG/DL (ref 0.7–1.2)
ERYTHROCYTE [DISTWIDTH] IN BLOOD BY AUTOMATED COUNT: 18.7 % (ref 11.5–15)
ERYTHROCYTE [DISTWIDTH] IN BLOOD BY AUTOMATED COUNT: 18.8 % (ref 11.5–15)
GFR SERPL CREATININE-BSD FRML MDRD: 44 ML/MIN/1.73M2
GFR SERPL CREATININE-BSD FRML MDRD: 46 ML/MIN/1.73M2
GLUCOSE SERPL-MCNC: 85 MG/DL (ref 74–99)
GLUCOSE SERPL-MCNC: 95 MG/DL (ref 74–99)
HCT VFR BLD AUTO: 20.4 % (ref 37–54)
HCT VFR BLD AUTO: 23.4 % (ref 37–54)
HGB BLD-MCNC: 6.3 G/DL (ref 12.5–16.5)
HGB BLD-MCNC: 7.2 G/DL (ref 12.5–16.5)
MCH RBC QN AUTO: 30 PG (ref 26–35)
MCH RBC QN AUTO: 30.1 PG (ref 26–35)
MCHC RBC AUTO-ENTMCNC: 30.8 G/DL (ref 32–34.5)
MCHC RBC AUTO-ENTMCNC: 30.9 G/DL (ref 32–34.5)
MCV RBC AUTO: 97.1 FL (ref 80–99.9)
MCV RBC AUTO: 97.9 FL (ref 80–99.9)
PLATELET # BLD AUTO: 154 K/UL (ref 130–450)
PLATELET # BLD AUTO: 182 K/UL (ref 130–450)
PMV BLD AUTO: 10.9 FL (ref 7–12)
PMV BLD AUTO: 11 FL (ref 7–12)
POTASSIUM SERPL-SCNC: 3 MMOL/L (ref 3.5–5)
POTASSIUM SERPL-SCNC: 3.6 MMOL/L (ref 3.5–5)
PROT SERPL-MCNC: 4.5 G/DL (ref 6.4–8.3)
PROT SERPL-MCNC: 4.9 G/DL (ref 6.4–8.3)
RBC # BLD AUTO: 2.1 M/UL (ref 3.8–5.8)
RBC # BLD AUTO: 2.39 M/UL (ref 3.8–5.8)
SODIUM SERPL-SCNC: 139 MMOL/L (ref 132–146)
SODIUM SERPL-SCNC: 142 MMOL/L (ref 132–146)
WBC OTHER # BLD: 3.1 K/UL (ref 4.5–11.5)
WBC OTHER # BLD: 3.4 K/UL (ref 4.5–11.5)

## 2024-02-21 NOTE — TELEPHONE ENCOUNTER
Per Daina CHAN patient to be cancelled due to reason of patient has to pay for infusion, but patient is [facility].  Front office looking into situation, contacted [facility] and [facility] -([facility] Facility[facility]  [facility] confirmed [facility], spoke w/Audrey @ [facility].

## 2024-02-22 LAB
ALBUMIN SERPL-MCNC: 2.7 G/DL (ref 3.5–5.2)
ALP SERPL-CCNC: 116 U/L (ref 40–129)
ALT SERPL-CCNC: 19 U/L (ref 0–40)
ANION GAP SERPL CALCULATED.3IONS-SCNC: 10 MMOL/L (ref 7–16)
AST SERPL-CCNC: 20 U/L (ref 0–39)
BILIRUB SERPL-MCNC: 0.2 MG/DL (ref 0–1.2)
BUN SERPL-MCNC: 17 MG/DL (ref 6–23)
CALCIUM SERPL-MCNC: 9.6 MG/DL (ref 8.6–10.2)
CHLORIDE SERPL-SCNC: 110 MMOL/L (ref 98–107)
CO2 SERPL-SCNC: 20 MMOL/L (ref 22–29)
CREAT SERPL-MCNC: 1.6 MG/DL (ref 0.7–1.2)
ERYTHROCYTE [DISTWIDTH] IN BLOOD BY AUTOMATED COUNT: 18.6 % (ref 11.5–15)
ERYTHROCYTE [DISTWIDTH] IN BLOOD BY AUTOMATED COUNT: 18.7 % (ref 11.5–15)
GFR SERPL CREATININE-BSD FRML MDRD: 43 ML/MIN/1.73M2
GLUCOSE SERPL-MCNC: 109 MG/DL (ref 74–99)
HCT VFR BLD AUTO: 20.9 % (ref 37–54)
HCT VFR BLD AUTO: 23.5 % (ref 37–54)
HGB BLD-MCNC: 6.5 G/DL (ref 12.5–16.5)
HGB BLD-MCNC: 7.3 G/DL (ref 12.5–16.5)
MCH RBC QN AUTO: 30.2 PG (ref 26–35)
MCH RBC QN AUTO: 30.3 PG (ref 26–35)
MCHC RBC AUTO-ENTMCNC: 31.1 G/DL (ref 32–34.5)
MCHC RBC AUTO-ENTMCNC: 31.1 G/DL (ref 32–34.5)
MCV RBC AUTO: 97.2 FL (ref 80–99.9)
MCV RBC AUTO: 97.5 FL (ref 80–99.9)
PLATELET # BLD AUTO: 176 K/UL (ref 130–450)
PLATELET # BLD AUTO: 182 K/UL (ref 130–450)
PMV BLD AUTO: 10.1 FL (ref 7–12)
PMV BLD AUTO: 11.1 FL (ref 7–12)
POTASSIUM SERPL-SCNC: 3.7 MMOL/L (ref 3.5–5)
PROT SERPL-MCNC: 4.8 G/DL (ref 6.4–8.3)
RBC # BLD AUTO: 2.15 M/UL (ref 3.8–5.8)
RBC # BLD AUTO: 2.41 M/UL (ref 3.8–5.8)
SODIUM SERPL-SCNC: 140 MMOL/L (ref 132–146)
WBC OTHER # BLD: 3.2 K/UL (ref 4.5–11.5)
WBC OTHER # BLD: 3.7 K/UL (ref 4.5–11.5)

## 2024-02-23 ENCOUNTER — HOSPITAL ENCOUNTER (OUTPATIENT)
Dept: INFUSION THERAPY | Age: 84
Discharge: HOME | End: 2024-02-23
Payer: OTHER GOVERNMENT

## 2024-02-23 VITALS
SYSTOLIC BLOOD PRESSURE: 137 MMHG | HEART RATE: 75 BPM | TEMPERATURE: 97.5 F | RESPIRATION RATE: 18 BRPM | DIASTOLIC BLOOD PRESSURE: 60 MMHG | OXYGEN SATURATION: 100 %

## 2024-02-23 DIAGNOSIS — C64.9 METASTATIC RENAL CELL CARCINOMA, UNSPECIFIED LATERALITY (HCC): Primary | ICD-10-CM

## 2024-02-23 PROCEDURE — 96374 THER/PROPH/DIAG INJ IV PUSH: CPT

## 2024-02-23 PROCEDURE — 2580000003 HC RX 258: Performed by: INTERNAL MEDICINE

## 2024-02-23 PROCEDURE — 6360000002 HC RX W HCPCS: Performed by: INTERNAL MEDICINE

## 2024-02-23 RX ORDER — SODIUM CHLORIDE 9 MG/ML
INJECTION, SOLUTION INTRAVENOUS CONTINUOUS
Status: CANCELLED | OUTPATIENT
Start: 2024-02-24

## 2024-02-23 RX ORDER — SODIUM CHLORIDE 9 MG/ML
5-250 INJECTION, SOLUTION INTRAVENOUS PRN
Status: CANCELLED | OUTPATIENT
Start: 2024-02-24

## 2024-02-23 RX ORDER — ONDANSETRON 2 MG/ML
8 INJECTION INTRAMUSCULAR; INTRAVENOUS
Status: CANCELLED | OUTPATIENT
Start: 2024-02-24

## 2024-02-23 RX ORDER — DIPHENHYDRAMINE HYDROCHLORIDE 50 MG/ML
50 INJECTION INTRAMUSCULAR; INTRAVENOUS
Status: CANCELLED | OUTPATIENT
Start: 2024-02-24

## 2024-02-23 RX ORDER — ALBUTEROL SULFATE 90 UG/1
4 AEROSOL, METERED RESPIRATORY (INHALATION) PRN
Status: CANCELLED | OUTPATIENT
Start: 2024-02-24

## 2024-02-23 RX ORDER — EPINEPHRINE 1 MG/ML
0.3 INJECTION, SOLUTION, CONCENTRATE INTRAVENOUS PRN
Status: CANCELLED | OUTPATIENT
Start: 2024-02-24

## 2024-02-23 RX ORDER — SODIUM CHLORIDE 0.9 % (FLUSH) 0.9 %
5-40 SYRINGE (ML) INJECTION PRN
Status: CANCELLED | OUTPATIENT
Start: 2024-02-24

## 2024-02-23 RX ORDER — SODIUM CHLORIDE 0.9 % (FLUSH) 0.9 %
5-40 SYRINGE (ML) INJECTION PRN
Status: DISCONTINUED | OUTPATIENT
Start: 2024-02-23 | End: 2024-02-24 | Stop reason: HOSPADM

## 2024-02-23 RX ORDER — HEPARIN 100 UNIT/ML
500 SYRINGE INTRAVENOUS PRN
Status: CANCELLED | OUTPATIENT
Start: 2024-02-24

## 2024-02-23 RX ORDER — ACETAMINOPHEN 325 MG/1
650 TABLET ORAL
Status: CANCELLED | OUTPATIENT
Start: 2024-02-24

## 2024-02-23 RX ORDER — SODIUM CHLORIDE 9 MG/ML
5-250 INJECTION, SOLUTION INTRAVENOUS PRN
Status: DISCONTINUED | OUTPATIENT
Start: 2024-02-23 | End: 2024-02-24 | Stop reason: HOSPADM

## 2024-02-23 RX ADMIN — SODIUM CHLORIDE, PRESERVATIVE FREE 10 ML: 5 INJECTION INTRAVENOUS at 11:17

## 2024-02-23 RX ADMIN — SODIUM CHLORIDE 30 ML/HR: 9 INJECTION, SOLUTION INTRAVENOUS at 11:18

## 2024-02-23 RX ADMIN — IRON SUCROSE 200 MG: 20 INJECTION, SOLUTION INTRAVENOUS at 11:24

## 2024-02-23 ASSESSMENT — PAIN DESCRIPTION - DESCRIPTORS: DESCRIPTORS: ACHING

## 2024-02-23 ASSESSMENT — PAIN DESCRIPTION - ONSET: ONSET: ON-GOING

## 2024-02-23 ASSESSMENT — PAIN DESCRIPTION - ORIENTATION: ORIENTATION: MID

## 2024-02-23 ASSESSMENT — PAIN - FUNCTIONAL ASSESSMENT: PAIN_FUNCTIONAL_ASSESSMENT: ACTIVITIES ARE NOT PREVENTED

## 2024-02-23 ASSESSMENT — PAIN DESCRIPTION - PAIN TYPE: TYPE: OTHER (COMMENT)

## 2024-02-23 ASSESSMENT — PAIN DESCRIPTION - LOCATION: LOCATION: BUTTOCKS

## 2024-02-23 ASSESSMENT — PAIN DESCRIPTION - FREQUENCY: FREQUENCY: CONTINUOUS

## 2024-02-23 ASSESSMENT — PAIN SCALES - GENERAL: PAINLEVEL_OUTOF10: 5

## 2024-02-23 NOTE — PROGRESS NOTES
Dr. Chance perez notified hgb 7.3. Patient is asymptotic and vitals are stable. Ok to discharge today. Please recheck CBC on Monday 2/26.

## 2024-02-23 NOTE — PROGRESS NOTES
Notified Mary Beth Alejandra RN  yesterday's HBG 6.5 and 7.3 drawn at [place]. Venofer given. Vitals stable Patient asymptomatic. Denies  dizziness. Up to the bathroom-eating and drinking. Alert. Per Dr Stevens-repeat labs on Monday when he returns here. I put on the schedule. Call to [place]. Spoke to nursing supervisor Lavinia Pollock and notified of above. Patient will have repeat labs here and may need a blood transfusion. Transportation called.

## 2024-02-24 PROBLEM — R79.89 ELEVATED TROPONIN: Status: RESOLVED | Noted: 2024-01-25 | Resolved: 2024-02-24

## 2024-02-26 ENCOUNTER — HOSPITAL ENCOUNTER (OUTPATIENT)
Dept: INFUSION THERAPY | Age: 84
Discharge: HOME | End: 2024-02-26
Payer: OTHER GOVERNMENT

## 2024-02-26 VITALS
SYSTOLIC BLOOD PRESSURE: 142 MMHG | HEART RATE: 69 BPM | RESPIRATION RATE: 16 BRPM | TEMPERATURE: 97.7 F | OXYGEN SATURATION: 100 % | DIASTOLIC BLOOD PRESSURE: 69 MMHG

## 2024-02-26 DIAGNOSIS — C64.9 METASTATIC RENAL CELL CARCINOMA, UNSPECIFIED LATERALITY (HCC): Primary | ICD-10-CM

## 2024-02-26 DIAGNOSIS — N18.32 STAGE 3B CHRONIC KIDNEY DISEASE (HCC): ICD-10-CM

## 2024-02-26 DIAGNOSIS — C64.9 RENAL CELL CARCINOMA, UNSPECIFIED LATERALITY (HCC): ICD-10-CM
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
## 2024-02-26 LAB
BASOPHILS # BLD: 0.01 K/UL (ref 0–0.2)
BASOPHILS NFR BLD: 0 % (ref 0–2)
EOSINOPHIL # BLD: 0.13 K/UL (ref 0.05–0.5)
EOSINOPHILS RELATIVE PERCENT: 3 % (ref 0–6)
ERYTHROCYTE [DISTWIDTH] IN BLOOD BY AUTOMATED COUNT: 19.5 % (ref 11.5–15)
HCT VFR BLD AUTO: 21.9 % (ref 37–54)
HGB BLD-MCNC: 6.7 G/DL (ref 12.5–16.5)
IMM GRANULOCYTES # BLD AUTO: <0.03 K/UL (ref 0–0.58)
IMM GRANULOCYTES NFR BLD: 0 % (ref 0–5)
LYMPHOCYTES NFR BLD: 0.46 K/UL (ref 1.5–4)
LYMPHOCYTES RELATIVE PERCENT: 12 % (ref 20–42)
MCH RBC QN AUTO: 30.6 PG (ref 26–35)
MCHC RBC AUTO-ENTMCNC: 30.6 G/DL (ref 32–34.5)
MCV RBC AUTO: 100 FL (ref 80–99.9)
MONOCYTES NFR BLD: 0.3 K/UL (ref 0.1–0.95)
MONOCYTES NFR BLD: 8 % (ref 2–12)
NEUTROPHILS NFR BLD: 76 % (ref 43–80)
NEUTS SEG NFR BLD: 2.89 K/UL (ref 1.8–7.3)
PLATELET # BLD AUTO: 187 K/UL (ref 130–450)
PMV BLD AUTO: 10.5 FL (ref 7–12)
RBC # BLD AUTO: 2.19 M/UL (ref 3.8–5.8)
RBC # BLD: ABNORMAL 10*6/UL
WBC OTHER # BLD: 3.8 K/UL (ref 4.5–11.5)

## 2024-02-26 PROCEDURE — 96361 HYDRATE IV INFUSION ADD-ON: CPT

## 2024-02-26 PROCEDURE — 86923 COMPATIBILITY TEST ELECTRIC: CPT

## 2024-02-26 PROCEDURE — P9016 RBC LEUKOCYTES REDUCED: HCPCS

## 2024-02-26 PROCEDURE — 86850 RBC ANTIBODY SCREEN: CPT

## 2024-02-26 PROCEDURE — 6360000002 HC RX W HCPCS: Performed by: INTERNAL MEDICINE

## 2024-02-26 PROCEDURE — 6370000000 HC RX 637 (ALT 250 FOR IP): Performed by: INTERNAL MEDICINE

## 2024-02-26 PROCEDURE — 96374 THER/PROPH/DIAG INJ IV PUSH: CPT

## 2024-02-26 PROCEDURE — 86901 BLOOD TYPING SEROLOGIC RH(D): CPT

## 2024-02-26 PROCEDURE — 2580000003 HC RX 258: Performed by: INTERNAL MEDICINE

## 2024-02-26 PROCEDURE — 85025 COMPLETE CBC W/AUTO DIFF WBC: CPT

## 2024-02-26 PROCEDURE — 96375 TX/PRO/DX INJ NEW DRUG ADDON: CPT

## 2024-02-26 PROCEDURE — 36430 TRANSFUSION BLD/BLD COMPNT: CPT

## 2024-02-26 PROCEDURE — 86900 BLOOD TYPING SEROLOGIC ABO: CPT

## 2024-02-26 RX ORDER — SODIUM CHLORIDE 0.9 % (FLUSH) 0.9 %
5-40 SYRINGE (ML) INJECTION PRN
Status: CANCELLED | OUTPATIENT
Start: 2024-02-26

## 2024-02-26 RX ORDER — SODIUM CHLORIDE 0.9 % (FLUSH) 0.9 %
5-40 SYRINGE (ML) INJECTION PRN
Status: CANCELLED | OUTPATIENT
Start: 2024-02-27

## 2024-02-26 RX ORDER — ACETAMINOPHEN 325 MG/1
650 TABLET ORAL ONCE
Status: CANCELLED | OUTPATIENT
Start: 2024-02-26 | End: 2024-02-26

## 2024-02-26 RX ORDER — SODIUM CHLORIDE 9 MG/ML
25 INJECTION, SOLUTION INTRAVENOUS PRN
Status: CANCELLED | OUTPATIENT
Start: 2024-02-26

## 2024-02-26 RX ORDER — DIPHENHYDRAMINE HYDROCHLORIDE 50 MG/ML
50 INJECTION INTRAMUSCULAR; INTRAVENOUS
Status: CANCELLED | OUTPATIENT
Start: 2024-02-27

## 2024-02-26 RX ORDER — SODIUM CHLORIDE 9 MG/ML
20 INJECTION, SOLUTION INTRAVENOUS CONTINUOUS
Status: DISCONTINUED | OUTPATIENT
Start: 2024-02-26 | End: 2024-02-27 | Stop reason: HOSPADM

## 2024-02-26 RX ORDER — SODIUM CHLORIDE 9 MG/ML
5-250 INJECTION, SOLUTION INTRAVENOUS PRN
Status: CANCELLED | OUTPATIENT
Start: 2024-02-27

## 2024-02-26 RX ORDER — ONDANSETRON 2 MG/ML
8 INJECTION INTRAMUSCULAR; INTRAVENOUS
OUTPATIENT
Start: 2024-02-26

## 2024-02-26 RX ORDER — EPINEPHRINE 1 MG/ML
0.3 INJECTION, SOLUTION, CONCENTRATE INTRAVENOUS PRN
OUTPATIENT
Start: 2024-02-26

## 2024-02-26 RX ORDER — ONDANSETRON 2 MG/ML
8 INJECTION INTRAMUSCULAR; INTRAVENOUS
Status: CANCELLED | OUTPATIENT
Start: 2024-02-26

## 2024-02-26 RX ORDER — HEPARIN 100 UNIT/ML
500 SYRINGE INTRAVENOUS PRN
Status: CANCELLED | OUTPATIENT
Start: 2024-02-27

## 2024-02-26 RX ORDER — EPINEPHRINE 1 MG/ML
0.3 INJECTION, SOLUTION, CONCENTRATE INTRAVENOUS PRN
Status: CANCELLED | OUTPATIENT
Start: 2024-02-27

## 2024-02-26 RX ORDER — SODIUM CHLORIDE 9 MG/ML
INJECTION, SOLUTION INTRAVENOUS CONTINUOUS
Status: CANCELLED | OUTPATIENT
Start: 2024-02-26

## 2024-02-26 RX ORDER — EPINEPHRINE 1 MG/ML
0.3 INJECTION, SOLUTION, CONCENTRATE INTRAVENOUS PRN
Status: CANCELLED | OUTPATIENT
Start: 2024-02-26

## 2024-02-26 RX ORDER — SODIUM CHLORIDE 9 MG/ML
INJECTION, SOLUTION INTRAVENOUS CONTINUOUS
OUTPATIENT
Start: 2024-02-26

## 2024-02-26 RX ORDER — SODIUM CHLORIDE 9 MG/ML
5-250 INJECTION, SOLUTION INTRAVENOUS PRN
Status: DISCONTINUED | OUTPATIENT
Start: 2024-02-26 | End: 2024-02-27 | Stop reason: HOSPADM

## 2024-02-26 RX ORDER — DIPHENHYDRAMINE HYDROCHLORIDE 50 MG/ML
50 INJECTION INTRAMUSCULAR; INTRAVENOUS
OUTPATIENT
Start: 2024-02-26

## 2024-02-26 RX ORDER — ACETAMINOPHEN 325 MG/1
650 TABLET ORAL
OUTPATIENT
Start: 2024-02-26

## 2024-02-26 RX ORDER — SODIUM CHLORIDE 9 MG/ML
20 INJECTION, SOLUTION INTRAVENOUS CONTINUOUS
Status: CANCELLED | OUTPATIENT
Start: 2024-02-26

## 2024-02-26 RX ORDER — ACETAMINOPHEN 325 MG/1
650 TABLET ORAL
Status: CANCELLED | OUTPATIENT
Start: 2024-02-26

## 2024-02-26 RX ORDER — DIPHENHYDRAMINE HYDROCHLORIDE 50 MG/ML
50 INJECTION INTRAMUSCULAR; INTRAVENOUS
Status: CANCELLED | OUTPATIENT
Start: 2024-02-26

## 2024-02-26 RX ORDER — ACETAMINOPHEN 325 MG/1
650 TABLET ORAL ONCE
Status: COMPLETED | OUTPATIENT
Start: 2024-02-26 | End: 2024-02-26

## 2024-02-26 RX ORDER — ONDANSETRON 2 MG/ML
8 INJECTION INTRAMUSCULAR; INTRAVENOUS
Status: CANCELLED | OUTPATIENT
Start: 2024-02-27

## 2024-02-26 RX ORDER — ALBUTEROL SULFATE 90 UG/1
4 AEROSOL, METERED RESPIRATORY (INHALATION) PRN
Status: CANCELLED | OUTPATIENT
Start: 2024-02-27

## 2024-02-26 RX ORDER — SODIUM CHLORIDE 9 MG/ML
INJECTION, SOLUTION INTRAVENOUS CONTINUOUS
Status: CANCELLED | OUTPATIENT
Start: 2024-02-27

## 2024-02-26 RX ORDER — SODIUM CHLORIDE 0.9 % (FLUSH) 0.9 %
5-40 SYRINGE (ML) INJECTION PRN
Status: DISCONTINUED | OUTPATIENT
Start: 2024-02-26 | End: 2024-02-27 | Stop reason: HOSPADM

## 2024-02-26 RX ORDER — SODIUM CHLORIDE 9 MG/ML
25 INJECTION, SOLUTION INTRAVENOUS PRN
OUTPATIENT
Start: 2024-02-26

## 2024-02-26 RX ORDER — ALBUTEROL SULFATE 90 UG/1
4 AEROSOL, METERED RESPIRATORY (INHALATION) PRN
OUTPATIENT
Start: 2024-02-26

## 2024-02-26 RX ORDER — ACETAMINOPHEN 325 MG/1
650 TABLET ORAL
Status: CANCELLED | OUTPATIENT
Start: 2024-02-27

## 2024-02-26 RX ORDER — ALBUTEROL SULFATE 90 UG/1
4 AEROSOL, METERED RESPIRATORY (INHALATION) PRN
Status: CANCELLED | OUTPATIENT
Start: 2024-02-26

## 2024-02-26 RX ADMIN — IRON SUCROSE 200 MG: 20 INJECTION, SOLUTION INTRAVENOUS at 11:12

## 2024-02-26 RX ADMIN — SODIUM CHLORIDE 200 ML/HR: 9 INJECTION, SOLUTION INTRAVENOUS at 11:12

## 2024-02-26 RX ADMIN — ACETAMINOPHEN 650 MG: 325 TABLET, FILM COATED ORAL at 13:15

## 2024-02-26 RX ADMIN — SODIUM CHLORIDE, PRESERVATIVE FREE 10 ML: 5 INJECTION INTRAVENOUS at 11:10

## 2024-02-26 ASSESSMENT — PAIN DESCRIPTION - DESCRIPTORS: DESCRIPTORS: SORE

## 2024-02-26 ASSESSMENT — PAIN SCALES - GENERAL: PAINLEVEL_OUTOF10: 5

## 2024-02-26 ASSESSMENT — PAIN DESCRIPTION - LOCATION: LOCATION: BUTTOCKS

## 2024-02-26 NOTE — PROGRESS NOTES
Informed consent from Dr. Chance Eagle verified and on patient chart.   Patient received 1 unit PRBC.  Patient tolerated well.    Vital signs stable.  Reviewed orally and provided with written information regarding potential delayed reaction and instructions on what to do if reaction occurs. Questions answered to apparent satisfaction. Patient observed for 30 minutes after transfusion. Nurse to nurse provided. PT discharged via w/c [employer] transportation.

## 2024-02-26 NOTE — PROGRESS NOTES
Pt tolerated Venofer infusion well. CBC resulted Hgb 6.7, Dr. Chance Eagle notified. N.O. Transfuse 1 unit PRBC. Lavinia at [facility] updated that pt will be receiving blood, transportation also notified.

## 2024-02-27 DIAGNOSIS — C64.9 METASTATIC RENAL CELL CARCINOMA, UNSPECIFIED LATERALITY (HCC): Primary | ICD-10-CM

## 2024-02-27 DIAGNOSIS — N18.32 STAGE 3B CHRONIC KIDNEY DISEASE (HCC): ICD-10-CM

## 2024-02-27 LAB
ABO/RH: NORMAL
ANION GAP SERPL CALCULATED.3IONS-SCNC: 9 MMOL/L (ref 7–16)
ANTIBODY SCREEN: NEGATIVE
ARM BAND NUMBER: NORMAL
BLOOD BANK BLOOD PRODUCT EXPIRATION DATE: NORMAL
BLOOD BANK DISPENSE STATUS: NORMAL
BLOOD BANK ISBT PRODUCT BLOOD TYPE: 9500
BLOOD BANK PRODUCT CODE: NORMAL
BLOOD BANK SAMPLE EXPIRATION: NORMAL
BLOOD BANK UNIT TYPE AND RH: NORMAL
BPU ID: NORMAL
BUN SERPL-MCNC: 24 MG/DL (ref 6–23)
CALCIUM SERPL-MCNC: 8.8 MG/DL (ref 8.6–10.2)
CHLORIDE SERPL-SCNC: 111 MMOL/L (ref 98–107)
CO2 SERPL-SCNC: 20 MMOL/L (ref 22–29)
COMPONENT: NORMAL
CREAT SERPL-MCNC: 1.8 MG/DL (ref 0.7–1.2)
CROSSMATCH RESULT: NORMAL
ERYTHROCYTE [DISTWIDTH] IN BLOOD BY AUTOMATED COUNT: 19.6 % (ref 11.5–15)
GFR SERPL CREATININE-BSD FRML MDRD: 38 ML/MIN/1.73M2
GLUCOSE SERPL-MCNC: 84 MG/DL (ref 74–99)
HCT VFR BLD AUTO: 21.6 % (ref 37–54)
HCT VFR BLD AUTO: 23 % (ref 37–54)
HGB BLD-MCNC: 6.8 G/DL (ref 12.5–16.5)
HGB BLD-MCNC: 7.1 G/DL (ref 12.5–16.5)
MCH RBC QN AUTO: 30.8 PG (ref 26–35)
MCHC RBC AUTO-ENTMCNC: 31.5 G/DL (ref 32–34.5)
MCV RBC AUTO: 97.7 FL (ref 80–99.9)
PLATELET # BLD AUTO: 166 K/UL (ref 130–450)
PMV BLD AUTO: 10.3 FL (ref 7–12)
POTASSIUM SERPL-SCNC: 4.4 MMOL/L (ref 3.5–5)
RBC # BLD AUTO: 2.21 M/UL (ref 3.8–5.8)
SODIUM SERPL-SCNC: 140 MMOL/L (ref 132–146)
TRANSFUSION STATUS: NORMAL
UNIT DIVISION: 0
UNIT ISSUE DATE/TIME: NORMAL
WBC OTHER # BLD: 3.2 K/UL (ref 4.5–11.5)

## 2024-02-28 ENCOUNTER — HOSPITAL ENCOUNTER (OUTPATIENT)
Dept: INFUSION THERAPY | Age: 84
Discharge: HOME | End: 2024-02-28
Payer: OTHER GOVERNMENT

## 2024-02-28 VITALS
SYSTOLIC BLOOD PRESSURE: 132 MMHG | TEMPERATURE: 97.5 F | OXYGEN SATURATION: 97 % | RESPIRATION RATE: 16 BRPM | HEART RATE: 71 BPM | DIASTOLIC BLOOD PRESSURE: 59 MMHG

## 2024-02-28 DIAGNOSIS — C64.9 METASTATIC RENAL CELL CARCINOMA, UNSPECIFIED LATERALITY (HCC): Primary | ICD-10-CM

## 2024-02-28 LAB
ALBUMIN SERPL-MCNC: 2.7 G/DL (ref 3.5–5.2)
ALP SERPL-CCNC: 115 U/L (ref 40–129)
ALT SERPL-CCNC: 14 U/L (ref 0–40)
ANION GAP SERPL CALCULATED.3IONS-SCNC: 9 MMOL/L (ref 7–16)
AST SERPL-CCNC: 16 U/L (ref 0–39)
BILIRUB SERPL-MCNC: 0.2 MG/DL (ref 0–1.2)
BUN SERPL-MCNC: 22 MG/DL (ref 6–23)
CALCIUM SERPL-MCNC: 8.7 MG/DL (ref 8.6–10.2)
CHLORIDE SERPL-SCNC: 108 MMOL/L (ref 98–107)
CO2 SERPL-SCNC: 21 MMOL/L (ref 22–29)
CREAT SERPL-MCNC: 1.8 MG/DL (ref 0.7–1.2)
ERYTHROCYTE [DISTWIDTH] IN BLOOD BY AUTOMATED COUNT: 19.6 % (ref 11.5–15)
GFR SERPL CREATININE-BSD FRML MDRD: 37 ML/MIN/1.73M2
GLUCOSE SERPL-MCNC: 82 MG/DL (ref 74–99)
HCT VFR BLD AUTO: 21.6 % (ref 37–54)
HCT VFR BLD AUTO: 22.8 % (ref 37–54)
HGB BLD-MCNC: 6.7 G/DL (ref 12.5–16.5)
HGB BLD-MCNC: 7.1 G/DL (ref 12.5–16.5)
MCH RBC QN AUTO: 30.9 PG (ref 26–35)
MCHC RBC AUTO-ENTMCNC: 31 G/DL (ref 32–34.5)
MCV RBC AUTO: 99.5 FL (ref 80–99.9)
PLATELET # BLD AUTO: 170 K/UL (ref 130–450)
PMV BLD AUTO: 10.3 FL (ref 7–12)
POTASSIUM SERPL-SCNC: 3.9 MMOL/L (ref 3.5–5)
PROT SERPL-MCNC: 4.8 G/DL (ref 6.4–8.3)
RBC # BLD AUTO: 2.17 M/UL (ref 3.8–5.8)
SODIUM SERPL-SCNC: 138 MMOL/L (ref 132–146)
WBC OTHER # BLD: 3.4 K/UL (ref 4.5–11.5)

## 2024-02-28 PROCEDURE — 2580000003 HC RX 258: Performed by: INTERNAL MEDICINE

## 2024-02-28 PROCEDURE — 96374 THER/PROPH/DIAG INJ IV PUSH: CPT

## 2024-02-28 PROCEDURE — 6360000002 HC RX W HCPCS: Performed by: INTERNAL MEDICINE

## 2024-02-28 RX ORDER — DIPHENHYDRAMINE HYDROCHLORIDE 50 MG/ML
50 INJECTION INTRAMUSCULAR; INTRAVENOUS
Status: CANCELLED | OUTPATIENT
Start: 2024-03-01

## 2024-02-28 RX ORDER — SODIUM CHLORIDE 9 MG/ML
5-250 INJECTION, SOLUTION INTRAVENOUS PRN
Status: DISCONTINUED | OUTPATIENT
Start: 2024-02-28 | End: 2024-02-29 | Stop reason: HOSPADM

## 2024-02-28 RX ORDER — SODIUM CHLORIDE 9 MG/ML
INJECTION, SOLUTION INTRAVENOUS CONTINUOUS
Status: CANCELLED | OUTPATIENT
Start: 2024-03-01

## 2024-02-28 RX ORDER — SODIUM CHLORIDE 9 MG/ML
5-250 INJECTION, SOLUTION INTRAVENOUS PRN
Status: CANCELLED | OUTPATIENT
Start: 2024-03-01

## 2024-02-28 RX ORDER — HEPARIN 100 UNIT/ML
500 SYRINGE INTRAVENOUS PRN
Status: CANCELLED | OUTPATIENT
Start: 2024-03-01

## 2024-02-28 RX ORDER — ALBUTEROL SULFATE 90 UG/1
4 AEROSOL, METERED RESPIRATORY (INHALATION) PRN
Status: CANCELLED | OUTPATIENT
Start: 2024-03-01

## 2024-02-28 RX ORDER — EPINEPHRINE 1 MG/ML
0.3 INJECTION, SOLUTION, CONCENTRATE INTRAVENOUS PRN
Status: CANCELLED | OUTPATIENT
Start: 2024-03-01

## 2024-02-28 RX ORDER — SODIUM CHLORIDE 0.9 % (FLUSH) 0.9 %
5-40 SYRINGE (ML) INJECTION PRN
Status: CANCELLED | OUTPATIENT
Start: 2024-03-01

## 2024-02-28 RX ORDER — ACETAMINOPHEN 325 MG/1
650 TABLET ORAL
Status: CANCELLED | OUTPATIENT
Start: 2024-03-01

## 2024-02-28 RX ORDER — ONDANSETRON 2 MG/ML
8 INJECTION INTRAMUSCULAR; INTRAVENOUS
Status: CANCELLED | OUTPATIENT
Start: 2024-03-01

## 2024-02-28 RX ADMIN — IRON SUCROSE 200 MG: 20 INJECTION, SOLUTION INTRAVENOUS at 12:05

## 2024-02-28 RX ADMIN — SODIUM CHLORIDE 250 ML/HR: 9 INJECTION, SOLUTION INTRAVENOUS at 12:04

## 2024-02-28 NOTE — PROGRESS NOTES
Patient stated he had to use the restroom prior to receiving his infusion. He ended up having an accident in his depend before we made it to the restroom. I had patient sit on the toilet to try to relieve any remaining bowel movement, I cleaned up the patient and provided a new depend.

## 2024-02-28 NOTE — PROGRESS NOTES
Patient tolerated infusion well. Patient alert and oriented x 3 No distress noted. Vital signs stable. Patient denies any new or worsening pain. Patient denies questions regarding treatment or medication; verbalized understanding, offered patient information and discharge material; patient declined; Patient denies needs, all questions answered.

## 2024-02-28 NOTE — PROGRESS NOTES
Labs reviewed per CAMILO Goodman. Aware of HGB 6.7. Per CAMILO Goodman, patient to come today for venofer, no PRBC. Repeat labs Friday 3/1/24 as previously instructed per Dr Chance Eagle for possible PRBC

## 2024-02-28 NOTE — PROGRESS NOTES
Crystal from [facility] called and their NP recommended sending patient back to the facility and they will redraw labs stat. They will work on possible earlier transportation for Friday and call us back to confirm.

## 2024-02-28 NOTE — PROGRESS NOTES
Spoke to Mary Beth at [facility] regarding patient. Patient received venofer today and will return Friday for venofer, possible treatment, and possible blood transfusion. Discussed the need for patient to have an earlier appointment on Friday given the possibility of a transfusion and treatment. Also informed Mary Beth that patient will need a caregiver at next appt as he needs full care when he is here. Mary Beth stated she would have nursing staff return our call regarding Friday's appointment.

## 2024-02-28 NOTE — PROGRESS NOTES
2/27/24. HGB 7.1. patient receiving venofer 2/28/24. Per Dr Chance Eagle, give venofer, not PRBC until labs re drawn on 3/1/24.

## 2024-03-01 ENCOUNTER — OFFICE VISIT (OUTPATIENT)
Dept: ONCOLOGY | Age: 84
End: 2024-03-01
Payer: OTHER GOVERNMENT

## 2024-03-01 ENCOUNTER — HOSPITAL ENCOUNTER (OUTPATIENT)
Dept: INFUSION THERAPY | Age: 84
Discharge: HOME | End: 2024-03-01
Payer: OTHER GOVERNMENT

## 2024-03-01 VITALS
TEMPERATURE: 98 F | SYSTOLIC BLOOD PRESSURE: 140 MMHG | HEART RATE: 76 BPM | DIASTOLIC BLOOD PRESSURE: 62 MMHG | HEIGHT: 71 IN | OXYGEN SATURATION: 98 % | BODY MASS INDEX: 27.86 KG/M2 | WEIGHT: 199 LBS

## 2024-03-01 VITALS
TEMPERATURE: 97.3 F | SYSTOLIC BLOOD PRESSURE: 126 MMHG | HEART RATE: 72 BPM | RESPIRATION RATE: 16 BRPM | OXYGEN SATURATION: 100 % | DIASTOLIC BLOOD PRESSURE: 58 MMHG

## 2024-03-01 DIAGNOSIS — C64.9 RENAL CELL CARCINOMA, UNSPECIFIED LATERALITY (HCC): Primary | ICD-10-CM

## 2024-03-01 DIAGNOSIS — C64.9 METASTATIC RENAL CELL CARCINOMA, UNSPECIFIED LATERALITY (HCC): ICD-10-CM

## 2024-03-01 DIAGNOSIS — C64.9 METASTATIC RENAL CELL CARCINOMA, UNSPECIFIED LATERALITY (HCC): Primary | ICD-10-CM

## 2024-03-01 DIAGNOSIS — N18.32 STAGE 3B CHRONIC KIDNEY DISEASE (HCC): ICD-10-CM

## 2024-03-01 DIAGNOSIS — C64.9 RENAL CELL CARCINOMA, UNSPECIFIED LATERALITY (HCC): ICD-10-CM

## 2024-03-01 LAB
ABO + RH BLD: NORMAL
ALBUMIN SERPL-MCNC: 2.5 G/DL (ref 3.5–5.2)
ALBUMIN SERPL-MCNC: NORMAL G/DL (ref 3.5–5.2)
ALBUMIN/GLOB SERPL: NORMAL {RATIO} (ref 1–2.5)
ALP SERPL-CCNC: 119 U/L (ref 40–129)
ALP SERPL-CCNC: NORMAL U/L (ref 40–129)
ALT SERPL-CCNC: 20 U/L (ref 0–40)
ALT SERPL-CCNC: NORMAL U/L (ref 5–41)
ANION GAP SERPL CALCULATED.3IONS-SCNC: 9 MMOL/L (ref 7–16)
ANION GAP SERPL CALCULATED.3IONS-SCNC: NORMAL MMOL/L (ref 9–17)
ARM BAND NUMBER: NORMAL
AST SERPL-CCNC: 35 U/L (ref 0–39)
AST SERPL-CCNC: NORMAL U/L
BASOPHILS # BLD: 0 K/UL (ref 0–0.2)
BASOPHILS NFR BLD: 0 % (ref 0–2)
BILIRUB SERPL-MCNC: 0.2 MG/DL (ref 0–1.2)
BILIRUB SERPL-MCNC: NORMAL MG/DL (ref 0.3–1.2)
BLOOD BANK SAMPLE EXPIRATION: NORMAL
BLOOD GROUP ANTIBODIES SERPL: NEGATIVE
BUN SERPL-MCNC: 19 MG/DL (ref 6–23)
BUN SERPL-MCNC: NORMAL MG/DL (ref 8–23)
BUN/CREAT SERPL: NORMAL (ref 9–20)
CALCIUM SERPL-MCNC: 8.6 MG/DL (ref 8.6–10.2)
CALCIUM SERPL-MCNC: NORMAL MG/DL (ref 8.6–10.4)
CHLORIDE SERPL-SCNC: 108 MMOL/L (ref 98–107)
CHLORIDE SERPL-SCNC: NORMAL MMOL/L (ref 98–107)
CO2 SERPL-SCNC: 18 MMOL/L (ref 22–29)
CO2 SERPL-SCNC: NORMAL MMOL/L (ref 20–31)
CREAT SERPL-MCNC: 1.7 MG/DL (ref 0.7–1.2)
CREAT SERPL-MCNC: NORMAL MG/DL (ref 0.7–1.2)
EOSINOPHIL # BLD: 0.03 K/UL (ref 0.05–0.5)
EOSINOPHILS RELATIVE PERCENT: 1 % (ref 0–6)
ERYTHROCYTE [DISTWIDTH] IN BLOOD BY AUTOMATED COUNT: 19.7 % (ref 11.5–15)
FERRITIN SERPL-MCNC: 443 NG/ML
GFR SERPL CREATININE-BSD FRML MDRD: 39 ML/MIN/1.73M2
GFR SERPL CREATININE-BSD FRML MDRD: NORMAL ML/MIN/1.73M2
GLUCOSE SERPL-MCNC: 79 MG/DL (ref 74–99)
GLUCOSE SERPL-MCNC: NORMAL MG/DL (ref 70–99)
HCT VFR BLD AUTO: 22.9 % (ref 37–54)
HGB BLD-MCNC: 7 G/DL (ref 12.5–16.5)
IRON SATN MFR SERPL: 14 % (ref 20–55)
IRON SERPL-MCNC: 28 UG/DL (ref 59–158)
LYMPHOCYTES NFR BLD: 0.27 K/UL (ref 1.5–4)
LYMPHOCYTES RELATIVE PERCENT: 8 % (ref 20–42)
MCH RBC QN AUTO: 30.6 PG (ref 26–35)
MCHC RBC AUTO-ENTMCNC: 30.6 G/DL (ref 32–34.5)
MCV RBC AUTO: 100 FL (ref 80–99.9)
MONOCYTES NFR BLD: 0.18 K/UL (ref 0.1–0.95)
MONOCYTES NFR BLD: 5 % (ref 2–12)
NEUTROPHILS NFR BLD: 86 % (ref 43–80)
NEUTS SEG NFR BLD: 3.01 K/UL (ref 1.8–7.3)
PLATELET # BLD AUTO: 191 K/UL (ref 130–450)
PMV BLD AUTO: 10.9 FL (ref 7–12)
POTASSIUM SERPL-SCNC: 4.7 MMOL/L (ref 3.5–5)
POTASSIUM SERPL-SCNC: NORMAL MMOL/L (ref 3.7–5.3)
PROT SERPL-MCNC: 5.1 G/DL (ref 6.4–8.3)
PROT SERPL-MCNC: NORMAL G/DL (ref 6.4–8.3)
RBC # BLD AUTO: 2.29 M/UL (ref 3.8–5.8)
RBC # BLD: ABNORMAL 10*6/UL
SODIUM SERPL-SCNC: 135 MMOL/L (ref 132–146)
SODIUM SERPL-SCNC: NORMAL MMOL/L (ref 135–144)
TIBC SERPL-MCNC: 200 UG/DL (ref 250–450)
TSH SERPL DL<=0.05 MIU/L-ACNC: 0.7 UIU/ML (ref 0.27–4.2)
WBC OTHER # BLD: 3.5 K/UL (ref 4.5–11.5)

## 2024-03-01 PROCEDURE — 3077F SYST BP >= 140 MM HG: CPT | Performed by: CLINICAL NURSE SPECIALIST

## 2024-03-01 PROCEDURE — 82728 ASSAY OF FERRITIN: CPT

## 2024-03-01 PROCEDURE — 96374 THER/PROPH/DIAG INJ IV PUSH: CPT

## 2024-03-01 PROCEDURE — 6360000002 HC RX W HCPCS: Performed by: CLINICAL NURSE SPECIALIST

## 2024-03-01 PROCEDURE — 86901 BLOOD TYPING SEROLOGIC RH(D): CPT

## 2024-03-01 PROCEDURE — 96361 HYDRATE IV INFUSION ADD-ON: CPT

## 2024-03-01 PROCEDURE — 96375 TX/PRO/DX INJ NEW DRUG ADDON: CPT

## 2024-03-01 PROCEDURE — 85025 COMPLETE CBC W/AUTO DIFF WBC: CPT

## 2024-03-01 PROCEDURE — 36415 COLL VENOUS BLD VENIPUNCTURE: CPT

## 2024-03-01 PROCEDURE — 96413 CHEMO IV INFUSION 1 HR: CPT

## 2024-03-01 PROCEDURE — 84443 ASSAY THYROID STIM HORMONE: CPT

## 2024-03-01 PROCEDURE — 2580000003 HC RX 258: Performed by: INTERNAL MEDICINE

## 2024-03-01 PROCEDURE — 3078F DIAST BP <80 MM HG: CPT | Performed by: CLINICAL NURSE SPECIALIST

## 2024-03-01 PROCEDURE — 99213 OFFICE O/P EST LOW 20 MIN: CPT | Performed by: CLINICAL NURSE SPECIALIST

## 2024-03-01 PROCEDURE — 86900 BLOOD TYPING SEROLOGIC ABO: CPT

## 2024-03-01 PROCEDURE — 1123F ACP DISCUSS/DSCN MKR DOCD: CPT | Performed by: CLINICAL NURSE SPECIALIST

## 2024-03-01 PROCEDURE — 80053 COMPREHEN METABOLIC PANEL: CPT

## 2024-03-01 PROCEDURE — 2580000003 HC RX 258: Performed by: CLINICAL NURSE SPECIALIST

## 2024-03-01 PROCEDURE — 83540 ASSAY OF IRON: CPT

## 2024-03-01 PROCEDURE — 86850 RBC ANTIBODY SCREEN: CPT

## 2024-03-01 PROCEDURE — 83550 IRON BINDING TEST: CPT

## 2024-03-01 PROCEDURE — 6360000002 HC RX W HCPCS: Performed by: INTERNAL MEDICINE

## 2024-03-01 RX ORDER — EPINEPHRINE 1 MG/ML
0.3 INJECTION, SOLUTION, CONCENTRATE INTRAVENOUS PRN
Status: CANCELLED | OUTPATIENT
Start: 2024-03-03

## 2024-03-01 RX ORDER — SODIUM CHLORIDE 9 MG/ML
5-250 INJECTION, SOLUTION INTRAVENOUS PRN
Status: CANCELLED | OUTPATIENT
Start: 2024-03-01

## 2024-03-01 RX ORDER — SODIUM CHLORIDE 0.9 % (FLUSH) 0.9 %
5-40 SYRINGE (ML) INJECTION PRN
Status: DISCONTINUED | OUTPATIENT
Start: 2024-03-01 | End: 2024-03-02 | Stop reason: HOSPADM

## 2024-03-01 RX ORDER — ACETAMINOPHEN 325 MG/1
650 TABLET ORAL
Status: CANCELLED | OUTPATIENT
Start: 2024-03-03

## 2024-03-01 RX ORDER — ONDANSETRON 2 MG/ML
8 INJECTION INTRAMUSCULAR; INTRAVENOUS
Status: CANCELLED | OUTPATIENT
Start: 2024-03-01

## 2024-03-01 RX ORDER — HEPARIN SODIUM (PORCINE) LOCK FLUSH IV SOLN 100 UNIT/ML 100 UNIT/ML
500 SOLUTION INTRAVENOUS PRN
Status: CANCELLED | OUTPATIENT
Start: 2024-03-01

## 2024-03-01 RX ORDER — SODIUM CHLORIDE 9 MG/ML
INJECTION, SOLUTION INTRAVENOUS CONTINUOUS
Status: CANCELLED | OUTPATIENT
Start: 2024-03-01

## 2024-03-01 RX ORDER — ALBUTEROL SULFATE 90 UG/1
4 AEROSOL, METERED RESPIRATORY (INHALATION) PRN
Status: CANCELLED | OUTPATIENT
Start: 2024-03-01

## 2024-03-01 RX ORDER — MEPERIDINE HYDROCHLORIDE 50 MG/ML
12.5 INJECTION INTRAMUSCULAR; INTRAVENOUS; SUBCUTANEOUS PRN
Status: CANCELLED | OUTPATIENT
Start: 2024-03-01

## 2024-03-01 RX ORDER — SODIUM CHLORIDE 0.9 % (FLUSH) 0.9 %
5-40 SYRINGE (ML) INJECTION PRN
OUTPATIENT
Start: 2024-03-01

## 2024-03-01 RX ORDER — DIPHENHYDRAMINE HYDROCHLORIDE 50 MG/ML
50 INJECTION INTRAMUSCULAR; INTRAVENOUS
Status: CANCELLED | OUTPATIENT
Start: 2024-03-03

## 2024-03-01 RX ORDER — ACETAMINOPHEN 325 MG/1
650 TABLET ORAL
Status: CANCELLED | OUTPATIENT
Start: 2024-03-01

## 2024-03-01 RX ORDER — EPINEPHRINE 1 MG/ML
0.3 INJECTION, SOLUTION, CONCENTRATE INTRAVENOUS PRN
Status: CANCELLED | OUTPATIENT
Start: 2024-03-01

## 2024-03-01 RX ORDER — HEPARIN 100 UNIT/ML
500 SYRINGE INTRAVENOUS PRN
Status: CANCELLED | OUTPATIENT
Start: 2024-03-03

## 2024-03-01 RX ORDER — SODIUM CHLORIDE 9 MG/ML
INJECTION, SOLUTION INTRAVENOUS CONTINUOUS
Status: CANCELLED | OUTPATIENT
Start: 2024-03-03

## 2024-03-01 RX ORDER — SODIUM CHLORIDE 9 MG/ML
5-250 INJECTION, SOLUTION INTRAVENOUS PRN
Status: CANCELLED | OUTPATIENT
Start: 2024-03-03

## 2024-03-01 RX ORDER — FAMOTIDINE 10 MG/ML
20 INJECTION, SOLUTION INTRAVENOUS
Status: CANCELLED | OUTPATIENT
Start: 2024-03-01

## 2024-03-01 RX ORDER — SODIUM CHLORIDE 0.9 % (FLUSH) 0.9 %
5-40 SYRINGE (ML) INJECTION PRN
Status: CANCELLED | OUTPATIENT
Start: 2024-03-03

## 2024-03-01 RX ORDER — ALBUTEROL SULFATE 90 UG/1
4 AEROSOL, METERED RESPIRATORY (INHALATION) PRN
Status: CANCELLED | OUTPATIENT
Start: 2024-03-03

## 2024-03-01 RX ORDER — DIPHENHYDRAMINE HYDROCHLORIDE 50 MG/ML
50 INJECTION INTRAMUSCULAR; INTRAVENOUS
Status: CANCELLED | OUTPATIENT
Start: 2024-03-01

## 2024-03-01 RX ORDER — SODIUM CHLORIDE 9 MG/ML
5-250 INJECTION, SOLUTION INTRAVENOUS PRN
Status: DISCONTINUED | OUTPATIENT
Start: 2024-03-01 | End: 2024-03-02 | Stop reason: HOSPADM

## 2024-03-01 RX ORDER — SODIUM CHLORIDE 0.9 % (FLUSH) 0.9 %
5-40 SYRINGE (ML) INJECTION PRN
Status: CANCELLED | OUTPATIENT
Start: 2024-03-01

## 2024-03-01 RX ORDER — ONDANSETRON 2 MG/ML
8 INJECTION INTRAMUSCULAR; INTRAVENOUS
Status: CANCELLED | OUTPATIENT
Start: 2024-03-03

## 2024-03-01 RX ADMIN — SODIUM CHLORIDE 240 MG: 9 INJECTION, SOLUTION INTRAVENOUS at 12:29

## 2024-03-01 RX ADMIN — SODIUM CHLORIDE 10 ML/HR: 9 INJECTION, SOLUTION INTRAVENOUS at 11:14

## 2024-03-01 RX ADMIN — IRON SUCROSE 200 MG: 20 INJECTION, SOLUTION INTRAVENOUS at 11:14

## 2024-03-01 NOTE — PROGRESS NOTES
[facility] PHYSICIANS [facility] MED ONCOLOGY  [address]  [facility] [address]  Dept: [phone]  Loc: [phone]  Attending progress note      Reason for Visit:   Metastatic renal cell carcinoma.    Referring Physician:  [facility]    PCP:  Guilherme Salgado DO    History of Present Illness:    Mr. Fuentes is a pleasant 83-year-old gentleman, with a past medical history significant for CKD, MGUS, hyperlipidemia, hypertension, CAD, PVCs, and history of iron deficiency anemia, who had presented with left flank and back pain, he had CT scan of the abdomen the pelvis done on 3/20/2023, revealing retrocrural and retroperitoneal lymphadenopathy, multiple bilateral renal cysts, multiple bilateral hyperdense renal lesions, probable hemorrhagic cysts, CT scan of the lumbar spine had revealed a large conglomeration of soft tissue mass in the retroperitoneum, concerning for malignancy, hyperdense lesions in the kidneys, more on the left side, PET/CT scan was done on 2/12/2023, revealing retroperitoneal lymphadenopathy, measuring up to 8 cm, possible uptake in both parotid glands and left base of the neck, the patient had a CT-guided biopsy of the retroperitoneal lymphadenopathy on August 23, 2023, pathology was consistent with metastatic renal cell carcinoma, could not differentiate between clear and unclear cell carcinoma, the [facility], was [facility] Opdivo.  The patient was started on Eliquis for left lower extremity DVT.   On 11/21/2023, the patient was started on systemic therapy, Opdivo.    The patient was admitted to the hospital earlier this month with acute on chronic anemia, EGD and colonoscopy were done and were negative for bleeding, hospitalization records reviewed.    The patient is feeling better at this time, he is at the facility, he denies any bleeding.  3/1 increase in lower extremity edema increase in shortness of breath with activity

## 2024-03-01 NOTE — PROGRESS NOTES
Patient did NOT stop at check out window, left without AVS.  Called [facility] to confirm faxing AVS to their facility, received fax #[phone].  [facility] requested AVS be faxed to their facility for all future visits, they will set up patient's transportation.    Faxed AVS to [facility] and received Fax Call Report Result as Success 03/01/24.

## 2024-03-01 NOTE — PROGRESS NOTES
Patient tolerated treatment well without complications or complaints. Alert and oriented x3. Patient aware of potential side effects and has no questions regarding treatment. Vitals stable throughout treatment. Pain assessed, patient denies any new or worsening pain. Patient left via wheelchair with nurse from NH.

## 2024-03-04 ENCOUNTER — HOSPITAL ENCOUNTER (OUTPATIENT)
Dept: INFUSION THERAPY | Age: 84
End: 2024-03-04

## 2024-03-04 LAB
ANION GAP SERPL CALCULATED.3IONS-SCNC: 11 MMOL/L (ref 7–16)
BUN SERPL-MCNC: 23 MG/DL (ref 6–23)
CALCIUM SERPL-MCNC: 8.7 MG/DL (ref 8.6–10.2)
CHLORIDE SERPL-SCNC: 109 MMOL/L (ref 98–107)
CO2 SERPL-SCNC: 17 MMOL/L (ref 22–29)
CREAT SERPL-MCNC: 2.1 MG/DL (ref 0.7–1.2)
GFR SERPL CREATININE-BSD FRML MDRD: 31 ML/MIN/1.73M2
GLUCOSE SERPL-MCNC: 73 MG/DL (ref 74–99)
POTASSIUM SERPL-SCNC: 4.5 MMOL/L (ref 3.5–5)
SODIUM SERPL-SCNC: 137 MMOL/L (ref 132–146)

## 2024-03-06 ENCOUNTER — HOSPITAL ENCOUNTER (OUTPATIENT)
Dept: INFUSION THERAPY | Age: 84
Discharge: HOME | End: 2024-03-06
Payer: OTHER GOVERNMENT

## 2024-03-06 VITALS
SYSTOLIC BLOOD PRESSURE: 138 MMHG | DIASTOLIC BLOOD PRESSURE: 70 MMHG | RESPIRATION RATE: 16 BRPM | TEMPERATURE: 98 F | HEART RATE: 77 BPM | OXYGEN SATURATION: 99 %

## 2024-03-06 DIAGNOSIS — C64.9 METASTATIC RENAL CELL CARCINOMA, UNSPECIFIED LATERALITY (HCC): Primary | ICD-10-CM

## 2024-03-06 PROCEDURE — 2580000003 HC RX 258: Performed by: INTERNAL MEDICINE

## 2024-03-06 PROCEDURE — 96374 THER/PROPH/DIAG INJ IV PUSH: CPT

## 2024-03-06 PROCEDURE — 6360000002 HC RX W HCPCS: Performed by: INTERNAL MEDICINE

## 2024-03-06 RX ORDER — HEPARIN 100 UNIT/ML
500 SYRINGE INTRAVENOUS PRN
OUTPATIENT
Start: 2024-03-07

## 2024-03-06 RX ORDER — SODIUM CHLORIDE 0.9 % (FLUSH) 0.9 %
5-40 SYRINGE (ML) INJECTION PRN
OUTPATIENT
Start: 2024-03-07

## 2024-03-06 RX ORDER — SODIUM CHLORIDE 9 MG/ML
5-250 INJECTION, SOLUTION INTRAVENOUS PRN
OUTPATIENT
Start: 2024-03-07

## 2024-03-06 RX ORDER — SODIUM CHLORIDE 9 MG/ML
5-250 INJECTION, SOLUTION INTRAVENOUS PRN
Status: DISCONTINUED | OUTPATIENT
Start: 2024-03-06 | End: 2024-03-07 | Stop reason: HOSPADM

## 2024-03-06 RX ORDER — DIPHENHYDRAMINE HYDROCHLORIDE 50 MG/ML
50 INJECTION INTRAMUSCULAR; INTRAVENOUS
OUTPATIENT
Start: 2024-03-07

## 2024-03-06 RX ORDER — ALBUTEROL SULFATE 90 UG/1
4 AEROSOL, METERED RESPIRATORY (INHALATION) PRN
OUTPATIENT
Start: 2024-03-07

## 2024-03-06 RX ORDER — ACETAMINOPHEN 325 MG/1
650 TABLET ORAL
OUTPATIENT
Start: 2024-03-07

## 2024-03-06 RX ORDER — EPINEPHRINE 1 MG/ML
0.3 INJECTION, SOLUTION, CONCENTRATE INTRAVENOUS PRN
OUTPATIENT
Start: 2024-03-07

## 2024-03-06 RX ORDER — SODIUM CHLORIDE 9 MG/ML
INJECTION, SOLUTION INTRAVENOUS CONTINUOUS
OUTPATIENT
Start: 2024-03-07

## 2024-03-06 RX ORDER — ONDANSETRON 2 MG/ML
8 INJECTION INTRAMUSCULAR; INTRAVENOUS
OUTPATIENT
Start: 2024-03-07

## 2024-03-06 RX ADMIN — IRON SUCROSE 200 MG: 20 INJECTION, SOLUTION INTRAVENOUS at 13:14

## 2024-03-06 RX ADMIN — SODIUM CHLORIDE 200 ML/HR: 9 INJECTION, SOLUTION INTRAVENOUS at 13:12

## 2024-03-11 ENCOUNTER — HOSPITAL ENCOUNTER (EMERGENCY)
Age: 84
Discharge: OTHER INSTITUTION | End: 2024-03-12
Attending: EMERGENCY MEDICINE
Payer: OTHER GOVERNMENT

## 2024-03-11 DIAGNOSIS — D64.9 CHRONIC ANEMIA: ICD-10-CM

## 2024-03-11 DIAGNOSIS — E87.6 HYPOKALEMIA: Primary | ICD-10-CM

## 2024-03-11 LAB
ABO + RH BLD: NORMAL
ALBUMIN SERPL-MCNC: 2.6 G/DL (ref 3.5–5.2)
ALBUMIN SERPL-MCNC: 2.9 G/DL (ref 3.5–5.2)
ALP SERPL-CCNC: 100 U/L (ref 40–129)
ALP SERPL-CCNC: 120 U/L (ref 40–129)
ALT SERPL-CCNC: 16 U/L (ref 0–40)
ALT SERPL-CCNC: 21 U/L (ref 0–40)
ANION GAP SERPL CALCULATED.3IONS-SCNC: 12 MMOL/L (ref 7–16)
ANION GAP SERPL CALCULATED.3IONS-SCNC: 9 MMOL/L (ref 7–16)
ANION GAP SERPL CALCULATED.3IONS-SCNC: 9 MMOL/L (ref 7–16)
ARM BAND NUMBER: NORMAL
AST SERPL-CCNC: 25 U/L (ref 0–39)
AST SERPL-CCNC: 31 U/L (ref 0–39)
BASOPHILS # BLD: 0 K/UL (ref 0–0.2)
BASOPHILS NFR BLD: 0 % (ref 0–2)
BILIRUB SERPL-MCNC: <0.2 MG/DL (ref 0–1.2)
BILIRUB SERPL-MCNC: <0.2 MG/DL (ref 0–1.2)
BLOOD BANK SAMPLE EXPIRATION: NORMAL
BLOOD GROUP ANTIBODIES SERPL: NEGATIVE
BUN SERPL-MCNC: 18 MG/DL (ref 6–23)
BUN SERPL-MCNC: 19 MG/DL (ref 6–23)
BUN SERPL-MCNC: 20 MG/DL (ref 6–23)
CALCIUM SERPL-MCNC: 8.5 MG/DL (ref 8.6–10.2)
CALCIUM SERPL-MCNC: 8.6 MG/DL (ref 8.6–10.2)
CALCIUM SERPL-MCNC: 9 MG/DL (ref 8.6–10.2)
CHLORIDE SERPL-SCNC: 115 MMOL/L (ref 98–107)
CO2 SERPL-SCNC: 15 MMOL/L (ref 22–29)
CO2 SERPL-SCNC: 17 MMOL/L (ref 22–29)
CO2 SERPL-SCNC: 17 MMOL/L (ref 22–29)
CREAT SERPL-MCNC: 2 MG/DL (ref 0.7–1.2)
CREAT SERPL-MCNC: 2 MG/DL (ref 0.7–1.2)
CREAT SERPL-MCNC: 2.1 MG/DL (ref 0.7–1.2)
EOSINOPHIL # BLD: 0.03 K/UL (ref 0.05–0.5)
EOSINOPHILS RELATIVE PERCENT: 1 % (ref 0–6)
ERYTHROCYTE [DISTWIDTH] IN BLOOD BY AUTOMATED COUNT: 17.5 % (ref 11.5–15)
ERYTHROCYTE [DISTWIDTH] IN BLOOD BY AUTOMATED COUNT: 17.5 % (ref 11.5–15)
GFR SERPL CREATININE-BSD FRML MDRD: 32 ML/MIN/1.73M2
GFR SERPL CREATININE-BSD FRML MDRD: 32 ML/MIN/1.73M2
GFR SERPL CREATININE-BSD FRML MDRD: 33 ML/MIN/1.73M2
GLUCOSE SERPL-MCNC: 79 MG/DL (ref 74–99)
GLUCOSE SERPL-MCNC: 84 MG/DL (ref 74–99)
GLUCOSE SERPL-MCNC: 95 MG/DL (ref 74–99)
HCT VFR BLD AUTO: 21.7 % (ref 37–54)
HCT VFR BLD AUTO: 22.2 % (ref 37–54)
HCT VFR BLD AUTO: 26.7 % (ref 37–54)
HGB BLD-MCNC: 6.6 G/DL (ref 12.5–16.5)
HGB BLD-MCNC: 6.8 G/DL (ref 12.5–16.5)
HGB BLD-MCNC: 8.1 G/DL (ref 12.5–16.5)
IMM GRANULOCYTES # BLD AUTO: <0.03 K/UL (ref 0–0.58)
IMM GRANULOCYTES NFR BLD: 0 % (ref 0–5)
INR PPP: 1
LIPASE SERPL-CCNC: 76 U/L (ref 13–60)
LYMPHOCYTES NFR BLD: 0.85 K/UL (ref 1.5–4)
LYMPHOCYTES RELATIVE PERCENT: 18 % (ref 20–42)
MAGNESIUM SERPL-MCNC: 1.9 MG/DL (ref 1.6–2.6)
MCH RBC QN AUTO: 30.8 PG (ref 26–35)
MCH RBC QN AUTO: 31 PG (ref 26–35)
MCHC RBC AUTO-ENTMCNC: 30.3 G/DL (ref 32–34.5)
MCHC RBC AUTO-ENTMCNC: 30.4 G/DL (ref 32–34.5)
MCV RBC AUTO: 101.5 FL (ref 80–99.9)
MCV RBC AUTO: 101.9 FL (ref 80–99.9)
MONOCYTES NFR BLD: 0.47 K/UL (ref 0.1–0.95)
MONOCYTES NFR BLD: 10 % (ref 2–12)
NEUTROPHILS NFR BLD: 72 % (ref 43–80)
NEUTS SEG NFR BLD: 3.44 K/UL (ref 1.8–7.3)
PLATELET # BLD AUTO: 180 K/UL (ref 130–450)
PLATELET # BLD AUTO: 245 K/UL (ref 130–450)
PMV BLD AUTO: 10.2 FL (ref 7–12)
PMV BLD AUTO: 9.6 FL (ref 7–12)
POTASSIUM SERPL-SCNC: 2.5 MMOL/L (ref 3.5–5)
POTASSIUM SERPL-SCNC: 2.6 MMOL/L (ref 3.5–5)
POTASSIUM SERPL-SCNC: 2.6 MMOL/L (ref 3.5–5)
POTASSIUM SERPL-SCNC: 3.3 MMOL/L (ref 3.5–5)
PROT SERPL-MCNC: 4.7 G/DL (ref 6.4–8.3)
PROT SERPL-MCNC: 5.8 G/DL (ref 6.4–8.3)
PROTHROMBIN TIME: 11.6 SEC (ref 9.3–12.4)
RBC # BLD AUTO: 2.13 M/UL (ref 3.8–5.8)
RBC # BLD AUTO: 2.63 M/UL (ref 3.8–5.8)
SODIUM SERPL-SCNC: 141 MMOL/L (ref 132–146)
SODIUM SERPL-SCNC: 141 MMOL/L (ref 132–146)
SODIUM SERPL-SCNC: 142 MMOL/L (ref 132–146)
WBC OTHER # BLD: 3.5 K/UL (ref 4.5–11.5)
WBC OTHER # BLD: 4.8 K/UL (ref 4.5–11.5)

## 2024-03-11 PROCEDURE — 86901 BLOOD TYPING SEROLOGIC RH(D): CPT

## 2024-03-11 PROCEDURE — 96368 THER/DIAG CONCURRENT INF: CPT

## 2024-03-11 PROCEDURE — 86850 RBC ANTIBODY SCREEN: CPT

## 2024-03-11 PROCEDURE — 6370000000 HC RX 637 (ALT 250 FOR IP): Performed by: EMERGENCY MEDICINE

## 2024-03-11 PROCEDURE — 86900 BLOOD TYPING SEROLOGIC ABO: CPT

## 2024-03-11 PROCEDURE — 99284 EMERGENCY DEPT VISIT MOD MDM: CPT

## 2024-03-11 PROCEDURE — 96366 THER/PROPH/DIAG IV INF ADDON: CPT

## 2024-03-11 PROCEDURE — 80053 COMPREHEN METABOLIC PANEL: CPT

## 2024-03-11 PROCEDURE — 84132 ASSAY OF SERUM POTASSIUM: CPT

## 2024-03-11 PROCEDURE — 85610 PROTHROMBIN TIME: CPT

## 2024-03-11 PROCEDURE — 85025 COMPLETE CBC W/AUTO DIFF WBC: CPT

## 2024-03-11 PROCEDURE — 6360000002 HC RX W HCPCS: Performed by: EMERGENCY MEDICINE

## 2024-03-11 PROCEDURE — 83690 ASSAY OF LIPASE: CPT

## 2024-03-11 PROCEDURE — 83735 ASSAY OF MAGNESIUM: CPT

## 2024-03-11 PROCEDURE — 93005 ELECTROCARDIOGRAM TRACING: CPT | Performed by: EMERGENCY MEDICINE

## 2024-03-11 PROCEDURE — 96365 THER/PROPH/DIAG IV INF INIT: CPT

## 2024-03-11 RX ORDER — POTASSIUM CHLORIDE 7.45 MG/ML
10 INJECTION INTRAVENOUS ONCE
Status: COMPLETED | OUTPATIENT
Start: 2024-03-11 | End: 2024-03-11

## 2024-03-11 RX ORDER — MAGNESIUM SULFATE IN WATER 40 MG/ML
2000 INJECTION, SOLUTION INTRAVENOUS ONCE
Status: COMPLETED | OUTPATIENT
Start: 2024-03-11 | End: 2024-03-11

## 2024-03-11 RX ADMIN — MAGNESIUM SULFATE HEPTAHYDRATE 2000 MG: 40 INJECTION, SOLUTION INTRAVENOUS at 18:42

## 2024-03-11 RX ADMIN — POTASSIUM CHLORIDE 10 MEQ: 7.46 INJECTION, SOLUTION INTRAVENOUS at 19:48

## 2024-03-11 RX ADMIN — POTASSIUM BICARBONATE 40 MEQ: 782 TABLET, EFFERVESCENT ORAL at 18:39

## 2024-03-11 RX ADMIN — POTASSIUM BICARBONATE 40 MEQ: 782 TABLET, EFFERVESCENT ORAL at 21:15

## 2024-03-11 NOTE — ED PROVIDER NOTES
the day and was sent over as it was abnormal.  Patient apparently had low potassium and a low hemoglobin.  Patient was stable he had no complaints at this time.  Patient's hemoglobin was actually much better than it was last time no signs of overt bleeding.  Patient appears to be chronically anemic.  Patient's potassium was slightly low both the potassium magnesium were repleted with improvement on recheck. Given that his potassium was no longer severe I did feel that he could follow up outpatient and have this redrawn and check at his facility.  Patient was agreeable this plan.    FINAL IMPRESSION      1. Hypokalemia    2. Chronic anemia          DISPOSITION/PLAN     DISPOSITION Decision To Discharge 03/11/2024 09:41:54 PM    PATIENT REFERRED TO:  Guilherme Salgado DO  [address] [place] [address] [phone]    Schedule an appointment as soon as possible for a visit       [facility] Emergency Department  [address] [id number] [phone]  Go to   If symptoms worsen      DISCHARGE MEDICATIONS:  Discharge Medication List as of 3/12/2024 11:05 AM          DISCONTINUED MEDICATIONS:  Discharge Medication List as of 3/12/2024 11:05 AM               (Please note that portions of this note were completed with a voice recognition program.  Efforts were made to edit the dictations but occasionally words are mis-transcribed.)    Guilherme Campos DO (electronically signed)        Guilherme Campos DO  03/13/24 1700

## 2024-03-12 VITALS
TEMPERATURE: 98.6 F | HEART RATE: 82 BPM | DIASTOLIC BLOOD PRESSURE: 62 MMHG | BODY MASS INDEX: 28 KG/M2 | SYSTOLIC BLOOD PRESSURE: 126 MMHG | HEIGHT: 71 IN | RESPIRATION RATE: 24 BRPM | WEIGHT: 200 LBS | OXYGEN SATURATION: 96 %

## 2024-03-12 LAB
EKG ATRIAL RATE: 83 BPM
EKG P AXIS: 69 DEGREES
EKG P-R INTERVAL: 244 MS
EKG Q-T INTERVAL: 388 MS
EKG QRS DURATION: 126 MS
EKG QTC CALCULATION (BAZETT): 455 MS
EKG R AXIS: -35 DEGREES
EKG T AXIS: 57 DEGREES
EKG VENTRICULAR RATE: 83 BPM

## 2024-03-12 NOTE — CARE COORDINATION
Social Work/Transition of Care:    [facility] initial ETA 0200 to  pt to transport to [facility], call from [facility] transport delayed again, pt upset due to the time it is taking for transport, facility contracts with [facility] called ETA 11:30am.    Electronically signed by JOSE kwan on 3/12/2024 at 10:50 AM

## 2024-03-14 LAB
ALBUMIN SERPL-MCNC: 3 G/DL (ref 3.5–5.2)
ALP SERPL-CCNC: 131 U/L (ref 40–129)
ALT SERPL-CCNC: 19 U/L (ref 0–40)
ANION GAP SERPL CALCULATED.3IONS-SCNC: 12 MMOL/L (ref 7–16)
AST SERPL-CCNC: 19 U/L (ref 0–39)
BILIRUB SERPL-MCNC: 0.3 MG/DL (ref 0–1.2)
BUN SERPL-MCNC: 18 MG/DL (ref 6–23)
CALCIUM SERPL-MCNC: 9.1 MG/DL (ref 8.6–10.2)
CHLORIDE SERPL-SCNC: 114 MMOL/L (ref 98–107)
CO2 SERPL-SCNC: 13 MMOL/L (ref 22–29)
CREAT SERPL-MCNC: 1.9 MG/DL (ref 0.7–1.2)
ERYTHROCYTE [DISTWIDTH] IN BLOOD BY AUTOMATED COUNT: 17.8 % (ref 11.5–15)
GFR SERPL CREATININE-BSD FRML MDRD: 36 ML/MIN/1.73M2
GLUCOSE SERPL-MCNC: 135 MG/DL (ref 74–99)
HCT VFR BLD AUTO: 27.9 % (ref 37–54)
HGB BLD-MCNC: 8.3 G/DL (ref 12.5–16.5)
MCH RBC QN AUTO: 30.3 PG (ref 26–35)
MCHC RBC AUTO-ENTMCNC: 29.7 G/DL (ref 32–34.5)
MCV RBC AUTO: 101.8 FL (ref 80–99.9)
PLATELET # BLD AUTO: 285 K/UL (ref 130–450)
PMV BLD AUTO: 9.7 FL (ref 7–12)
POTASSIUM SERPL-SCNC: 5.8 MMOL/L (ref 3.5–5)
PROT SERPL-MCNC: 5.7 G/DL (ref 6.4–8.3)
RBC # BLD AUTO: 2.74 M/UL (ref 3.8–5.8)
SODIUM SERPL-SCNC: 139 MMOL/L (ref 132–146)
WBC OTHER # BLD: 6.4 K/UL (ref 4.5–11.5)

## 2024-03-15 ENCOUNTER — HOSPITAL ENCOUNTER (OUTPATIENT)
Dept: INFUSION THERAPY | Age: 84
Discharge: HOME | End: 2024-03-15

## 2024-03-15 LAB — POTASSIUM SERPL-SCNC: 5.3 MMOL/L (ref 3.5–5)

## 2024-03-16 LAB — POTASSIUM SERPL-SCNC: 4 MMOL/L (ref 3.5–5)

## 2024-03-18 ENCOUNTER — TELEPHONE (OUTPATIENT)
Dept: ONCOLOGY | Age: 84
End: 2024-03-18

## 2024-03-18 NOTE — TELEPHONE ENCOUNTER
CALLED [facility] TO RECONFIRM PT'S 3/19 APPT. THIS APPT WAS R/S'D FROM 3/15 APPT (FACILITY WAS SHORT STAFFED). SPOKE WITH ANANT WHO STATED THE 3/19 APPT WAS CANCELLED DUE TO LACK OF TRANSPORTATION. ANANT STATED PT WASN'T WILLING TO PAY OUT OF POCKET FOR TRANSPORT. I LET HER KNOW THAT HE IS A [facility] REFERRAL AND PROVIDED HER [facility]. SHE CALLED BACK SAYING SHE HAD LEFT A MSG AND NEEDED TO R/S HIS APPT TO NEXT WEEK. I ATTEMPTED TO R/S TO 3/22 BUT AGAIN LACK OF TRANSPORTATION PREVENTED THAT.

## 2024-03-19 ENCOUNTER — HOSPITAL ENCOUNTER (OUTPATIENT)
Dept: INFUSION THERAPY | Age: 84
End: 2024-03-19

## 2024-03-19 LAB
ALBUMIN SERPL-MCNC: 2.5 G/DL (ref 3.5–5.2)
ALP SERPL-CCNC: 129 U/L (ref 40–129)
ALT SERPL-CCNC: 17 U/L (ref 0–40)
ANION GAP SERPL CALCULATED.3IONS-SCNC: 11 MMOL/L (ref 7–16)
AST SERPL-CCNC: 25 U/L (ref 0–39)
BILIRUB SERPL-MCNC: 0.2 MG/DL (ref 0–1.2)
BUN SERPL-MCNC: 21 MG/DL (ref 6–23)
CALCIUM SERPL-MCNC: 9 MG/DL (ref 8.6–10.2)
CHLORIDE SERPL-SCNC: 112 MMOL/L (ref 98–107)
CO2 SERPL-SCNC: 18 MMOL/L (ref 22–29)
CREAT SERPL-MCNC: 2 MG/DL (ref 0.7–1.2)
ERYTHROCYTE [DISTWIDTH] IN BLOOD BY AUTOMATED COUNT: 17.2 % (ref 11.5–15)
GFR SERPL CREATININE-BSD FRML MDRD: 33 ML/MIN/1.73M2
GLUCOSE SERPL-MCNC: 144 MG/DL (ref 74–99)
HCT VFR BLD AUTO: 25.4 % (ref 37–54)
HGB BLD-MCNC: 7.9 G/DL (ref 12.5–16.5)
MCH RBC QN AUTO: 30.9 PG (ref 26–35)
MCHC RBC AUTO-ENTMCNC: 31.1 G/DL (ref 32–34.5)
MCV RBC AUTO: 99.2 FL (ref 80–99.9)
PLATELET # BLD AUTO: 237 K/UL (ref 130–450)
PMV BLD AUTO: 10.3 FL (ref 7–12)
POTASSIUM SERPL-SCNC: 3.8 MMOL/L (ref 3.5–5)
PROT SERPL-MCNC: 5.1 G/DL (ref 6.4–8.3)
RBC # BLD AUTO: 2.56 M/UL (ref 3.8–5.8)
SODIUM SERPL-SCNC: 141 MMOL/L (ref 132–146)
WBC OTHER # BLD: 5.7 K/UL (ref 4.5–11.5)

## 2024-03-23 LAB
ANION GAP SERPL CALCULATED.3IONS-SCNC: 8 MMOL/L (ref 7–16)
BUN SERPL-MCNC: 27 MG/DL (ref 6–23)
CALCIUM SERPL-MCNC: 9.3 MG/DL (ref 8.6–10.2)
CHLORIDE SERPL-SCNC: 110 MMOL/L (ref 98–107)
CO2 SERPL-SCNC: 24 MMOL/L (ref 22–29)
CREAT SERPL-MCNC: 2 MG/DL (ref 0.7–1.2)
GFR SERPL CREATININE-BSD FRML MDRD: 33 ML/MIN/1.73M2
GLUCOSE SERPL-MCNC: 120 MG/DL (ref 74–99)
POTASSIUM SERPL-SCNC: 4.6 MMOL/L (ref 3.5–5)
SODIUM SERPL-SCNC: 142 MMOL/L (ref 132–146)

## 2024-03-25 PROBLEM — Z79.899 OTHER LONG TERM (CURRENT) DRUG THERAPY: Status: ACTIVE | Noted: 2024-03-25

## 2024-03-25 LAB
BASOPHILS # BLD: 0.01 K/UL (ref 0–0.2)
BASOPHILS NFR BLD: 0 % (ref 0–2)
EOSINOPHIL # BLD: 0.07 K/UL (ref 0.05–0.5)
EOSINOPHILS RELATIVE PERCENT: 1 % (ref 0–6)
ERYTHROCYTE [DISTWIDTH] IN BLOOD BY AUTOMATED COUNT: 16.1 % (ref 11.5–15)
HCT VFR BLD AUTO: 26.2 % (ref 37–54)
HGB BLD-MCNC: 8.3 G/DL (ref 12.5–16.5)
IMM GRANULOCYTES # BLD AUTO: 0.05 K/UL (ref 0–0.58)
IMM GRANULOCYTES NFR BLD: 1 % (ref 0–5)
INFLUENZA A BY PCR: NOT DETECTED
INFLUENZA B BY PCR: NOT DETECTED
LYMPHOCYTES NFR BLD: 0.49 K/UL (ref 1.5–4)
LYMPHOCYTES RELATIVE PERCENT: 7 % (ref 20–42)
MCH RBC QN AUTO: 31.8 PG (ref 26–35)
MCHC RBC AUTO-ENTMCNC: 31.7 G/DL (ref 32–34.5)
MCV RBC AUTO: 100.4 FL (ref 80–99.9)
MONOCYTES NFR BLD: 0.46 K/UL (ref 0.1–0.95)
MONOCYTES NFR BLD: 6 % (ref 2–12)
NEUTROPHILS NFR BLD: 85 % (ref 43–80)
NEUTS SEG NFR BLD: 6.11 K/UL (ref 1.8–7.3)
PLATELET # BLD AUTO: 187 K/UL (ref 130–450)
PMV BLD AUTO: 10.3 FL (ref 7–12)
RBC # BLD AUTO: 2.61 M/UL (ref 3.8–5.8)
RBC # BLD: ABNORMAL 10*6/UL
WBC OTHER # BLD: 7.2 K/UL (ref 4.5–11.5)

## 2024-03-26 ENCOUNTER — HOSPITAL ENCOUNTER (OUTPATIENT)
Dept: INFUSION THERAPY | Age: 84
Discharge: HOME | End: 2024-03-26
Payer: OTHER GOVERNMENT

## 2024-03-26 ENCOUNTER — OFFICE VISIT (OUTPATIENT)
Dept: ONCOLOGY | Age: 84
End: 2024-03-26
Payer: OTHER GOVERNMENT

## 2024-03-26 VITALS
DIASTOLIC BLOOD PRESSURE: 70 MMHG | HEIGHT: 71 IN | OXYGEN SATURATION: 98 % | BODY MASS INDEX: 27.3 KG/M2 | SYSTOLIC BLOOD PRESSURE: 132 MMHG | TEMPERATURE: 98 F | HEART RATE: 96 BPM | WEIGHT: 195 LBS

## 2024-03-26 VITALS
RESPIRATION RATE: 18 BRPM | OXYGEN SATURATION: 98 % | HEART RATE: 93 BPM | TEMPERATURE: 97.9 F | DIASTOLIC BLOOD PRESSURE: 70 MMHG | SYSTOLIC BLOOD PRESSURE: 124 MMHG

## 2024-03-26 DIAGNOSIS — Z79.899 OTHER LONG TERM (CURRENT) DRUG THERAPY: Primary | ICD-10-CM

## 2024-03-26 DIAGNOSIS — C64.9 RENAL CELL CARCINOMA, UNSPECIFIED LATERALITY (HCC): Primary | ICD-10-CM

## 2024-03-26 DIAGNOSIS — C64.9 RENAL CELL CARCINOMA, UNSPECIFIED LATERALITY (HCC): ICD-10-CM

## 2024-03-26 DIAGNOSIS — I82.402 ACUTE DEEP VEIN THROMBOSIS (DVT) OF LEFT LOWER EXTREMITY, UNSPECIFIED VEIN (HCC): ICD-10-CM

## 2024-03-26 DIAGNOSIS — D64.9 ANEMIA, UNSPECIFIED TYPE: ICD-10-CM

## 2024-03-26 DIAGNOSIS — Z79.899 OTHER LONG TERM (CURRENT) DRUG THERAPY: ICD-10-CM

## 2024-03-26 LAB
ALBUMIN SERPL-MCNC: 2.7 G/DL (ref 3.5–5.2)
ALP SERPL-CCNC: 125 U/L (ref 40–129)
ALT SERPL-CCNC: 19 U/L (ref 0–40)
ANION GAP SERPL CALCULATED.3IONS-SCNC: 11 MMOL/L (ref 7–16)
AST SERPL-CCNC: 26 U/L (ref 0–39)
BASOPHILS # BLD: 0.01 K/UL (ref 0–0.2)
BASOPHILS NFR BLD: 0 % (ref 0–2)
BILIRUB SERPL-MCNC: 0.3 MG/DL (ref 0–1.2)
BUN SERPL-MCNC: 24 MG/DL (ref 6–23)
CALCIUM SERPL-MCNC: 9.3 MG/DL (ref 8.6–10.2)
CHLORIDE SERPL-SCNC: 106 MMOL/L (ref 98–107)
CO2 SERPL-SCNC: 22 MMOL/L (ref 22–29)
CREAT SERPL-MCNC: 2 MG/DL (ref 0.7–1.2)
EOSINOPHIL # BLD: 0.11 K/UL (ref 0.05–0.5)
EOSINOPHILS RELATIVE PERCENT: 2 % (ref 0–6)
ERYTHROCYTE [DISTWIDTH] IN BLOOD BY AUTOMATED COUNT: 15.9 % (ref 11.5–15)
GFR SERPL CREATININE-BSD FRML MDRD: 34 ML/MIN/1.73M2
GLUCOSE SERPL-MCNC: 101 MG/DL (ref 74–99)
HCT VFR BLD AUTO: 27.6 % (ref 37–54)
HGB BLD-MCNC: 8.3 G/DL (ref 12.5–16.5)
IMM GRANULOCYTES # BLD AUTO: 0.03 K/UL (ref 0–0.58)
IMM GRANULOCYTES NFR BLD: 0 % (ref 0–5)
LYMPHOCYTES NFR BLD: 0.63 K/UL (ref 1.5–4)
LYMPHOCYTES RELATIVE PERCENT: 9 % (ref 20–42)
MCH RBC QN AUTO: 31 PG (ref 26–35)
MCHC RBC AUTO-ENTMCNC: 30.1 G/DL (ref 32–34.5)
MCV RBC AUTO: 103 FL (ref 80–99.9)
MONOCYTES NFR BLD: 0.55 K/UL (ref 0.1–0.95)
MONOCYTES NFR BLD: 8 % (ref 2–12)
NEUTROPHILS NFR BLD: 82 % (ref 43–80)
NEUTS SEG NFR BLD: 5.79 K/UL (ref 1.8–7.3)
PLATELET # BLD AUTO: 182 K/UL (ref 130–450)
PMV BLD AUTO: 11.2 FL (ref 7–12)
POTASSIUM SERPL-SCNC: 3.5 MMOL/L (ref 3.5–5)
PROT SERPL-MCNC: 5.2 G/DL (ref 6.4–8.3)
RBC # BLD AUTO: 2.68 M/UL (ref 3.8–5.8)
SODIUM SERPL-SCNC: 139 MMOL/L (ref 132–146)
TSH SERPL DL<=0.05 MIU/L-ACNC: 0.65 UIU/ML (ref 0.27–4.2)
WBC OTHER # BLD: 7.1 K/UL (ref 4.5–11.5)

## 2024-03-26 PROCEDURE — 1123F ACP DISCUSS/DSCN MKR DOCD: CPT | Performed by: INTERNAL MEDICINE

## 2024-03-26 PROCEDURE — 2580000003 HC RX 258: Performed by: INTERNAL MEDICINE

## 2024-03-26 PROCEDURE — 6360000002 HC RX W HCPCS: Performed by: INTERNAL MEDICINE

## 2024-03-26 PROCEDURE — 84443 ASSAY THYROID STIM HORMONE: CPT

## 2024-03-26 PROCEDURE — 3078F DIAST BP <80 MM HG: CPT | Performed by: INTERNAL MEDICINE

## 2024-03-26 PROCEDURE — 96413 CHEMO IV INFUSION 1 HR: CPT

## 2024-03-26 PROCEDURE — 99214 OFFICE O/P EST MOD 30 MIN: CPT | Performed by: INTERNAL MEDICINE

## 2024-03-26 PROCEDURE — 80053 COMPREHEN METABOLIC PANEL: CPT

## 2024-03-26 PROCEDURE — 3075F SYST BP GE 130 - 139MM HG: CPT | Performed by: INTERNAL MEDICINE

## 2024-03-26 RX ORDER — HEPARIN SODIUM (PORCINE) LOCK FLUSH IV SOLN 100 UNIT/ML 100 UNIT/ML
500 SOLUTION INTRAVENOUS PRN
Status: CANCELLED | OUTPATIENT
Start: 2024-03-26

## 2024-03-26 RX ORDER — EPINEPHRINE 1 MG/ML
0.3 INJECTION, SOLUTION, CONCENTRATE INTRAVENOUS PRN
Status: CANCELLED | OUTPATIENT
Start: 2024-03-26

## 2024-03-26 RX ORDER — SODIUM CHLORIDE 9 MG/ML
5-250 INJECTION, SOLUTION INTRAVENOUS PRN
Status: DISCONTINUED | OUTPATIENT
Start: 2024-03-26 | End: 2024-03-27 | Stop reason: HOSPADM

## 2024-03-26 RX ORDER — SODIUM CHLORIDE 0.9 % (FLUSH) 0.9 %
5-40 SYRINGE (ML) INJECTION PRN
Status: CANCELLED | OUTPATIENT
Start: 2024-03-26

## 2024-03-26 RX ORDER — SODIUM CHLORIDE 9 MG/ML
5-250 INJECTION, SOLUTION INTRAVENOUS PRN
Status: CANCELLED | OUTPATIENT
Start: 2024-03-26

## 2024-03-26 RX ORDER — DIPHENHYDRAMINE HYDROCHLORIDE 50 MG/ML
50 INJECTION INTRAMUSCULAR; INTRAVENOUS
Status: CANCELLED | OUTPATIENT
Start: 2024-03-26

## 2024-03-26 RX ORDER — SODIUM CHLORIDE 9 MG/ML
INJECTION, SOLUTION INTRAVENOUS CONTINUOUS
Status: CANCELLED | OUTPATIENT
Start: 2024-03-26

## 2024-03-26 RX ORDER — SODIUM CHLORIDE 0.9 % (FLUSH) 0.9 %
5-40 SYRINGE (ML) INJECTION PRN
Status: DISCONTINUED | OUTPATIENT
Start: 2024-03-26 | End: 2024-03-27 | Stop reason: HOSPADM

## 2024-03-26 RX ORDER — ONDANSETRON 2 MG/ML
8 INJECTION INTRAMUSCULAR; INTRAVENOUS
Status: CANCELLED | OUTPATIENT
Start: 2024-03-26

## 2024-03-26 RX ORDER — MEPERIDINE HYDROCHLORIDE 50 MG/ML
12.5 INJECTION INTRAMUSCULAR; INTRAVENOUS; SUBCUTANEOUS PRN
Status: CANCELLED | OUTPATIENT
Start: 2024-03-26

## 2024-03-26 RX ORDER — ACETAMINOPHEN 325 MG/1
650 TABLET ORAL
Status: CANCELLED | OUTPATIENT
Start: 2024-03-26

## 2024-03-26 RX ORDER — FAMOTIDINE 10 MG/ML
20 INJECTION, SOLUTION INTRAVENOUS
Status: CANCELLED | OUTPATIENT
Start: 2024-03-26

## 2024-03-26 RX ORDER — ALBUTEROL SULFATE 90 UG/1
4 AEROSOL, METERED RESPIRATORY (INHALATION) PRN
Status: CANCELLED | OUTPATIENT
Start: 2024-03-26

## 2024-03-26 RX ADMIN — SODIUM CHLORIDE 250 ML/HR: 9 INJECTION, SOLUTION INTRAVENOUS at 13:13

## 2024-03-26 RX ADMIN — SODIUM CHLORIDE 240 MG: 9 INJECTION, SOLUTION INTRAVENOUS at 13:19

## 2024-03-27 ENCOUNTER — HOSPITAL ENCOUNTER (INPATIENT)
Age: 84
LOS: 6 days | Discharge: NURSING, REHAB OR LONG-TERM CARE | DRG: 377 | End: 2024-04-02
Attending: EMERGENCY MEDICINE | Admitting: INTERNAL MEDICINE
Payer: OTHER GOVERNMENT

## 2024-03-27 ENCOUNTER — APPOINTMENT (OUTPATIENT)
Dept: GENERAL RADIOLOGY | Age: 84
DRG: 377 | End: 2024-03-27
Payer: OTHER GOVERNMENT

## 2024-03-27 DIAGNOSIS — Z79.01 CHRONIC ANTICOAGULATION: ICD-10-CM

## 2024-03-27 DIAGNOSIS — R79.89 ELEVATED BRAIN NATRIURETIC PEPTIDE (BNP) LEVEL: ICD-10-CM

## 2024-03-27 DIAGNOSIS — D64.9 ANEMIA, UNSPECIFIED TYPE: ICD-10-CM

## 2024-03-27 DIAGNOSIS — K62.5 BRIGHT RED BLOOD PER RECTUM: ICD-10-CM

## 2024-03-27 DIAGNOSIS — N18.9 CHRONIC KIDNEY DISEASE, UNSPECIFIED CKD STAGE: ICD-10-CM

## 2024-03-27 DIAGNOSIS — D62 ACUTE ON CHRONIC BLOOD LOSS ANEMIA: Primary | ICD-10-CM

## 2024-03-27 LAB
ALBUMIN SERPL-MCNC: 2.4 G/DL (ref 3.5–5.2)
ALP SERPL-CCNC: 115 U/L (ref 40–129)
ALT SERPL-CCNC: 18 U/L (ref 0–40)
ANION GAP SERPL CALCULATED.3IONS-SCNC: 7 MMOL/L (ref 7–16)
AST SERPL-CCNC: 27 U/L (ref 0–39)
BASOPHILS # BLD: 0 K/UL (ref 0–0.2)
BASOPHILS NFR BLD: 0 % (ref 0–2)
BILIRUB SERPL-MCNC: 0.2 MG/DL (ref 0–1.2)
BNP SERPL-MCNC: ABNORMAL PG/ML (ref 0–450)
BUN SERPL-MCNC: 24 MG/DL (ref 6–23)
CALCIUM SERPL-MCNC: 9 MG/DL (ref 8.6–10.2)
CHLORIDE SERPL-SCNC: 109 MMOL/L (ref 98–107)
CO2 SERPL-SCNC: 23 MMOL/L (ref 22–29)
CREAT SERPL-MCNC: 2 MG/DL (ref 0.7–1.2)
EOSINOPHIL # BLD: 0 K/UL (ref 0.05–0.5)
EOSINOPHILS RELATIVE PERCENT: 0 % (ref 0–6)
ERYTHROCYTE [DISTWIDTH] IN BLOOD BY AUTOMATED COUNT: 15.9 % (ref 11.5–15)
GFR SERPL CREATININE-BSD FRML MDRD: 33 ML/MIN/1.73M2
GLUCOSE SERPL-MCNC: 109 MG/DL (ref 74–99)
HCT VFR BLD AUTO: 23.7 % (ref 37–54)
HCT VFR BLD AUTO: 25.4 % (ref 37–54)
HGB BLD-MCNC: 7.2 G/DL (ref 12.5–16.5)
HGB BLD-MCNC: 7.5 G/DL (ref 12.5–16.5)
INR PPP: 1.1
LACTATE BLDV-SCNC: 1.5 MMOL/L (ref 0.5–2.2)
LIPASE SERPL-CCNC: 66 U/L (ref 13–60)
LYMPHOCYTES NFR BLD: 0.28 K/UL (ref 1.5–4)
LYMPHOCYTES RELATIVE PERCENT: 4 % (ref 20–42)
MAGNESIUM SERPL-MCNC: 1.6 MG/DL (ref 1.6–2.6)
MCH RBC QN AUTO: 30 PG (ref 26–35)
MCHC RBC AUTO-ENTMCNC: 29.5 G/DL (ref 32–34.5)
MCV RBC AUTO: 101.6 FL (ref 80–99.9)
MONOCYTES NFR BLD: 0.4 K/UL (ref 0.1–0.95)
MONOCYTES NFR BLD: 6 % (ref 2–12)
NEUTROPHILS NFR BLD: 90 % (ref 43–80)
NEUTS SEG NFR BLD: 5.82 K/UL (ref 1.8–7.3)
PARTIAL THROMBOPLASTIN TIME: 29.9 SEC (ref 24.5–35.1)
PLATELET # BLD AUTO: 175 K/UL (ref 130–450)
PMV BLD AUTO: 10.5 FL (ref 7–12)
POTASSIUM SERPL-SCNC: 3.7 MMOL/L (ref 3.5–5)
PROT SERPL-MCNC: 4.6 G/DL (ref 6.4–8.3)
PROTHROMBIN TIME: 12.8 SEC (ref 9.3–12.4)
RBC # BLD AUTO: 2.5 M/UL (ref 3.8–5.8)
RBC # BLD: ABNORMAL 10*6/UL
SODIUM SERPL-SCNC: 139 MMOL/L (ref 132–146)
TROPONIN I SERPL HS-MCNC: 83 NG/L (ref 0–11)
TROPONIN I SERPL HS-MCNC: 84 NG/L (ref 0–11)
WBC OTHER # BLD: 6.5 K/UL (ref 4.5–11.5)

## 2024-03-27 PROCEDURE — 85025 COMPLETE CBC W/AUTO DIFF WBC: CPT

## 2024-03-27 PROCEDURE — 83690 ASSAY OF LIPASE: CPT

## 2024-03-27 PROCEDURE — APPSS60 APP SPLIT SHARED TIME 46-60 MINUTES: Performed by: NURSE PRACTITIONER

## 2024-03-27 PROCEDURE — 36430 TRANSFUSION BLD/BLD COMPNT: CPT

## 2024-03-27 PROCEDURE — 6370000000 HC RX 637 (ALT 250 FOR IP): Performed by: NURSE PRACTITIONER

## 2024-03-27 PROCEDURE — 30233N1 TRANSFUSION OF NONAUTOLOGOUS RED BLOOD CELLS INTO PERIPHERAL VEIN, PERCUTANEOUS APPROACH: ICD-10-PCS | Performed by: STUDENT IN AN ORGANIZED HEALTH CARE EDUCATION/TRAINING PROGRAM

## 2024-03-27 PROCEDURE — 2060000000 HC ICU INTERMEDIATE R&B

## 2024-03-27 PROCEDURE — 83880 ASSAY OF NATRIURETIC PEPTIDE: CPT

## 2024-03-27 PROCEDURE — 2500000003 HC RX 250 WO HCPCS: Performed by: NURSE PRACTITIONER

## 2024-03-27 PROCEDURE — 99285 EMERGENCY DEPT VISIT HI MDM: CPT

## 2024-03-27 PROCEDURE — 85014 HEMATOCRIT: CPT

## 2024-03-27 PROCEDURE — 84484 ASSAY OF TROPONIN QUANT: CPT

## 2024-03-27 PROCEDURE — 86850 RBC ANTIBODY SCREEN: CPT

## 2024-03-27 PROCEDURE — 2700000000 HC OXYGEN THERAPY PER DAY

## 2024-03-27 PROCEDURE — P9016 RBC LEUKOCYTES REDUCED: HCPCS

## 2024-03-27 PROCEDURE — 85018 HEMOGLOBIN: CPT

## 2024-03-27 PROCEDURE — 83605 ASSAY OF LACTIC ACID: CPT

## 2024-03-27 PROCEDURE — 93005 ELECTROCARDIOGRAM TRACING: CPT | Performed by: EMERGENCY MEDICINE

## 2024-03-27 PROCEDURE — 96374 THER/PROPH/DIAG INJ IV PUSH: CPT

## 2024-03-27 PROCEDURE — 85610 PROTHROMBIN TIME: CPT

## 2024-03-27 PROCEDURE — 86900 BLOOD TYPING SEROLOGIC ABO: CPT

## 2024-03-27 PROCEDURE — 99223 1ST HOSP IP/OBS HIGH 75: CPT | Performed by: INTERNAL MEDICINE

## 2024-03-27 PROCEDURE — C9113 INJ PANTOPRAZOLE SODIUM, VIA: HCPCS | Performed by: EMERGENCY MEDICINE

## 2024-03-27 PROCEDURE — 85730 THROMBOPLASTIN TIME PARTIAL: CPT

## 2024-03-27 PROCEDURE — 71045 X-RAY EXAM CHEST 1 VIEW: CPT

## 2024-03-27 PROCEDURE — 83735 ASSAY OF MAGNESIUM: CPT

## 2024-03-27 PROCEDURE — 6360000002 HC RX W HCPCS: Performed by: EMERGENCY MEDICINE

## 2024-03-27 PROCEDURE — 86923 COMPATIBILITY TEST ELECTRIC: CPT

## 2024-03-27 PROCEDURE — 80053 COMPREHEN METABOLIC PANEL: CPT

## 2024-03-27 PROCEDURE — 86901 BLOOD TYPING SEROLOGIC RH(D): CPT

## 2024-03-27 RX ORDER — SODIUM CHLORIDE 9 MG/ML
INJECTION, SOLUTION INTRAVENOUS PRN
Status: DISCONTINUED | OUTPATIENT
Start: 2024-03-27 | End: 2024-04-02 | Stop reason: HOSPADM

## 2024-03-27 RX ORDER — SUCRALFATE 1 G/1
1 TABLET ORAL 4 TIMES DAILY
Status: DISCONTINUED | OUTPATIENT
Start: 2024-03-27 | End: 2024-04-02 | Stop reason: HOSPADM

## 2024-03-27 RX ORDER — ONDANSETRON 2 MG/ML
4 INJECTION INTRAMUSCULAR; INTRAVENOUS EVERY 6 HOURS PRN
Status: DISCONTINUED | OUTPATIENT
Start: 2024-03-27 | End: 2024-04-02 | Stop reason: HOSPADM

## 2024-03-27 RX ORDER — OXYCODONE HYDROCHLORIDE AND ACETAMINOPHEN 5; 325 MG/1; MG/1
1 TABLET ORAL EVERY 4 HOURS PRN
Status: DISCONTINUED | OUTPATIENT
Start: 2024-03-27 | End: 2024-04-02 | Stop reason: HOSPADM

## 2024-03-27 RX ORDER — MULTIVITAMIN WITH IRON
1 TABLET ORAL DAILY
Status: DISCONTINUED | OUTPATIENT
Start: 2024-03-28 | End: 2024-04-02 | Stop reason: HOSPADM

## 2024-03-27 RX ORDER — ACETAMINOPHEN 650 MG/1
650 SUPPOSITORY RECTAL EVERY 6 HOURS PRN
Status: DISCONTINUED | OUTPATIENT
Start: 2024-03-27 | End: 2024-04-02 | Stop reason: HOSPADM

## 2024-03-27 RX ORDER — CETIRIZINE HYDROCHLORIDE 10 MG/1
5 TABLET ORAL DAILY
Status: DISCONTINUED | OUTPATIENT
Start: 2024-03-28 | End: 2024-04-02 | Stop reason: HOSPADM

## 2024-03-27 RX ORDER — LATANOPROST 50 UG/ML
1 SOLUTION/ DROPS OPHTHALMIC NIGHTLY
Status: DISCONTINUED | OUTPATIENT
Start: 2024-03-27 | End: 2024-04-02 | Stop reason: HOSPADM

## 2024-03-27 RX ORDER — TAMSULOSIN HYDROCHLORIDE 0.4 MG/1
0.4 CAPSULE ORAL NIGHTLY
Status: DISCONTINUED | OUTPATIENT
Start: 2024-03-27 | End: 2024-04-02 | Stop reason: HOSPADM

## 2024-03-27 RX ORDER — METOPROLOL SUCCINATE 25 MG/1
25 TABLET, EXTENDED RELEASE ORAL 2 TIMES DAILY
Status: DISCONTINUED | OUTPATIENT
Start: 2024-03-27 | End: 2024-04-02 | Stop reason: HOSPADM

## 2024-03-27 RX ORDER — ACETAMINOPHEN 325 MG/1
650 TABLET ORAL EVERY 6 HOURS PRN
Status: DISCONTINUED | OUTPATIENT
Start: 2024-03-27 | End: 2024-04-02 | Stop reason: HOSPADM

## 2024-03-27 RX ORDER — FINASTERIDE 5 MG/1
5 TABLET, FILM COATED ORAL DAILY
Status: DISCONTINUED | OUTPATIENT
Start: 2024-03-28 | End: 2024-04-02 | Stop reason: HOSPADM

## 2024-03-27 RX ORDER — ONDANSETRON 4 MG/1
4 TABLET, ORALLY DISINTEGRATING ORAL EVERY 8 HOURS PRN
Status: DISCONTINUED | OUTPATIENT
Start: 2024-03-27 | End: 2024-04-02 | Stop reason: HOSPADM

## 2024-03-27 RX ORDER — ZINC SULFATE 50(220)MG
50 CAPSULE ORAL DAILY
Status: DISCONTINUED | OUTPATIENT
Start: 2024-03-28 | End: 2024-04-02 | Stop reason: HOSPADM

## 2024-03-27 RX ORDER — PANTOPRAZOLE SODIUM 40 MG/10ML
40 INJECTION, POWDER, LYOPHILIZED, FOR SOLUTION INTRAVENOUS ONCE
Status: COMPLETED | OUTPATIENT
Start: 2024-03-27 | End: 2024-03-27

## 2024-03-27 RX ORDER — LANOLIN ALCOHOL/MO/W.PET/CERES
1000 CREAM (GRAM) TOPICAL DAILY
Status: DISCONTINUED | OUTPATIENT
Start: 2024-03-27 | End: 2024-04-02 | Stop reason: HOSPADM

## 2024-03-27 RX ORDER — ATORVASTATIN CALCIUM 40 MG/1
40 TABLET, FILM COATED ORAL NIGHTLY
Status: DISCONTINUED | OUTPATIENT
Start: 2024-03-27 | End: 2024-04-02 | Stop reason: HOSPADM

## 2024-03-27 RX ORDER — SODIUM CHLORIDE 0.9 % (FLUSH) 0.9 %
5-40 SYRINGE (ML) INJECTION PRN
Status: DISCONTINUED | OUTPATIENT
Start: 2024-03-27 | End: 2024-04-02 | Stop reason: HOSPADM

## 2024-03-27 RX ORDER — SODIUM CHLORIDE 0.9 % (FLUSH) 0.9 %
5-40 SYRINGE (ML) INJECTION EVERY 12 HOURS SCHEDULED
Status: DISCONTINUED | OUTPATIENT
Start: 2024-03-27 | End: 2024-04-02 | Stop reason: HOSPADM

## 2024-03-27 RX ADMIN — METOPROLOL SUCCINATE 25 MG: 25 TABLET, FILM COATED, EXTENDED RELEASE ORAL at 23:22

## 2024-03-27 RX ADMIN — ATORVASTATIN CALCIUM 40 MG: 40 TABLET, FILM COATED ORAL at 23:22

## 2024-03-27 RX ADMIN — CYANOCOBALAMIN TAB 1000 MCG 1000 MCG: 1000 TAB at 23:22

## 2024-03-27 RX ADMIN — LATANOPROST 1 DROP: 50 SOLUTION OPHTHALMIC at 23:59

## 2024-03-27 RX ADMIN — SUCRALFATE 1 G: 1 TABLET ORAL at 23:22

## 2024-03-27 RX ADMIN — PANTOPRAZOLE SODIUM 40 MG: 40 INJECTION, POWDER, FOR SOLUTION INTRAVENOUS at 16:06

## 2024-03-27 RX ADMIN — MICONAZOLE NITRATE: 20 POWDER TOPICAL at 23:22

## 2024-03-27 RX ADMIN — TAMSULOSIN HYDROCHLORIDE 0.4 MG: 0.4 CAPSULE ORAL at 23:22

## 2024-03-27 ASSESSMENT — PAIN - FUNCTIONAL ASSESSMENT: PAIN_FUNCTIONAL_ASSESSMENT: NONE - DENIES PAIN

## 2024-03-27 NOTE — ED NOTES
Received report from Gigi CASTANON. Introduced self to patient and family at beside. Patient adjusted in bed for comfort. Provided blanket.

## 2024-03-27 NOTE — ED PROVIDER NOTES
Decision-making details documented in ED Course.    Risk  Prescription drug management.  Decision regarding hospitalization.             CONSULTS: (Who and What was discussed)  Spoke with Dr. Mehta (Medicine).  Discussed case.  They will admit this patient.           I am the Primary Clinician of Record.    FINAL IMPRESSION      1. Acute on chronic blood loss anemia    2. Chronic anticoagulation    3. Bright red blood per rectum    4. Chronic kidney disease, unspecified CKD stage    5. Elevated brain natriuretic peptide (BNP) level          DISPOSITION/PLAN     DISPOSITION Admitted 03/27/2024 07:54:32 PM      PATIENT REFERRED TO:  No follow-up provider specified.    DISCHARGE MEDICATIONS:  Current Discharge Medication List          DISCONTINUED MEDICATIONS:  Current Discharge Medication List                 (Please note that portions of this note were completed with a voice recognition program.  Efforts were made to edit the dictations but occasionally words are mis-transcribed.)    Trino Breen DO (electronically signed)           Trino Breen,   03/28/24 0117

## 2024-03-28 ENCOUNTER — ANESTHESIA EVENT (OUTPATIENT)
Dept: ENDOSCOPY | Age: 84
End: 2024-03-28
Payer: OTHER GOVERNMENT

## 2024-03-28 ENCOUNTER — ANESTHESIA (OUTPATIENT)
Dept: ENDOSCOPY | Age: 84
End: 2024-03-28
Payer: OTHER GOVERNMENT

## 2024-03-28 PROBLEM — E43 SEVERE PROTEIN-CALORIE MALNUTRITION (HCC): Chronic | Status: ACTIVE | Noted: 2024-03-28

## 2024-03-28 LAB
ABO/RH: NORMAL
ALBUMIN SERPL-MCNC: 2.6 G/DL (ref 3.5–5.2)
ALP SERPL-CCNC: 113 U/L (ref 40–129)
ALT SERPL-CCNC: 19 U/L (ref 0–40)
ANION GAP SERPL CALCULATED.3IONS-SCNC: 9 MMOL/L (ref 7–16)
ANTIBODY SCREEN: NEGATIVE
ARM BAND NUMBER: NORMAL
AST SERPL-CCNC: 29 U/L (ref 0–39)
BILIRUB SERPL-MCNC: 0.5 MG/DL (ref 0–1.2)
BLOOD BANK BLOOD PRODUCT EXPIRATION DATE: NORMAL
BLOOD BANK DISPENSE STATUS: NORMAL
BLOOD BANK ISBT PRODUCT BLOOD TYPE: 5100
BLOOD BANK PRODUCT CODE: NORMAL
BLOOD BANK SAMPLE EXPIRATION: NORMAL
BLOOD BANK UNIT TYPE AND RH: NORMAL
BPU ID: NORMAL
BUN SERPL-MCNC: 23 MG/DL (ref 6–23)
CALCIUM SERPL-MCNC: 9.5 MG/DL (ref 8.6–10.2)
CHLORIDE SERPL-SCNC: 109 MMOL/L (ref 98–107)
CO2 SERPL-SCNC: 22 MMOL/L (ref 22–29)
COMPONENT: NORMAL
CREAT SERPL-MCNC: 1.9 MG/DL (ref 0.7–1.2)
CROSSMATCH RESULT: NORMAL
EKG ATRIAL RATE: 92 BPM
EKG P AXIS: 43 DEGREES
EKG P-R INTERVAL: 202 MS
EKG Q-T INTERVAL: 362 MS
EKG QRS DURATION: 112 MS
EKG QTC CALCULATION (BAZETT): 447 MS
EKG R AXIS: -58 DEGREES
EKG T AXIS: 92 DEGREES
EKG VENTRICULAR RATE: 92 BPM
GFR SERPL CREATININE-BSD FRML MDRD: 35 ML/MIN/1.73M2
GLUCOSE SERPL-MCNC: 98 MG/DL (ref 74–99)
HCT VFR BLD AUTO: 27.6 % (ref 37–54)
HCT VFR BLD AUTO: 29.6 % (ref 37–54)
HEMOCCULT STL QL: NORMAL
HEMOCCULT STL QL: NORMAL
HEMOCCULT STL QL: POSITIVE
HGB BLD-MCNC: 8.4 G/DL (ref 12.5–16.5)
HGB BLD-MCNC: 9 G/DL (ref 12.5–16.5)
IRON SATN MFR SERPL: 28 % (ref 20–55)
IRON SERPL-MCNC: 42 UG/DL (ref 59–158)
PARTIAL THROMBOPLASTIN TIME: 31.3 SEC (ref 24.5–35.1)
POTASSIUM SERPL-SCNC: 4 MMOL/L (ref 3.5–5)
PROT SERPL-MCNC: 4.9 G/DL (ref 6.4–8.3)
SODIUM SERPL-SCNC: 140 MMOL/L (ref 132–146)
TIBC SERPL-MCNC: 150 UG/DL (ref 250–450)
TRANSFUSION STATUS: NORMAL
TROPONIN I SERPL HS-MCNC: 96 NG/L (ref 0–11)
UNIT DIVISION: 0
UNIT ISSUE DATE/TIME: NORMAL

## 2024-03-28 PROCEDURE — 85730 THROMBOPLASTIN TIME PARTIAL: CPT

## 2024-03-28 PROCEDURE — 2580000003 HC RX 258: Performed by: NURSE PRACTITIONER

## 2024-03-28 PROCEDURE — 2060000000 HC ICU INTERMEDIATE R&B

## 2024-03-28 PROCEDURE — 99232 SBSQ HOSP IP/OBS MODERATE 35: CPT | Performed by: STUDENT IN AN ORGANIZED HEALTH CARE EDUCATION/TRAINING PROGRAM

## 2024-03-28 PROCEDURE — A4216 STERILE WATER/SALINE, 10 ML: HCPCS | Performed by: HOSPITALIST

## 2024-03-28 PROCEDURE — 85018 HEMOGLOBIN: CPT

## 2024-03-28 PROCEDURE — 2700000000 HC OXYGEN THERAPY PER DAY

## 2024-03-28 PROCEDURE — C9113 INJ PANTOPRAZOLE SODIUM, VIA: HCPCS | Performed by: INTERNAL MEDICINE

## 2024-03-28 PROCEDURE — C9113 INJ PANTOPRAZOLE SODIUM, VIA: HCPCS | Performed by: HOSPITALIST

## 2024-03-28 PROCEDURE — 85014 HEMATOCRIT: CPT

## 2024-03-28 PROCEDURE — 7100000010 HC PHASE II RECOVERY - FIRST 15 MIN: Performed by: INTERNAL MEDICINE

## 2024-03-28 PROCEDURE — 80053 COMPREHEN METABOLIC PANEL: CPT

## 2024-03-28 PROCEDURE — 3609012400 HC EGD TRANSORAL BIOPSY SINGLE/MULTIPLE: Performed by: INTERNAL MEDICINE

## 2024-03-28 PROCEDURE — 83550 IRON BINDING TEST: CPT

## 2024-03-28 PROCEDURE — 2500000003 HC RX 250 WO HCPCS: Performed by: NURSE PRACTITIONER

## 2024-03-28 PROCEDURE — 93010 ELECTROCARDIOGRAM REPORT: CPT | Performed by: INTERNAL MEDICINE

## 2024-03-28 PROCEDURE — 83540 ASSAY OF IRON: CPT

## 2024-03-28 PROCEDURE — 6360000002 HC RX W HCPCS: Performed by: HOSPITALIST

## 2024-03-28 PROCEDURE — 2580000003 HC RX 258: Performed by: HOSPITALIST

## 2024-03-28 PROCEDURE — 6360000002 HC RX W HCPCS: Performed by: INTERNAL MEDICINE

## 2024-03-28 PROCEDURE — 3700000000 HC ANESTHESIA ATTENDED CARE: Performed by: INTERNAL MEDICINE

## 2024-03-28 PROCEDURE — 6360000002 HC RX W HCPCS: Performed by: NURSE ANESTHETIST, CERTIFIED REGISTERED

## 2024-03-28 PROCEDURE — 7100000011 HC PHASE II RECOVERY - ADDTL 15 MIN: Performed by: INTERNAL MEDICINE

## 2024-03-28 PROCEDURE — 0DB98ZX EXCISION OF DUODENUM, VIA NATURAL OR ARTIFICIAL OPENING ENDOSCOPIC, DIAGNOSTIC: ICD-10-PCS | Performed by: STUDENT IN AN ORGANIZED HEALTH CARE EDUCATION/TRAINING PROGRAM

## 2024-03-28 PROCEDURE — 2580000003 HC RX 258: Performed by: NURSE ANESTHETIST, CERTIFIED REGISTERED

## 2024-03-28 PROCEDURE — 2580000003 HC RX 258: Performed by: INTERNAL MEDICINE

## 2024-03-28 PROCEDURE — 84484 ASSAY OF TROPONIN QUANT: CPT

## 2024-03-28 PROCEDURE — 6370000000 HC RX 637 (ALT 250 FOR IP): Performed by: NURSE PRACTITIONER

## 2024-03-28 PROCEDURE — 0DB68ZX EXCISION OF STOMACH, VIA NATURAL OR ARTIFICIAL OPENING ENDOSCOPIC, DIAGNOSTIC: ICD-10-PCS | Performed by: STUDENT IN AN ORGANIZED HEALTH CARE EDUCATION/TRAINING PROGRAM

## 2024-03-28 PROCEDURE — 88305 TISSUE EXAM BY PATHOLOGIST: CPT

## 2024-03-28 PROCEDURE — 2709999900 HC NON-CHARGEABLE SUPPLY: Performed by: INTERNAL MEDICINE

## 2024-03-28 PROCEDURE — A4216 STERILE WATER/SALINE, 10 ML: HCPCS | Performed by: INTERNAL MEDICINE

## 2024-03-28 RX ORDER — MEPERIDINE HYDROCHLORIDE 25 MG/ML
12.5 INJECTION INTRAMUSCULAR; INTRAVENOUS; SUBCUTANEOUS ONCE
Status: CANCELLED | OUTPATIENT
Start: 2024-03-28 | End: 2024-03-28

## 2024-03-28 RX ORDER — HYDRALAZINE HYDROCHLORIDE 20 MG/ML
5 INJECTION INTRAMUSCULAR; INTRAVENOUS
Status: CANCELLED | OUTPATIENT
Start: 2024-03-28

## 2024-03-28 RX ORDER — SODIUM CHLORIDE 0.9 % (FLUSH) 0.9 %
5-40 SYRINGE (ML) INJECTION PRN
Status: CANCELLED | OUTPATIENT
Start: 2024-03-28

## 2024-03-28 RX ORDER — PROCHLORPERAZINE EDISYLATE 5 MG/ML
5 INJECTION INTRAMUSCULAR; INTRAVENOUS
Status: CANCELLED | OUTPATIENT
Start: 2024-03-28 | End: 2024-03-29

## 2024-03-28 RX ORDER — SODIUM CHLORIDE 9 MG/ML
INJECTION, SOLUTION INTRAVENOUS CONTINUOUS PRN
Status: DISCONTINUED | OUTPATIENT
Start: 2024-03-28 | End: 2024-03-28 | Stop reason: SDUPTHER

## 2024-03-28 RX ORDER — FENTANYL CITRATE 50 UG/ML
25 INJECTION, SOLUTION INTRAMUSCULAR; INTRAVENOUS EVERY 5 MIN PRN
Status: CANCELLED | OUTPATIENT
Start: 2024-03-28

## 2024-03-28 RX ORDER — SODIUM CHLORIDE 0.9 % (FLUSH) 0.9 %
5-40 SYRINGE (ML) INJECTION EVERY 12 HOURS SCHEDULED
Status: CANCELLED | OUTPATIENT
Start: 2024-03-28

## 2024-03-28 RX ORDER — PROPOFOL 10 MG/ML
INJECTION, EMULSION INTRAVENOUS PRN
Status: DISCONTINUED | OUTPATIENT
Start: 2024-03-28 | End: 2024-03-28 | Stop reason: SDUPTHER

## 2024-03-28 RX ORDER — NALOXONE HYDROCHLORIDE 0.4 MG/ML
INJECTION, SOLUTION INTRAMUSCULAR; INTRAVENOUS; SUBCUTANEOUS PRN
Status: CANCELLED | OUTPATIENT
Start: 2024-03-28

## 2024-03-28 RX ORDER — DIPHENHYDRAMINE HYDROCHLORIDE 50 MG/ML
12.5 INJECTION INTRAMUSCULAR; INTRAVENOUS
Status: CANCELLED | OUTPATIENT
Start: 2024-03-28 | End: 2024-03-29

## 2024-03-28 RX ORDER — LABETALOL HYDROCHLORIDE 5 MG/ML
5 INJECTION, SOLUTION INTRAVENOUS
Status: CANCELLED | OUTPATIENT
Start: 2024-03-28

## 2024-03-28 RX ORDER — LOPERAMIDE HYDROCHLORIDE 2 MG/1
2 CAPSULE ORAL ONCE
Status: COMPLETED | OUTPATIENT
Start: 2024-03-28 | End: 2024-03-28

## 2024-03-28 RX ORDER — SODIUM CHLORIDE 9 MG/ML
INJECTION, SOLUTION INTRAVENOUS PRN
Status: CANCELLED | OUTPATIENT
Start: 2024-03-28

## 2024-03-28 RX ADMIN — ACETAMINOPHEN 650 MG: 325 TABLET ORAL at 20:34

## 2024-03-28 RX ADMIN — MICONAZOLE NITRATE: 20 POWDER TOPICAL at 10:31

## 2024-03-28 RX ADMIN — FINASTERIDE 5 MG: 5 TABLET, FILM COATED ORAL at 10:29

## 2024-03-28 RX ADMIN — ZINC SULFATE 220 MG (50 MG) CAPSULE 50 MG: CAPSULE at 10:29

## 2024-03-28 RX ADMIN — SUCRALFATE 1 G: 1 TABLET ORAL at 14:42

## 2024-03-28 RX ADMIN — LATANOPROST 1 DROP: 50 SOLUTION OPHTHALMIC at 20:35

## 2024-03-28 RX ADMIN — LOPERAMIDE HYDROCHLORIDE 2 MG: 2 CAPSULE ORAL at 23:31

## 2024-03-28 RX ADMIN — OXYCODONE AND ACETAMINOPHEN 1 TABLET: 5; 325 TABLET ORAL at 13:34

## 2024-03-28 RX ADMIN — MICONAZOLE NITRATE: 20 POWDER TOPICAL at 20:35

## 2024-03-28 RX ADMIN — SODIUM CHLORIDE, PRESERVATIVE FREE 10 ML: 5 INJECTION INTRAVENOUS at 10:31

## 2024-03-28 RX ADMIN — SODIUM CHLORIDE, PRESERVATIVE FREE 40 MG: 5 INJECTION INTRAVENOUS at 20:34

## 2024-03-28 RX ADMIN — SUCRALFATE 1 G: 1 TABLET ORAL at 20:34

## 2024-03-28 RX ADMIN — MULTIVITAMIN TABLET 1 TABLET: TABLET at 10:29

## 2024-03-28 RX ADMIN — CETIRIZINE HYDROCHLORIDE 5 MG: 10 TABLET, FILM COATED ORAL at 10:29

## 2024-03-28 RX ADMIN — PROPOFOL 150 MG: 10 INJECTION, EMULSION INTRAVENOUS at 12:17

## 2024-03-28 RX ADMIN — TAMSULOSIN HYDROCHLORIDE 0.4 MG: 0.4 CAPSULE ORAL at 20:35

## 2024-03-28 RX ADMIN — METOPROLOL SUCCINATE 25 MG: 25 TABLET, FILM COATED, EXTENDED RELEASE ORAL at 10:29

## 2024-03-28 RX ADMIN — ATORVASTATIN CALCIUM 40 MG: 40 TABLET, FILM COATED ORAL at 20:35

## 2024-03-28 RX ADMIN — METOPROLOL SUCCINATE 25 MG: 25 TABLET, FILM COATED, EXTENDED RELEASE ORAL at 20:35

## 2024-03-28 RX ADMIN — SODIUM CHLORIDE: 9 INJECTION, SOLUTION INTRAVENOUS at 12:16

## 2024-03-28 RX ADMIN — SUCRALFATE 1 G: 1 TABLET ORAL at 10:30

## 2024-03-28 RX ADMIN — PANTOPRAZOLE SODIUM 40 MG: 40 INJECTION, POWDER, FOR SOLUTION INTRAVENOUS at 06:58

## 2024-03-28 RX ADMIN — SODIUM CHLORIDE, PRESERVATIVE FREE 10 ML: 5 INJECTION INTRAVENOUS at 20:53

## 2024-03-28 RX ADMIN — CYANOCOBALAMIN TAB 1000 MCG 1000 MCG: 1000 TAB at 10:29

## 2024-03-28 ASSESSMENT — PAIN DESCRIPTION - LOCATION
LOCATION: EAR
LOCATION: FOOT

## 2024-03-28 ASSESSMENT — PAIN DESCRIPTION - ORIENTATION
ORIENTATION: RIGHT
ORIENTATION: RIGHT

## 2024-03-28 ASSESSMENT — PAIN SCALES - GENERAL
PAINLEVEL_OUTOF10: 7
PAINLEVEL_OUTOF10: 8

## 2024-03-28 ASSESSMENT — PAIN DESCRIPTION - DESCRIPTORS
DESCRIPTORS: ACHING;SORE;STABBING
DESCRIPTORS: ACHING;SHARP;THROBBING

## 2024-03-28 ASSESSMENT — PAIN - FUNCTIONAL ASSESSMENT
PAIN_FUNCTIONAL_ASSESSMENT: ACTIVITIES ARE NOT PREVENTED
PAIN_FUNCTIONAL_ASSESSMENT: 0-10

## 2024-03-28 ASSESSMENT — LIFESTYLE VARIABLES: SMOKING_STATUS: 0

## 2024-03-28 NOTE — ED NOTES
ED to Inpatient Handoff Report    Notified 6W that electronic handoff available and patient ready for transport to room 615.    Safety Risks: Memory Problems and Risk of falls    Patient in Restraints: no    Constant Observer or Patient : no    Telemetry Monitoring Ordered :Yes           Order to transfer to unit without monitor:YES    Last MEWS: 1 Time completed: 2040    Deterioration Index Score:   Predictive Model Details          31  Factor Value    Calculated 3/27/2024 20:40 41% Age 84 years old    Deterioration Index Model 36% Supplemental oxygen Nasal cannula     7% Hematocrit abnormal (23.7 %)     7% Pulse 100     4% Systolic 137     3% BUN abnormal (24 mg/dL)     1% Potassium 3.7 mmol/L     0% Temperature 97.5 °F (36.4 °C)     0% Pulse oximetry 96 %     0% Respiratory rate 16     0% WBC count 6.5 k/uL     0% Sodium 139 mmol/L        Vitals:    03/27/24 1531 03/27/24 1828 03/27/24 2039   BP: 128/83 (!) 134/97 137/89   Pulse: (!) 105 92 100   Resp: 23 22 16   Temp: 97 °F (36.1 °C)  97.5 °F (36.4 °C)   TempSrc: Oral     SpO2: 98% 98% 96%   Weight: 81.2 kg (179 lb)     Height: 1.803 m (5' 11\")           Opportunity for questions and clarification was provided.

## 2024-03-28 NOTE — CARE COORDINATION
Introduced my self and provided explanation of CM role to patient. Pt. Admitted for Acute on chronic blood loss anemia. Received PRBC's Hgb 8.4 hct 27.6 GI following plan for EGD.  PT/OT evals pending. Pt. Is LTC at [facility], does not need pre-cert to return.Patient is established with Dr. Salgado and denies any issue with retail pharmaceutical   coverage.[place], envelope with facesheet and transport forms in chart. Patient will need followed and assisted with discharge planning as necessary.   Brenda Zvaala BSN, RN  Case Management

## 2024-03-28 NOTE — PLAN OF CARE
Problem: Discharge Planning  Goal: Discharge to home or other facility with appropriate resources  3/28/2024 0747 by Pauly Gonsalez RN  Outcome: Progressing  3/28/2024 0004 by Felicitas Saunders RN  Outcome: Progressing     Problem: Safety - Adult  Goal: Free from fall injury  3/28/2024 0747 by Pauly oGnsalez RN  Outcome: Progressing  3/28/2024 0004 by Felicitas Saunders RN  Outcome: Progressing     Problem: ABCDS Injury Assessment  Goal: Absence of physical injury  3/28/2024 0747 by Pauly Gonsalez RN  Outcome: Progressing  3/28/2024 0004 by Felicitas Saunders RN  Outcome: Progressing     Problem: Respiratory - Adult  Goal: Achieves optimal ventilation and oxygenation  3/28/2024 0747 by Pauly Gonsalez RN  Outcome: Progressing  3/28/2024 0004 by Felicitas Saunders RN  Outcome: Progressing     Problem: Cardiovascular - Adult  Goal: Maintains optimal cardiac output and hemodynamic stability  3/28/2024 0747 by Pauly Gonsalez RN  Outcome: Progressing  3/28/2024 0004 by Felicitas Saunders RN  Outcome: Progressing  Goal: Absence of cardiac dysrhythmias or at baseline  3/28/2024 0747 by Pauly Gonsalez RN  Outcome: Progressing  3/28/2024 0004 by Felicitas Saunders RN  Outcome: Progressing     Problem: Hematologic - Adult  Goal: Maintains hematologic stability  3/28/2024 0747 by Pauly Gonsalez RN  Outcome: Progressing  3/28/2024 0004 by Felicitas Saunders RN  Outcome: Progressing

## 2024-03-28 NOTE — CONSULTS
CONSULT  Law Daniel M.D.  [facility]  Dr. Leopoldo Contreras M.D.,  Dr. Abraham Cuello M.D.,  Dr. Franc Diaz D.O.,  Dr. Dave Heredia D.O. ,  Dr. Sánchez Ho M.D.,          Uzair Fuentes  84 y.o.  male      Re:\"recurrent [id number]\"  Requesting physician: Dr. Mehta  Date:12:18 PM 3/28/2024          HPI: 85-year-old male patient seen in the hospital for above described issue.  Patient has previous history of renal cancer for which he is receiving chemotherapy most recently yesterday and also history of hypertension, chronic kidney disease, hyperlipidemia, DVT and oral anticoagulation.  r patient is well-known to me from previous hospital admission and was most recently seen in February of this year when he underwent upper endoscopy and colonoscopy.  Neither endoscopy revealed bleeding.  EGD performed on 10 February normal examined esophagus, moderate gastritis and, normal examined proximal small bowel.  Colonoscopy performed on 12 February revealed diverticular disease of the transverse, descending and sigmoid colon however otherwise unremarkable with fair prep.  On this presentation patient comes to the hospital yesterday with chief complaint of rectal bleeding per notes.  Apparently dark stool for several months.  Patient currently is not experiencing any abdominal pain  Upon presentation he was found to be anemic with hemoglobin of 7.5 compared hemoglobin of 8.3 on the 26th and patient received 1 unit of PRBC transfusion with increase of hemoglobin to 8.4.    Information sources:   -Patient  -medical record  -health care team    PMHx:  Past Medical History:   Diagnosis Date    Cancer (HCC) 2023    RENAL    Disease of blood and blood forming organ     Hypertension        PSHx:  Past Surgical History:   Procedure Laterality Date    ABDOMINAL AORTIC ANEURYSM REPAIR      COLONOSCOPY      COLONOSCOPY N/A 2/12/2024    COLONOSCOPY DIAGNOSTIC performed by Dave Heredia DO at [facility] ENDOSCOPY

## 2024-03-28 NOTE — PROGRESS NOTES
Patient provided with discharge instructions, received printed AVS.  All questions answered.  Patient understands follow up plan of care.  Called [facility] and spoke with  giving them next follow up appointment.  [facility] will set up patient's transportation.    
  GENERAL: Alert, oriented x 3, not in acute distress.  HEENT: PERRLA; EOMI. Oropharynx clear.   NECK: Supple. No palpable cervical or supraclavicular lymphadenopathy.   LUNGS: Good air entry bilaterally. No wheezing, crackles or rhonchi.   CARDIOVASCULAR: Regular rate. No murmurs, rubs or gallops.   SKIN: Faint rash on his back  ABDOMEN: Soft. Non-tender, non-distended. Positive bowel sounds.  EXTREMITIES: Without clubbing, cyanosis, improved left lower leg edema.  NEUROLOGIC: No focal deficits.   ECOG PS 1      Impression/Plan:     Mr. Fuentes is a pleasant 83-year-old gentleman, with a past medical history significant for CKD, MGUS, hyperlipidemia, hypertension, CAD, PVCs, and history of iron deficiency anemia, who had presented with left flank and back pain, he had CT scan of the abdomen the pelvis done on 3/20/2023, revealing retrocrural and retroperitoneal lymphadenopathy, multiple bilateral renal cysts, multiple bilateral hyperdense renal lesions, probable hemorrhagic cysts, CT scan of the lumbar spine had revealed a large conglomeration of soft tissue mass in the retroperitoneum, concerning for malignancy, hyperdense lesions in the kidneys, more on the left side, PET/CT scan was done on 2/12/2023, revealing retroperitoneal lymphadenopathy, measuring up to 8 cm, possible uptake in both parotid glands and left base of the neck, the patient had a CT-guided biopsy of the retroperitoneal lymphadenopathy on August 23, 2023, pathology was consistent with metastatic renal cell carcinoma, could not differentiate between clear and unclear cell carcinoma, [facility] was recommended Opdivo.       The patient has metastatic renal cell carcinoma, pathology could not differentiate between clear versus non clear RCC, diagnosis and prognosis were discussed with the patient, as well as recommendations for treatment with Opdivo, the side effects and the schedule of the treatment were

## 2024-03-28 NOTE — ANESTHESIA PRE PROCEDURE
ENDOSCOPY    UPPER GASTROINTESTINAL ENDOSCOPY N/A 2/10/2024    EGD ESOPHAGOGASTRODUODENOSCOPY performed by Dave Heredia DO at [facility] OR       Social History:    Social History     Tobacco Use    Smoking status: Former     Current packs/day: 0.00     Types: Cigarettes     Quit date:      Years since quittin.2    Smokeless tobacco: Never   Substance Use Topics    Alcohol use: Never                                Counseling given: Not Answered      Vital Signs (Current):   Vitals:    24 0253 24 0745 24 1015 24 1145   BP: 130/82 137/89 (!) 130/90    Pulse: 98 94 88    Resp: 18 16 17    Temp: 97.7 °F (36.5 °C) 97.3 °F (36.3 °C) 97.1 °F (36.2 °C)    TempSrc: Oral Infrared Infrared    SpO2: 98% 98% 100% 92%   Weight:       Height:                                                  BP Readings from Last 3 Encounters:   24 (!) 130/90   24 124/70   24 132/70       NPO Status:                                                                                 BMI:   Wt Readings from Last 3 Encounters:   24 81.2 kg (179 lb)   24 88.5 kg (195 lb)   24 90.7 kg (200 lb)     Body mass index is 24.97 kg/m².    CBC:   Lab Results   Component Value Date/Time    WBC 6.5 2024 03:55 PM    RBC 2.50 2024 03:55 PM    HGB 8.4 2024 04:05 AM    HCT 27.6 2024 04:05 AM    .6 2024 03:55 PM    RDW 15.9 2024 03:55 PM     2024 03:55 PM       CMP:   Lab Results   Component Value Date/Time     2024 04:05 AM    K 4.0 2024 04:05 AM    K 4.4 10/18/2022 04:50 AM     2024 04:05 AM    CO2 22 2024 04:05 AM    BUN 23 2024 04:05 AM    CREATININE 1.9 2024 04:05 AM    GFRAA 44 10/17/2022 04:08 AM    AGRATIO DUPLICATE ORDER 2024 09:59 AM    LABGLOM 35 2024 04:05 AM    GLUCOSE 98 2024 04:05 AM    PROT 4.9 2024 04:05 AM    CALCIUM 9.5 2024 04:05 AM    BILITOT 0.5

## 2024-03-28 NOTE — CONSENT
Informed Consent for Blood Component Transfusion Note    I have discussed with the patient the rationale for blood component transfusion; its benefits in treating or preventing fatigue, organ damage, or death; and its risk which includes mild transfusion reactions, rare risk of blood borne infection, or more serious but rare reactions. I have discussed the alternatives to transfusion, including the risk and consequences of not receiving transfusion. The patient had an opportunity to ask questions and had agreed to proceed with transfusion of blood components.    Electronically signed by JONATHAN Sorto CNP on 3/27/24 at 8:34 PM EDT

## 2024-03-28 NOTE — ACP (ADVANCE CARE PLANNING)
Advance Care Planning   Healthcare Decision Maker:    Primary Decision Maker: mariah martinez - [facility]  [phone]    Secondary Decision Maker: Joan Disla - [phone]    Click here to complete Healthcare Decision Makers including selection of the Healthcare Decision Maker Relationship (ie \"Primary\").  Today we documented Decision Maker(s) consistent with Legal Next of Kin hierarchy.

## 2024-03-28 NOTE — PLAN OF CARE
Problem: Discharge Planning  Goal: Discharge to home or other facility with appropriate resources  Outcome: Progressing     Problem: ABCDS Injury Assessment  Goal: Absence of physical injury  Outcome: Progressing     Problem: Respiratory - Adult  Goal: Achieves optimal ventilation and oxygenation  Outcome: Progressing     Problem: Cardiovascular - Adult  Goal: Maintains optimal cardiac output and hemodynamic stability  Outcome: Progressing     Problem: Cardiovascular - Adult  Goal: Absence of cardiac dysrhythmias or at baseline  Outcome: Progressing     Problem: Hematologic - Adult  Goal: Maintains hematologic stability  Outcome: Progressing

## 2024-03-28 NOTE — H&P
[facility] Hospitalist Group History and Physical      CHIEF COMPLAINT:  Rectal bleed    History of Present Illness:  This is a 84 year old male with PMH significant for renal cancer, HTN, CKD, HLD, DVT and GI bleed.  Patient is in the past.  Patient sent in from nursing facility due to rectal bleeding.  Patient reports that this has been an ongoing problem.  Oriented x 3 at this time.  Reports that his hemoglobin has gone down to low 6 before.  States that he has had dark stools for several months.  Is on Eliquis.  Additional symptoms include fatigue headache.  Denies fever, chills, shortness of breath, chest pain, abdominal pain, and changes in bladder habits.  Labs remarkable for BUN/creatinine 24/2.0, GFR 33, BNP 34,225, troponin 83 and then 84, and hemoglobin 7.5 and then 7.2.  Chest x-ray showing large left pleural effusion.  Given Protonix in ED.  Will admit for further evaluation and treatment.    Informant(s) for H&P: Patient and chart review    REVIEW OF SYSTEMS:  A comprehensive review of systems was negative except for: what is in the HPI      PMH:  Past Medical History:   Diagnosis Date    Cancer (HCC) 2023    RENAL    Disease of blood and blood forming organ     Hypertension        Surgical History:  Past Surgical History:   Procedure Laterality Date    ABDOMINAL AORTIC ANEURYSM REPAIR      COLONOSCOPY      COLONOSCOPY N/A 2/12/2024    COLONOSCOPY DIAGNOSTIC performed by Dave Heredia DO at [facility] ENDOSCOPY    TONSILLECTOMY      TOTAL KNEE ARTHROPLASTY Bilateral     UPPER GASTROINTESTINAL ENDOSCOPY N/A 10/14/2022    EGD DIAGNOSTIC ONLY performed by Joshua Avila MD at [facility] ENDOSCOPY    UPPER GASTROINTESTINAL ENDOSCOPY N/A 2/10/2024    EGD ESOPHAGOGASTRODUODENOSCOPY performed by Dave Heredia DO at [facility] OR       Medications Prior to Admission:    Prior to Admission medications    Medication Sig Start Date End Date Taking? Authorizing Provider   doxycycline hyclate (VIBRAMYCIN) 100 MG

## 2024-03-28 NOTE — ANESTHESIA POSTPROCEDURE EVALUATION
Department of Anesthesiology  Postprocedure Note    Patient: Uzair Fuentes  MRN: [id number]  YOB: 1940  Date of evaluation: 3/28/2024    Procedure Summary       Date: 03/28/24 Room / Location: [place] [address] / [facility]    Anesthesia Start: 1216 Anesthesia Stop: 1223    Procedure: ESOPHAGOGASTRODUODENOSCOPY BIOPSY Diagnosis:       Anemia, unspecified type      (Anemia, unspecified type [D64.9])    Surgeons: Dave Heredia DO Responsible Provider: Raúl De La Garza DO    Anesthesia Type: MAC ASA Status: 4            Anesthesia Type: No value filed.    Willian Phase I:      Willian Phase II:      Anesthesia Post Evaluation    Patient location during evaluation: PACU  Patient participation: complete - patient participated  Level of consciousness: awake and alert  Airway patency: patent  Nausea & Vomiting: no nausea and no vomiting  Cardiovascular status: blood pressure returned to baseline  Respiratory status: acceptable and room air  Hydration status: euvolemic        No notable events documented.

## 2024-03-28 NOTE — DISCHARGE INSTR - COC
the above information and transfer of Uzair Fuentes  is necessary for the continuing treatment of the diagnosis listed and that he requires Skilled Nursing Facility for greater 30 days.     Update Admission H&P: No change in H&P    PHYSICIAN SIGNATURE:  Electronically signed by Luke Gama MD on 4/2/24 at 11:38 AM EDT

## 2024-03-29 LAB
ALBUMIN SERPL-MCNC: 2.5 G/DL (ref 3.5–5.2)
ALP SERPL-CCNC: 107 U/L (ref 40–129)
ALT SERPL-CCNC: 18 U/L (ref 0–40)
ANION GAP SERPL CALCULATED.3IONS-SCNC: 6 MMOL/L (ref 7–16)
AST SERPL-CCNC: 28 U/L (ref 0–39)
BILIRUB SERPL-MCNC: 0.4 MG/DL (ref 0–1.2)
BUN SERPL-MCNC: 22 MG/DL (ref 6–23)
CALCIUM SERPL-MCNC: 9.6 MG/DL (ref 8.6–10.2)
CHLORIDE SERPL-SCNC: 111 MMOL/L (ref 98–107)
CO2 SERPL-SCNC: 22 MMOL/L (ref 22–29)
CREAT SERPL-MCNC: 1.9 MG/DL (ref 0.7–1.2)
GFR SERPL CREATININE-BSD FRML MDRD: 35 ML/MIN/1.73M2
GLUCOSE SERPL-MCNC: 84 MG/DL (ref 74–99)
HCT VFR BLD AUTO: 29.6 % (ref 37–54)
HCT VFR BLD AUTO: 30.3 % (ref 37–54)
HGB BLD-MCNC: 9 G/DL (ref 12.5–16.5)
HGB BLD-MCNC: 9 G/DL (ref 12.5–16.5)
POTASSIUM SERPL-SCNC: 3.7 MMOL/L (ref 3.5–5)
PROT SERPL-MCNC: 4.9 G/DL (ref 6.4–8.3)
SODIUM SERPL-SCNC: 139 MMOL/L (ref 132–146)

## 2024-03-29 PROCEDURE — 80053 COMPREHEN METABOLIC PANEL: CPT

## 2024-03-29 PROCEDURE — 6370000000 HC RX 637 (ALT 250 FOR IP): Performed by: NURSE PRACTITIONER

## 2024-03-29 PROCEDURE — 6360000002 HC RX W HCPCS: Performed by: STUDENT IN AN ORGANIZED HEALTH CARE EDUCATION/TRAINING PROGRAM

## 2024-03-29 PROCEDURE — 2580000003 HC RX 258: Performed by: NURSE PRACTITIONER

## 2024-03-29 PROCEDURE — 99232 SBSQ HOSP IP/OBS MODERATE 35: CPT | Performed by: STUDENT IN AN ORGANIZED HEALTH CARE EDUCATION/TRAINING PROGRAM

## 2024-03-29 PROCEDURE — 85018 HEMOGLOBIN: CPT

## 2024-03-29 PROCEDURE — 2060000000 HC ICU INTERMEDIATE R&B

## 2024-03-29 PROCEDURE — 36415 COLL VENOUS BLD VENIPUNCTURE: CPT

## 2024-03-29 PROCEDURE — 85014 HEMATOCRIT: CPT

## 2024-03-29 PROCEDURE — 97165 OT EVAL LOW COMPLEX 30 MIN: CPT

## 2024-03-29 PROCEDURE — 2580000003 HC RX 258: Performed by: STUDENT IN AN ORGANIZED HEALTH CARE EDUCATION/TRAINING PROGRAM

## 2024-03-29 PROCEDURE — 6360000002 HC RX W HCPCS: Performed by: INTERNAL MEDICINE

## 2024-03-29 PROCEDURE — C9113 INJ PANTOPRAZOLE SODIUM, VIA: HCPCS | Performed by: INTERNAL MEDICINE

## 2024-03-29 PROCEDURE — A4216 STERILE WATER/SALINE, 10 ML: HCPCS | Performed by: INTERNAL MEDICINE

## 2024-03-29 PROCEDURE — 2580000003 HC RX 258: Performed by: INTERNAL MEDICINE

## 2024-03-29 RX ORDER — FUROSEMIDE 10 MG/ML
20 INJECTION INTRAMUSCULAR; INTRAVENOUS ONCE
Status: COMPLETED | OUTPATIENT
Start: 2024-03-29 | End: 2024-03-29

## 2024-03-29 RX ORDER — SODIUM CHLORIDE, SODIUM LACTATE, POTASSIUM CHLORIDE, CALCIUM CHLORIDE 600; 310; 30; 20 MG/100ML; MG/100ML; MG/100ML; MG/100ML
INJECTION, SOLUTION INTRAVENOUS CONTINUOUS
Status: DISCONTINUED | OUTPATIENT
Start: 2024-03-29 | End: 2024-03-29

## 2024-03-29 RX ADMIN — SODIUM CHLORIDE, POTASSIUM CHLORIDE, SODIUM LACTATE AND CALCIUM CHLORIDE: 600; 310; 30; 20 INJECTION, SOLUTION INTRAVENOUS at 08:54

## 2024-03-29 RX ADMIN — CETIRIZINE HYDROCHLORIDE 5 MG: 10 TABLET, FILM COATED ORAL at 08:54

## 2024-03-29 RX ADMIN — SODIUM CHLORIDE, PRESERVATIVE FREE 10 ML: 5 INJECTION INTRAVENOUS at 08:52

## 2024-03-29 RX ADMIN — LATANOPROST 1 DROP: 50 SOLUTION OPHTHALMIC at 19:40

## 2024-03-29 RX ADMIN — ONDANSETRON 4 MG: 4 TABLET, ORALLY DISINTEGRATING ORAL at 19:51

## 2024-03-29 RX ADMIN — SUCRALFATE 1 G: 1 TABLET ORAL at 17:02

## 2024-03-29 RX ADMIN — ATORVASTATIN CALCIUM 40 MG: 40 TABLET, FILM COATED ORAL at 19:39

## 2024-03-29 RX ADMIN — SODIUM CHLORIDE, PRESERVATIVE FREE 40 MG: 5 INJECTION INTRAVENOUS at 08:52

## 2024-03-29 RX ADMIN — METOPROLOL SUCCINATE 25 MG: 25 TABLET, FILM COATED, EXTENDED RELEASE ORAL at 08:54

## 2024-03-29 RX ADMIN — FINASTERIDE 5 MG: 5 TABLET, FILM COATED ORAL at 08:54

## 2024-03-29 RX ADMIN — MULTIVITAMIN TABLET 1 TABLET: TABLET at 08:54

## 2024-03-29 RX ADMIN — SODIUM CHLORIDE, PRESERVATIVE FREE 40 MG: 5 INJECTION INTRAVENOUS at 19:39

## 2024-03-29 RX ADMIN — ZINC SULFATE 220 MG (50 MG) CAPSULE 50 MG: CAPSULE at 08:54

## 2024-03-29 RX ADMIN — MICONAZOLE NITRATE: 20 POWDER TOPICAL at 08:56

## 2024-03-29 RX ADMIN — SODIUM CHLORIDE, PRESERVATIVE FREE 10 ML: 5 INJECTION INTRAVENOUS at 19:41

## 2024-03-29 RX ADMIN — FUROSEMIDE 20 MG: 10 INJECTION, SOLUTION INTRAMUSCULAR; INTRAVENOUS at 09:28

## 2024-03-29 RX ADMIN — SUCRALFATE 1 G: 1 TABLET ORAL at 19:39

## 2024-03-29 RX ADMIN — MICONAZOLE NITRATE: 20 POWDER TOPICAL at 19:40

## 2024-03-29 RX ADMIN — METOPROLOL SUCCINATE 25 MG: 25 TABLET, FILM COATED, EXTENDED RELEASE ORAL at 19:39

## 2024-03-29 RX ADMIN — SUCRALFATE 1 G: 1 TABLET ORAL at 08:54

## 2024-03-29 RX ADMIN — CYANOCOBALAMIN TAB 1000 MCG 1000 MCG: 1000 TAB at 08:54

## 2024-03-29 RX ADMIN — TAMSULOSIN HYDROCHLORIDE 0.4 MG: 0.4 CAPSULE ORAL at 19:39

## 2024-03-29 RX ADMIN — SUCRALFATE 1 G: 1 TABLET ORAL at 12:53

## 2024-03-29 NOTE — PLAN OF CARE
Problem: Discharge Planning  Goal: Discharge to home or other facility with appropriate resources  3/28/2024 2321 by Felicitas Saunders RN  Outcome: Progressing  3/28/2024 2309 by Felicitas Saunders RN  Outcome: Progressing  Flowsheets (Taken 3/28/2024 2056)  Discharge to home or other facility with appropriate resources:   Identify barriers to discharge with patient and caregiver   Arrange for needed discharge resources and transportation as appropriate     Problem: Safety - Adult  Goal: Free from fall injury  3/28/2024 2321 by Felicitas Saunders RN  Outcome: Progressing  3/28/2024 2309 by Felicitas Saunders RN  Outcome: Progressing     Problem: ABCDS Injury Assessment  Goal: Absence of physical injury  3/28/2024 2321 by Felicitas Saunders RN  Outcome: Progressing  3/28/2024 2309 by Felicitas Saunders RN  Outcome: Progressing     Problem: Respiratory - Adult  Goal: Achieves optimal ventilation and oxygenation  3/28/2024 2321 by Felicitas Saunders RN  Outcome: Progressing  3/28/2024 2309 by Felicitas Saunders RN  Outcome: Progressing     Problem: Cardiovascular - Adult  Goal: Maintains optimal cardiac output and hemodynamic stability  3/28/2024 2321 by Felicitas Saunders RN  Outcome: Progressing  3/28/2024 2309 by Felicitas Saunders RN  Outcome: Progressing  Goal: Absence of cardiac dysrhythmias or at baseline  3/28/2024 2321 by Felicitas Saunders RN  Outcome: Progressing  3/28/2024 2309 by Felicitas Saunders RN  Outcome: Progressing     Problem: Hematologic - Adult  Goal: Maintains hematologic stability  3/28/2024 2321 by Felicitas Saunders RN  Outcome: Progressing  3/28/2024 2309 by Felicitas Saunders RN  Outcome: Progressing  Flowsheets (Taken 3/28/2024 2056)  Maintains hematologic stability:   Assess for signs and symptoms of bleeding or hemorrhage   Monitor labs for bleeding or clotting disorders     Problem: Pain  Goal: Verbalizes/displays adequate comfort level or baseline comfort level  3/28/2024 2321 by

## 2024-03-29 NOTE — CARE COORDINATION
Met with pt. admitted for Acute on chronic blood loss anemia.Hx of metastatic renal cell CA, DVT. Received PRBC's Hgb 7.2--9.0 hct 23.7--30.3 Pt. Is on room air. GI following EGD 3/28 revealed no significant bleeding will consider nuclear medicine bleeding scan if decrease in H&H. Pt. Is LTC at [facility], does not need pre-cert plan is to return when medically stable. [place] complete, envelope with facesheet and transport forms in chart. Patient will need followed and assisted with discharge planning as necessary.   Brenda BACAN, RN  Case Management

## 2024-03-29 NOTE — PLAN OF CARE
Problem: Discharge Planning  Goal: Discharge to home or other facility with appropriate resources  Outcome: Progressing  Flowsheets (Taken 3/28/2024 2056)  Discharge to home or other facility with appropriate resources:   Identify barriers to discharge with patient and caregiver   Arrange for needed discharge resources and transportation as appropriate     Problem: Safety - Adult  Goal: Free from fall injury  Outcome: Progressing     Problem: ABCDS Injury Assessment  Goal: Absence of physical injury  Outcome: Progressing     Problem: Respiratory - Adult  Goal: Achieves optimal ventilation and oxygenation  Outcome: Progressing     Problem: Cardiovascular - Adult  Goal: Maintains optimal cardiac output and hemodynamic stability  Outcome: Progressing  Goal: Absence of cardiac dysrhythmias or at baseline  Outcome: Progressing     Problem: Hematologic - Adult  Goal: Maintains hematologic stability  Outcome: Progressing  Flowsheets (Taken 3/28/2024 2056)  Maintains hematologic stability:   Assess for signs and symptoms of bleeding or hemorrhage   Monitor labs for bleeding or clotting disorders     Problem: Pain  Goal: Verbalizes/displays adequate comfort level or baseline comfort level  Outcome: Progressing     Problem: Nutrition Deficit:  Goal: Optimize nutritional status  Outcome: Progressing     Problem: Discharge Planning  Goal: Discharge to home or other facility with appropriate resources  Outcome: Progressing  Flowsheets (Taken 3/28/2024 2056)  Discharge to home or other facility with appropriate resources:   Identify barriers to discharge with patient and caregiver   Arrange for needed discharge resources and transportation as appropriate     Problem: Safety - Adult  Goal: Free from fall injury  Outcome: Progressing     Problem: ABCDS Injury Assessment  Goal: Absence of physical injury  Outcome: Progressing     Problem: Respiratory - Adult  Goal: Achieves optimal ventilation and oxygenation  Outcome:

## 2024-03-29 NOTE — PLAN OF CARE
Problem: Discharge Planning  Goal: Discharge to home or other facility with appropriate resources  Outcome: Progressing     Problem: Safety - Adult  Goal: Free from fall injury  Outcome: Progressing     Problem: ABCDS Injury Assessment  Goal: Absence of physical injury  Outcome: Progressing     Problem: Respiratory - Adult  Goal: Achieves optimal ventilation and oxygenation  Outcome: Progressing     Problem: Cardiovascular - Adult  Goal: Maintains optimal cardiac output and hemodynamic stability  Outcome: Progressing  Goal: Absence of cardiac dysrhythmias or at baseline  Outcome: Progressing     Problem: Hematologic - Adult  Goal: Maintains hematologic stability  Outcome: Progressing     Problem: Pain  Goal: Verbalizes/displays adequate comfort level or baseline comfort level  Outcome: Progressing     Problem: Nutrition Deficit:  Goal: Optimize nutritional status  Outcome: Progressing

## 2024-03-30 LAB
ERYTHROCYTE [DISTWIDTH] IN BLOOD BY AUTOMATED COUNT: 15.4 % (ref 11.5–15)
HCT VFR BLD AUTO: 26.1 % (ref 37–54)
HCT VFR BLD AUTO: 27.8 % (ref 37–54)
HCT VFR BLD AUTO: 29.2 % (ref 37–54)
HGB BLD-MCNC: 7.8 G/DL (ref 12.5–16.5)
HGB BLD-MCNC: 8.3 G/DL (ref 12.5–16.5)
HGB BLD-MCNC: 8.7 G/DL (ref 12.5–16.5)
MCH RBC QN AUTO: 29.8 PG (ref 26–35)
MCHC RBC AUTO-ENTMCNC: 29.9 G/DL (ref 32–34.5)
MCV RBC AUTO: 99.6 FL (ref 80–99.9)
PLATELET # BLD AUTO: 140 K/UL (ref 130–450)
PMV BLD AUTO: 10.4 FL (ref 7–12)
RBC # BLD AUTO: 2.62 M/UL (ref 3.8–5.8)
WBC OTHER # BLD: 5.8 K/UL (ref 4.5–11.5)

## 2024-03-30 PROCEDURE — 2580000003 HC RX 258: Performed by: NURSE PRACTITIONER

## 2024-03-30 PROCEDURE — C9113 INJ PANTOPRAZOLE SODIUM, VIA: HCPCS | Performed by: INTERNAL MEDICINE

## 2024-03-30 PROCEDURE — 2060000000 HC ICU INTERMEDIATE R&B

## 2024-03-30 PROCEDURE — 36415 COLL VENOUS BLD VENIPUNCTURE: CPT

## 2024-03-30 PROCEDURE — 2580000003 HC RX 258: Performed by: INTERNAL MEDICINE

## 2024-03-30 PROCEDURE — 85027 COMPLETE CBC AUTOMATED: CPT

## 2024-03-30 PROCEDURE — 85018 HEMOGLOBIN: CPT

## 2024-03-30 PROCEDURE — 6360000002 HC RX W HCPCS: Performed by: INTERNAL MEDICINE

## 2024-03-30 PROCEDURE — 6370000000 HC RX 637 (ALT 250 FOR IP): Performed by: NURSE PRACTITIONER

## 2024-03-30 PROCEDURE — 85014 HEMATOCRIT: CPT

## 2024-03-30 PROCEDURE — A4216 STERILE WATER/SALINE, 10 ML: HCPCS | Performed by: INTERNAL MEDICINE

## 2024-03-30 PROCEDURE — 2700000000 HC OXYGEN THERAPY PER DAY

## 2024-03-30 PROCEDURE — 99232 SBSQ HOSP IP/OBS MODERATE 35: CPT | Performed by: STUDENT IN AN ORGANIZED HEALTH CARE EDUCATION/TRAINING PROGRAM

## 2024-03-30 PROCEDURE — 97530 THERAPEUTIC ACTIVITIES: CPT

## 2024-03-30 PROCEDURE — 97161 PT EVAL LOW COMPLEX 20 MIN: CPT

## 2024-03-30 RX ADMIN — TAMSULOSIN HYDROCHLORIDE 0.4 MG: 0.4 CAPSULE ORAL at 20:36

## 2024-03-30 RX ADMIN — LATANOPROST 1 DROP: 50 SOLUTION OPHTHALMIC at 20:36

## 2024-03-30 RX ADMIN — SUCRALFATE 1 G: 1 TABLET ORAL at 07:47

## 2024-03-30 RX ADMIN — MICONAZOLE NITRATE: 20 POWDER TOPICAL at 20:36

## 2024-03-30 RX ADMIN — SODIUM CHLORIDE, PRESERVATIVE FREE 40 MG: 5 INJECTION INTRAVENOUS at 07:46

## 2024-03-30 RX ADMIN — SODIUM CHLORIDE, PRESERVATIVE FREE 10 ML: 5 INJECTION INTRAVENOUS at 20:37

## 2024-03-30 RX ADMIN — SODIUM CHLORIDE, PRESERVATIVE FREE 10 ML: 5 INJECTION INTRAVENOUS at 07:49

## 2024-03-30 RX ADMIN — SUCRALFATE 1 G: 1 TABLET ORAL at 16:31

## 2024-03-30 RX ADMIN — SUCRALFATE 1 G: 1 TABLET ORAL at 20:36

## 2024-03-30 RX ADMIN — METOPROLOL SUCCINATE 25 MG: 25 TABLET, FILM COATED, EXTENDED RELEASE ORAL at 20:36

## 2024-03-30 RX ADMIN — SODIUM CHLORIDE, PRESERVATIVE FREE 40 MG: 5 INJECTION INTRAVENOUS at 20:37

## 2024-03-30 RX ADMIN — MULTIVITAMIN TABLET 1 TABLET: TABLET at 07:47

## 2024-03-30 RX ADMIN — CYANOCOBALAMIN TAB 1000 MCG 1000 MCG: 1000 TAB at 07:47

## 2024-03-30 RX ADMIN — ZINC SULFATE 220 MG (50 MG) CAPSULE 50 MG: CAPSULE at 07:46

## 2024-03-30 RX ADMIN — METOPROLOL SUCCINATE 25 MG: 25 TABLET, FILM COATED, EXTENDED RELEASE ORAL at 07:46

## 2024-03-30 RX ADMIN — ATORVASTATIN CALCIUM 40 MG: 40 TABLET, FILM COATED ORAL at 20:36

## 2024-03-30 RX ADMIN — CETIRIZINE HYDROCHLORIDE 5 MG: 10 TABLET, FILM COATED ORAL at 07:47

## 2024-03-30 RX ADMIN — SUCRALFATE 1 G: 1 TABLET ORAL at 12:06

## 2024-03-30 RX ADMIN — MICONAZOLE NITRATE: 20 POWDER TOPICAL at 07:49

## 2024-03-30 RX ADMIN — FINASTERIDE 5 MG: 5 TABLET, FILM COATED ORAL at 07:47

## 2024-03-30 ASSESSMENT — PAIN SCALES - GENERAL: PAINLEVEL_OUTOF10: 0

## 2024-03-30 NOTE — PLAN OF CARE
Problem: Discharge Planning  Goal: Discharge to home or other facility with appropriate resources  Outcome: Progressing     Problem: Safety - Adult  Goal: Free from fall injury  Outcome: Progressing     Problem: ABCDS Injury Assessment  Goal: Absence of physical injury  Outcome: Progressing     Problem: Respiratory - Adult  Goal: Achieves optimal ventilation and oxygenation  Outcome: Progressing

## 2024-03-30 NOTE — PLAN OF CARE
Problem: Discharge Planning  Goal: Discharge to home or other facility with appropriate resources  3/29/2024 2024 by Tiff Boyd RN  Outcome: Progressing  3/29/2024 1342 by Pauly Gonsalez RN  Outcome: Progressing     Problem: Safety - Adult  Goal: Free from fall injury  3/29/2024 2024 by Tiff Boyd RN  Outcome: Progressing  3/29/2024 1342 by Pauly Gonsalez RN  Outcome: Progressing     Problem: ABCDS Injury Assessment  Goal: Absence of physical injury  3/29/2024 2024 by Tiff Boyd RN  Outcome: Progressing  3/29/2024 1342 by Pauly Gonsalez RN  Outcome: Progressing     Problem: Respiratory - Adult  Goal: Achieves optimal ventilation and oxygenation  3/29/2024 2024 by Tiff Boyd RN  Outcome: Progressing  3/29/2024 1342 by Pauly Gonsalez RN  Outcome: Progressing     Problem: Cardiovascular - Adult  Goal: Maintains optimal cardiac output and hemodynamic stability  3/29/2024 2024 by Tiff oByd RN  Outcome: Progressing  3/29/2024 1342 by Pauly Gonsalez RN  Outcome: Progressing  Goal: Absence of cardiac dysrhythmias or at baseline  3/29/2024 2024 by Tiff Boyd RN  Outcome: Progressing  3/29/2024 1342 by Pauly Gonsalez RN  Outcome: Progressing     Problem: Hematologic - Adult  Goal: Maintains hematologic stability  3/29/2024 2024 by Tiff Boyd RN  Outcome: Progressing  3/29/2024 1342 by Pauly Gonsalez RN  Outcome: Progressing     Problem: Pain  Goal: Verbalizes/displays adequate comfort level or baseline comfort level  3/29/2024 2024 by Tiff Boyd RN  Outcome: Progressing  3/29/2024 1342 by Pauly Gonsalez RN  Outcome: Progressing     Problem: Nutrition Deficit:  Goal: Optimize nutritional status  3/29/2024 2024 by Tiff Boyd RN  Outcome: Progressing  3/29/2024 1342 by Pauly Gonsalez RN  Outcome: Progressing

## 2024-03-31 ENCOUNTER — APPOINTMENT (OUTPATIENT)
Dept: NUCLEAR MEDICINE | Age: 84
DRG: 377 | End: 2024-03-31
Payer: OTHER GOVERNMENT

## 2024-03-31 LAB
ANION GAP SERPL CALCULATED.3IONS-SCNC: 7 MMOL/L (ref 7–16)
BASOPHILS # BLD: 0.01 K/UL (ref 0–0.2)
BASOPHILS NFR BLD: 0 % (ref 0–2)
BUN SERPL-MCNC: 24 MG/DL (ref 6–23)
CALCIUM SERPL-MCNC: 9.4 MG/DL (ref 8.6–10.2)
CHLORIDE SERPL-SCNC: 110 MMOL/L (ref 98–107)
CO2 SERPL-SCNC: 23 MMOL/L (ref 22–29)
CREAT SERPL-MCNC: 1.9 MG/DL (ref 0.7–1.2)
EOSINOPHIL # BLD: 0.14 K/UL (ref 0.05–0.5)
EOSINOPHILS RELATIVE PERCENT: 3 % (ref 0–6)
ERYTHROCYTE [DISTWIDTH] IN BLOOD BY AUTOMATED COUNT: 15.2 % (ref 11.5–15)
GFR SERPL CREATININE-BSD FRML MDRD: 35 ML/MIN/1.73M2
GLUCOSE SERPL-MCNC: 90 MG/DL (ref 74–99)
HCT VFR BLD AUTO: 25.6 % (ref 37–54)
HCT VFR BLD AUTO: 27.1 % (ref 37–54)
HCT VFR BLD AUTO: 27.5 % (ref 37–54)
HGB BLD-MCNC: 7.6 G/DL (ref 12.5–16.5)
HGB BLD-MCNC: 8.3 G/DL (ref 12.5–16.5)
HGB BLD-MCNC: 8.3 G/DL (ref 12.5–16.5)
IMM GRANULOCYTES # BLD AUTO: <0.03 K/UL (ref 0–0.58)
IMM GRANULOCYTES NFR BLD: 0 % (ref 0–5)
LYMPHOCYTES NFR BLD: 0.43 K/UL (ref 1.5–4)
LYMPHOCYTES RELATIVE PERCENT: 8 % (ref 20–42)
MAGNESIUM SERPL-MCNC: 1.6 MG/DL (ref 1.6–2.6)
MCH RBC QN AUTO: 30.3 PG (ref 26–35)
MCHC RBC AUTO-ENTMCNC: 29.7 G/DL (ref 32–34.5)
MCV RBC AUTO: 102 FL (ref 80–99.9)
MONOCYTES NFR BLD: 0.34 K/UL (ref 0.1–0.95)
MONOCYTES NFR BLD: 7 % (ref 2–12)
NEUTROPHILS NFR BLD: 82 % (ref 43–80)
NEUTS SEG NFR BLD: 4.15 K/UL (ref 1.8–7.3)
PLATELET # BLD AUTO: 145 K/UL (ref 130–450)
PMV BLD AUTO: 10.2 FL (ref 7–12)
POTASSIUM SERPL-SCNC: 3.9 MMOL/L (ref 3.5–5)
RBC # BLD AUTO: 2.51 M/UL (ref 3.8–5.8)
RBC # BLD: ABNORMAL 10*6/UL
SODIUM SERPL-SCNC: 140 MMOL/L (ref 132–146)
WBC OTHER # BLD: 5.1 K/UL (ref 4.5–11.5)

## 2024-03-31 PROCEDURE — 36415 COLL VENOUS BLD VENIPUNCTURE: CPT

## 2024-03-31 PROCEDURE — 3430000000 HC RX DIAGNOSTIC RADIOPHARMACEUTICAL: Performed by: RADIOLOGY

## 2024-03-31 PROCEDURE — 85014 HEMATOCRIT: CPT

## 2024-03-31 PROCEDURE — 2580000003 HC RX 258: Performed by: INTERNAL MEDICINE

## 2024-03-31 PROCEDURE — 6360000002 HC RX W HCPCS: Performed by: INTERNAL MEDICINE

## 2024-03-31 PROCEDURE — 83735 ASSAY OF MAGNESIUM: CPT

## 2024-03-31 PROCEDURE — 6370000000 HC RX 637 (ALT 250 FOR IP): Performed by: STUDENT IN AN ORGANIZED HEALTH CARE EDUCATION/TRAINING PROGRAM

## 2024-03-31 PROCEDURE — 2060000000 HC ICU INTERMEDIATE R&B

## 2024-03-31 PROCEDURE — 85025 COMPLETE CBC W/AUTO DIFF WBC: CPT

## 2024-03-31 PROCEDURE — 6370000000 HC RX 637 (ALT 250 FOR IP): Performed by: NURSE PRACTITIONER

## 2024-03-31 PROCEDURE — 80048 BASIC METABOLIC PNL TOTAL CA: CPT

## 2024-03-31 PROCEDURE — 99232 SBSQ HOSP IP/OBS MODERATE 35: CPT | Performed by: STUDENT IN AN ORGANIZED HEALTH CARE EDUCATION/TRAINING PROGRAM

## 2024-03-31 PROCEDURE — A9560 TC99M LABELED RBC: HCPCS | Performed by: RADIOLOGY

## 2024-03-31 PROCEDURE — C9113 INJ PANTOPRAZOLE SODIUM, VIA: HCPCS | Performed by: INTERNAL MEDICINE

## 2024-03-31 PROCEDURE — A4216 STERILE WATER/SALINE, 10 ML: HCPCS | Performed by: INTERNAL MEDICINE

## 2024-03-31 PROCEDURE — 2580000003 HC RX 258: Performed by: NURSE PRACTITIONER

## 2024-03-31 PROCEDURE — 78278 ACUTE GI BLOOD LOSS IMAGING: CPT

## 2024-03-31 PROCEDURE — 85018 HEMOGLOBIN: CPT

## 2024-03-31 RX ORDER — LANOLIN ALCOHOL/MO/W.PET/CERES
3 CREAM (GRAM) TOPICAL NIGHTLY PRN
Status: DISCONTINUED | OUTPATIENT
Start: 2024-03-31 | End: 2024-04-02 | Stop reason: HOSPADM

## 2024-03-31 RX ADMIN — MICONAZOLE NITRATE: 20 POWDER TOPICAL at 09:17

## 2024-03-31 RX ADMIN — SODIUM CHLORIDE, PRESERVATIVE FREE 40 MG: 5 INJECTION INTRAVENOUS at 09:17

## 2024-03-31 RX ADMIN — CETIRIZINE HYDROCHLORIDE 5 MG: 10 TABLET, FILM COATED ORAL at 09:16

## 2024-03-31 RX ADMIN — SODIUM CHLORIDE, PRESERVATIVE FREE 40 MG: 5 INJECTION INTRAVENOUS at 20:44

## 2024-03-31 RX ADMIN — METOPROLOL SUCCINATE 25 MG: 25 TABLET, FILM COATED, EXTENDED RELEASE ORAL at 20:44

## 2024-03-31 RX ADMIN — SODIUM CHLORIDE, PRESERVATIVE FREE 10 ML: 5 INJECTION INTRAVENOUS at 20:44

## 2024-03-31 RX ADMIN — ZINC SULFATE 220 MG (50 MG) CAPSULE 50 MG: CAPSULE at 09:17

## 2024-03-31 RX ADMIN — Medication 3 MG: at 20:43

## 2024-03-31 RX ADMIN — TAMSULOSIN HYDROCHLORIDE 0.4 MG: 0.4 CAPSULE ORAL at 20:43

## 2024-03-31 RX ADMIN — FINASTERIDE 5 MG: 5 TABLET, FILM COATED ORAL at 09:16

## 2024-03-31 RX ADMIN — MICONAZOLE NITRATE: 20 POWDER TOPICAL at 20:44

## 2024-03-31 RX ADMIN — SODIUM CHLORIDE, PRESERVATIVE FREE 10 ML: 5 INJECTION INTRAVENOUS at 15:13

## 2024-03-31 RX ADMIN — SUCRALFATE 1 G: 1 TABLET ORAL at 09:17

## 2024-03-31 RX ADMIN — LATANOPROST 1 DROP: 50 SOLUTION OPHTHALMIC at 20:44

## 2024-03-31 RX ADMIN — MULTIVITAMIN TABLET 1 TABLET: TABLET at 09:17

## 2024-03-31 RX ADMIN — METOPROLOL SUCCINATE 25 MG: 25 TABLET, FILM COATED, EXTENDED RELEASE ORAL at 09:17

## 2024-03-31 RX ADMIN — SODIUM CHLORIDE, PRESERVATIVE FREE 10 ML: 5 INJECTION INTRAVENOUS at 09:18

## 2024-03-31 RX ADMIN — ATORVASTATIN CALCIUM 40 MG: 40 TABLET, FILM COATED ORAL at 20:43

## 2024-03-31 RX ADMIN — Medication 20 MILLICURIE: at 11:30

## 2024-03-31 RX ADMIN — SUCRALFATE 1 G: 1 TABLET ORAL at 17:12

## 2024-03-31 RX ADMIN — CYANOCOBALAMIN TAB 1000 MCG 1000 MCG: 1000 TAB at 09:16

## 2024-03-31 RX ADMIN — SUCRALFATE 1 G: 1 TABLET ORAL at 13:27

## 2024-03-31 RX ADMIN — SUCRALFATE 1 G: 1 TABLET ORAL at 20:43

## 2024-03-31 NOTE — PLAN OF CARE
Problem: Discharge Planning  Goal: Discharge to home or other facility with appropriate resources  3/31/2024 0955 by Lisa Hernandez RN  Outcome: Progressing  3/31/2024 0029 by Sravanthi Adrian RN  Outcome: Progressing     Problem: Safety - Adult  Goal: Free from fall injury  3/31/2024 0955 by Lisa Hernandez RN  Outcome: Progressing  3/31/2024 0029 by Sravanthi Adrian RN  Outcome: Progressing     Problem: ABCDS Injury Assessment  Goal: Absence of physical injury  3/31/2024 0955 by Lisa Hernandez RN  Outcome: Progressing  3/31/2024 0029 by Sravanthi Adrian RN  Outcome: Progressing     Problem: Respiratory - Adult  Goal: Achieves optimal ventilation and oxygenation  3/31/2024 0955 by Lisa Hernandez RN  Outcome: Progressing  3/31/2024 0029 by Sravanthi Adrian RN  Outcome: Progressing     Problem: Cardiovascular - Adult  Goal: Maintains optimal cardiac output and hemodynamic stability  3/31/2024 0955 by Lisa Hernandez RN  Outcome: Progressing  3/31/2024 0029 by Sravanthi Adrian RN  Outcome: Progressing     Problem: Cardiovascular - Adult  Goal: Absence of cardiac dysrhythmias or at baseline  3/31/2024 0955 by Lisa Hernandez RN  Outcome: Progressing  3/31/2024 0029 by Sravanthi Adrian RN  Outcome: Progressing     Problem: Hematologic - Adult  Goal: Maintains hematologic stability  3/31/2024 0955 by Lisa Hernandez RN  Outcome: Progressing  3/31/2024 0029 by Sravanthi Adrian RN  Outcome: Progressing     Problem: Pain  Goal: Verbalizes/displays adequate comfort level or baseline comfort level  3/31/2024 0955 by Lisa Hernandez RN  Outcome: Progressing  3/31/2024 0029 by Sravanthi Adrian, RN  Outcome: Progressing     Problem: Nutrition Deficit:  Goal: Optimize nutritional status  3/31/2024 0955 by Lisa Hernandez RN  Outcome: Progressing  3/31/2024 0029 by Sravanthi Adrian, RN  Outcome: Progressing

## 2024-03-31 NOTE — PLAN OF CARE
Problem: Discharge Planning  Goal: Discharge to home or other facility with appropriate resources  3/31/2024 0029 by Sravanthi Adrian RN  Outcome: Progressing  3/30/2024 1119 by Pauly Gonsalez RN  Outcome: Progressing     Problem: Safety - Adult  Goal: Free from fall injury  3/31/2024 0029 by Sravanthi Adrian RN  Outcome: Progressing  3/30/2024 1119 by Pauly Gonsalez RN  Outcome: Progressing     Problem: ABCDS Injury Assessment  Goal: Absence of physical injury  3/31/2024 0029 by Sravanthi Adrian RN  Outcome: Progressing  3/30/2024 1119 by Pauly Gonsalez RN  Outcome: Progressing     Problem: Respiratory - Adult  Goal: Achieves optimal ventilation and oxygenation  3/31/2024 0029 by Sravanthi Adrian RN  Outcome: Progressing  3/30/2024 1119 by Pauly Gonsalez RN  Outcome: Progressing     Problem: Cardiovascular - Adult  Goal: Maintains optimal cardiac output and hemodynamic stability  Outcome: Progressing  Goal: Absence of cardiac dysrhythmias or at baseline  Outcome: Progressing     Problem: Hematologic - Adult  Goal: Maintains hematologic stability  Outcome: Progressing     Problem: Pain  Goal: Verbalizes/displays adequate comfort level or baseline comfort level  Outcome: Progressing     Problem: Nutrition Deficit:  Goal: Optimize nutritional status  Outcome: Progressing

## 2024-04-01 LAB
ALBUMIN SERPL-MCNC: 2.3 G/DL (ref 3.5–5.2)
ALP SERPL-CCNC: 110 U/L (ref 40–129)
ALT SERPL-CCNC: 20 U/L (ref 0–40)
ANION GAP SERPL CALCULATED.3IONS-SCNC: 6 MMOL/L (ref 7–16)
AST SERPL-CCNC: 30 U/L (ref 0–39)
BASOPHILS # BLD: 0 K/UL (ref 0–0.2)
BASOPHILS NFR BLD: 0 % (ref 0–2)
BILIRUB DIRECT SERPL-MCNC: <0.2 MG/DL (ref 0–0.3)
BILIRUB INDIRECT SERPL-MCNC: ABNORMAL MG/DL (ref 0–1)
BILIRUB SERPL-MCNC: 0.2 MG/DL (ref 0–1.2)
BUN SERPL-MCNC: 22 MG/DL (ref 6–23)
CALCIUM SERPL-MCNC: 9.2 MG/DL (ref 8.6–10.2)
CHLORIDE SERPL-SCNC: 109 MMOL/L (ref 98–107)
CO2 SERPL-SCNC: 24 MMOL/L (ref 22–29)
CREAT SERPL-MCNC: 1.7 MG/DL (ref 0.7–1.2)
EOSINOPHIL # BLD: 0.13 K/UL (ref 0.05–0.5)
EOSINOPHILS RELATIVE PERCENT: 3 % (ref 0–6)
ERYTHROCYTE [DISTWIDTH] IN BLOOD BY AUTOMATED COUNT: 15.4 % (ref 11.5–15)
GFR SERPL CREATININE-BSD FRML MDRD: 39 ML/MIN/1.73M2
GLUCOSE SERPL-MCNC: 93 MG/DL (ref 74–99)
HCT VFR BLD AUTO: 26.1 % (ref 37–54)
HGB BLD-MCNC: 7.8 G/DL (ref 12.5–16.5)
IMM GRANULOCYTES # BLD AUTO: <0.03 K/UL (ref 0–0.58)
IMM GRANULOCYTES NFR BLD: 0 % (ref 0–5)
LYMPHOCYTES NFR BLD: 0.48 K/UL (ref 1.5–4)
LYMPHOCYTES RELATIVE PERCENT: 10 % (ref 20–42)
MAGNESIUM SERPL-MCNC: 1.6 MG/DL (ref 1.6–2.6)
MCH RBC QN AUTO: 30.2 PG (ref 26–35)
MCHC RBC AUTO-ENTMCNC: 29.9 G/DL (ref 32–34.5)
MCV RBC AUTO: 101.2 FL (ref 80–99.9)
MONOCYTES NFR BLD: 0.28 K/UL (ref 0.1–0.95)
MONOCYTES NFR BLD: 6 % (ref 2–12)
NEUTROPHILS NFR BLD: 81 % (ref 43–80)
NEUTS SEG NFR BLD: 3.74 K/UL (ref 1.8–7.3)
PLATELET # BLD AUTO: 167 K/UL (ref 130–450)
PMV BLD AUTO: 10.8 FL (ref 7–12)
POTASSIUM SERPL-SCNC: 3.7 MMOL/L (ref 3.5–5)
PROT SERPL-MCNC: 4.8 G/DL (ref 6.4–8.3)
RBC # BLD AUTO: 2.58 M/UL (ref 3.8–5.8)
RBC # BLD: ABNORMAL 10*6/UL
SODIUM SERPL-SCNC: 139 MMOL/L (ref 132–146)
WBC OTHER # BLD: 4.7 K/UL (ref 4.5–11.5)

## 2024-04-01 PROCEDURE — 2060000000 HC ICU INTERMEDIATE R&B

## 2024-04-01 PROCEDURE — 80053 COMPREHEN METABOLIC PANEL: CPT

## 2024-04-01 PROCEDURE — 6370000000 HC RX 637 (ALT 250 FOR IP): Performed by: NURSE PRACTITIONER

## 2024-04-01 PROCEDURE — 2580000003 HC RX 258: Performed by: NURSE PRACTITIONER

## 2024-04-01 PROCEDURE — 6370000000 HC RX 637 (ALT 250 FOR IP): Performed by: STUDENT IN AN ORGANIZED HEALTH CARE EDUCATION/TRAINING PROGRAM

## 2024-04-01 PROCEDURE — 82248 BILIRUBIN DIRECT: CPT

## 2024-04-01 PROCEDURE — 99232 SBSQ HOSP IP/OBS MODERATE 35: CPT | Performed by: STUDENT IN AN ORGANIZED HEALTH CARE EDUCATION/TRAINING PROGRAM

## 2024-04-01 PROCEDURE — A4216 STERILE WATER/SALINE, 10 ML: HCPCS | Performed by: INTERNAL MEDICINE

## 2024-04-01 PROCEDURE — C9113 INJ PANTOPRAZOLE SODIUM, VIA: HCPCS | Performed by: INTERNAL MEDICINE

## 2024-04-01 PROCEDURE — 83735 ASSAY OF MAGNESIUM: CPT

## 2024-04-01 PROCEDURE — 2700000000 HC OXYGEN THERAPY PER DAY

## 2024-04-01 PROCEDURE — 2580000003 HC RX 258: Performed by: INTERNAL MEDICINE

## 2024-04-01 PROCEDURE — 6360000002 HC RX W HCPCS: Performed by: INTERNAL MEDICINE

## 2024-04-01 PROCEDURE — 85025 COMPLETE CBC W/AUTO DIFF WBC: CPT

## 2024-04-01 RX ORDER — FLUTICASONE PROPIONATE 50 MCG
1 SPRAY, SUSPENSION (ML) NASAL DAILY
Status: DISCONTINUED | OUTPATIENT
Start: 2024-04-01 | End: 2024-04-02 | Stop reason: HOSPADM

## 2024-04-01 RX ADMIN — ZINC SULFATE 220 MG (50 MG) CAPSULE 50 MG: CAPSULE at 11:21

## 2024-04-01 RX ADMIN — SUCRALFATE 1 G: 1 TABLET ORAL at 20:12

## 2024-04-01 RX ADMIN — METOPROLOL SUCCINATE 25 MG: 25 TABLET, FILM COATED, EXTENDED RELEASE ORAL at 20:12

## 2024-04-01 RX ADMIN — SODIUM CHLORIDE, PRESERVATIVE FREE 10 ML: 5 INJECTION INTRAVENOUS at 11:21

## 2024-04-01 RX ADMIN — Medication 3 MG: at 20:12

## 2024-04-01 RX ADMIN — MULTIVITAMIN TABLET 1 TABLET: TABLET at 11:21

## 2024-04-01 RX ADMIN — FLUTICASONE PROPIONATE 1 SPRAY: 50 SPRAY, METERED NASAL at 20:12

## 2024-04-01 RX ADMIN — TAMSULOSIN HYDROCHLORIDE 0.4 MG: 0.4 CAPSULE ORAL at 20:12

## 2024-04-01 RX ADMIN — SODIUM CHLORIDE, PRESERVATIVE FREE 10 ML: 5 INJECTION INTRAVENOUS at 20:12

## 2024-04-01 RX ADMIN — METOPROLOL SUCCINATE 25 MG: 25 TABLET, FILM COATED, EXTENDED RELEASE ORAL at 11:21

## 2024-04-01 RX ADMIN — FINASTERIDE 5 MG: 5 TABLET, FILM COATED ORAL at 11:21

## 2024-04-01 RX ADMIN — MICONAZOLE NITRATE: 20 POWDER TOPICAL at 20:13

## 2024-04-01 RX ADMIN — CETIRIZINE HYDROCHLORIDE 5 MG: 10 TABLET, FILM COATED ORAL at 11:21

## 2024-04-01 RX ADMIN — CYANOCOBALAMIN TAB 1000 MCG 1000 MCG: 1000 TAB at 11:21

## 2024-04-01 RX ADMIN — ATORVASTATIN CALCIUM 40 MG: 40 TABLET, FILM COATED ORAL at 20:12

## 2024-04-01 RX ADMIN — SODIUM CHLORIDE, PRESERVATIVE FREE 40 MG: 5 INJECTION INTRAVENOUS at 11:21

## 2024-04-01 RX ADMIN — SUCRALFATE 1 G: 1 TABLET ORAL at 11:21

## 2024-04-01 RX ADMIN — Medication 5 DROP: at 20:12

## 2024-04-01 RX ADMIN — SODIUM CHLORIDE, PRESERVATIVE FREE 40 MG: 5 INJECTION INTRAVENOUS at 20:11

## 2024-04-01 RX ADMIN — LATANOPROST 1 DROP: 50 SOLUTION OPHTHALMIC at 20:12

## 2024-04-01 ASSESSMENT — PAIN SCALES - GENERAL: PAINLEVEL_OUTOF10: 0

## 2024-04-01 NOTE — PLAN OF CARE
Problem: Discharge Planning  Goal: Discharge to home or other facility with appropriate resources  3/31/2024 2348 by Sravanthi Adrian RN  Outcome: Progressing  3/31/2024 0955 by Lisa Hernandez RN  Outcome: Progressing     Problem: Safety - Adult  Goal: Free from fall injury  3/31/2024 2348 by Sravanthi Adrian RN  Outcome: Progressing  3/31/2024 0955 by Lisa Hernandez RN  Outcome: Progressing     Problem: ABCDS Injury Assessment  Goal: Absence of physical injury  3/31/2024 2348 by Sravanthi Adrian RN  Outcome: Progressing  3/31/2024 0955 by Lisa Hernandez RN  Outcome: Progressing     Problem: Respiratory - Adult  Goal: Achieves optimal ventilation and oxygenation  3/31/2024 2348 by Sravanthi Adrian RN  Outcome: Progressing  3/31/2024 0955 by Lisa Hernandez RN  Outcome: Progressing     Problem: Cardiovascular - Adult  Goal: Maintains optimal cardiac output and hemodynamic stability  3/31/2024 2348 by Sravanthi Adrian RN  Outcome: Progressing  3/31/2024 0955 by Lisa Hernandez RN  Outcome: Progressing  Goal: Absence of cardiac dysrhythmias or at baseline  3/31/2024 2348 by Sravanthi Adrian RN  Outcome: Progressing  3/31/2024 0955 by Lisa Hernandez RN  Outcome: Progressing     Problem: Hematologic - Adult  Goal: Maintains hematologic stability  3/31/2024 2348 by Sravanthi Adrian RN  Outcome: Progressing  3/31/2024 0955 by Lisa Hernandez RN  Outcome: Progressing     Problem: Pain  Goal: Verbalizes/displays adequate comfort level or baseline comfort level  3/31/2024 2348 by Sravanthi Adrian RN  Outcome: Progressing  3/31/2024 0955 by Lisa Hernandez RN  Outcome: Progressing     Problem: Nutrition Deficit:  Goal: Optimize nutritional status  3/31/2024 2348 by Sravanthi Adrian RN  Outcome: Progressing  3/31/2024 0955 by Lisa Hernandez RN  Outcome: Progressing

## 2024-04-01 NOTE — CARE COORDINATION
Chart review - pt. admitted for Acute on chronic blood loss anemia.Hx of metastatic renal cell CA, DVT. Received PRBC's Hgb drifting down. GI following Nuclear bleeding scan completed - results pending. Possible endoscopic eval pending scan results.Pt. is on room air.  Pt. Is LTC at [facility], does not need pre-cert plan is to return when medically stable. MADISON complete, envelope with facesheet and transport forms in chart. Patient will need followed and assisted with discharge planning as necessary.   Brenda BACAN, RN  Case Management

## 2024-04-01 NOTE — CARE COORDINATION
EGD 3/28/2024 by Dr. Heredia:  Normal esophagus. Gastritis, characterized by erythema. Biopsied. Normal duodenal bulb, first portion of the duodenum and second portion of the duodenum. Biopsied.

## 2024-04-02 VITALS
WEIGHT: 203.2 LBS | HEART RATE: 71 BPM | RESPIRATION RATE: 20 BRPM | SYSTOLIC BLOOD PRESSURE: 109 MMHG | HEIGHT: 71 IN | OXYGEN SATURATION: 97 % | BODY MASS INDEX: 28.45 KG/M2 | DIASTOLIC BLOOD PRESSURE: 62 MMHG | TEMPERATURE: 97.6 F

## 2024-04-02 LAB
ANION GAP SERPL CALCULATED.3IONS-SCNC: 6 MMOL/L (ref 7–16)
BASOPHILS # BLD: 0 K/UL (ref 0–0.2)
BASOPHILS NFR BLD: 0 % (ref 0–2)
BUN SERPL-MCNC: 22 MG/DL (ref 6–23)
CALCIUM SERPL-MCNC: 9.6 MG/DL (ref 8.6–10.2)
CHLORIDE SERPL-SCNC: 110 MMOL/L (ref 98–107)
CO2 SERPL-SCNC: 23 MMOL/L (ref 22–29)
CREAT SERPL-MCNC: 1.7 MG/DL (ref 0.7–1.2)
EOSINOPHIL # BLD: 0 K/UL (ref 0.05–0.5)
EOSINOPHILS RELATIVE PERCENT: 0 % (ref 0–6)
ERYTHROCYTE [DISTWIDTH] IN BLOOD BY AUTOMATED COUNT: 15.2 % (ref 11.5–15)
GFR SERPL CREATININE-BSD FRML MDRD: 39 ML/MIN/1.73M2
GLUCOSE SERPL-MCNC: 94 MG/DL (ref 74–99)
HCT VFR BLD AUTO: 28 % (ref 37–54)
HGB BLD-MCNC: 8.4 G/DL (ref 12.5–16.5)
LYMPHOCYTES NFR BLD: 0.16 K/UL (ref 1.5–4)
LYMPHOCYTES RELATIVE PERCENT: 3 % (ref 20–42)
MCH RBC QN AUTO: 30 PG (ref 26–35)
MCHC RBC AUTO-ENTMCNC: 30 G/DL (ref 32–34.5)
MCV RBC AUTO: 100 FL (ref 80–99.9)
MONOCYTES NFR BLD: 0.12 K/UL (ref 0.1–0.95)
MONOCYTES NFR BLD: 3 % (ref 2–12)
NEUTROPHILS NFR BLD: 94 % (ref 43–80)
NEUTS SEG NFR BLD: 4.42 K/UL (ref 1.8–7.3)
PLATELET # BLD AUTO: 165 K/UL (ref 130–450)
PMV BLD AUTO: 10.8 FL (ref 7–12)
POTASSIUM SERPL-SCNC: 3.6 MMOL/L (ref 3.5–5)
RBC # BLD AUTO: 2.8 M/UL (ref 3.8–5.8)
RBC # BLD: ABNORMAL 10*6/UL
SODIUM SERPL-SCNC: 139 MMOL/L (ref 132–146)
SURGICAL PATHOLOGY REPORT: NORMAL
WBC OTHER # BLD: 4.7 K/UL (ref 4.5–11.5)

## 2024-04-02 PROCEDURE — C9113 INJ PANTOPRAZOLE SODIUM, VIA: HCPCS | Performed by: INTERNAL MEDICINE

## 2024-04-02 PROCEDURE — 36415 COLL VENOUS BLD VENIPUNCTURE: CPT

## 2024-04-02 PROCEDURE — 85025 COMPLETE CBC W/AUTO DIFF WBC: CPT

## 2024-04-02 PROCEDURE — 80048 BASIC METABOLIC PNL TOTAL CA: CPT

## 2024-04-02 PROCEDURE — 99239 HOSP IP/OBS DSCHRG MGMT >30: CPT | Performed by: STUDENT IN AN ORGANIZED HEALTH CARE EDUCATION/TRAINING PROGRAM

## 2024-04-02 PROCEDURE — 2580000003 HC RX 258: Performed by: NURSE PRACTITIONER

## 2024-04-02 PROCEDURE — 6370000000 HC RX 637 (ALT 250 FOR IP): Performed by: STUDENT IN AN ORGANIZED HEALTH CARE EDUCATION/TRAINING PROGRAM

## 2024-04-02 PROCEDURE — 6360000002 HC RX W HCPCS: Performed by: INTERNAL MEDICINE

## 2024-04-02 PROCEDURE — 6370000000 HC RX 637 (ALT 250 FOR IP): Performed by: NURSE PRACTITIONER

## 2024-04-02 PROCEDURE — 97535 SELF CARE MNGMENT TRAINING: CPT

## 2024-04-02 PROCEDURE — A4216 STERILE WATER/SALINE, 10 ML: HCPCS | Performed by: INTERNAL MEDICINE

## 2024-04-02 PROCEDURE — 97530 THERAPEUTIC ACTIVITIES: CPT

## 2024-04-02 PROCEDURE — 2580000003 HC RX 258: Performed by: INTERNAL MEDICINE

## 2024-04-02 RX ADMIN — FINASTERIDE 5 MG: 5 TABLET, FILM COATED ORAL at 08:32

## 2024-04-02 RX ADMIN — SUCRALFATE 1 G: 1 TABLET ORAL at 08:32

## 2024-04-02 RX ADMIN — OXYCODONE AND ACETAMINOPHEN 1 TABLET: 5; 325 TABLET ORAL at 04:52

## 2024-04-02 RX ADMIN — CETIRIZINE HYDROCHLORIDE 5 MG: 10 TABLET, FILM COATED ORAL at 08:32

## 2024-04-02 RX ADMIN — CYANOCOBALAMIN TAB 1000 MCG 1000 MCG: 1000 TAB at 08:32

## 2024-04-02 RX ADMIN — SODIUM CHLORIDE, PRESERVATIVE FREE 10 ML: 5 INJECTION INTRAVENOUS at 08:33

## 2024-04-02 RX ADMIN — SODIUM CHLORIDE, PRESERVATIVE FREE 40 MG: 5 INJECTION INTRAVENOUS at 08:32

## 2024-04-02 RX ADMIN — MULTIVITAMIN TABLET 1 TABLET: TABLET at 08:32

## 2024-04-02 RX ADMIN — PETROLATUM: 420 OINTMENT TOPICAL at 08:33

## 2024-04-02 RX ADMIN — METOPROLOL SUCCINATE 25 MG: 25 TABLET, FILM COATED, EXTENDED RELEASE ORAL at 08:32

## 2024-04-02 RX ADMIN — SUCRALFATE 1 G: 1 TABLET ORAL at 13:02

## 2024-04-02 RX ADMIN — MICONAZOLE NITRATE: 20 POWDER TOPICAL at 08:33

## 2024-04-02 RX ADMIN — ZINC SULFATE 220 MG (50 MG) CAPSULE 50 MG: CAPSULE at 08:32

## 2024-04-02 RX ADMIN — FLUTICASONE PROPIONATE 1 SPRAY: 50 SPRAY, METERED NASAL at 08:33

## 2024-04-02 ASSESSMENT — PAIN SCALES - GENERAL: PAINLEVEL_OUTOF10: 8

## 2024-04-02 ASSESSMENT — PAIN DESCRIPTION - ORIENTATION: ORIENTATION: RIGHT;LOWER

## 2024-04-02 ASSESSMENT — PAIN DESCRIPTION - LOCATION: LOCATION: BACK

## 2024-04-02 NOTE — CARE COORDINATION
Met with pt. admitted for acute on chronic blood loss anemia.Hx of metastatic renal cell CA, DVT. GI following Received PRBC's Hgb 8.4.nuclear bleed scan was negative. Pt. will discharge today. Pt. is LTC at [facility]  notified of patients return. Transportation is scheduled with [facility] at 4pm. MADISON completed, envelope with facesheet and transport form in chart. Patient,and nursing notified, patient's daughter Erica notified via voicemail.  Brenda BACAN, RN  Case Management

## 2024-04-02 NOTE — DISCHARGE SUMMARY
[facility] Hospitalist Physician Discharge Summary       [facility]  [address] [address]  [phone]          Activity level: as tolerated    Dispo: SNF/rehab      Condition on discharge: stable    Patient ID:  Uzair Fuentes  [id number]  84 y.o.  1940    Admit date: 3/27/2024    Discharge date and time:  4/2/2024  2:44 PM    Admission Diagnoses: Principal Problem:    Acute on chronic blood loss anemia  Active Problems:    Primary hypertension    Metastatic renal cell carcinoma (HCC)    Severe protein-calorie malnutrition (HCC)    On apixaban therapy    CKD (chronic kidney disease)  Resolved Problems:    * No resolved hospital problems. *      Discharge Diagnoses: Principal Problem:    Acute on chronic blood loss anemia  Active Problems:    Primary hypertension    Metastatic renal cell carcinoma (HCC)    Severe protein-calorie malnutrition (HCC)    On apixaban therapy    CKD (chronic kidney disease)  Resolved Problems:    * No resolved hospital problems. *      Consults:  IP CONSULT TO GI  IP CONSULT TO IV TEAM  IP CONSULT TO IV TEAM  IP CONSULT TO IV TEAM    Procedures:   EGD 3/28/2024 by Dr. Heredia:  Normal esophagus. Gastritis, characterized by erythema. Biopsied. Normal duodenal bulb, first portion of the duodenum and second portion of the duodenum. Biopsied.    Hospital Course:   Patient Uzair Fuentes is a 84 y.o. presented with Acute on chronic blood loss anemia [D62]    Brief summary:  Patient presented with acute on chronic blood loss anemia in setting of eliquis use. Seen by GI, had recent scopes 2/2024 without evidence of bleeding, repeat EGD as above without acute bleeding, nuclear medicine scan 3/31 with delayed images 4/1 also without signs of bleeding, no further GI interventions warranted at this time. Hgb did stabilize to ~8 at time of dispo, will need close monitoring of Hgb when Eliquis resumed for history of DVT. Also noted to

## 2024-04-02 NOTE — PLAN OF CARE
Problem: Discharge Planning  Goal: Discharge to home or other facility with appropriate resources  Outcome: Progressing     Problem: Safety - Adult  Goal: Free from fall injury  Outcome: Progressing     Problem: ABCDS Injury Assessment  Goal: Absence of physical injury  Outcome: Progressing     Problem: Respiratory - Adult  Goal: Achieves optimal ventilation and oxygenation  Outcome: Progressing     Problem: Cardiovascular - Adult  Goal: Maintains optimal cardiac output and hemodynamic stability  Outcome: Progressing  Goal: Absence of cardiac dysrhythmias or at baseline  Outcome: Progressing     Problem: Hematologic - Adult  Goal: Maintains hematologic stability  Outcome: Progressing     Problem: Pain  Goal: Verbalizes/displays adequate comfort level or baseline comfort level  Outcome: Progressing     Problem: Nutrition Deficit:  Goal: Optimize nutritional status  4/1/2024 2224 by Lisbeth Falcon RN  Outcome: Progressing  4/1/2024 1328 by Ce Torres RD  Flowsheets (Taken 4/1/2024 1328)  Nutrient intake appropriate for improving, restoring, or maintaining nutritional needs:   Assess nutritional status and recommend course of action   Monitor oral intake, labs, and treatment plans   Recommend appropriate diets, oral nutritional supplements, and vitamin/mineral supplements

## 2024-04-02 NOTE — PROGRESS NOTES
[facility] Hospitalist Progress Note    Admitting Date and Time: 3/27/2024  3:21 PM  Admit Dx: Acute on chronic blood loss anemia [D62]    Subjective:  Patient is being followed for Acute on chronic blood loss anemia [D62]   Pt feels okay  Per RN: no additional concerns    ROS: denies fever, chills, cp, sob, n/v, HA unless stated above.      pantoprazole (PROTONIX) 40 mg in sodium chloride (PF) 0.9 % 10 mL injection  40 mg IntraVENous Q12H    sodium chloride flush  5-40 mL IntraVENous 2 times per day    atorvastatin  40 mg Oral Nightly    cyanocobalamin  1,000 mcg Oral Daily    finasteride  5 mg Oral Daily    latanoprost  1 drop Both Eyes Nightly    cetirizine  5 mg Oral Daily    metoprolol succinate  25 mg Oral BID    multivitamin  1 tablet Oral Daily    miconazole   Topical BID    sucralfate  1 g Oral 4x Daily    tamsulosin  0.4 mg Oral Nightly    zinc sulfate  50 mg Oral Daily     sodium chloride flush, 5-40 mL, PRN  sodium chloride, , PRN  ondansetron, 4 mg, Q8H PRN   Or  ondansetron, 4 mg, Q6H PRN  acetaminophen, 650 mg, Q6H PRN   Or  acetaminophen, 650 mg, Q6H PRN  sodium chloride, , PRN  oxyCODONE-acetaminophen, 1 tablet, Q4H PRN         Objective:  BP (!) 124/97   Pulse 88   Temp 98.2 °F (36.8 °C) (Infrared)   Resp 16   Ht 1.803 m (5' 11\")   Wt 79.2 kg (174 lb 11.2 oz)   SpO2 97%   BMI 24.37 kg/m²     General Appearance: alert and oriented to person, place and time and in NAD, resting in bed  Skin: warm and dry  Head: normocephalic and atraumatic  Eyes: PERRL, EOMI, conjunctivae normal  Neck: neck supple, trachea midline   Pulmonary/Chest: CTAB, no w/r/r, normal air movement, no respiratory distress, RA  Cardiovascular: RRR, no murmurs  Abdomen: soft, non-tender, non-distended, normal bowel sounds, no masses or organomegaly  Extremities: no cyanosis, no clubbing and 2+ BLE edema  Neurologic: no cranial nerve deficit and speech normal      Recent Labs     03/27/24  1555 03/28/24  0405 
       [facility] Hospitalist Progress Note    Admitting Date and Time: 3/27/2024  3:21 PM  Admit Dx: Acute on chronic blood loss anemia [D62]    Subjective:  Patient is being followed for Acute on chronic blood loss anemia [D62]   Pt feels okay  Per RN: no additional concerns    ROS: denies fever, chills, cp, sob, n/v, HA unless stated above.      pantoprazole (PROTONIX) 40 mg in sodium chloride (PF) 0.9 % 10 mL injection  40 mg IntraVENous Q12H    sodium chloride flush  5-40 mL IntraVENous 2 times per day    atorvastatin  40 mg Oral Nightly    cyanocobalamin  1,000 mcg Oral Daily    finasteride  5 mg Oral Daily    latanoprost  1 drop Both Eyes Nightly    cetirizine  5 mg Oral Daily    metoprolol succinate  25 mg Oral BID    multivitamin  1 tablet Oral Daily    miconazole   Topical BID    sucralfate  1 g Oral 4x Daily    tamsulosin  0.4 mg Oral Nightly    zinc sulfate  50 mg Oral Daily     melatonin, 3 mg, Nightly PRN  sodium chloride flush, 5-40 mL, PRN  sodium chloride, , PRN  ondansetron, 4 mg, Q8H PRN   Or  ondansetron, 4 mg, Q6H PRN  acetaminophen, 650 mg, Q6H PRN   Or  acetaminophen, 650 mg, Q6H PRN  sodium chloride, , PRN  oxyCODONE-acetaminophen, 1 tablet, Q4H PRN         Objective:  /85   Pulse 95   Temp 97.3 °F (36.3 °C) (Oral)   Resp 16   Ht 1.803 m (5' 11\")   Wt 90.9 kg (200 lb 6.4 oz)   SpO2 96%   BMI 27.95 kg/m²     General Appearance: alert and oriented to person, place and time and in NAD, sitting on bedside commode  Skin: warm and dry  Head: normocephalic and atraumatic  Eyes: PERRL, EOMI, conjunctivae normal  Neck: neck supple, trachea midline   Pulmonary/Chest: CTAB, no w/r/r, normal air movement, no respiratory distress, RA  Cardiovascular: RRR, no murmurs  Abdomen: soft, non-tender, non-distended, normal bowel sounds, no masses or organomegaly  Extremities: no cyanosis, no clubbing and 1-2+ BLE edema  Neurologic: no cranial nerve deficit and speech normal      Recent Labs     
       [facility] Hospitalist Progress Note    Admitting Date and Time: 3/27/2024  3:21 PM  Admit Dx: Acute on chronic blood loss anemia [D62]    Subjective:  Patient is being followed for Acute on chronic blood loss anemia [D62]   Pt feels okay  Per RN: no additional concerns    ROS: denies fever, chills, cp, sob, n/v, HA unless stated above.      pantoprazole (PROTONIX) 40 mg in sodium chloride (PF) 0.9 % 10 mL injection  40 mg IntraVENous Q12H    sodium chloride flush  5-40 mL IntraVENous 2 times per day    atorvastatin  40 mg Oral Nightly    cyanocobalamin  1,000 mcg Oral Daily    finasteride  5 mg Oral Daily    latanoprost  1 drop Both Eyes Nightly    cetirizine  5 mg Oral Daily    metoprolol succinate  25 mg Oral BID    multivitamin  1 tablet Oral Daily    miconazole   Topical BID    sucralfate  1 g Oral 4x Daily    tamsulosin  0.4 mg Oral Nightly    zinc sulfate  50 mg Oral Daily     sodium chloride flush, 5-40 mL, PRN  sodium chloride, , PRN  ondansetron, 4 mg, Q8H PRN   Or  ondansetron, 4 mg, Q6H PRN  acetaminophen, 650 mg, Q6H PRN   Or  acetaminophen, 650 mg, Q6H PRN  sodium chloride, , PRN  oxyCODONE-acetaminophen, 1 tablet, Q4H PRN         Objective:  BP (!) 141/80   Pulse 91   Temp 97.8 °F (36.6 °C) (Oral)   Resp 16   Ht 1.803 m (5' 11\")   Wt 90.9 kg (200 lb 6.4 oz)   SpO2 95%   BMI 27.95 kg/m²     General Appearance: alert and oriented to person, place and time and in NAD, sitting on bedside commode  Skin: warm and dry  Head: normocephalic and atraumatic  Eyes: PERRL, EOMI, conjunctivae normal  Neck: neck supple, trachea midline   Pulmonary/Chest: CTAB, no w/r/r, normal air movement, no respiratory distress, RA  Cardiovascular: RRR, no murmurs  Abdomen: soft, non-tender, non-distended, normal bowel sounds, no masses or organomegaly  Extremities: no cyanosis, no clubbing and 2+ BLE edema  Neurologic: no cranial nerve deficit and speech normal      Recent Labs     03/29/24  0240 03/31/24  4836 
  Physician Progress Note      PATIENT:               MANI GALLEGO  CSN #:                  [id number]  :                       1940  ADMIT DATE:       3/27/2024 3:21 PM  DISCH DATE:  RESPONDING  PROVIDER #:        Luke Gama MD          QUERY TEXT:    Patient admitted with Rectal Bleed. Noted to have Severe Malnutrition per   Nutrition Note 3/28. If possible, please document in progress notes and   discharge summary if you are evaluating and /or treating any of the following:    The medical record reflects the following:  Risk Factors: Chronic Illness,  metastatic renal cell carcinoma, CKD, severe   PCM, GI bleed, chronic pancreatitis.  Clinical Indicators: Per Nutrition Note 3/28-  Severe malnutrition.  Severe   body fat loss Orbital, Buccal region.  Severe muscle mass loss Temples   (temporalis), Current BMI (kg/m2): 28.1  Treatment: Ensure Enlive BID, Nutrition Consult    Thank You  DELORES Senior, RN  Clinical Documentation Integrity    ASPEN Criteria:    https://aspenjournals.onlinelibrary.gr.com/doi/full/10.1177/105951684348686  5  Options provided:  -- Protein calorie malnutrition severe  -- Other - I will add my own diagnosis  -- Disagree - Not applicable / Not valid  -- Disagree - Clinically unable to determine / Unknown  -- Refer to Clinical Documentation Reviewer    PROVIDER RESPONSE TEXT:    This patient has severe protein calorie malnutrition.    Query created by: Wesly Zacarias on 3/29/2024 9:08 AM      Electronically signed by:  Luke Gama MD 3/29/2024 11:34 AM          
4 Eyes Skin Assessment     NAME:  Uzair Fuentes  YOB: 1940  MEDICAL RECORD NUMBER:  [id number]    The patient is being assessed for  Admission    I agree that at least one RN has performed a thorough Head to Toe Skin Assessment on the patient. ALL assessment sites listed below have been assessed.      Areas assessed by both nurses:    Head, Face, Ears, Shoulders, Back, Chest, Arms, Elbows, Hands, Sacrum. Buttock, Coccyx, Ischium, and Legs. Feet and Heels        Does the Patient have a Wound? No noted wound(s)       Jaylen Prevention initiated by RN: Yes  Wound Care Orders initiated by RN: No    Pressure Injury (Stage 3,4, Unstageable, DTI, NWPT, and Complex wounds) if present, place Wound referral order by RN under : No    New Ostomies, if present place, Ostomy referral order under : No     Nurse 1 eSignature: Electronically signed by Tiff Boyd RN on 3/27/24 at 11:05 PM EDT    **SHARE this note so that the co-signing nurse can place an eSignature**    Nurse 2 eSignature: Electronically signed by Felicitas Saunders RN on 3/28/24 at 1:39 AM EDT   
Comprehensive Nutrition Assessment    Type and Reason for Visit:  Initial, Positive Nutrition Screen    Nutrition Recommendations/Plan:   Continue current diet as tolerated  Will add Standard HC/HP ONS BID to promote protein/energy intake  Will continue to monitor while inpatient     Malnutrition Assessment:  Malnutrition Status:  Severe malnutrition (03/28/24 1634)    Context:  Chronic Illness     Findings of the 6 clinical characteristics of malnutrition:  Energy Intake:  Mild decrease in energy intake (Comment)  Weight Loss:  No significant weight loss     Body Fat Loss:  Severe body fat loss Orbital, Buccal region   Muscle Mass Loss:  Severe muscle mass loss Temples (temporalis)  Fluid Accumulation:  Unable to assess (d/t multifactorial)     Strength:  Not Performed    Nutrition Assessment:    Pt admits with rectal bleeding, acute on chronic blood loss anemia. Hx metastatic renal cell carcinoma, CKD, severe PCM, GI bleed, chronic pancreatitis. S/p EGD today. Diet advanced from CL to regular. Continue current diet as tolerated. Will add Ensure Enlive BID to promote protein/energy intake. Will continue to monitor.    Nutrition Related Findings:    A&O, disoriented at times, abd soft/tenderness, I/O WDL, +BS, Cr 1.9, nasal cannula 2L Wound Type: None       Current Nutrition Intake & Therapies:    Average Meal Intake: Unable to assess (NPO at time of meal rounds)  Average Supplements Intake: None Ordered  ADULT DIET; Regular  ADULT ORAL NUTRITION SUPPLEMENT; Breakfast, Lunch; Standard High Calorie/High Protein Oral Supplement    Anthropometric Measures:  Height: 180.3 cm (5' 11\")  Ideal Body Weight (IBW): 172 lbs (78 kg)    Admission Body Weight: 91.5 kg (201 lb 11.5 oz) (3/28 bed scale)  Current Body Weight: 91.5 kg (201 lb 11.5 oz) (3/28), 117.3 % IBW. Weight Source: Bed Scale  Current BMI (kg/m2): 28.1  Usual Body Weight: 79.5 kg (175 lb 4.3 oz) (12/5/23 (OV))  % Weight Change (Calculated): 15.1  Weight 
Date:3/28/2024  Patient Name: Uzair Fuentes  MRN: [id number]  : 1940  ROOM #: [id number]-A    Occupational Therapy order received, chart reviewed and evaluation attempted this PM. Patient politely declined OT due to just getting back from procedure and wanting to rest. Will re-attempt OT evaluation at a later time. Thank you.     Sheeba Robb OTR/L #[id number]    
No  available at this time. Bed alarm on. Safety measures in place.  
Notified Dr. Gama of patient having 6 beats of Vtach  
Occupational Therapy    OCCUPATIONAL THERAPY INITIAL EVALUATION    [facility]   [address], [place]         Date:3/29/2024                                                  Patient Name: Uzair Fuentes    MRN: [id number]    : 1940    Room: [address] [place] [address]      Evaluating OT: April De Luna OTR/L   [id number]      Referring Provider:Luke Gama MD     Specific Provider Orders/Date:OT eval and treat 3/28/2024      Diagnosis:  Acute on chronic blood loss anemia [D62]     Pertinent Medical History: CA, HTN, TKA     Precautions:  Fall Risk,      Assessment of current deficits    [x] Functional mobility  [x]ADLs  [x] Strength               [x]Cognition    [x] Functional transfers   [x] IADLs         [x] Safety Awareness   [x]Endurance    [] Fine Coordination              [x] Balance      [] Vision/perception   []Sensation     []Gross Motor Coordination  [] ROM  [] Delirium                   [] Motor Control     OT PLAN OF CARE   OT POC based on physician orders, patient diagnosis and results of clinical assessment    Frequency/Duration  2-4 days/wk for 2 - 4weeks PRN   Specific OT Treatment Interventions to include:   ADL retraining/adapted techniques and AE recommendations to increase functional independence within precautions                    Energy conservation techniques to improve tolerance for selfcare routine   Functional transfer/mobility training/DME recommendations for increased independence, safety and fall prevention         Patient/family education to increase safety and functional independence             Environmental modifications for safe mobility and completion of ADLs                             Therapeutic activity to improve functional performance during ADLs.                                         Therapeutic exercise to improve tolerance and functional strength for ADLs    Balance retraining/tolerance tasks for facilitation of 
Occupational Therapy  OT BEDSIDE TREATMENT NOTE      Date:2024  Patient Name: Uzair Fuentes  MRN: [id number]  : 1940  Room: [address] [place] [address]     Evaluating OT: April De Luna OTR/L   [id number]       Referring Provider:Luke Gama MD     Specific Provider Orders/Date:OT eval and treat 3/28/2024       Diagnosis:  Acute on chronic blood loss anemia [D62]     Pertinent Medical History: CA, HTN, TKA     Precautions:  Fall Risk,       Assessment of current deficits    [x] Functional mobility            [x]ADLs           [x] Strength                  [x]Cognition    [x] Functional transfers          [x] IADLs         [x] Safety Awareness   [x]Endurance    [] Fine Coordination                         [x] Balance      [] Vision/perception   []Sensation      []Gross Motor Coordination             [] ROM           [] Delirium                   [] Motor Control      OT PLAN OF CARE   OT POC based on physician orders, patient diagnosis and results of clinical assessment     Frequency/Duration  2-4 days/wk for 2 - 4weeks PRN   Specific OT Treatment Interventions to include:   ADL retraining/adapted techniques and AE recommendations to increase functional independence within precautions                    Energy conservation techniques to improve tolerance for selfcare routine   Functional transfer/mobility training/DME recommendations for increased independence, safety and fall prevention         Patient/family education to increase safety and functional independence             Environmental modifications for safe mobility and completion of ADLs                             Therapeutic activity to improve functional performance during ADLs.                                         Therapeutic exercise to improve tolerance and functional strength for ADLs    Balance retraining/tolerance tasks for facilitation of postural control with dynamic challenges during ADLs .      Positioning to improve functional independence       
Occupational Therapy  Patient treatment attempted this PM.  Patient leaving for a test.  Returned later this PM.  Pt laying in the bed.  Declined therapy at this time.  States he was up and walked from the cart to the bed and now is tired.  Will attempt at a later time.  Jair CARDOSO [id number]    
PROGRESS NOTE  By Jas Weston, FARSHAD    [facility]  Dr. Leopoldo Contreras M.D.,  Dr. Abraham Cuello M.D.,   Dr. Franc Diaz D.O.,  Dr. Sánchez Ho M.D.,  Dr. Dave Heredia D.O.,          Uzair Fuentes  84 y.o.  male    SUBJECTIVE:  Patient sitting up in bed eating lunch.  Denies abdominal pain.  Denies nausea or vomiting.  Large bowel movement yesterday reported as dark.  No blood per patient report.    OBJECTIVE:    /72   Pulse 87   Temp 98 °F (36.7 °C) (Tympanic)   Resp 18   Ht 1.803 m (5' 11\")   Wt 83.9 kg (185 lb)   SpO2 93%   BMI 25.80 kg/m²     General: NAD/older adult  male  HEENT: Anicteric sclera/moist oral mucosa  Neck: Supple with trachea midline  Chest: Symmetric excursion/nonlabored respirations  Cor: Irregular  Abd.: Soft/obese.  Nontender  Extr.:  Decreased muscle tone and bulk throughout  Skin: Warm and dry/anicteric      DATA:    Monitor data reviewed -atrial fibrillation noted.       Lab Results   Component Value Date/Time    WBC 5.8 03/30/2024 02:25 AM    RBC 2.62 03/30/2024 02:25 AM    HGB 8.7 03/30/2024 12:00 PM    HCT 29.2 03/30/2024 12:00 PM    MCV 99.6 03/30/2024 02:25 AM    MCH 29.8 03/30/2024 02:25 AM    MCHC 29.9 03/30/2024 02:25 AM    RDW 15.4 03/30/2024 02:25 AM     03/30/2024 02:25 AM    MPV 10.4 03/30/2024 02:25 AM     Lab Results   Component Value Date/Time     03/29/2024 02:40 AM    K 3.7 03/29/2024 02:40 AM    K 4.4 10/18/2022 04:50 AM     03/29/2024 02:40 AM    CO2 22 03/29/2024 02:40 AM    BUN 22 03/29/2024 02:40 AM    CREATININE 1.9 03/29/2024 02:40 AM    CALCIUM 9.6 03/29/2024 02:40 AM    PROT 4.9 03/29/2024 02:40 AM    LABALBU 2.5 03/29/2024 02:40 AM    BILITOT 0.4 03/29/2024 02:40 AM    ALKPHOS 107 03/29/2024 02:40 AM    AST 28 03/29/2024 02:40 AM    ALT 18 03/29/2024 02:40 AM     Lab Results   Component Value Date/Time    LIPASE 66 03/27/2024 03:55 PM     No results found for: 
PROGRESS NOTE  By Jas Weston, MACEYP    [facility]  Dr. Leopoldo Contreras M.D.,  Dr. Abraham Cuello M.D.,   Dr. Franc Diaz D.O.,  Dr. Sánchez Ho M.D.,  Dr. Dave Heredia D.O.,          Uzair Fuentes  84 y.o.  male    SUBJECTIVE:  Patient resting in bed.  Persistent dark stools.  Denies abdominal pain.    OBJECTIVE:    BP (!) 141/80   Pulse 91   Temp 97.8 °F (36.6 °C) (Oral)   Resp 16   Ht 1.803 m (5' 11\")   Wt 90.9 kg (200 lb 6.4 oz)   SpO2 95%   BMI 27.95 kg/m²     General: NAD/older adult  male  HEENT: Anicteric sclera/moist oral mucosa  Neck: Supple with trachea midline  Chest: Symmetric excursion/nonlabored respirations  Cor: Irregular  Abd.: Soft/obese.  Nontender  Extr.:  Decreased muscle tone and bulk throughout  Skin: Warm and dry/anicteric      DATA:    Monitor data reviewed -atrial fibrillation noted.       Lab Results   Component Value Date/Time    WBC 5.1 03/31/2024 04:40 AM    RBC 2.51 03/31/2024 04:40 AM    HGB 7.6 03/31/2024 04:40 AM    HCT 25.6 03/31/2024 04:40 AM    .0 03/31/2024 04:40 AM    MCH 30.3 03/31/2024 04:40 AM    MCHC 29.7 03/31/2024 04:40 AM    RDW 15.2 03/31/2024 04:40 AM     03/31/2024 04:40 AM    MPV 10.2 03/31/2024 04:40 AM     Lab Results   Component Value Date/Time     03/31/2024 04:40 AM    K 3.9 03/31/2024 04:40 AM    K 4.4 10/18/2022 04:50 AM     03/31/2024 04:40 AM    CO2 23 03/31/2024 04:40 AM    BUN 24 03/31/2024 04:40 AM    CREATININE 1.9 03/31/2024 04:40 AM    CALCIUM 9.4 03/31/2024 04:40 AM    PROT 4.9 03/29/2024 02:40 AM    LABALBU 2.5 03/29/2024 02:40 AM    BILITOT 0.4 03/29/2024 02:40 AM    ALKPHOS 107 03/29/2024 02:40 AM    AST 28 03/29/2024 02:40 AM    ALT 18 03/29/2024 02:40 AM     Lab Results   Component Value Date/Time    LIPASE 66 03/27/2024 03:55 PM     No results found for: \"AMYLASE\"      ASSESSMENT/PLAN:  Patient Active Problem List   Diagnosis    Normocytic anemia    Rectal bleed    GI bleed    
PROGRESS NOTE  By Law Daniel M.D.    [facility]  Dr. Leopoldo Contreras M.D.,  Dr. Abraham Cuello M.D.,   Dr. Franc Diaz D.O.,  Dr. Sánchez Ho M.D.,  Dr. Dave Heredia D.O.,          Uzair Fuentes  84 y.o.  male    SUBJECTIVE:  Denies abdominal pain.  Denies nausea vomiting.  Reports bowel movement which she describes as black and dark.    OBJECTIVE:    /85   Pulse 95   Temp 97.3 °F (36.3 °C) (Oral)   Resp 16   Ht 1.803 m (5' 11\")   Wt 90.9 kg (200 lb 6.4 oz)   SpO2 96%   BMI 27.95 kg/m²     General: NAD/older adult  male  HEENT: Anicteric sclera/moist oral mucosa  Neck: Supple with trachea midline  Chest: Symmetric excursion/nonlabored respirations  Cor: Irregular without tachycardia  Abd.: Soft and nontender  Extr.:  Decreased muscle tone and bulk, consistent with age and condition  Skin: Warm and dry/anicteric      DATA:    Monitor data reviewed -atrial fibrillation noted.       Lab Results   Component Value Date/Time    WBC 4.7 04/01/2024 09:58 AM    RBC 2.58 04/01/2024 09:58 AM    HGB 7.8 04/01/2024 09:58 AM    HCT 26.1 04/01/2024 09:58 AM    .2 04/01/2024 09:58 AM    MCH 30.2 04/01/2024 09:58 AM    MCHC 29.9 04/01/2024 09:58 AM    RDW 15.4 04/01/2024 09:58 AM     04/01/2024 09:58 AM    MPV 10.8 04/01/2024 09:58 AM     Lab Results   Component Value Date/Time     04/01/2024 09:58 AM    K 3.7 04/01/2024 09:58 AM    K 4.4 10/18/2022 04:50 AM     04/01/2024 09:58 AM    CO2 24 04/01/2024 09:58 AM    BUN 22 04/01/2024 09:58 AM    CREATININE 1.7 04/01/2024 09:58 AM    CALCIUM 9.2 04/01/2024 09:58 AM    PROT 4.8 04/01/2024 09:58 AM    LABALBU 2.3 04/01/2024 09:58 AM    BILITOT 0.2 04/01/2024 09:58 AM    ALKPHOS 110 04/01/2024 09:58 AM    AST 30 04/01/2024 09:58 AM    ALT 20 04/01/2024 09:58 AM     Lab Results   Component Value Date/Time    LIPASE 66 03/27/2024 03:55 PM     No results found for: \"AMYLASE\"      ASSESSMENT/PLAN:  Patient Active 
03/26/24  1200 03/27/24  1555 03/28/24  0405    139 140   K 3.5 3.7 4.0    109* 109*   CO2 22 23 22   BUN 24* 24* 23   CREATININE 2.0* 2.0* 1.9*   GLUCOSE 101* 109* 98   CALCIUM 9.3 9.0 9.5       Recent Labs     03/26/24  0450 03/27/24  1555 03/27/24  1700 03/28/24  0405   WBC 7.1 6.5  --   --    RBC 2.68* 2.50*  --   --    HGB 8.3* 7.5* 7.2* 8.4*   HCT 27.6* 25.4* 23.7* 27.6*   .0* 101.6*  --   --    MCH 31.0 30.0  --   --    MCHC 30.1* 29.5*  --   --    RDW 15.9* 15.9*  --   --     175  --   --    MPV 11.2 10.5  --   --        Radiology:  XR CHEST PORTABLE   Final Result   Cardiomegaly with pulmonary vascular congestive changes with large left   pleural effusion.              Assessment:  Principal Problem:    Acute on chronic blood loss anemia  Active Problems:    Primary hypertension    Metastatic renal cell carcinoma (HCC)    On apixaban therapy    CKD (chronic kidney disease)  Resolved Problems:    * No resolved hospital problems. *      Plan:  Acute on chronic blood loss anemia- fatigued, dark stools. S/p 1u RBC. Monitor H&H, transfuse <7. Consult GI. CLD and advance as able. Protonix IV twice daily. Likely component of both chemo and AC   MOISE on CKD- b/l Cr ~1.2, on admit 2.0, cont IVF, improving  Metastatic renal cell carcinoma- last treatment 3/26. Receiving Opdivo. Continue pain medication. Follows Onc Dr. Chance Eagle  Hx DVT- hold AC for now  Primary HTN- continue Toprol-XL    Today's exam/findings/plan  -GI c/s, likely plans for repeat EGD, timing tbd  -cont to monitor Hgb, transfuse <7    NOTE: Portions of this report may have been transcribed using voice recognition software. Every effort was made to ensure accuracy; however, inadvertent computerized transcription errors may be present.  Electronically signed by Luke Gama MD on 3/28/2024 at 3:36 PM     
Nutrient Needs:  Energy Requirements Based On: Formula  Weight Used for Energy Requirements: Current  Energy (kcal/day): 1759-0334  Weight Used for Protein Requirements: Ideal  Protein (g/day): 100-110  Method Used for Fluid Requirements: 1 ml/kcal  Fluid (ml/day): 7191-8248 ml    Nutrition Diagnosis:   Severe malnutrition, In context of chronic illness related to altered GI structure as evidenced by Criteria as identified in malnutrition assessment    Nutrition Interventions:   Food and/or Nutrient Delivery: Continue Current Diet, Continue Oral Nutrition Supplement  Nutrition Education/Counseling: No recommendation at this time  Coordination of Nutrition Care: Continue to monitor while inpatient       Goals:  Previous Goal Met: Progressing toward Goal(s)  Goals: PO intake 75% or greater, by next RD assessment       Nutrition Monitoring and Evaluation:   Behavioral-Environmental Outcomes: None Identified  Food/Nutrient Intake Outcomes: Food and Nutrient Intake, Supplement Intake  Physical Signs/Symptoms Outcomes: Biochemical Data, GI Status, Fluid Status or Edema, Nutrition Focused Physical Findings, Skin, Weight    Discharge Planning:    Continue Oral Nutrition Supplement     ZAKI LUO, MPH, RD, LD  Contact: x [phone]    
stand:  CGA   Stand to sit:  CGA   Stand pivot:  CGA   Supervision    Ambulation     40  feet with  ww  with  CGA    100  feet with  ww  with  supervision        Stair negotiation: ascended and descended NT    N/A    LE ROM  WFL     LE strength  4- to 4/ 5   4+/ 5    AM- PAC RAW score  17/ 24            Pt is alert and Oriented      Balance:  CGA . Fall risk due to [x]Decreased strength, [x] Decreased balance, [] Decreased safety awareness, [] Other :     Endurance: decreased   Bed/Chair alarm:  yes      ASSESSMENT  Pt displays functional ability as noted in the objective portion of this evaluation.        Conditions Requiring Skilled Therapeutic Intervention:    [x]Decreased strength     []Decreased ROM  [x]Decreased functional mobility  [x]Decreased balance   [x]Decreased endurance   [x]Decreased posture  []Decreased sensation  []Decreased coordination   []Decreased vision  []Decreased safety awareness   []Increased pain         Comments:   Pt sitting EOB  upon arrival ; agreeable to PT. Mobility as above. Pt performed SPS and required rest break. Needing encouragement to walk. On RA , SpO2 >90% throughout session. RN informed    Treatment:  Pt was instructed on the following :   -Bed mobility : including sequencing and technique  -Transfers: hand  placement, controlled movement with stand to sit  - Gait: proper use of ww, PLB with mobility             Pt educated on fall risk, safety with mobility        Patient response to education:   Pt verbalized understanding Pt demonstrated skill Pt requires further education in this area    x  x       Comments:  Pt left  in chair after session, with call light in reach. Waffle cushion placed       Rehab potential is Good for reaching above PT goals.    Pt’s/ family goals   1.  None stated    Patient and or family understand(s) diagnosis, prognosis, and plan of care. -     PLAN  PT care will be provided in accordance with the objectives noted above.  Whenever appropriate, 
ensure accuracy; however, inadvertent computerized transcription errors may be present.  Electronically signed by Luke Gama MD on 3/30/2024 at 12:03 PM     
remained stable.  Recent unremarkable colonoscopy (February 2024)  Consider nuclear medicine if further decrease in H&H.  Nuclear medicine bleeding scan and further decrease in H&H     Law Daniel MD  3/29/2024  1:35 PM    NOTE:  This report was transcribed using voice recognition software.  Every effort was made to ensure accuracy; however, inadvertent computerized transcription errors may be present.

## 2024-04-02 NOTE — PLAN OF CARE
Problem: Discharge Planning  Goal: Discharge to home or other facility with appropriate resources  4/2/2024 0955 by Lisa Hernandez RN  Outcome: Progressing  4/1/2024 2224 by Lisbeth Falcon RN  Outcome: Progressing     Problem: Safety - Adult  Goal: Free from fall injury  4/2/2024 0955 by Lisa Hernandez RN  Outcome: Progressing  4/1/2024 2224 by Lisbeth Falcon RN  Outcome: Progressing     Problem: ABCDS Injury Assessment  Goal: Absence of physical injury  4/2/2024 0955 by Lisa Hernandez RN  Outcome: Progressing  4/1/2024 2224 by Lisbeth Falcon RN  Outcome: Progressing     Problem: Respiratory - Adult  Goal: Achieves optimal ventilation and oxygenation  4/2/2024 0955 by Lisa Hernandez RN  Outcome: Progressing  4/1/2024 2224 by Lisbeth Falcon RN  Outcome: Progressing     Problem: Cardiovascular - Adult  Goal: Maintains optimal cardiac output and hemodynamic stability  4/2/2024 0955 by Lisa Hernandez RN  Outcome: Progressing  4/1/2024 2224 by Lisbeth Falcon RN  Outcome: Progressing     Problem: Cardiovascular - Adult  Goal: Absence of cardiac dysrhythmias or at baseline  4/2/2024 0955 by Lisa Hernandez RN  Outcome: Progressing  4/1/2024 2224 by Lisbeth Falcon RN  Outcome: Progressing     Problem: Hematologic - Adult  Goal: Maintains hematologic stability  4/2/2024 0955 by Lisa Hernandez RN  Outcome: Progressing  4/1/2024 2224 by Lisbeth Falcon RN  Outcome: Progressing     Problem: Pain  Goal: Verbalizes/displays adequate comfort level or baseline comfort level  4/2/2024 0955 by Lisa Hernandez RN  Outcome: Progressing  4/1/2024 2224 by Lisbeth Falcon RN  Outcome: Progressing     Problem: Nutrition Deficit:  Goal: Optimize nutritional status  4/2/2024 0955 by Lisa Hernandez RN  Outcome: Progressing  4/1/2024 2224 by Lisbeth Falcon RN  Outcome: Progressing

## 2024-04-03 LAB
ALBUMIN SERPL-MCNC: 2.5 G/DL (ref 3.5–5.2)
ALP SERPL-CCNC: 117 U/L (ref 40–129)
ALT SERPL-CCNC: 22 U/L (ref 0–40)
ANION GAP SERPL CALCULATED.3IONS-SCNC: 14 MMOL/L (ref 7–16)
AST SERPL-CCNC: 32 U/L (ref 0–39)
BILIRUB SERPL-MCNC: 0.3 MG/DL (ref 0–1.2)
BUN SERPL-MCNC: 21 MG/DL (ref 6–23)
CALCIUM SERPL-MCNC: 9.8 MG/DL (ref 8.6–10.2)
CHLORIDE SERPL-SCNC: 107 MMOL/L (ref 98–107)
CHOLEST SERPL-MCNC: 95 MG/DL
CO2 SERPL-SCNC: 21 MMOL/L (ref 22–29)
CREAT SERPL-MCNC: 1.7 MG/DL (ref 0.7–1.2)
ERYTHROCYTE [DISTWIDTH] IN BLOOD BY AUTOMATED COUNT: 15.3 % (ref 11.5–15)
GFR SERPL CREATININE-BSD FRML MDRD: 39 ML/MIN/1.73M2
GLUCOSE SERPL-MCNC: 71 MG/DL (ref 74–99)
HCT VFR BLD AUTO: 27.5 % (ref 37–54)
HDLC SERPL-MCNC: 46 MG/DL
HGB BLD-MCNC: 8.3 G/DL (ref 12.5–16.5)
LDLC SERPL CALC-MCNC: 37 MG/DL
MCH RBC QN AUTO: 30.1 PG (ref 26–35)
MCHC RBC AUTO-ENTMCNC: 30.2 G/DL (ref 32–34.5)
MCV RBC AUTO: 99.6 FL (ref 80–99.9)
PLATELET # BLD AUTO: 168 K/UL (ref 130–450)
PMV BLD AUTO: 10.8 FL (ref 7–12)
POTASSIUM SERPL-SCNC: 3.8 MMOL/L (ref 3.5–5)
PROT SERPL-MCNC: 5.1 G/DL (ref 6.4–8.3)
RBC # BLD AUTO: 2.76 M/UL (ref 3.8–5.8)
SODIUM SERPL-SCNC: 142 MMOL/L (ref 132–146)
TRIGL SERPL-MCNC: 59 MG/DL
VLDLC SERPL CALC-MCNC: 12 MG/DL
WBC OTHER # BLD: 4.4 K/UL (ref 4.5–11.5)

## 2024-04-04 ENCOUNTER — OUTSIDE SERVICES (OUTPATIENT)
Dept: PRIMARY CARE CLINIC | Age: 84
End: 2024-04-04

## 2024-04-04 DIAGNOSIS — R53.81 PHYSICAL DECONDITIONING: ICD-10-CM

## 2024-04-04 DIAGNOSIS — N18.32 STAGE 3B CHRONIC KIDNEY DISEASE (HCC): ICD-10-CM

## 2024-04-04 DIAGNOSIS — D64.9 ACUTE ON CHRONIC ANEMIA: ICD-10-CM

## 2024-04-04 DIAGNOSIS — I10 PRIMARY HYPERTENSION: ICD-10-CM

## 2024-04-04 DIAGNOSIS — K92.2 GASTROINTESTINAL HEMORRHAGE, UNSPECIFIED GASTROINTESTINAL HEMORRHAGE TYPE: ICD-10-CM

## 2024-04-04 DIAGNOSIS — C64.9 METASTATIC RENAL CELL CARCINOMA, UNSPECIFIED LATERALITY (HCC): ICD-10-CM

## 2024-04-04 DIAGNOSIS — Z91.81 AT MODERATE RISK FOR FALL: ICD-10-CM

## 2024-04-04 DIAGNOSIS — C64.9 RENAL CELL CARCINOMA, UNSPECIFIED LATERALITY (HCC): ICD-10-CM

## 2024-04-04 DIAGNOSIS — Z86.718 HX OF DEEP VENOUS THROMBOSIS: ICD-10-CM

## 2024-04-04 DIAGNOSIS — R53.1 GENERALIZED WEAKNESS: ICD-10-CM

## 2024-04-04 DIAGNOSIS — D62 ACUTE BLOOD LOSS ANEMIA: Primary | ICD-10-CM

## 2024-04-04 LAB
ALBUMIN SERPL-MCNC: 2.5 G/DL (ref 3.5–5.2)
ALP SERPL-CCNC: 118 U/L (ref 40–129)
ALT SERPL-CCNC: 25 U/L (ref 0–40)
ANION GAP SERPL CALCULATED.3IONS-SCNC: 15 MMOL/L (ref 7–16)
AST SERPL-CCNC: 37 U/L (ref 0–39)
BILIRUB SERPL-MCNC: 0.2 MG/DL (ref 0–1.2)
BUN SERPL-MCNC: 21 MG/DL (ref 6–23)
CALCIUM SERPL-MCNC: 9.6 MG/DL (ref 8.6–10.2)
CHLORIDE SERPL-SCNC: 106 MMOL/L (ref 98–107)
CO2 SERPL-SCNC: 19 MMOL/L (ref 22–29)
CREAT SERPL-MCNC: 1.8 MG/DL (ref 0.7–1.2)
ERYTHROCYTE [DISTWIDTH] IN BLOOD BY AUTOMATED COUNT: 15.2 % (ref 11.5–15)
GFR SERPL CREATININE-BSD FRML MDRD: 37 ML/MIN/1.73M2
GLUCOSE SERPL-MCNC: 95 MG/DL (ref 74–99)
HCT VFR BLD AUTO: 26.5 % (ref 37–54)
HGB BLD-MCNC: 8 G/DL (ref 12.5–16.5)
MCH RBC QN AUTO: 29.7 PG (ref 26–35)
MCHC RBC AUTO-ENTMCNC: 30.2 G/DL (ref 32–34.5)
MCV RBC AUTO: 98.5 FL (ref 80–99.9)
PLATELET # BLD AUTO: 182 K/UL (ref 130–450)
PMV BLD AUTO: 10.9 FL (ref 7–12)
POTASSIUM SERPL-SCNC: 3.6 MMOL/L (ref 3.5–5)
PROT SERPL-MCNC: 4.9 G/DL (ref 6.4–8.3)
RBC # BLD AUTO: 2.69 M/UL (ref 3.8–5.8)
SODIUM SERPL-SCNC: 140 MMOL/L (ref 132–146)
WBC OTHER # BLD: 5.2 K/UL (ref 4.5–11.5)

## 2024-04-04 NOTE — PROGRESS NOTES
Uzair Fuentes (:  1940) is a 84 y.o. male.    Subjective   SUBJECTIVE/OBJECTIVE:  Past Medical History:   Diagnosis Date    Cancer (HCC)     RENAL    Disease of blood and blood forming organ     Hypertension       Past Surgical History:   Procedure Laterality Date    ABDOMINAL AORTIC ANEURYSM REPAIR      COLONOSCOPY      COLONOSCOPY N/A 2024    COLONOSCOPY DIAGNOSTIC performed by Dave Heredia DO at [facility] ENDOSCOPY    TONSILLECTOMY      TOTAL KNEE ARTHROPLASTY Bilateral     UPPER GASTROINTESTINAL ENDOSCOPY N/A 10/14/2022    EGD DIAGNOSTIC ONLY performed by Joshua Avila MD at [facility] ENDOSCOPY    UPPER GASTROINTESTINAL ENDOSCOPY N/A 2/10/2024    EGD ESOPHAGOGASTRODUODENOSCOPY performed by Dave Heredia DO at [facility] OR    UPPER GASTROINTESTINAL ENDOSCOPY N/A 3/28/2024    ESOPHAGOGASTRODUODENOSCOPY BIOPSY performed by Dave Heredia DO at [facility] ENDOSCOPY      Family History   Problem Relation Age of Onset    Heart Disease Father     Heart Attack Father       Social History     Socioeconomic History    Marital status:    Tobacco Use    Smoking status: Former     Current packs/day: 0.00     Types: Cigarettes     Quit date:      Years since quittin.2    Smokeless tobacco: Never   Vaping Use    Vaping Use: Never used   Substance and Sexual Activity    Alcohol use: Never    Drug use: Never     Social Determinants of Health     Food Insecurity: No Food Insecurity (3/27/2024)    Hunger Vital Sign     Worried About Running Out of Food in the Last Year: Never true     Ran Out of Food in the Last Year: Never true   Transportation Needs: No Transportation Needs (3/27/2024)    PRAPARE - Transportation     Lack of Transportation (Medical): No     Lack of Transportation (Non-Medical): No   Housing Stability: Low Risk  (3/27/2024)    Housing Stability Vital Sign     Unable to Pay for Housing in the Last Year: No     Number of Places Lived in the Last Year: 1     Unstable Housing in the

## 2024-04-05 LAB
ERYTHROCYTE [DISTWIDTH] IN BLOOD BY AUTOMATED COUNT: 15 % (ref 11.5–15)
HCT VFR BLD AUTO: 24.9 % (ref 37–54)
HGB BLD-MCNC: 7.7 G/DL (ref 12.5–16.5)
MCH RBC QN AUTO: 30.7 PG (ref 26–35)
MCHC RBC AUTO-ENTMCNC: 30.9 G/DL (ref 32–34.5)
MCV RBC AUTO: 99.2 FL (ref 80–99.9)
PLATELET # BLD AUTO: 182 K/UL (ref 130–450)
PMV BLD AUTO: 10.7 FL (ref 7–12)
RBC # BLD AUTO: 2.51 M/UL (ref 3.8–5.8)
WBC OTHER # BLD: 5.2 K/UL (ref 4.5–11.5)

## 2024-04-06 ENCOUNTER — HOSPITAL ENCOUNTER (INPATIENT)
Age: 84
LOS: 8 days | Discharge: NURSING, REHAB OR LONG-TERM CARE | DRG: 813 | End: 2024-04-17
Attending: STUDENT IN AN ORGANIZED HEALTH CARE EDUCATION/TRAINING PROGRAM | Admitting: INTERNAL MEDICINE
Payer: OTHER GOVERNMENT

## 2024-04-06 DIAGNOSIS — I82.402 DEEP VEIN THROMBOSIS (DVT) OF LEFT LOWER EXTREMITY, UNSPECIFIED CHRONICITY, UNSPECIFIED VEIN (HCC): ICD-10-CM

## 2024-04-06 DIAGNOSIS — J90 PLEURAL EFFUSION: ICD-10-CM

## 2024-04-06 DIAGNOSIS — I50.43 ACUTE ON CHRONIC COMBINED SYSTOLIC AND DIASTOLIC CONGESTIVE HEART FAILURE (HCC): ICD-10-CM

## 2024-04-06 DIAGNOSIS — K92.2 GASTROINTESTINAL HEMORRHAGE, UNSPECIFIED GASTROINTESTINAL HEMORRHAGE TYPE: Primary | ICD-10-CM

## 2024-04-06 LAB
ERYTHROCYTE [DISTWIDTH] IN BLOOD BY AUTOMATED COUNT: 14.8 % (ref 11.5–15)
HCT VFR BLD AUTO: 22.2 % (ref 37–54)
HCT VFR BLD AUTO: 23.9 % (ref 37–54)
HGB BLD-MCNC: 6.9 G/DL (ref 12.5–16.5)
HGB BLD-MCNC: 7.4 G/DL (ref 12.5–16.5)
MCH RBC QN AUTO: 30.2 PG (ref 26–35)
MCHC RBC AUTO-ENTMCNC: 31 G/DL (ref 32–34.5)
MCV RBC AUTO: 97.6 FL (ref 80–99.9)
PLATELET # BLD AUTO: 197 K/UL (ref 130–450)
PMV BLD AUTO: 10.5 FL (ref 7–12)
RBC # BLD AUTO: 2.45 M/UL (ref 3.8–5.8)
WBC OTHER # BLD: 5.7 K/UL (ref 4.5–11.5)

## 2024-04-06 PROCEDURE — 99285 EMERGENCY DEPT VISIT HI MDM: CPT

## 2024-04-06 PROCEDURE — 86923 COMPATIBILITY TEST ELECTRIC: CPT

## 2024-04-06 PROCEDURE — 96374 THER/PROPH/DIAG INJ IV PUSH: CPT

## 2024-04-06 PROCEDURE — 86850 RBC ANTIBODY SCREEN: CPT

## 2024-04-06 PROCEDURE — 86901 BLOOD TYPING SEROLOGIC RH(D): CPT

## 2024-04-06 PROCEDURE — 85025 COMPLETE CBC W/AUTO DIFF WBC: CPT

## 2024-04-06 PROCEDURE — 83605 ASSAY OF LACTIC ACID: CPT

## 2024-04-06 PROCEDURE — 86900 BLOOD TYPING SEROLOGIC ABO: CPT

## 2024-04-06 PROCEDURE — 83735 ASSAY OF MAGNESIUM: CPT

## 2024-04-06 PROCEDURE — 80053 COMPREHEN METABOLIC PANEL: CPT

## 2024-04-06 PROCEDURE — 84484 ASSAY OF TROPONIN QUANT: CPT

## 2024-04-06 RX ORDER — PANTOPRAZOLE SODIUM 40 MG/10ML
80 INJECTION, POWDER, LYOPHILIZED, FOR SOLUTION INTRAVENOUS ONCE
Status: COMPLETED | OUTPATIENT
Start: 2024-04-06 | End: 2024-04-07

## 2024-04-06 RX ORDER — SODIUM CHLORIDE 9 MG/ML
INJECTION, SOLUTION INTRAVENOUS PRN
Status: DISCONTINUED | OUTPATIENT
Start: 2024-04-06 | End: 2024-04-11

## 2024-04-07 ENCOUNTER — APPOINTMENT (OUTPATIENT)
Dept: ULTRASOUND IMAGING | Age: 84
DRG: 813 | End: 2024-04-07
Payer: OTHER GOVERNMENT

## 2024-04-07 ENCOUNTER — APPOINTMENT (OUTPATIENT)
Dept: GENERAL RADIOLOGY | Age: 84
DRG: 813 | End: 2024-04-07
Payer: OTHER GOVERNMENT

## 2024-04-07 PROBLEM — K92.2 ACUTE UPPER GI BLEED: Status: ACTIVE | Noted: 2024-04-07

## 2024-04-07 LAB
ALBUMIN SERPL-MCNC: 2.5 G/DL (ref 3.5–5.2)
ALP SERPL-CCNC: 116 U/L (ref 40–129)
ALT SERPL-CCNC: 22 U/L (ref 0–40)
ANION GAP SERPL CALCULATED.3IONS-SCNC: 7 MMOL/L (ref 7–16)
AST SERPL-CCNC: 27 U/L (ref 0–39)
ATYPICAL LYMPHOCYTE ABSOLUTE COUNT: 0.1 K/UL (ref 0–0.46)
ATYPICAL LYMPHOCYTES: 2 % (ref 0–4)
BASOPHILS # BLD: 0 K/UL (ref 0–0.2)
BASOPHILS NFR BLD: 0 % (ref 0–2)
BILIRUB SERPL-MCNC: 0.2 MG/DL (ref 0–1.2)
BUN SERPL-MCNC: 24 MG/DL (ref 6–23)
CALCIUM SERPL-MCNC: 9.3 MG/DL (ref 8.6–10.2)
CHLORIDE SERPL-SCNC: 105 MMOL/L (ref 98–107)
CO2 SERPL-SCNC: 23 MMOL/L (ref 22–29)
CREAT SERPL-MCNC: 1.8 MG/DL (ref 0.7–1.2)
EOSINOPHIL # BLD: 0.05 K/UL (ref 0.05–0.5)
EOSINOPHILS RELATIVE PERCENT: 1 % (ref 0–6)
ERYTHROCYTE [DISTWIDTH] IN BLOOD BY AUTOMATED COUNT: 14.9 % (ref 11.5–15)
FERRITIN SERPL-MCNC: 238 NG/ML
GFR SERPL CREATININE-BSD FRML MDRD: 37 ML/MIN/1.73M2
GLUCOSE SERPL-MCNC: 115 MG/DL (ref 74–99)
HCT VFR BLD AUTO: 22.8 % (ref 37–54)
HCT VFR BLD AUTO: 23.5 % (ref 37–54)
HCT VFR BLD AUTO: 24.7 % (ref 37–54)
HCT VFR BLD AUTO: 24.8 % (ref 37–54)
HGB BLD-MCNC: 7.1 G/DL (ref 12.5–16.5)
HGB BLD-MCNC: 7.1 G/DL (ref 12.5–16.5)
HGB BLD-MCNC: 7.7 G/DL (ref 12.5–16.5)
HGB BLD-MCNC: 7.7 G/DL (ref 12.5–16.5)
LACTATE BLDV-SCNC: 1.5 MMOL/L (ref 0.5–2.2)
LYMPHOCYTES NFR BLD: 0.51 K/UL (ref 1.5–4)
LYMPHOCYTES RELATIVE PERCENT: 9 % (ref 20–42)
MAGNESIUM SERPL-MCNC: 1.7 MG/DL (ref 1.6–2.6)
MCH RBC QN AUTO: 30.5 PG (ref 26–35)
MCHC RBC AUTO-ENTMCNC: 30.2 G/DL (ref 32–34.5)
MCV RBC AUTO: 100.9 FL (ref 80–99.9)
MONOCYTES NFR BLD: 0 % (ref 2–12)
MONOCYTES NFR BLD: 0 K/UL (ref 0.1–0.95)
NEUTROPHILS NFR BLD: 88 % (ref 43–80)
NEUTS SEG NFR BLD: 4.93 K/UL (ref 1.8–7.3)
PLATELET # BLD AUTO: 208 K/UL (ref 130–450)
PMV BLD AUTO: 10.5 FL (ref 7–12)
POTASSIUM SERPL-SCNC: 3.5 MMOL/L (ref 3.5–5)
PREALB SERPL-MCNC: 13 MG/DL (ref 20–40)
PROT SERPL-MCNC: 4.7 G/DL (ref 6.4–8.3)
RBC # BLD AUTO: 2.33 M/UL (ref 3.8–5.8)
RBC # BLD: ABNORMAL 10*6/UL
SODIUM SERPL-SCNC: 135 MMOL/L (ref 132–146)
TROPONIN I SERPL HS-MCNC: 80 NG/L (ref 0–11)
WBC OTHER # BLD: 5.6 K/UL (ref 4.5–11.5)

## 2024-04-07 PROCEDURE — C9113 INJ PANTOPRAZOLE SODIUM, VIA: HCPCS | Performed by: INTERNAL MEDICINE

## 2024-04-07 PROCEDURE — C9113 INJ PANTOPRAZOLE SODIUM, VIA: HCPCS

## 2024-04-07 PROCEDURE — 85014 HEMATOCRIT: CPT

## 2024-04-07 PROCEDURE — A4216 STERILE WATER/SALINE, 10 ML: HCPCS | Performed by: INTERNAL MEDICINE

## 2024-04-07 PROCEDURE — 96375 TX/PRO/DX INJ NEW DRUG ADDON: CPT

## 2024-04-07 PROCEDURE — 71045 X-RAY EXAM CHEST 1 VIEW: CPT

## 2024-04-07 PROCEDURE — 96361 HYDRATE IV INFUSION ADD-ON: CPT

## 2024-04-07 PROCEDURE — 6360000002 HC RX W HCPCS

## 2024-04-07 PROCEDURE — 96376 TX/PRO/DX INJ SAME DRUG ADON: CPT

## 2024-04-07 PROCEDURE — G0378 HOSPITAL OBSERVATION PER HR: HCPCS

## 2024-04-07 PROCEDURE — 6360000002 HC RX W HCPCS: Performed by: INTERNAL MEDICINE

## 2024-04-07 PROCEDURE — 2580000003 HC RX 258: Performed by: INTERNAL MEDICINE

## 2024-04-07 PROCEDURE — 6370000000 HC RX 637 (ALT 250 FOR IP): Performed by: INTERNAL MEDICINE

## 2024-04-07 PROCEDURE — 84630 ASSAY OF ZINC: CPT

## 2024-04-07 PROCEDURE — 2580000003 HC RX 258: Performed by: HOSPITALIST

## 2024-04-07 PROCEDURE — P9016 RBC LEUKOCYTES REDUCED: HCPCS

## 2024-04-07 PROCEDURE — 6360000002 HC RX W HCPCS: Performed by: HOSPITALIST

## 2024-04-07 PROCEDURE — 96365 THER/PROPH/DIAG IV INF INIT: CPT

## 2024-04-07 PROCEDURE — 85018 HEMOGLOBIN: CPT

## 2024-04-07 PROCEDURE — 36415 COLL VENOUS BLD VENIPUNCTURE: CPT

## 2024-04-07 PROCEDURE — 93005 ELECTROCARDIOGRAM TRACING: CPT | Performed by: STUDENT IN AN ORGANIZED HEALTH CARE EDUCATION/TRAINING PROGRAM

## 2024-04-07 PROCEDURE — 93970 EXTREMITY STUDY: CPT

## 2024-04-07 PROCEDURE — 82728 ASSAY OF FERRITIN: CPT

## 2024-04-07 PROCEDURE — 84134 ASSAY OF PREALBUMIN: CPT

## 2024-04-07 PROCEDURE — 99223 1ST HOSP IP/OBS HIGH 75: CPT | Performed by: INTERNAL MEDICINE

## 2024-04-07 PROCEDURE — 96366 THER/PROPH/DIAG IV INF ADDON: CPT

## 2024-04-07 RX ORDER — OXYCODONE HYDROCHLORIDE AND ACETAMINOPHEN 5; 325 MG/1; MG/1
1 TABLET ORAL EVERY 4 HOURS PRN
Status: DISCONTINUED | OUTPATIENT
Start: 2024-04-07 | End: 2024-04-18 | Stop reason: HOSPADM

## 2024-04-07 RX ORDER — ONDANSETRON 4 MG/1
4 TABLET, ORALLY DISINTEGRATING ORAL EVERY 8 HOURS PRN
Status: DISCONTINUED | OUTPATIENT
Start: 2024-04-07 | End: 2024-04-18 | Stop reason: HOSPADM

## 2024-04-07 RX ORDER — LATANOPROST 50 UG/ML
1 SOLUTION/ DROPS OPHTHALMIC NIGHTLY
Status: DISCONTINUED | OUTPATIENT
Start: 2024-04-07 | End: 2024-04-18 | Stop reason: HOSPADM

## 2024-04-07 RX ORDER — SODIUM CHLORIDE 9 MG/ML
INJECTION, SOLUTION INTRAVENOUS PRN
Status: DISCONTINUED | OUTPATIENT
Start: 2024-04-07 | End: 2024-04-18 | Stop reason: HOSPADM

## 2024-04-07 RX ORDER — ACETAMINOPHEN 325 MG/1
650 TABLET ORAL EVERY 6 HOURS PRN
Status: DISCONTINUED | OUTPATIENT
Start: 2024-04-07 | End: 2024-04-18 | Stop reason: HOSPADM

## 2024-04-07 RX ORDER — SODIUM CHLORIDE 0.9 % (FLUSH) 0.9 %
5-40 SYRINGE (ML) INJECTION EVERY 12 HOURS SCHEDULED
Status: DISCONTINUED | OUTPATIENT
Start: 2024-04-07 | End: 2024-04-18 | Stop reason: HOSPADM

## 2024-04-07 RX ORDER — LIDOCAINE 40 MG/G
CREAM TOPICAL DAILY PRN
Status: DISCONTINUED | OUTPATIENT
Start: 2024-04-07 | End: 2024-04-18 | Stop reason: HOSPADM

## 2024-04-07 RX ORDER — FINASTERIDE 5 MG/1
5 TABLET, FILM COATED ORAL DAILY
Status: DISCONTINUED | OUTPATIENT
Start: 2024-04-07 | End: 2024-04-18 | Stop reason: HOSPADM

## 2024-04-07 RX ORDER — MULTIVITAMIN WITH IRON
1 TABLET ORAL DAILY
Status: DISCONTINUED | OUTPATIENT
Start: 2024-04-07 | End: 2024-04-18 | Stop reason: HOSPADM

## 2024-04-07 RX ORDER — TAMSULOSIN HYDROCHLORIDE 0.4 MG/1
0.4 CAPSULE ORAL NIGHTLY
Status: DISCONTINUED | OUTPATIENT
Start: 2024-04-07 | End: 2024-04-18 | Stop reason: HOSPADM

## 2024-04-07 RX ORDER — SODIUM CHLORIDE, SODIUM LACTATE, POTASSIUM CHLORIDE, CALCIUM CHLORIDE 600; 310; 30; 20 MG/100ML; MG/100ML; MG/100ML; MG/100ML
INJECTION, SOLUTION INTRAVENOUS CONTINUOUS
Status: DISCONTINUED | OUTPATIENT
Start: 2024-04-07 | End: 2024-04-11

## 2024-04-07 RX ORDER — ONDANSETRON 2 MG/ML
4 INJECTION INTRAMUSCULAR; INTRAVENOUS ONCE
Status: COMPLETED | OUTPATIENT
Start: 2024-04-07 | End: 2024-04-07

## 2024-04-07 RX ORDER — SODIUM CHLORIDE 0.9 % (FLUSH) 0.9 %
5-40 SYRINGE (ML) INJECTION PRN
Status: DISCONTINUED | OUTPATIENT
Start: 2024-04-07 | End: 2024-04-18 | Stop reason: HOSPADM

## 2024-04-07 RX ORDER — ONDANSETRON 2 MG/ML
INJECTION INTRAMUSCULAR; INTRAVENOUS
Status: COMPLETED
Start: 2024-04-07 | End: 2024-04-07

## 2024-04-07 RX ORDER — METOPROLOL SUCCINATE 25 MG/1
25 TABLET, EXTENDED RELEASE ORAL 2 TIMES DAILY
Status: DISCONTINUED | OUTPATIENT
Start: 2024-04-07 | End: 2024-04-18 | Stop reason: HOSPADM

## 2024-04-07 RX ORDER — ACETAMINOPHEN 650 MG/1
650 SUPPOSITORY RECTAL EVERY 6 HOURS PRN
Status: DISCONTINUED | OUTPATIENT
Start: 2024-04-07 | End: 2024-04-18 | Stop reason: HOSPADM

## 2024-04-07 RX ORDER — ONDANSETRON 2 MG/ML
4 INJECTION INTRAMUSCULAR; INTRAVENOUS EVERY 6 HOURS PRN
Status: DISCONTINUED | OUTPATIENT
Start: 2024-04-07 | End: 2024-04-18 | Stop reason: HOSPADM

## 2024-04-07 RX ORDER — CETIRIZINE HYDROCHLORIDE 10 MG/1
5 TABLET ORAL DAILY
Status: DISCONTINUED | OUTPATIENT
Start: 2024-04-07 | End: 2024-04-18 | Stop reason: HOSPADM

## 2024-04-07 RX ORDER — SUCRALFATE 1 G/1
1 TABLET ORAL 4 TIMES DAILY
Status: DISCONTINUED | OUTPATIENT
Start: 2024-04-07 | End: 2024-04-18 | Stop reason: HOSPADM

## 2024-04-07 RX ORDER — LANOLIN ALCOHOL/MO/W.PET/CERES
1000 CREAM (GRAM) TOPICAL DAILY
Status: DISCONTINUED | OUTPATIENT
Start: 2024-04-07 | End: 2024-04-18 | Stop reason: HOSPADM

## 2024-04-07 RX ORDER — ATORVASTATIN CALCIUM 40 MG/1
40 TABLET, FILM COATED ORAL NIGHTLY
Status: DISCONTINUED | OUTPATIENT
Start: 2024-04-07 | End: 2024-04-18 | Stop reason: HOSPADM

## 2024-04-07 RX ADMIN — METOPROLOL SUCCINATE 25 MG: 25 TABLET, FILM COATED, EXTENDED RELEASE ORAL at 21:59

## 2024-04-07 RX ADMIN — ONDANSETRON 4 MG: 2 INJECTION INTRAMUSCULAR; INTRAVENOUS at 01:27

## 2024-04-07 RX ADMIN — PANTOPRAZOLE SODIUM 80 MG: 40 INJECTION, POWDER, FOR SOLUTION INTRAVENOUS at 00:38

## 2024-04-07 RX ADMIN — METOPROLOL SUCCINATE 25 MG: 25 TABLET, FILM COATED, EXTENDED RELEASE ORAL at 09:42

## 2024-04-07 RX ADMIN — CETIRIZINE HYDROCHLORIDE 5 MG: 10 TABLET, FILM COATED ORAL at 09:42

## 2024-04-07 RX ADMIN — SUCRALFATE 1 G: 1 TABLET ORAL at 17:23

## 2024-04-07 RX ADMIN — MULTIVITAMIN TABLET 1 TABLET: TABLET at 09:42

## 2024-04-07 RX ADMIN — SUCRALFATE 1 G: 1 TABLET ORAL at 13:31

## 2024-04-07 RX ADMIN — SODIUM CHLORIDE 100 MG: 9 INJECTION, SOLUTION INTRAVENOUS at 11:43

## 2024-04-07 RX ADMIN — OXYCODONE AND ACETAMINOPHEN 1 TABLET: 5; 325 TABLET ORAL at 13:43

## 2024-04-07 RX ADMIN — SODIUM CHLORIDE, PRESERVATIVE FREE 40 MG: 5 INJECTION INTRAVENOUS at 17:24

## 2024-04-07 RX ADMIN — OXYCODONE AND ACETAMINOPHEN 1 TABLET: 5; 325 TABLET ORAL at 21:59

## 2024-04-07 RX ADMIN — TAMSULOSIN HYDROCHLORIDE 0.4 MG: 0.4 CAPSULE ORAL at 21:59

## 2024-04-07 RX ADMIN — LATANOPROST 1 DROP: 50 SOLUTION OPHTHALMIC at 21:59

## 2024-04-07 RX ADMIN — SUCRALFATE 1 G: 1 TABLET ORAL at 21:59

## 2024-04-07 RX ADMIN — FINASTERIDE 5 MG: 5 TABLET, FILM COATED ORAL at 09:42

## 2024-04-07 RX ADMIN — SUCRALFATE 1 G: 1 TABLET ORAL at 09:43

## 2024-04-07 RX ADMIN — CYANOCOBALAMIN TAB 1000 MCG 1000 MCG: 1000 TAB at 09:42

## 2024-04-07 RX ADMIN — ONDANSETRON 4 MG: 2 INJECTION INTRAMUSCULAR; INTRAVENOUS at 17:34

## 2024-04-07 RX ADMIN — ATORVASTATIN CALCIUM 40 MG: 40 TABLET, FILM COATED ORAL at 21:59

## 2024-04-07 RX ADMIN — SODIUM CHLORIDE 25 MG: 9 INJECTION, SOLUTION INTRAVENOUS at 09:53

## 2024-04-07 RX ADMIN — SODIUM CHLORIDE, POTASSIUM CHLORIDE, SODIUM LACTATE AND CALCIUM CHLORIDE: 600; 310; 30; 20 INJECTION, SOLUTION INTRAVENOUS at 04:44

## 2024-04-07 RX ADMIN — SODIUM CHLORIDE, PRESERVATIVE FREE 40 MG: 5 INJECTION INTRAVENOUS at 04:38

## 2024-04-07 ASSESSMENT — PAIN SCALES - GENERAL
PAINLEVEL_OUTOF10: 9
PAINLEVEL_OUTOF10: 0

## 2024-04-07 NOTE — PROGRESS NOTES
Pt c/o sob 83% on 2liters, slowly increased to 5liters 93%, will continue to monitor, and remind pt to leave 02 on

## 2024-04-07 NOTE — CONSULTS
[facility]  Hematology/Oncology  Consult      Patient Name: Uzair Fuentes  YOB: 1940  PCP: Guilherme Salgado DO   Referring Provider:      Reason for Consultation:   Chief Complaint   Patient presents with    Lab result      Gets blood transfusions once a month. Hgb at [facility] was 6.9. is on 2L NC at baseline. +thinners         History of Present Illness:  Mr. Fuentes is a pleasant 83-year-old gentleman, with a past medical history significant for CKD, MGUS, hyperlipidemia, hypertension, CAD, PVCs, and history of iron deficiency anemia, who had presented with left flank and back pain and he was diagnosed in March 2023 with renal cell carcinoma.  CT scan of the abdomen the pelvis done on 3/20/2023, revealing retrocrural and retroperitoneal lymphadenopathy, multiple bilateral renal cysts, multiple bilateral hyperdense renal lesions, probable hemorrhagic cysts, CT scan of the lumbar spine had revealed a large conglomeration of soft tissue mass in the retroperitoneum, concerning for malignancy, hyperdense lesions in the kidneys, more on the left side, PET/CT scan was done on 2/12/2023, revealing retroperitoneal lymphadenopathy, measuring up to 8 cm, possible uptake in both parotid glands and left base of the neck, the patient had a CT-guided biopsy of the retroperitoneal lymphadenopathy on August 23, 2023, pathology was consistent with metastatic renal cell carcinoma, could not differentiate between clear and unclear cell carcinoma, [facility] was recommended Nivolumab. The patient was started on Eliquis for left lower extremity DVT. on 11/21/2023   He was hospitalized around 1 month ago with acute on chronic anemia, EGD and colonoscopy were negative for active bleeding.    Recently he developed significant weight gain with edema and anasarca.  His last Opdivo was in March.  He was hospitalized again on 3/28/2024 for recurrent bouts of rectal  01/29/2024    Palliative care by specialist 01/29/2024    First degree AV block 01/27/2024    Abdominal aortic aneurysm (AAA) without rupture (HCC) 01/27/2024    Acute kidney injury (HCC) 01/27/2024    Stage 4 chronic kidney disease (HCC) 01/27/2024    Monoclonal gammopathy 01/27/2024    Chronic deep vein thrombosis (DVT) of proximal vein of lower extremity (HCC) 01/27/2024    Metastatic renal cell carcinoma (HCC) 11/21/2023    Acute on chronic anemia 11/06/2023    Renal cell cancer (HCC) 10/10/2023        Past Surgical History:   Procedure Laterality Date    ABDOMINAL AORTIC ANEURYSM REPAIR      COLONOSCOPY      COLONOSCOPY N/A 2/12/2024    COLONOSCOPY DIAGNOSTIC performed by Dave Heredia DO at [facility] ENDOSCOPY    TONSILLECTOMY      TOTAL KNEE ARTHROPLASTY Bilateral     UPPER GASTROINTESTINAL ENDOSCOPY N/A 10/14/2022    EGD DIAGNOSTIC ONLY performed by Joshua Avila MD at [facility] ENDOSCOPY    UPPER GASTROINTESTINAL ENDOSCOPY N/A 2/10/2024    EGD ESOPHAGOGASTRODUODENOSCOPY performed by Dave Heredia DO at [facility] OR    UPPER GASTROINTESTINAL ENDOSCOPY N/A 3/28/2024    ESOPHAGOGASTRODUODENOSCOPY BIOPSY performed by Dave Heredia DO at [facility] ENDOSCOPY       Family History  Family History   Problem Relation Age of Onset    Heart Disease Father     Heart Attack Father        Social History    TOBACCO:   reports that he quit smoking about 36 years ago. His smoking use included cigarettes. He has never used smokeless tobacco.  ETOH:   reports no history of alcohol use.    Home Medications  Prior to Admission medications    Medication Sig Start Date End Date Taking? Authorizing Provider   doxycycline hyclate (VIBRAMYCIN) 100 MG capsule Take 1 capsule by mouth 2 times daily  Patient not taking: Reported on 4/7/2024    ProviderTrevor MD   Multiple Vitamin (MULTIVITAMIN) TABS tablet Take 1 tablet by mouth daily 2/14/24 3/15/24  Sven Dyer MD   nitroGLYCERIN (NITROSTAT) 0.4 MG SL tablet Place 1 tablet under

## 2024-04-07 NOTE — PROGRESS NOTES
4 Eyes Skin Assessment     NAME:  Uzair Fuentes  YOB: 1940  MEDICAL RECORD NUMBER:  [id number]    The patient is being assessed for  Admission    I agree that at least one RN has performed a thorough Head to Toe Skin Assessment on the patient. ALL assessment sites listed below have been assessed.      Areas assessed by both nurses:    Head, Face, Ears, Shoulders, Back, Chest, Arms, Elbows, Hands, Sacrum. Buttock, Coccyx, Ischium, and Legs. Feet and Heels        Does the Patient have a Wound? No noted wound(s)       Jaylen Prevention initiated by RN: Yes  Wound Care Orders initiated by RN: No    Pressure Injury (Stage 3,4, Unstageable, DTI, NWPT, and Complex wounds) if present, place Wound referral order by RN under : No    New Ostomies, if present place, Ostomy referral order under : No     Nurse 1 eSignature: Electronically signed by Cynthia Sears RN on 4/7/24 at 4:01 AM EDT    **SHARE this note so that the co-signing nurse can place an eSignature**    Nurse 2 eSignature: Electronically signed by Mayte Cuevas on 4/7/24 at 4:03 AM EDT

## 2024-04-07 NOTE — ED NOTES
Pt C/O nausea and SOB during initial 15 mins of transfusion, vitals stable, DO made aware and at bedside to assess pt. Per DO continue blood transfusion, obtain EKG and administer Zofran.

## 2024-04-07 NOTE — ED NOTES
ED to Inpatient Handoff Report    Notified [place] that electronic handoff available and patient ready for transport to room 0517.    Safety Risks: Risk of falls    Patient in Restraints: no    Constant Observer or Patient : no    Telemetry Monitoring Ordered :Yes           Order to transfer to unit without monitor:NO    Last MEWS: 1 Time completed: 0123    Deterioration Index Score:   Predictive Model Details          42 (Caution)  Factor Value    Calculated 4/7/2024 01:50 30% Age 84 years old    Deterioration Index Model 26% Supplemental oxygen Nasal cannula     16% [place] coma scale 14     13% Respiratory rate 20     5% Hematocrit abnormal (23.5 %)     3% Pulse oximetry 100 %     2% BUN abnormal (24 mg/dL)     2% Sodium 135 mmol/L     2% Potassium 3.5 mmol/L     1% Systolic 118     0% Temperature 97.3 °F (36.3 °C)     0% Pulse 80     0% WBC count 5.6 k/uL        Vitals:    04/06/24 2251 04/06/24 2252 04/07/24 0044 04/07/24 0123   BP: 135/78  120/70 118/68   Pulse: 95  84 80   Resp: 15  16 20   Temp:  97.4 °F (36.3 °C) 97.5 °F (36.4 °C) 97.3 °F (36.3 °C)   TempSrc:  Oral Oral Oral   SpO2: 98%  100% 100%         Opportunity for questions and clarification was provided.

## 2024-04-07 NOTE — CONSULTS
CONSULT  Law Daniel M.D.  [facility]  Dr. Leopoldo Contreras M.D.,  Dr. Abraham Cuello M.D.,  Dr. Franc Diaz D.O.,  Dr. Dave Heredia D.O. ,  Dr. Sánchez Ho M.D.,          Uzair Fuentes  84 y.o.  male      Re: \"Upper GI bleed\"  Requesting physician: Dr. Neff,, resident/emergency department   Admitting physician: Dr. Nicole  Date:8:26 AM 4/7/2024          HPI: 84-year-old male patient seen in the hospital for above described issue.  Patient was very recently seen for similar issue.  He presented to the hospital at the end of March because of acute on chronic anemia.  Patient underwent upper endoscopy on 28 March revealing mild gastritis but no source of bleeding.  Patient also had upper endoscopy and colonoscopy in February.  EGD on 10 February was unremarkable and colonoscopy performed on 12 February revealed diverticular disease with fair prep.  Patient has extensive past medical history including history of renal cancer and receiving chemotherapy as well as history of DVT with oral anticoagulation.  Patient also has chronic kidney disease, hypertension and hyperlipidemia.  Patient denies abdominal pain.  He denies nausea vomiting.  Denies hematemesis emesis of coffee-ground material.  He reports small amount of bright red blood per rectum.  Patient reports that he believes he took his Eliquis yesterday.    Information sources:   -Patient  -medical record  -health care team    PMHx:  Past Medical History:   Diagnosis Date    Cancer (HCC) 2023    RENAL    Disease of blood and blood forming organ     Hypertension        PSHx:  Past Surgical History:   Procedure Laterality Date    ABDOMINAL AORTIC ANEURYSM REPAIR      COLONOSCOPY      COLONOSCOPY N/A 2/12/2024    COLONOSCOPY DIAGNOSTIC performed by Dave Heredia DO at [facility] ENDOSCOPY    TONSILLECTOMY      TOTAL KNEE ARTHROPLASTY Bilateral     UPPER GASTROINTESTINAL ENDOSCOPY N/A 10/14/2022    EGD DIAGNOSTIC ONLY performed by Joshua JEREZ  Last Year: No       FamHx:  Family History   Problem Relation Age of Onset    Heart Disease Father     Heart Attack Father        Allergy:  Allergies   Allergen Reactions    Shellfish-Derived Products          ROS: As described in the HPI and in addition is negative upon detailed review of systems or unobtainable unless otherwise stated in this dictation.    PE:  /75   Pulse 88   Temp 97.6 °F (36.4 °C) (Oral)   Resp 18   Ht 1.803 m (5' 11\")   Wt 94.5 kg (208 lb 4.8 oz)   SpO2 94%   BMI 29.05 kg/m²     Gen.: NAD/older adult  male  Head: Atraumatic/normocephalic  Eyes: EOMI/anicteric sclera  ENT: Moist oral mucosa/no discharge from nose or ears  Neck: Supple with trachea midline  Chest: CTAB  Cor: Regular  Abd.: Soft and not distended.  Nontender  Extr.:  Trace lower extremity edema  Muscles: Decreased tone and bulk, consistent with age and condition  Skin: Warm and dry/anicteric      DATA:     Lab Results   Component Value Date/Time    WBC 5.6 04/06/2024 11:25 PM    RBC 2.33 04/06/2024 11:25 PM    HGB 7.7 04/07/2024 06:05 AM    HCT 24.7 04/07/2024 06:05 AM    .9 04/06/2024 11:25 PM    MCH 30.5 04/06/2024 11:25 PM    MCHC 30.2 04/06/2024 11:25 PM    RDW 14.9 04/06/2024 11:25 PM     04/06/2024 11:25 PM    MPV 10.5 04/06/2024 11:25 PM     Lab Results   Component Value Date/Time     04/06/2024 11:25 PM    K 3.5 04/06/2024 11:25 PM    K 4.4 10/18/2022 04:50 AM     04/06/2024 11:25 PM    CO2 23 04/06/2024 11:25 PM    BUN 24 04/06/2024 11:25 PM    CREATININE 1.8 04/06/2024 11:25 PM    CALCIUM 9.3 04/06/2024 11:25 PM    PROT 4.7 04/06/2024 11:25 PM    LABALBU 2.5 04/06/2024 11:25 PM    BILITOT 0.2 04/06/2024 11:25 PM    ALKPHOS 116 04/06/2024 11:25 PM    AST 27 04/06/2024 11:25 PM    ALT 22 04/06/2024 11:25 PM     Lab Results   Component Value Date/Time    LIPASE 66 03/27/2024 03:55 PM     No results found for: \"AMYLASE\"      ASSESSMENT/PLAN:  Patient Active Problem List

## 2024-04-07 NOTE — PLAN OF CARE
This is a 84 year old male with past medical history of renal cancer, HTN, CKD, HLD, H/O DVT, and H/O GIB. Patient presents with rectal bleeding.     Acute hypoxic respiratory failure 2/2 large left pleural effusion - Currently on 2 L NC. Will order diagnostic and therapeutic left thoracentesis  Acute on chronic Rectal bleeding - GI consulted. Plan for balloon enteroscopy. Hold anticoagulation. Patient is on eliquis for DVT however is on life-long therapy due to history of cancer. May consider vascular surgery for possible placement of IVC filter. Will consult hematology for their recommendations. He had an EGD on 2/10/24 that was normal. Colonoscopy from 2/12/24 showed diverticular disease. NM Bleeding scan from 3/31/24 was unremarkable as well. Possible balloon enteroscopy. Continue protonix and carafate. Currently receiving IV iron supplementation.   Acute blood loss anemia S/P 1 unit PRBC - Keep hemoglobin greater than 7.   Metastatic renal cell carcinoma - Last treatment appears to be on 3/26, Opdivo. He follows up with Dr Chance Eagle  Essential HTN - Continue toprol  HLD - Continue lipitor    Will continue to follow along.

## 2024-04-07 NOTE — H&P
History & Physicial  Uzair Fuentes  [id number]  1940  04/07/24  Primary Care:  Guilherme Salgado, DO  [address] / [place] [address]        Chief Complaint   Patient presents with    Lab result      Gets blood transfusions once a month. Hgb at [facility] was 6.9. is on 2L NC at baseline. +thinners        HPI:    84-year-old male who lives in a nursing home presents again for GI bleed.  He had heme positive stool per the rectum and a decreased hemoglobin.  Not too long ago Dr. Wooten wrote the following HPI:  History of Present Illness:  This is a 84 year old male with PMH significant for renal cancer, HTN, CKD, HLD, DVT and GI bleed.  Patient is in the past.  Patient sent in from nursing facility due to rectal bleeding.  Patient reports that this has been an ongoing problem.  Oriented x 3 at this time.  Reports that his hemoglobin has gone down to low 6 before.  States that he has had dark stools for several months.  Is on Eliquis.  Additional symptoms include fatigue headache.  And I will Concur and admit that there is absolutely no difference this time.      Prior to Visit Medications    Medication Sig Taking? Authorizing Provider   doxycycline hyclate (VIBRAMYCIN) 100 MG capsule Take 1 capsule by mouth 2 times daily  ProviderTrevor MD   Multiple Vitamin (MULTIVITAMIN) TABS tablet Take 1 tablet by mouth daily  Sven Dyer MD   nitroGLYCERIN (NITROSTAT) 0.4 MG SL tablet Place 1 tablet under the tongue every 5 minutes as needed for Chest pain up to max of 3 total doses. If no relief after 1 dose, call 911.  Sven Dyer MD   acetaminophen (TYLENOL) 325 MG tablet Take 2 tablets by mouth every 6 hours as needed for Fever (TEMP >100)  ProviderTrevor MD   diphenoxylate-atropine (LOMOTIL) 2.5-0.025 MG per tablet Take 2 tablets by mouth every 6 hours as needed for Diarrhea.  ProviderTrevor MD   nystatin (MYCOSTATIN) 018599 UNIT/GM powder Apply topically 2 times daily

## 2024-04-08 PROBLEM — J90 PLEURAL EFFUSION ON LEFT: Status: ACTIVE | Noted: 2024-04-08

## 2024-04-08 LAB
ALBUMIN SERPL-MCNC: 2.4 G/DL (ref 3.5–5.2)
ALP SERPL-CCNC: 101 U/L (ref 40–129)
ALT SERPL-CCNC: 21 U/L (ref 0–40)
ANION GAP SERPL CALCULATED.3IONS-SCNC: 7 MMOL/L (ref 7–16)
AST SERPL-CCNC: 27 U/L (ref 0–39)
BILIRUB SERPL-MCNC: 0.2 MG/DL (ref 0–1.2)
BUN SERPL-MCNC: 27 MG/DL (ref 6–23)
CALCIUM SERPL-MCNC: 9.2 MG/DL (ref 8.6–10.2)
CHLORIDE SERPL-SCNC: 107 MMOL/L (ref 98–107)
CO2 SERPL-SCNC: 25 MMOL/L (ref 22–29)
CREAT SERPL-MCNC: 2 MG/DL (ref 0.7–1.2)
EKG ATRIAL RATE: 79 BPM
EKG P AXIS: 7 DEGREES
EKG P-R INTERVAL: 168 MS
EKG Q-T INTERVAL: 378 MS
EKG QRS DURATION: 124 MS
EKG QTC CALCULATION (BAZETT): 433 MS
EKG R AXIS: -16 DEGREES
EKG T AXIS: 119 DEGREES
EKG VENTRICULAR RATE: 79 BPM
GFR SERPL CREATININE-BSD FRML MDRD: 33 ML/MIN/1.73M2
GLUCOSE SERPL-MCNC: 98 MG/DL (ref 74–99)
HAPTOGLOB SERPL-MCNC: 145 MG/DL (ref 30–200)
HCT VFR BLD AUTO: 23 % (ref 37–54)
HCT VFR BLD AUTO: 23.4 % (ref 37–54)
HCT VFR BLD AUTO: 23.4 % (ref 37–54)
HCT VFR BLD AUTO: 23.5 % (ref 37–54)
HGB BLD-MCNC: 7 G/DL (ref 12.5–16.5)
HGB BLD-MCNC: 7.2 G/DL (ref 12.5–16.5)
IMM RETICS NFR: 21.2 % (ref 2.3–13.4)
INR PPP: 1.2
IRON SATN MFR SERPL: 50 % (ref 20–55)
IRON SERPL-MCNC: 75 UG/DL (ref 59–158)
LACTATE BLDV-SCNC: 0.8 MMOL/L (ref 0.5–2.2)
LDH SERPL-CCNC: 222 U/L (ref 135–225)
PARTIAL THROMBOPLASTIN TIME: 40.4 SEC (ref 24.5–35.1)
PHOSPHATE SERPL-MCNC: 3.4 MG/DL (ref 2.5–4.5)
POTASSIUM SERPL-SCNC: 3.9 MMOL/L (ref 3.5–5)
PROT SERPL-MCNC: 4.5 G/DL (ref 6.4–8.3)
PROTHROMBIN TIME: 13.1 SEC (ref 9.3–12.4)
RETIC HEMOGLOBIN: 30.1 PG (ref 28.2–36.6)
RETICS # AUTO: 0.09 M/UL
RETICS/RBC NFR AUTO: 3.6 % (ref 0.4–1.9)
SODIUM SERPL-SCNC: 139 MMOL/L (ref 132–146)
TIBC SERPL-MCNC: 150 UG/DL (ref 250–450)

## 2024-04-08 PROCEDURE — 85045 AUTOMATED RETICULOCYTE COUNT: CPT

## 2024-04-08 PROCEDURE — 6370000000 HC RX 637 (ALT 250 FOR IP): Performed by: INTERNAL MEDICINE

## 2024-04-08 PROCEDURE — 85730 THROMBOPLASTIN TIME PARTIAL: CPT

## 2024-04-08 PROCEDURE — 80053 COMPREHEN METABOLIC PANEL: CPT

## 2024-04-08 PROCEDURE — G0378 HOSPITAL OBSERVATION PER HR: HCPCS

## 2024-04-08 PROCEDURE — 93010 ELECTROCARDIOGRAM REPORT: CPT | Performed by: INTERNAL MEDICINE

## 2024-04-08 PROCEDURE — 6360000002 HC RX W HCPCS: Performed by: HOSPITALIST

## 2024-04-08 PROCEDURE — 83540 ASSAY OF IRON: CPT

## 2024-04-08 PROCEDURE — A4216 STERILE WATER/SALINE, 10 ML: HCPCS | Performed by: INTERNAL MEDICINE

## 2024-04-08 PROCEDURE — 96366 THER/PROPH/DIAG IV INF ADDON: CPT

## 2024-04-08 PROCEDURE — 36415 COLL VENOUS BLD VENIPUNCTURE: CPT

## 2024-04-08 PROCEDURE — 96361 HYDRATE IV INFUSION ADD-ON: CPT

## 2024-04-08 PROCEDURE — 83615 LACTATE (LD) (LDH) ENZYME: CPT

## 2024-04-08 PROCEDURE — 96376 TX/PRO/DX INJ SAME DRUG ADON: CPT

## 2024-04-08 PROCEDURE — 84100 ASSAY OF PHOSPHORUS: CPT

## 2024-04-08 PROCEDURE — 85014 HEMATOCRIT: CPT

## 2024-04-08 PROCEDURE — 94664 DEMO&/EVAL PT USE INHALER: CPT

## 2024-04-08 PROCEDURE — 85018 HEMOGLOBIN: CPT

## 2024-04-08 PROCEDURE — 85610 PROTHROMBIN TIME: CPT

## 2024-04-08 PROCEDURE — C9113 INJ PANTOPRAZOLE SODIUM, VIA: HCPCS | Performed by: INTERNAL MEDICINE

## 2024-04-08 PROCEDURE — 2580000003 HC RX 258: Performed by: HOSPITALIST

## 2024-04-08 PROCEDURE — 83605 ASSAY OF LACTIC ACID: CPT

## 2024-04-08 PROCEDURE — 6360000002 HC RX W HCPCS: Performed by: INTERNAL MEDICINE

## 2024-04-08 PROCEDURE — 94669 MECHANICAL CHEST WALL OSCILL: CPT

## 2024-04-08 PROCEDURE — 83550 IRON BINDING TEST: CPT

## 2024-04-08 PROCEDURE — 83010 ASSAY OF HAPTOGLOBIN QUANT: CPT

## 2024-04-08 PROCEDURE — 2700000000 HC OXYGEN THERAPY PER DAY

## 2024-04-08 PROCEDURE — 94640 AIRWAY INHALATION TREATMENT: CPT

## 2024-04-08 PROCEDURE — 96375 TX/PRO/DX INJ NEW DRUG ADDON: CPT

## 2024-04-08 PROCEDURE — 99232 SBSQ HOSP IP/OBS MODERATE 35: CPT | Performed by: INTERNAL MEDICINE

## 2024-04-08 PROCEDURE — 2580000003 HC RX 258: Performed by: INTERNAL MEDICINE

## 2024-04-08 RX ORDER — BUMETANIDE 0.25 MG/ML
2 INJECTION INTRAMUSCULAR; INTRAVENOUS ONCE
Status: COMPLETED | OUTPATIENT
Start: 2024-04-08 | End: 2024-04-08

## 2024-04-08 RX ORDER — IPRATROPIUM BROMIDE AND ALBUTEROL SULFATE 2.5; .5 MG/3ML; MG/3ML
1 SOLUTION RESPIRATORY (INHALATION) 2 TIMES DAILY
Status: DISCONTINUED | OUTPATIENT
Start: 2024-04-08 | End: 2024-04-11

## 2024-04-08 RX ADMIN — SUCRALFATE 1 G: 1 TABLET ORAL at 12:59

## 2024-04-08 RX ADMIN — TAMSULOSIN HYDROCHLORIDE 0.4 MG: 0.4 CAPSULE ORAL at 20:13

## 2024-04-08 RX ADMIN — SODIUM CHLORIDE 125 MG: 900 INJECTION INTRAVENOUS at 09:20

## 2024-04-08 RX ADMIN — IPRATROPIUM BROMIDE AND ALBUTEROL SULFATE 1 DOSE: 2.5; .5 SOLUTION RESPIRATORY (INHALATION) at 08:47

## 2024-04-08 RX ADMIN — CYANOCOBALAMIN TAB 1000 MCG 1000 MCG: 1000 TAB at 09:15

## 2024-04-08 RX ADMIN — BUMETANIDE 2 MG: 0.25 INJECTION INTRAMUSCULAR; INTRAVENOUS at 09:14

## 2024-04-08 RX ADMIN — IPRATROPIUM BROMIDE AND ALBUTEROL SULFATE 1 DOSE: 2.5; .5 SOLUTION RESPIRATORY (INHALATION) at 20:40

## 2024-04-08 RX ADMIN — SUCRALFATE 1 G: 1 TABLET ORAL at 20:13

## 2024-04-08 RX ADMIN — OXYCODONE AND ACETAMINOPHEN 1 TABLET: 5; 325 TABLET ORAL at 20:13

## 2024-04-08 RX ADMIN — MULTIVITAMIN TABLET 1 TABLET: TABLET at 09:15

## 2024-04-08 RX ADMIN — FINASTERIDE 5 MG: 5 TABLET, FILM COATED ORAL at 09:15

## 2024-04-08 RX ADMIN — SODIUM CHLORIDE, PRESERVATIVE FREE 40 MG: 5 INJECTION INTRAVENOUS at 14:07

## 2024-04-08 RX ADMIN — SODIUM CHLORIDE, PRESERVATIVE FREE 40 MG: 5 INJECTION INTRAVENOUS at 05:15

## 2024-04-08 RX ADMIN — METOPROLOL SUCCINATE 25 MG: 25 TABLET, FILM COATED, EXTENDED RELEASE ORAL at 20:13

## 2024-04-08 RX ADMIN — METOPROLOL SUCCINATE 25 MG: 25 TABLET, FILM COATED, EXTENDED RELEASE ORAL at 09:15

## 2024-04-08 RX ADMIN — SODIUM CHLORIDE, PRESERVATIVE FREE 10 ML: 5 INJECTION INTRAVENOUS at 09:15

## 2024-04-08 RX ADMIN — CETIRIZINE HYDROCHLORIDE 5 MG: 10 TABLET, FILM COATED ORAL at 09:15

## 2024-04-08 RX ADMIN — LATANOPROST 1 DROP: 50 SOLUTION OPHTHALMIC at 20:13

## 2024-04-08 RX ADMIN — ATORVASTATIN CALCIUM 40 MG: 40 TABLET, FILM COATED ORAL at 20:13

## 2024-04-08 RX ADMIN — SUCRALFATE 1 G: 1 TABLET ORAL at 16:57

## 2024-04-08 RX ADMIN — SUCRALFATE 1 G: 1 TABLET ORAL at 09:15

## 2024-04-08 ASSESSMENT — PAIN DESCRIPTION - DESCRIPTORS: DESCRIPTORS: ACHING

## 2024-04-08 ASSESSMENT — PAIN DESCRIPTION - ORIENTATION: ORIENTATION: LOWER

## 2024-04-08 ASSESSMENT — PAIN DESCRIPTION - LOCATION: LOCATION: BACK

## 2024-04-08 ASSESSMENT — PAIN SCALES - GENERAL: PAINLEVEL_OUTOF10: 10

## 2024-04-08 NOTE — PATIENT CARE CONFERENCE
P Quality Flow/Interdisciplinary Rounds Progress Note        Quality Flow Rounds held on April 8, 2024    Disciplines Attending:  Bedside Nurse, , , and Nursing Unit Leadership    Uzair Fuentes was admitted on 4/6/2024 10:45 PM    Anticipated Discharge Date:       Disposition:    Jaylen Score:  Jaylen Scale Score: 17    Readmission Risk              Risk of Unplanned Readmission:  0           Discussed patient goal for the day, patient clinical progression, and barriers to discharge.  The following Goal(s) of the Day/Commitment(s) have been identified:  vascular consult      Marla Esquivel RN  April 8, 2024

## 2024-04-08 NOTE — ED PROVIDER NOTES
84-year-old male with history of hypertension, CKD, renal cancer, DVT on Eliquis who presents from a nursing facility for the concerns of low hemoglobin count that was taken this morning which was 6.9.  He stated that he has noticed dark stools for the past few days.  He also reports lightheadedness but denies the following: Chest pain, acute on chronic shortness of breath, syncope, abdominal pain, cough, hemoptysis.  He did take his Eliquis this morning for he has chronic leg swelling.        Chief Complaint   Patient presents with    Lab result      Gets blood transfusions once a month. Hgb at [facility] was 6.9. is on 2L NC at baseline. +thinners        Review of Systems   Pert stated in the hpi above   Physical Exam  Vitals reviewed.   Constitutional:       General: He is not in acute distress.     Appearance: He is not ill-appearing.   HENT:      Head: Normocephalic.      Right Ear: External ear normal.      Left Ear: External ear normal.      Nose: Nose normal.      Mouth/Throat:      Mouth: Mucous membranes are dry.   Eyes:      General:         Right eye: No discharge.         Left eye: No discharge.      Conjunctiva/sclera: Conjunctivae normal.   Cardiovascular:      Rate and Rhythm: Normal rate and regular rhythm.      Heart sounds:      No friction rub. No gallop.   Pulmonary:      Effort: No respiratory distress.      Breath sounds: No stridor.   Abdominal:      Tenderness: There is no abdominal tenderness. There is no guarding or rebound.   Musculoskeletal:         General: No deformity or signs of injury.      Cervical back: Normal range of motion and neck supple. No rigidity or tenderness.      Right lower leg: Edema present.      Left lower leg: Edema present.      Comments: Chronic    Skin:     Coloration: Skin is pale. Skin is not jaundiced.   Neurological:      Mental Status: He is alert.      Sensory: No sensory deficit.      Motor: No weakness.   Psychiatric:         Mood and Affect: Mood  Hematocrit 22.8 (L) 37.0 - 54.0 %   EKG 12 Lead   Result Value Ref Range    Ventricular Rate 79 BPM    Atrial Rate 79 BPM    P-R Interval 168 ms    QRS Duration 124 ms    Q-T Interval 378 ms    QTc Calculation (Bazett) 433 ms    P Axis 7 degrees    R Axis -16 degrees    T Axis 119 degrees   TYPE AND SCREEN   Result Value Ref Range    Blood Bank Sample Expiration 04/09/2024,2359     Arm Band Number [id number]     ABO/Rh O POSITIVE     Antibody Screen NEGATIVE     Unit Number [id number]     Component Leukocyte Reduced Red Cell     Unit Divison 00     Dispense Status Blood Bank ISSUED     Unit Issue Date/Time [id number]     Product Code Blood Bank [id number]     Blood Bank Unit Type and Rh O POS     Blood Bank ISBT Product Blood Type 5100     Blood Bank Blood Product Expiration Date 567099203072     Transfusion Status OK TO TRANSFUSE     Crossmatch Result COMPATIBLE        RADIOLOGY:  Vascular duplex lower extremity venous bilateral   Final Result   No evidence of DVT in either lower extremity.         XR CHEST PORTABLE   Final Result   Large left pleural effusion and probable left lung airspace opacity.                   ------------------------- NURSING NOTES AND VITALS REVIEWED ---------------------------  Date / Time Roomed:  4/6/2024 10:45 PM  ED Bed Assignment:  0517/0517-A    The nursing notes within the ED encounter and vital signs as below have been reviewed.     Patient Vitals for the past 24 hrs:   BP Temp Temp src Pulse Resp SpO2 Height Weight   04/07/24 1944 137/85 97.6 °F (36.4 °C) Oral 95 18 90 % -- --   04/07/24 0945 (!) 149/67 97.8 °F (36.6 °C) Oral 89 18 -- -- --   04/07/24 0729 117/75 97.6 °F (36.4 °C) Oral 88 18 94 % -- --   04/07/24 0434 112/61 97.4 °F (36.3 °C) Oral 83 20 95 % -- --   04/07/24 0245 110/61 97.3 °F (36.3 °C) Oral 91 18 99 % 1.803 m (5' 11\") 94.5 kg (208 lb 4.8 oz)   04/07/24 0123 118/68 97.3 °F (36.3 °C) Oral 80 20 100 % -- --   04/07/24 0044 120/70 97.5 °F (36.4 °C) Oral 84 16 100

## 2024-04-08 NOTE — PLAN OF CARE
Problem: Safety - Adult  Goal: Free from fall injury  4/8/2024 1045 by Nicole Dowd RN  Outcome: Progressing  4/7/2024 2317 by Mayte Cuevas  Outcome: Progressing     Problem: Skin/Tissue Integrity  Goal: Absence of new skin breakdown  Description: 1.  Monitor for areas of redness and/or skin breakdown  2.  Assess vascular access sites hourly  3.  Every 4-6 hours minimum:  Change oxygen saturation probe site  4.  Every 4-6 hours:  If on nasal continuous positive airway pressure, respiratory therapy assess nares and determine need for appliance change or resting period.  4/8/2024 1045 by Nicole Dowd RN  Outcome: Progressing  4/7/2024 2317 by Mayte Cuevas  Outcome: Progressing

## 2024-04-08 NOTE — PROGRESS NOTES
PROGRESS NOTE  By Law Daniel M.D.    [facility]  Dr. Leopoldo Contreras M.D.,  Dr. Abraham Cuello M.D.,   GAVIOTA CoronelO.,  Dr. Sánchez Ho M.D.,  Dr. Dave Heredia D.O.,          Uzair Fuentes  84 y.o.  male    SUBJECTIVE:  Denies abdominal pain.  Denies nausea vomiting.    OBJECTIVE:    /63   Pulse 90   Temp 98 °F (36.7 °C) (Oral)   Resp 18   Ht 1.803 m (5' 11\")   Wt 94.3 kg (207 lb 14.3 oz)   SpO2 92%   BMI 29.00 kg/m²     General: NAD/older adult  male.  HEENT: Icteric sclera/moist oral mucosa  Neck: Supple with trachea midline  Chest: Symmetric excursion/nonlabored respirations  Cor: Regular  Abd.: Soft and nontender/nondistended  Extr.:  Decreased muscle tone and bulk, consistent with age and condition  Skin: Warm and dry      DATA:    Lab Results   Component Value Date/Time    WBC 5.6 04/06/2024 11:25 PM    RBC 2.33 04/06/2024 11:25 PM    HGB 7.0 04/08/2024 07:02 AM    HCT 23.0 04/08/2024 07:02 AM    .9 04/06/2024 11:25 PM    MCH 30.5 04/06/2024 11:25 PM    MCHC 30.2 04/06/2024 11:25 PM    RDW 14.9 04/06/2024 11:25 PM     04/06/2024 11:25 PM    MPV 10.5 04/06/2024 11:25 PM     Lab Results   Component Value Date/Time     04/08/2024 01:20 AM    K 3.9 04/08/2024 01:20 AM    K 4.4 10/18/2022 04:50 AM     04/08/2024 01:20 AM    CO2 25 04/08/2024 01:20 AM    BUN 27 04/08/2024 01:20 AM    CREATININE 2.0 04/08/2024 01:20 AM    CALCIUM 9.2 04/08/2024 01:20 AM    PROT 4.5 04/08/2024 01:20 AM    LABALBU 2.4 04/08/2024 01:20 AM    BILITOT 0.2 04/08/2024 01:20 AM    ALKPHOS 101 04/08/2024 01:20 AM    AST 27 04/08/2024 01:20 AM    ALT 21 04/08/2024 01:20 AM     Lab Results   Component Value Date/Time    LIPASE 66 03/27/2024 03:55 PM     No results found for: \"AMYLASE\"      ASSESSMENT/PLAN:  Patient Active Problem List   Diagnosis    Normocytic anemia    Rectal bleed    GI bleed    Chest pain    PVC's (premature ventricular contractions)

## 2024-04-08 NOTE — DISCHARGE INSTR - COC
No       Date of Last BM: 04/17    Intake/Output Summary (Last 24 hours) at 4/8/2024 0841  Last data filed at 4/8/2024 0654  Gross per 24 hour   Intake 1627.91 ml   Output 700 ml   Net 927.91 ml     I/O last 3 completed shifts:  In: 2032.5 [P.O.:180; I.V.:1287.9; Blood:404.6; IV Piggyback:160]  Out: 700 [Urine:700]    Safety Concerns:     At Risk for Falls and Aspiration Risk    Impairments/Disabilities:      None    Nutrition Therapy:  Current Nutrition Therapy:   - Oral Diet:  General and Low fat/low chol/high fiber/2gm na, low sodium    Routes of Feeding: Oral  Liquids: Thin Liquids  Daily Fluid Restriction: no  Last Modified Barium Swallow with Video (Video Swallowing Test): not done    Treatments at the Time of Hospital Discharge:   Respiratory Treatments: [place] at night   Oxygen Therapy:  is on oxygen at 3 L/min per nasal cannula.  Ventilator:    - No ventilator support    Rehab Therapies: Physical Therapy  Weight Bearing Status/Restrictions: No weight bearing restrictions  Other Medical Equipment (for information only, NOT a DME order):  wheelchair  Other Treatments: NA    Patient's personal belongings (please select all that are sent with patient):  None    RN SIGNATURE:  Electronically signed by Shantel Oswald RN on 4/17/24 at 4:53 PM EDT    CASE MANAGEMENT/SOCIAL WORK SECTION    Inpatient Status Date: ***    Readmission Risk Assessment Score:  Readmission Risk              Risk of Unplanned Readmission:  0           Discharging to Facility/ Agency   Name: [facility]  Address: [facility] [place] [address]  Phone: [phone]  Fax: [phone]    / signature: Electronically signed by RENAE Graham on 4/8/24 at 8:41 AM EDT    PHYSICIAN SECTION    Prognosis: {Prognosis:7047569758}    Condition at Discharge: Stable    Rehab Potential (if transferring to Rehab): {Prognosis:7264611067}    Recommended Labs or Other Treatments After Discharge: ***    Physician  Certification: I certify the above information and transfer of Uzair Fuentes  is necessary for the continuing treatment of the diagnosis listed and that he requires Intermediate Nursing Care for less 30 days.     Update Admission H&P: {CHP DME Changes in HandP:[id number]}    PHYSICIAN SIGNATURE:  {Esignature:[id number]}

## 2024-04-08 NOTE — ACP (ADVANCE CARE PLANNING)
Advance Care Planning   Healthcare Decision Maker:    Primary Decision Maker: mariah martinez - [facility] - [phone]    Secondary Decision Maker: Joan Disla - [phone]    Click here to complete Healthcare Decision Makers including selection of the Healthcare Decision Maker Relationship (ie \"Primary\").

## 2024-04-08 NOTE — CARE COORDINATION
Social Work discharge planning  Pt is from [facility] at [facility]. Pt said plan is to rerturn at discharge.   Per Tiffanie with [facility], precert NOT needed to return. N17 started in [facility]. Transport forms in folder. [facility] [phone].  Electronically signed by RENAE Graham on 4/8/2024 at 9:06 AM

## 2024-04-08 NOTE — PROGRESS NOTES
Vascular:      Today vascular service was consulted for the placement of inferior vena cava filter and the patient had a history of DVT, in October 2023, currently has anemia with hemoglobin of 7 g, was on aspirin and Eliquis that was discontinued because of blood counts      Patient did have this following venous ultrasound study that have personally reviewed    1.  Venous ultrasound study of both lower extremities done on 6 April 2024: No DVT    2.  Venous ultrasound study of both lower extremities done on 9 February 2024: No DVT      3.  The venous ultrasound study done on 10 October 2023: Was personally reviewed by me    The report indicated evidence of nonocclusive thrombus in the mid superficial femoral vein and the peroneal vein and at that time patient was started on anticoagulation        Careful review of the venous ultrasound done in October 2023, indicated markedly echogenic very dense very small area indicating chronic postphlebitic changes, the size being only 3 to 4 mm at the maximal involving the distal superficial femoral vein and even the peroneal vein revealed marked echogenic fibrotic findings nonocclusive again indicating of postphlebitic changes, without any evidence of deep vein thrombosis acute even in October 2023      Patient did not have any CT of chest that was done that indicated any pulmonary embolus        Based upon review as outlined above, do not see any indication to place a inferior vena cava filter on a prophylactic basis at this juncture on this patient    Please call us back, if the patient has new thromboembolic issues as well documented, at that time consideration can be given for the placement of IVC filter, if contraindication for anticoagulation exists at the time    I have also personally discussed with the charge nurse on the floor, Marla Esquivel RN    Thank you      Vignesh Sandhu MD

## 2024-04-08 NOTE — PROGRESS NOTES
Uzair Fuentes 1940    SUBJECTIVE:    Patient is for IVC filter today.    Reports hungry. (Currently npo for procedure). Endorses dark stool.     OBJECTIVE  Physical Exam:  VITALS:  /63   Pulse 90   Temp 98 °F (36.7 °C) (Oral)   Resp 18   Ht 1.803 m (5' 11\")   Wt 94.3 kg (207 lb 14.3 oz)   SpO2 92%   BMI 29.00 kg/m²   ENT:  oropharynx clear, no evidence of mucositis.   NHEMATOLOGIC/LYMPHATICS:  No abnormal lymphadenopathy.  LUNGS:  Clear to auscultation   CARDIOVASCULAR:  Regular rate and rhythm. No clicks, murmurs, rubs or gallops.  ABDOMEN:  Soft, nontender.  No ascites.  No mass or organomegaly.  NEUROLOGIC:  No focal deficits.  SKIN:  No rash.  EXTREMITIES: without clubbing, cyanosis, or edema.    DIAGNOSTIC DATA  Labs:    Lab Results   Component Value Date    WBC 5.6 04/06/2024    HGB 7.0 (L) 04/08/2024    HCT 23.5 (L) 04/08/2024    .9 (H) 04/06/2024     04/06/2024     No results found for: \"CEA\"  Recent Labs     04/06/24  2325 04/08/24  0120    139   CO2 23 25   PHOS  --  3.4   BUN 24* 27*   CREATININE 1.8* 2.0*     Lab Results   Component Value Date    FERRITIN 238 04/07/2024     Lab Results   Component Value Date    ALT 21 04/08/2024    AST 27 04/08/2024    ALKPHOS 101 04/08/2024    BILITOT 0.2 04/08/2024     Lab Results   Component Value Date    LABALBU 2.4 (L) 04/08/2024         Current Facility-Administered Medications   Medication Dose Route Frequency Provider Last Rate Last Admin    ipratropium 0.5 mg-albuterol 2.5 mg (DUONEB) nebulizer solution 1 Dose  1 Dose Inhalation BID Sven Dyer MD   1 Dose at 04/08/24 0847    atorvastatin (LIPITOR) tablet 40 mg  40 mg Oral Nightly Sven Dyer MD   40 mg at 04/07/24 6744    brinzolamide-brimonidine 1-0.2 % SUSP 1 drop (Patient Supplied)  1 drop Both Eyes BID Sven Dyer MD        vitamin B-12 (CYANOCOBALAMIN) tablet 1,000 mcg  1,000 mcg Oral Daily Sven Dyer MD   1,000 mcg at 04/08/24 0915    finasteride  hemoglobin below 7  Continue parenteral IV iron with Ferrlecit  Ordered folate and B12  Anemia is likely multifactorial including anemia of chronic disease as well as GI bleed. Will also check haptoglobin, LDH and retic count.     Pleural effusion- recommend thoracentesis with cytology for large left pleural effusion.      JONATHAN Lemus    Plan discussed with my attending Dr. Edy Fuller

## 2024-04-08 NOTE — PLAN OF CARE
Problem: Safety - Adult  Goal: Free from fall injury  4/7/2024 2317 by Mayte Cuevas  Outcome: Progressing  4/7/2024 1052 by Mayte Hardy, RN  Outcome: Progressing     Problem: Skin/Tissue Integrity  Goal: Absence of new skin breakdown  Description: 1.  Monitor for areas of redness and/or skin breakdown  2.  Assess vascular access sites hourly  3.  Every 4-6 hours minimum:  Change oxygen saturation probe site  4.  Every 4-6 hours:  If on nasal continuous positive airway pressure, respiratory therapy assess nares and determine need for appliance change or resting period.  4/7/2024 2317 by Mayte Cuevas  Outcome: Progressing  4/7/2024 1052 by Mayte Hardy, RN  Outcome: Progressing

## 2024-04-08 NOTE — PROGRESS NOTES
Progress  Note  Chief Complaint   Patient presents with    Lab result      Gets blood transfusions once a month. Hgb at [facility] was 6.9. is on 2L NC at baseline. +thinners      Historical Issues:  Current Facility-Administered Medications   Medication Dose Route Frequency Provider Last Rate Last Admin    ipratropium 0.5 mg-albuterol 2.5 mg (DUONEB) nebulizer solution 1 Dose  1 Dose Inhalation BID Sven Dyer MD   1 Dose at 04/08/24 0847    atorvastatin (LIPITOR) tablet 40 mg  40 mg Oral Nightly Sven Dyer MD   40 mg at 04/07/24 2159    brinzolamide-brimonidine 1-0.2 % SUSP 1 drop (Patient Supplied)  1 drop Both Eyes BID Sven Dyer MD        vitamin B-12 (CYANOCOBALAMIN) tablet 1,000 mcg  1,000 mcg Oral Daily Sven Dyer MD   1,000 mcg at 04/08/24 0915    finasteride (PROSCAR) tablet 5 mg  5 mg Oral Daily Sven Dyer MD   5 mg at 04/08/24 0915    latanoprost (XALATAN) 0.005 % ophthalmic solution 1 drop  1 drop Both Eyes Nightly Sven Dyer MD   1 drop at 04/07/24 2159    lidocaine (LMX) 4 % cream   Topical Daily PRN Sven Dyer MD        cetirizine (ZYRTEC) tablet 5 mg  5 mg Oral Daily Sven Dyer MD   5 mg at 04/08/24 0915    metoprolol succinate (TOPROL XL) extended release tablet 25 mg  25 mg Oral BID Sven Dyer MD   25 mg at 04/08/24 0915    multivitamin 1 tablet  1 tablet Oral Daily Sven Dyer MD   1 tablet at 04/08/24 0915    oxyCODONE-acetaminophen (PERCOCET) 5-325 MG per tablet 1 tablet  1 tablet Oral Q4H PRN Sven Dyer MD   1 tablet at 04/07/24 2159    sucralfate (CARAFATE) tablet 1 g  1 g Oral 4x Daily Sven Dyer MD   1 g at 04/08/24 0915    tamsulosin (FLOMAX) capsule 0.4 mg  0.4 mg Oral Nightly vSen Dyer MD   0.4 mg at 04/07/24 2159    sodium chloride flush 0.9 % injection 5-40 mL  5-40 mL IntraVENous 2 times per day Sven Dyer MD   10 mL at 04/08/24 0915    sodium chloride flush 0.9 % injection 5-40 mL  5-40 mL IntraVENous PRN Sven Dyer MD

## 2024-04-09 ENCOUNTER — APPOINTMENT (OUTPATIENT)
Dept: GENERAL RADIOLOGY | Age: 84
DRG: 813 | End: 2024-04-09
Payer: OTHER GOVERNMENT

## 2024-04-09 ENCOUNTER — HOSPITAL ENCOUNTER (OUTPATIENT)
Dept: INFUSION THERAPY | Age: 84
Discharge: HOME | End: 2024-04-09

## 2024-04-09 PROBLEM — I95.9 HYPOTENSION: Status: ACTIVE | Noted: 2024-04-09

## 2024-04-09 PROBLEM — D62 ABLA (ACUTE BLOOD LOSS ANEMIA): Status: ACTIVE | Noted: 2024-04-09

## 2024-04-09 LAB
ERYTHROCYTE [DISTWIDTH] IN BLOOD BY AUTOMATED COUNT: 15.5 % (ref 11.5–15)
HCT VFR BLD AUTO: 20.9 % (ref 37–54)
HCT VFR BLD AUTO: 21.1 % (ref 37–54)
HCT VFR BLD AUTO: 22.8 % (ref 37–54)
HCT VFR BLD AUTO: 23.5 % (ref 37–54)
HCT VFR BLD AUTO: 23.6 % (ref 37–54)
HGB BLD-MCNC: 6.3 G/DL (ref 12.5–16.5)
HGB BLD-MCNC: 6.3 G/DL (ref 12.5–16.5)
HGB BLD-MCNC: 7 G/DL (ref 12.5–16.5)
HGB BLD-MCNC: 7.2 G/DL (ref 12.5–16.5)
HGB BLD-MCNC: 7.4 G/DL (ref 12.5–16.5)
INR PPP: 1.1
MCH RBC QN AUTO: 30.4 PG (ref 26–35)
MCHC RBC AUTO-ENTMCNC: 30.7 G/DL (ref 32–34.5)
MCV RBC AUTO: 99.1 FL (ref 80–99.9)
PLATELET # BLD AUTO: 176 K/UL (ref 130–450)
PMV BLD AUTO: 10.7 FL (ref 7–12)
PROTHROMBIN TIME: 12.1 SEC (ref 9.3–12.4)
RBC # BLD AUTO: 2.3 M/UL (ref 3.8–5.8)
WBC OTHER # BLD: 5.3 K/UL (ref 4.5–11.5)

## 2024-04-09 PROCEDURE — 6370000000 HC RX 637 (ALT 250 FOR IP): Performed by: INTERNAL MEDICINE

## 2024-04-09 PROCEDURE — 85610 PROTHROMBIN TIME: CPT

## 2024-04-09 PROCEDURE — 96361 HYDRATE IV INFUSION ADD-ON: CPT

## 2024-04-09 PROCEDURE — 36430 TRANSFUSION BLD/BLD COMPNT: CPT

## 2024-04-09 PROCEDURE — 6360000002 HC RX W HCPCS: Performed by: INTERNAL MEDICINE

## 2024-04-09 PROCEDURE — 2700000000 HC OXYGEN THERAPY PER DAY

## 2024-04-09 PROCEDURE — 84157 ASSAY OF PROTEIN OTHER: CPT

## 2024-04-09 PROCEDURE — 99232 SBSQ HOSP IP/OBS MODERATE 35: CPT | Performed by: STUDENT IN AN ORGANIZED HEALTH CARE EDUCATION/TRAINING PROGRAM

## 2024-04-09 PROCEDURE — 99233 SBSQ HOSP IP/OBS HIGH 50: CPT | Performed by: INTERNAL MEDICINE

## 2024-04-09 PROCEDURE — 36415 COLL VENOUS BLD VENIPUNCTURE: CPT

## 2024-04-09 PROCEDURE — 94669 MECHANICAL CHEST WALL OSCILL: CPT

## 2024-04-09 PROCEDURE — 89051 BODY FLUID CELL COUNT: CPT

## 2024-04-09 PROCEDURE — 82945 GLUCOSE OTHER FLUID: CPT

## 2024-04-09 PROCEDURE — 87205 SMEAR GRAM STAIN: CPT

## 2024-04-09 PROCEDURE — P9016 RBC LEUKOCYTES REDUCED: HCPCS

## 2024-04-09 PROCEDURE — 87070 CULTURE OTHR SPECIMN AEROBIC: CPT

## 2024-04-09 PROCEDURE — 85014 HEMATOCRIT: CPT

## 2024-04-09 PROCEDURE — 83615 LACTATE (LD) (LDH) ENZYME: CPT

## 2024-04-09 PROCEDURE — C9113 INJ PANTOPRAZOLE SODIUM, VIA: HCPCS | Performed by: INTERNAL MEDICINE

## 2024-04-09 PROCEDURE — 30233N1 TRANSFUSION OF NONAUTOLOGOUS RED BLOOD CELLS INTO PERIPHERAL VEIN, PERCUTANEOUS APPROACH: ICD-10-PCS | Performed by: INTERNAL MEDICINE

## 2024-04-09 PROCEDURE — 71045 X-RAY EXAM CHEST 1 VIEW: CPT

## 2024-04-09 PROCEDURE — A4216 STERILE WATER/SALINE, 10 ML: HCPCS | Performed by: INTERNAL MEDICINE

## 2024-04-09 PROCEDURE — 1200000000 HC SEMI PRIVATE

## 2024-04-09 PROCEDURE — P9047 ALBUMIN (HUMAN), 25%, 50ML: HCPCS | Performed by: INTERNAL MEDICINE

## 2024-04-09 PROCEDURE — 85027 COMPLETE CBC AUTOMATED: CPT

## 2024-04-09 PROCEDURE — 2580000003 HC RX 258: Performed by: INTERNAL MEDICINE

## 2024-04-09 PROCEDURE — 94640 AIRWAY INHALATION TREATMENT: CPT

## 2024-04-09 PROCEDURE — 85018 HEMOGLOBIN: CPT

## 2024-04-09 PROCEDURE — 96376 TX/PRO/DX INJ SAME DRUG ADON: CPT

## 2024-04-09 RX ORDER — BUMETANIDE 0.25 MG/ML
0.5 INJECTION INTRAMUSCULAR; INTRAVENOUS ONCE
Status: COMPLETED | OUTPATIENT
Start: 2024-04-09 | End: 2024-04-09

## 2024-04-09 RX ORDER — SODIUM CHLORIDE 9 MG/ML
INJECTION, SOLUTION INTRAVENOUS PRN
Status: DISCONTINUED | OUTPATIENT
Start: 2024-04-09 | End: 2024-04-18 | Stop reason: HOSPADM

## 2024-04-09 RX ORDER — ALBUMIN (HUMAN) 12.5 G/50ML
50 SOLUTION INTRAVENOUS ONCE
Status: COMPLETED | OUTPATIENT
Start: 2024-04-09 | End: 2024-04-09

## 2024-04-09 RX ORDER — SODIUM CHLORIDE 0.9 % (FLUSH) 0.9 %
5-40 SYRINGE (ML) INJECTION EVERY 12 HOURS SCHEDULED
Status: DISCONTINUED | OUTPATIENT
Start: 2024-04-09 | End: 2024-04-18 | Stop reason: HOSPADM

## 2024-04-09 RX ORDER — 0.9 % SODIUM CHLORIDE 0.9 %
500 INTRAVENOUS SOLUTION INTRAVENOUS ONCE
Status: COMPLETED | OUTPATIENT
Start: 2024-04-09 | End: 2024-04-09

## 2024-04-09 RX ORDER — SODIUM CHLORIDE 0.9 % (FLUSH) 0.9 %
5-40 SYRINGE (ML) INJECTION PRN
Status: DISCONTINUED | OUTPATIENT
Start: 2024-04-09 | End: 2024-04-18 | Stop reason: HOSPADM

## 2024-04-09 RX ORDER — GUAIFENESIN 400 MG/1
400 TABLET ORAL 4 TIMES DAILY PRN
Status: DISCONTINUED | OUTPATIENT
Start: 2024-04-09 | End: 2024-04-18 | Stop reason: HOSPADM

## 2024-04-09 RX ORDER — IPRATROPIUM BROMIDE AND ALBUTEROL SULFATE 2.5; .5 MG/3ML; MG/3ML
1 SOLUTION RESPIRATORY (INHALATION) EVERY 4 HOURS PRN
Status: DISCONTINUED | OUTPATIENT
Start: 2024-04-09 | End: 2024-04-18 | Stop reason: HOSPADM

## 2024-04-09 RX ADMIN — LATANOPROST 1 DROP: 50 SOLUTION OPHTHALMIC at 21:26

## 2024-04-09 RX ADMIN — OXYCODONE AND ACETAMINOPHEN 1 TABLET: 5; 325 TABLET ORAL at 21:24

## 2024-04-09 RX ADMIN — OXYCODONE AND ACETAMINOPHEN 1 TABLET: 5; 325 TABLET ORAL at 02:19

## 2024-04-09 RX ADMIN — SODIUM CHLORIDE, PRESERVATIVE FREE 40 MG: 5 INJECTION INTRAVENOUS at 05:12

## 2024-04-09 RX ADMIN — SODIUM CHLORIDE, POTASSIUM CHLORIDE, SODIUM LACTATE AND CALCIUM CHLORIDE: 600; 310; 30; 20 INJECTION, SOLUTION INTRAVENOUS at 08:12

## 2024-04-09 RX ADMIN — ATORVASTATIN CALCIUM 40 MG: 40 TABLET, FILM COATED ORAL at 21:25

## 2024-04-09 RX ADMIN — OXYCODONE AND ACETAMINOPHEN 1 TABLET: 5; 325 TABLET ORAL at 06:55

## 2024-04-09 RX ADMIN — SUCRALFATE 1 G: 1 TABLET ORAL at 17:07

## 2024-04-09 RX ADMIN — CYANOCOBALAMIN TAB 1000 MCG 1000 MCG: 1000 TAB at 09:55

## 2024-04-09 RX ADMIN — SODIUM CHLORIDE, PRESERVATIVE FREE 40 MG: 5 INJECTION INTRAVENOUS at 16:00

## 2024-04-09 RX ADMIN — ALBUMIN (HUMAN) 50 G: 0.25 INJECTION, SOLUTION INTRAVENOUS at 10:01

## 2024-04-09 RX ADMIN — SUCRALFATE 1 G: 1 TABLET ORAL at 13:00

## 2024-04-09 RX ADMIN — METOPROLOL SUCCINATE 25 MG: 25 TABLET, FILM COATED, EXTENDED RELEASE ORAL at 21:27

## 2024-04-09 RX ADMIN — IPRATROPIUM BROMIDE AND ALBUTEROL SULFATE 1 DOSE: 2.5; .5 SOLUTION RESPIRATORY (INHALATION) at 08:32

## 2024-04-09 RX ADMIN — SODIUM CHLORIDE 500 ML: 9 INJECTION, SOLUTION INTRAVENOUS at 10:03

## 2024-04-09 RX ADMIN — SUCRALFATE 1 G: 1 TABLET ORAL at 21:25

## 2024-04-09 RX ADMIN — SODIUM CHLORIDE, POTASSIUM CHLORIDE, SODIUM LACTATE AND CALCIUM CHLORIDE: 600; 310; 30; 20 INJECTION, SOLUTION INTRAVENOUS at 12:25

## 2024-04-09 RX ADMIN — IPRATROPIUM BROMIDE AND ALBUTEROL SULFATE 1 DOSE: 2.5; .5 SOLUTION RESPIRATORY (INHALATION) at 22:51

## 2024-04-09 RX ADMIN — CETIRIZINE HYDROCHLORIDE 5 MG: 10 TABLET, FILM COATED ORAL at 09:54

## 2024-04-09 RX ADMIN — IPRATROPIUM BROMIDE AND ALBUTEROL SULFATE 1 DOSE: 2.5; .5 SOLUTION RESPIRATORY (INHALATION) at 18:17

## 2024-04-09 RX ADMIN — FINASTERIDE 5 MG: 5 TABLET, FILM COATED ORAL at 09:54

## 2024-04-09 RX ADMIN — BUMETANIDE 0.5 MG: 0.25 INJECTION INTRAMUSCULAR; INTRAVENOUS at 16:00

## 2024-04-09 RX ADMIN — MULTIVITAMIN TABLET 1 TABLET: TABLET at 09:55

## 2024-04-09 RX ADMIN — SUCRALFATE 1 G: 1 TABLET ORAL at 09:55

## 2024-04-09 ASSESSMENT — PAIN DESCRIPTION - ORIENTATION
ORIENTATION: LOWER
ORIENTATION: LOWER

## 2024-04-09 ASSESSMENT — PAIN DESCRIPTION - LOCATION
LOCATION: BACK;BUTTOCKS
LOCATION: BACK;BUTTOCKS
LOCATION: BUTTOCKS
LOCATION: RECTUM

## 2024-04-09 ASSESSMENT — PAIN SCALES - GENERAL
PAINLEVEL_OUTOF10: 9
PAINLEVEL_OUTOF10: 10
PAINLEVEL_OUTOF10: 7
PAINLEVEL_OUTOF10: 9

## 2024-04-09 ASSESSMENT — PAIN DESCRIPTION - DESCRIPTORS
DESCRIPTORS: ACHING

## 2024-04-09 NOTE — PROGRESS NOTES
Plan for PAZ lion tomorrow 4/10.    Electronically signed by Emily Pavon RN on 4/9/2024 at 1:05 PM

## 2024-04-09 NOTE — PATIENT CARE CONFERENCE
P Quality Flow/Interdisciplinary Rounds Progress Note        Quality Flow Rounds held on April 9, 2024    Disciplines Attending:  Bedside Nurse, , , and Nursing Unit Leadership    Uzair Fuentes was admitted on 4/6/2024 10:45 PM    Anticipated Discharge Date:       Disposition:    Jaylen Score:  Jaylen Scale Score: 14    Readmission Risk              Risk of Unplanned Readmission:  0           Discussed patient goal for the day, patient clinical progression, and barriers to discharge.  The following Goal(s) of the Day/Commitment(s) have been identified:  Diagnostics - Report Results and Labs - Report Results      Emily Pavon RN  April 9, 2024

## 2024-04-09 NOTE — PROGRESS NOTES
Uzair WILLY Fuentes 1940    SUBJECTIVE:    Denies of any rock bleed but reports having dark stool.   It does not appears he has been very ambulatory.  Currently receiving blood.     OBJECTIVE  Physical Exam:  VITALS:  BP (!) 106/56   Pulse 95   Temp 98.7 °F (37.1 °C) (Oral)   Resp 18   Ht 1.803 m (5' 11\")   Wt 94.3 kg (207 lb 14.3 oz)   SpO2 94%   BMI 29.00 kg/m²   ENT:  oropharynx clear, no evidence of mucositis.   NHEMATOLOGIC/LYMPHATICS:  No abnormal lymphadenopathy.  LUNGS: Mild rhonchi. No wheezing.   CARDIOVASCULAR:  Regular rate and rhythm. No clicks, murmurs, rubs or gallops.  ABDOMEN:  Soft, nontender.  No ascites.  No mass or organomegaly.  NEUROLOGIC:  No focal deficits.  SKIN:  No rash.  EXTREMITIES: without clubbing, cyanosis, or edema.    DIAGNOSTIC DATA  Labs:    Lab Results   Component Value Date    WBC 5.3 04/09/2024    HGB 7.0 (L) 04/09/2024    HCT 22.8 (L) 04/09/2024    MCV 99.1 04/09/2024     04/09/2024     No results found for: \"CEA\"  Recent Labs     04/06/24  2325 04/08/24  0120    139   CO2 23 25   PHOS  --  3.4   BUN 24* 27*   CREATININE 1.8* 2.0*     Lab Results   Component Value Date    FERRITIN 238 04/07/2024     Lab Results   Component Value Date    ALT 21 04/08/2024    AST 27 04/08/2024    ALKPHOS 101 04/08/2024    BILITOT 0.2 04/08/2024     Lab Results   Component Value Date    LABALBU 2.4 (L) 04/08/2024         Current Facility-Administered Medications   Medication Dose Route Frequency Provider Last Rate Last Admin    0.9 % sodium chloride infusion   IntraVENous PRN Hric, Abdelrahman, DO        sodium chloride flush 0.9 % injection 5-40 mL  5-40 mL IntraVENous 2 times per day Sven Dyer MD        sodium chloride flush 0.9 % injection 5-40 mL  5-40 mL IntraVENous PRN Sven Dyer MD        0.9 % sodium chloride infusion   IntraVENous PRN Sven Dyer MD        0.9 % sodium chloride infusion   IntraVENous PRN Sven Dyer MD        ipratropium 0.5  partial response  with decrease in bulky retroperitoneal lymphadenopathy on his most recent CT abdomen from February  Last colonoscopy in February 2024 did not show autoimmune colitis     Recurrent lower GI bleeding in a patient on anticoagulation with Eliquis for DVT of left femoral vein diagnosed in October 2023 as well has antiplatelet therapy with aspirin. EGD showed mild irritation.  GI following; no immediate interventions recommended at this time.     Patient has had multiple admissions with GI bleed  Eliquis and baby aspirin on hold  Repeat U/S did not show DVT.  Case evaluated by vascular surgery.   No IVC filter placement recommended.    Monitor for bleed.  Monitor for recurrent VTE.     Transfuse for hemoglobin below 7  Continue parenteral IV iron with Ferrlecit  Ordered folate and B12 earlier but not collected so will reorder.  Anemia is likely multifactorial including anemia of chronic disease as well as GI bleed. Retic elevated  No overt evidence of hemolysis    Left large Pleural effusion- thoracentesis requested  Cytology and fluid studies ordered    From an oncologic standpoint, appears to be responding to treatment.  It has been a couple months since last dedicated sets of scans.   Overall functional status is not great but seems to have tolerated nivolumab well otherwise.  Will order CT c/a/p to reassess current disease status.  Prognosis guarded        Edy Fuller MD  Medical Oncology  [facility]  04/09/24 6:03 PM

## 2024-04-09 NOTE — PROGRESS NOTES
Charge RN Emily updated that thoracentesis will be 4/10 due to blood pressure. Verified with Emily that patient is on 4L O2 and has decreased from 6 Liters.

## 2024-04-09 NOTE — PROGRESS NOTES
Dr. Ritchie notified of pt's hgb of 6.1. 1 unit of PRBCs ordered with a recheck one hour post infusion    Electronically signed by Radha Gallo RN on 4/9/2024 at 3:22 AM

## 2024-04-09 NOTE — PROGRESS NOTES
Patient BP 88/52 on automatic. Repeat BP 88/48 manually. RN notified.     Cathi Terrell  Nursing Student  04/09/2024 0804    Verified by    Angela Lamar, MSN, RN  4/9/2024  8:08 AM

## 2024-04-09 NOTE — PROGRESS NOTES
Progress  Note  Chief Complaint   Patient presents with    Lab result      Gets blood transfusions once a month. Hgb at [facility] was 6.9. is on 2L NC at baseline. +thinners      Historical Issues:  Current Facility-Administered Medications   Medication Dose Route Frequency Provider Last Rate Last Admin    0.9 % sodium chloride infusion   IntraVENous PRN Abdelrahman Ritchie,         sodium chloride flush 0.9 % injection 5-40 mL  5-40 mL IntraVENous 2 times per day Sven Dyer MD        sodium chloride flush 0.9 % injection 5-40 mL  5-40 mL IntraVENous PRN Sven Dyer MD        0.9 % sodium chloride infusion   IntraVENous PRN Sven Dyer MD        ipratropium 0.5 mg-albuterol 2.5 mg (DUONEB) nebulizer solution 1 Dose  1 Dose Inhalation BID Sven Dyer MD   1 Dose at 04/09/24 0832    atorvastatin (LIPITOR) tablet 40 mg  40 mg Oral Nightly Sven Dyer MD   40 mg at 04/08/24 2013    brinzolamide-brimonidine 1-0.2 % SUSP 1 drop (Patient Supplied)  1 drop Both Eyes BID Sven Dyer MD        vitamin B-12 (CYANOCOBALAMIN) tablet 1,000 mcg  1,000 mcg Oral Daily Sven Dyer MD   1,000 mcg at 04/09/24 0955    finasteride (PROSCAR) tablet 5 mg  5 mg Oral Daily Sven Dyer MD   5 mg at 04/09/24 0954    latanoprost (XALATAN) 0.005 % ophthalmic solution 1 drop  1 drop Both Eyes Nightly Sven Dyer MD   1 drop at 04/08/24 2013    lidocaine (LMX) 4 % cream   Topical Daily PRN Sven Dyer MD        cetirizine (ZYRTEC) tablet 5 mg  5 mg Oral Daily Sven Dyer MD   5 mg at 04/09/24 0954    metoprolol succinate (TOPROL XL) extended release tablet 25 mg  25 mg Oral BID Sven Dyer MD   25 mg at 04/08/24 2013    multivitamin 1 tablet  1 tablet Oral Daily Sven Dyer MD   1 tablet at 04/09/24 0955    oxyCODONE-acetaminophen (PERCOCET) 5-325 MG per tablet 1 tablet  1 tablet Oral Q4H PRN Sven Dyer MD   1 tablet at 04/09/24 0655    sucralfate (CARAFATE) tablet 1 g  1 g Oral 4x Daily Sven Dyer  9.3 9.2     Recent Labs     04/06/24  2325 04/07/24  0605 04/09/24  0147 04/09/24  0242 04/09/24  0857   WBC 5.6  --   --   --   --    RBC 2.33*  --   --   --   --    HGB 7.1*   < > 6.3* 6.3* 7.2*   HCT 23.5*   < > 20.9* 21.1* 23.6*   .9*  --   --   --   --    MCH 30.5  --   --   --   --    MCHC 30.2*  --   --   --   --    RDW 14.9  --   --   --   --      --   --   --   --    MPV 10.5  --   --   --   --     < > = values in this interval not displayed.           Principal Problem:    Acute upper GI bleed  Active Problems:    Coronary artery disease involving native coronary artery of native heart without angina pectoris    Renal cell cancer (HCC)    Severe protein-calorie malnutrition (HCC)    Acute blood loss anemia    CKD (chronic kidney disease)    Pleural effusion on left    ABLA (acute blood loss anemia)    Hypotension  Resolved Problems:    * No resolved hospital problems. *      Plan:  Acute blood loss anemia associated with an acute upper GI bleed required blood transfusion yesterday.  Despite the blood transfusion which was given overnight his blood pressure was low this morning.  He was then given a fluid bolus and 50 g of salt poor albumin.  His blood pressure did improve with this.  A chest x-ray demonstrates a large left pleural effusion and a small effusion on the right side.  Because the pleural effusion did not improve with diuretic therapy yesterday he will have a thoracentesis today.  He has been n.p.o. in anticipation of the thoracentesis.  I have ordered the appropriate test for lights criteria with the thoracentesis.  I have also ordered cytology given the fact that he has a renal cell carcinoma.      Case Discussed with:      Electronically signed by Sven Dyer MD on 4/9/2024 at 12:44 PM

## 2024-04-09 NOTE — PROGRESS NOTES
PVC's (premature ventricular contractions)    Coronary artery disease involving native coronary artery of native heart without angina pectoris    Primary hypertension    Hyperlipidemia    Stage 3b chronic kidney disease (HCC)    Renal cell cancer (HCC)    Acute on chronic anemia    Metastatic renal cell carcinoma (HCC)    Severe protein-calorie malnutrition (HCC)    First degree AV block    Abdominal aortic aneurysm (AAA) without rupture (HCC)    Acute kidney injury (HCC)    Stage 4 chronic kidney disease (HCC)    Monoclonal gammopathy    Chronic deep vein thrombosis (DVT) of proximal vein of lower extremity (HCC)    Goals of care, counseling/discussion    Palliative care by specialist    Iron deficiency    Low zinc level    Acute blood loss anemia    Cardiomyopathy (HCC)    AV block, Mobitz 1    On apixaban therapy    Deep vein thrombosis (DVT) of left lower extremity (HCC)    Other long term (current) drug therapy    Acute on chronic blood loss anemia    CKD (chronic kidney disease)    Acute upper GI bleed    Pleural effusion on left    ABLA (acute blood loss anemia)    Hypotension     1.  Anemia/GI bleed  -Acute on chronic anemia  -Normocytic/iron deficient  -Questionable red stool.  -Likely precipitated/exacerbated by oral anticoagulation/chemotherapy  --EGD 2/10/2024 by Dr. Heredia showing normal examined esophagus, moderate gastritis, normal examined proximal small bowel  -Colonoscopy 2/12/2024 with diverticular disease transverse/descending/sigmoid colon otherwise unremarkable with fair prep  -Repeat upper endoscopy 3/28/2024 revealing mild gastritis but no source of bleeding  -Negative nuclear medicine bleeding scan on previous admission including delayed images  -Little utility in repeating colonoscopy unless ongoing bleeding  -Hold oral anticoagulation if clinically feasible  -Monitor H&H; defer transfusion to admitting  -Provided IV iron supplementation     2.  Metastatic malignancy  -Per  admitting/pertinent consultants     3.  Multiple comorbidities  -Anticoagulation recommendation as above -will require holding of oral anticoagulation for 48 to 72 hours prior to procedure  -Per admitting / pertinent consultants     Decreased H&H however no further dark stool.  Unremarkable EGD and colonoscopy in mid February of this year and unremarkable repeat upper endoscopy on 28 March.  Negative nuclear medicine bleeding scan on prior admission 3/31/2024.  Little utility in repeating colonoscopy at this time.  Anemia likely precipitated by oral anticoagulation and recent chemotherapy.  Agree with plan for IVC filter placement and holding oral anticoagulation.  No immediate plan for balloon enteroscopy but will plan for video capsule endoscopy as outpatient which will be scheduled in short order after patient is discharged from the hospital.  If further decrease in H&H should consider repeat inpatient nuclear medicine bleeding scan.  Future interventions will depend on H&H dynamics/clinical course and video capsule endoscopy findings.  Okay for regular diet from GI.    Law Daniel MD  4/9/2024  1:05 PM    NOTE:  This report was transcribed using voice recognition software.  Every effort was made to ensure accuracy; however, inadvertent computerized transcription errors may be present.

## 2024-04-10 ENCOUNTER — APPOINTMENT (OUTPATIENT)
Dept: CT IMAGING | Age: 84
DRG: 813 | End: 2024-04-10
Payer: OTHER GOVERNMENT

## 2024-04-10 ENCOUNTER — APPOINTMENT (OUTPATIENT)
Dept: ULTRASOUND IMAGING | Age: 84
DRG: 813 | End: 2024-04-10
Payer: OTHER GOVERNMENT

## 2024-04-10 LAB
ABO/RH: NORMAL
ANTIBODY SCREEN: NEGATIVE
APPEARANCE FLD: CLEAR
ARM BAND NUMBER: NORMAL
BLOOD BANK BLOOD PRODUCT EXPIRATION DATE: NORMAL
BLOOD BANK DISPENSE STATUS: NORMAL
BLOOD BANK ISBT PRODUCT BLOOD TYPE: 5100
BLOOD BANK PRODUCT CODE: NORMAL
BLOOD BANK SAMPLE EXPIRATION: NORMAL
BLOOD BANK UNIT TYPE AND RH: NORMAL
BODY FLD TYPE: NORMAL
BPU ID: NORMAL
CLOT CHECK: NORMAL
COLOR FLD: NORMAL
COMPONENT: NORMAL
CRITICAL: ABNORMAL
CROSSMATCH RESULT: NORMAL
DATE ANALYZED: ABNORMAL
DATE OF COLLECTION: ABNORMAL
FOLATE SERPL-MCNC: 14.1 NG/ML (ref 4.8–24.2)
GLUCOSE FLD-MCNC: 124 MG/DL
HCT VFR BLD AUTO: 23.6 % (ref 37–54)
HCT VFR BLD AUTO: 25.3 % (ref 37–54)
HCT VFR BLD AUTO: 25.7 % (ref 37–54)
HGB BLD-MCNC: 7.5 G/DL (ref 12.5–16.5)
HGB BLD-MCNC: 7.9 G/DL (ref 12.5–16.5)
HGB BLD-MCNC: 8 G/DL (ref 12.5–16.5)
LAB: ABNORMAL
LDH FLD L TO P-CCNC: 55 U/L
LDH SERPL-CCNC: 237 U/L (ref 135–225)
Lab: 1015
Lab: ABNORMAL
MONOCYTES NFR FLD: 91 %
NEUTROPHILS NFR FLD: 9 %
OPERATOR ID: 101
PH FLUID: ABNORMAL
PROT FLD-MCNC: 0.9 G/DL
PROT SERPL-MCNC: 4.6 G/DL (ref 6.4–8.3)
RBC # FLD: <2000 CELLS/UL
SOURCE, BLOOD GAS: ABNORMAL
SPECIMEN TYPE: NORMAL
TIME ANALYZED: 1030
TRANSFUSION STATUS: NORMAL
UNIT DIVISION: 0
UNIT ISSUE DATE/TIME: NORMAL
VIT B12 SERPL-MCNC: 1511 PG/ML (ref 211–946)
WBC # FLD: 57 CELLS/UL
ZINC SERPL-MCNC: 38 UG/DL (ref 60–120)

## 2024-04-10 PROCEDURE — A4216 STERILE WATER/SALINE, 10 ML: HCPCS | Performed by: INTERNAL MEDICINE

## 2024-04-10 PROCEDURE — 6360000002 HC RX W HCPCS: Performed by: INTERNAL MEDICINE

## 2024-04-10 PROCEDURE — 88112 CYTOPATH CELL ENHANCE TECH: CPT

## 2024-04-10 PROCEDURE — C9113 INJ PANTOPRAZOLE SODIUM, VIA: HCPCS | Performed by: INTERNAL MEDICINE

## 2024-04-10 PROCEDURE — 2700000000 HC OXYGEN THERAPY PER DAY

## 2024-04-10 PROCEDURE — 2580000003 HC RX 258: Performed by: INTERNAL MEDICINE

## 2024-04-10 PROCEDURE — 99233 SBSQ HOSP IP/OBS HIGH 50: CPT | Performed by: INTERNAL MEDICINE

## 2024-04-10 PROCEDURE — 2500000003 HC RX 250 WO HCPCS: Performed by: PHYSICIAN ASSISTANT

## 2024-04-10 PROCEDURE — 83615 LACTATE (LD) (LDH) ENZYME: CPT

## 2024-04-10 PROCEDURE — 88305 TISSUE EXAM BY PATHOLOGIST: CPT

## 2024-04-10 PROCEDURE — 71250 CT THORAX DX C-: CPT

## 2024-04-10 PROCEDURE — 84155 ASSAY OF PROTEIN SERUM: CPT

## 2024-04-10 PROCEDURE — 85014 HEMATOCRIT: CPT

## 2024-04-10 PROCEDURE — 82607 VITAMIN B-12: CPT

## 2024-04-10 PROCEDURE — 36415 COLL VENOUS BLD VENIPUNCTURE: CPT

## 2024-04-10 PROCEDURE — 82746 ASSAY OF FOLIC ACID SERUM: CPT

## 2024-04-10 PROCEDURE — 85018 HEMOGLOBIN: CPT

## 2024-04-10 PROCEDURE — 99232 SBSQ HOSP IP/OBS MODERATE 35: CPT | Performed by: INTERNAL MEDICINE

## 2024-04-10 PROCEDURE — 6370000000 HC RX 637 (ALT 250 FOR IP): Performed by: INTERNAL MEDICINE

## 2024-04-10 PROCEDURE — 83986 ASSAY PH BODY FLUID NOS: CPT

## 2024-04-10 PROCEDURE — 74176 CT ABD & PELVIS W/O CONTRAST: CPT

## 2024-04-10 PROCEDURE — 0W9B3ZZ DRAINAGE OF LEFT PLEURAL CAVITY, PERCUTANEOUS APPROACH: ICD-10-PCS | Performed by: INTERNAL MEDICINE

## 2024-04-10 PROCEDURE — 2709999900 US THORACENTESIS

## 2024-04-10 PROCEDURE — 1200000000 HC SEMI PRIVATE

## 2024-04-10 RX ORDER — LIDOCAINE HYDROCHLORIDE 10 MG/ML
INJECTION, SOLUTION INFILTRATION; PERINEURAL PRN
Status: COMPLETED | OUTPATIENT
Start: 2024-04-10 | End: 2024-04-10

## 2024-04-10 RX ORDER — ZINC SULFATE 50(220)MG
50 CAPSULE ORAL DAILY
Status: DISCONTINUED | OUTPATIENT
Start: 2024-04-10 | End: 2024-04-18 | Stop reason: HOSPADM

## 2024-04-10 RX ADMIN — SODIUM CHLORIDE, PRESERVATIVE FREE 40 MG: 5 INJECTION INTRAVENOUS at 13:11

## 2024-04-10 RX ADMIN — SUCRALFATE 1 G: 1 TABLET ORAL at 08:20

## 2024-04-10 RX ADMIN — OXYCODONE AND ACETAMINOPHEN 1 TABLET: 5; 325 TABLET ORAL at 00:44

## 2024-04-10 RX ADMIN — ZINC SULFATE 220 MG (50 MG) CAPSULE 50 MG: CAPSULE at 08:19

## 2024-04-10 RX ADMIN — MULTIVITAMIN TABLET 1 TABLET: TABLET at 08:20

## 2024-04-10 RX ADMIN — OXYCODONE AND ACETAMINOPHEN 1 TABLET: 5; 325 TABLET ORAL at 22:06

## 2024-04-10 RX ADMIN — SODIUM CHLORIDE, PRESERVATIVE FREE 40 MG: 5 INJECTION INTRAVENOUS at 03:15

## 2024-04-10 RX ADMIN — LATANOPROST 1 DROP: 50 SOLUTION OPHTHALMIC at 22:06

## 2024-04-10 RX ADMIN — OXYCODONE AND ACETAMINOPHEN 1 TABLET: 5; 325 TABLET ORAL at 06:28

## 2024-04-10 RX ADMIN — FINASTERIDE 5 MG: 5 TABLET, FILM COATED ORAL at 08:19

## 2024-04-10 RX ADMIN — SUCRALFATE 1 G: 1 TABLET ORAL at 16:23

## 2024-04-10 RX ADMIN — GUAIFENESIN 400 MG: 400 TABLET ORAL at 00:44

## 2024-04-10 RX ADMIN — SUCRALFATE 1 G: 1 TABLET ORAL at 13:11

## 2024-04-10 RX ADMIN — SODIUM CHLORIDE, PRESERVATIVE FREE 10 ML: 5 INJECTION INTRAVENOUS at 08:19

## 2024-04-10 RX ADMIN — ATORVASTATIN CALCIUM 40 MG: 40 TABLET, FILM COATED ORAL at 22:06

## 2024-04-10 RX ADMIN — METOPROLOL SUCCINATE 25 MG: 25 TABLET, FILM COATED, EXTENDED RELEASE ORAL at 22:06

## 2024-04-10 RX ADMIN — CYANOCOBALAMIN TAB 1000 MCG 1000 MCG: 1000 TAB at 08:20

## 2024-04-10 RX ADMIN — METOPROLOL SUCCINATE 25 MG: 25 TABLET, FILM COATED, EXTENDED RELEASE ORAL at 08:20

## 2024-04-10 RX ADMIN — GUAIFENESIN 400 MG: 400 TABLET ORAL at 22:06

## 2024-04-10 RX ADMIN — CETIRIZINE HYDROCHLORIDE 5 MG: 10 TABLET, FILM COATED ORAL at 08:20

## 2024-04-10 RX ADMIN — SUCRALFATE 1 G: 1 TABLET ORAL at 22:06

## 2024-04-10 RX ADMIN — LIDOCAINE HYDROCHLORIDE 10 ML: 10 INJECTION, SOLUTION INFILTRATION; PERINEURAL at 10:15

## 2024-04-10 ASSESSMENT — PAIN DESCRIPTION - DESCRIPTORS
DESCRIPTORS: ACHING

## 2024-04-10 ASSESSMENT — PAIN SCALES - GENERAL
PAINLEVEL_OUTOF10: 9
PAINLEVEL_OUTOF10: 10
PAINLEVEL_OUTOF10: 9

## 2024-04-10 NOTE — PROGRESS NOTES
PROGRESS NOTE  By Law Daniel M.D.    [facility]  Dr. Leopoldo Contreras M.D.,  Dr. Abraham Cuello M.D.,   BARNEY Coronel.O.,  Dr. Sánchez Ho M.D.,  Dr. Dave Heredia D.O.,          Uzair Fuentes  84 y.o.  male    SUBJECTIVE:  No new complaints.  Denies abdominal pain.  Reports no bowel movement today but he is unsure about bowel movement yesterday.  Patient underwent thoracentesis earlier today with removal of approximately 1 L of pleural effusion.    OBJECTIVE:    BP (!) 148/72   Pulse 100   Temp 98.8 °F (37.1 °C) (Oral)   Resp 18   Ht 1.803 m (5' 11\")   Wt 105.6 kg (232 lb 12.8 oz)   SpO2 98%   BMI 32.47 kg/m²     General: NAD/older adult  male.  AAOx3  HEENT: Anicteric sclera/moist oral mucosa  Neck: Supple with trachea midline  Chest: Symmetric excursion/nonlabored respiration  Cor: Regular  Abd.: Soft/nontender and nondistended  Extr.:  Decreased muscle tone and bulk, consistent with age and condition  Skin: Warm and dry.  Anicteric      DATA:         Lab Results   Component Value Date/Time    WBC 5.3 04/09/2024 02:00 PM    RBC 2.30 04/09/2024 02:00 PM    HGB 8.0 04/10/2024 10:55 AM    HCT 25.7 04/10/2024 10:55 AM    MCV 99.1 04/09/2024 02:00 PM    MCH 30.4 04/09/2024 02:00 PM    MCHC 30.7 04/09/2024 02:00 PM    RDW 15.5 04/09/2024 02:00 PM     04/09/2024 02:00 PM    MPV 10.7 04/09/2024 02:00 PM     Lab Results   Component Value Date/Time     04/08/2024 01:20 AM    K 3.9 04/08/2024 01:20 AM    K 4.4 10/18/2022 04:50 AM     04/08/2024 01:20 AM    CO2 25 04/08/2024 01:20 AM    BUN 27 04/08/2024 01:20 AM    CREATININE 2.0 04/08/2024 01:20 AM    CALCIUM 9.2 04/08/2024 01:20 AM    PROT 4.6 04/10/2024 04:12 AM    LABALBU 2.4 04/08/2024 01:20 AM    BILITOT 0.2 04/08/2024 01:20 AM    ALKPHOS 101 04/08/2024 01:20 AM    AST 27 04/08/2024 01:20 AM    ALT 21 04/08/2024 01:20 AM     Lab Results   Component Value Date/Time    LIPASE 66 03/27/2024 03:55 PM     No  clinically feasible  -Monitor H&H; defer transfusion to admitting  -Provided IV iron supplementation     2.  Metastatic malignancy  -Per admitting/pertinent consultants     3.  Multiple comorbidities  -Anticoagulation recommendation as above -will require holding of oral anticoagulation for 48 to 72 hours prior to procedure  -Per admitting / pertinent consultants     Stabilized H&H after transfusion l.  Unremarkable EGD and colonoscopy in mid February of this year and unremarkable repeat upper endoscopy on 28 March.  Negative nuclear medicine bleeding scan on prior admission 3/31/2024.  Little utility in repeating colonoscopy at this time.  Anemia likely precipitated by oral anticoagulation and recent chemotherapy.  Agree with plan for IVC filter placement and holding oral anticoagulation.  No immediate plan for balloon enteroscopy but will plan for video capsule endoscopy as outpatient which will be scheduled in short order after patient is discharged from the hospital.  If further decrease in H&H should consider repeat inpatient nuclear medicine bleeding scan.  Future interventions will depend on H&H dynamics/clinical course and video capsule endoscopy findings.  Okay for regular diet from GI.    Law Daniel MD  4/10/2024  1:13 PM    NOTE:  This report was transcribed using voice recognition software.  Every effort was made to ensure accuracy; however, inadvertent computerized transcription errors may be present.

## 2024-04-10 NOTE — PROGRESS NOTES
Pt requesting a breathing treatment. RT notified    Electronically signed by Radha Gallo RN on 4/9/2024 at 10:49 PM

## 2024-04-10 NOTE — OR NURSING
Patient arrival to radiology for left thoracentesis. Consent signed and vitals taken, Connie Smith PA-C in to speak with the patient about the procedure. Patient placed upright dangling at the side of the bed, images taken using ultrasound and reviewed. Site prepped and draped, catheter inserted to Left posterior chest by Connie Smith PA-C . 1080 Ml of clear yellow colored pleural fluid drained. Procedure completed, site cleansed and dry dressing applied. Specimen sent to lab per physician orders. Nurse handoff report called to floor, patient transported back to room .

## 2024-04-10 NOTE — PATIENT CARE CONFERENCE
[facility] Quality Flow/Interdisciplinary Rounds Progress Note        Quality Flow Rounds held on April 10, 2024    Disciplines Attending:  Bedside Nurse, , , and Nursing Unit Leadership    Uzair Fuentes was admitted on 4/6/2024 10:45 PM    Anticipated Discharge Date:       Disposition:    Jaylen Score:  Jaylen Scale Score: 13    Readmission Risk              Risk of Unplanned Readmission:  48           Discussed patient goal for the day, patient clinical progression, and barriers to discharge.  The following Goal(s) of the Day/Commitment(s) have been identified:  Diagnostics - Report Results      Clara Brewer RN  April 10, 2024

## 2024-04-10 NOTE — PROCEDURES
Procedure: Ultrasound Guided Left Thoracentesis    Diagnosis: Pleural Effusion    Findings: Pleural effusion, successful catheter placement with clear yellow pleural fluid return    Specimen: Approximately 60cc obtained and sent for analysis (orders from referring physician). Total amount of fluid removed 1080 ml.     Anesthesia: Local infiltration of 8 cc 1% Lidocaine without epi    EBL: Minimal.    Complication: None immediately post procedure.    Plan: Return to floor/room following thoracentesis.    Comments:     See radiology dictation in PACs for image review and additional procedural information.

## 2024-04-10 NOTE — PLAN OF CARE
Problem: Safety - Adult  Goal: Free from fall injury  4/10/2024 0924 by Nicole Dowd, RN  Outcome: Progressing  4/10/2024 0610 by Radha Gallo RN  Outcome: Progressing     Problem: Skin/Tissue Integrity  Goal: Absence of new skin breakdown  Description: 1.  Monitor for areas of redness and/or skin breakdown  2.  Assess vascular access sites hourly  3.  Every 4-6 hours minimum:  Change oxygen saturation probe site  4.  Every 4-6 hours:  If on nasal continuous positive airway pressure, respiratory therapy assess nares and determine need for appliance change or resting period.  4/10/2024 0924 by Nicole Dowd, RN  Outcome: Progressing  4/10/2024 0610 by Radha Gallo, RN  Outcome: Progressing

## 2024-04-11 ENCOUNTER — APPOINTMENT (OUTPATIENT)
Dept: GENERAL RADIOLOGY | Age: 84
DRG: 813 | End: 2024-04-11
Payer: OTHER GOVERNMENT

## 2024-04-11 LAB
B.E.: -3.2 MMOL/L (ref -3–3)
BASOPHILS # BLD: 0 K/UL (ref 0–0.2)
BASOPHILS NFR BLD: 0 % (ref 0–2)
COHB: 1.6 % (ref 0–1.5)
CRITICAL: ABNORMAL
DATE ANALYZED: ABNORMAL
DATE OF COLLECTION: ABNORMAL
EOSINOPHIL # BLD: 0 K/UL (ref 0.05–0.5)
EOSINOPHILS RELATIVE PERCENT: 0 % (ref 0–6)
ERYTHROCYTE [DISTWIDTH] IN BLOOD BY AUTOMATED COUNT: 15.9 % (ref 11.5–15)
HCO3: 22.8 MMOL/L (ref 22–26)
HCT VFR BLD AUTO: 24.4 % (ref 37–54)
HGB BLD-MCNC: 7.7 G/DL (ref 12.5–16.5)
HHB: 5.2 % (ref 0–5)
LAB: ABNORMAL
LYMPHOCYTES NFR BLD: 0.06 K/UL (ref 1.5–4)
LYMPHOCYTES RELATIVE PERCENT: 1 % (ref 20–42)
Lab: 1510
MCH RBC QN AUTO: 30.6 PG (ref 26–35)
MCHC RBC AUTO-ENTMCNC: 31.6 G/DL (ref 32–34.5)
MCV RBC AUTO: 96.8 FL (ref 80–99.9)
METHB: 0.3 % (ref 0–1.5)
MICROORGANISM SPEC CULT: NO GROWTH
MICROORGANISM/AGENT SPEC: NORMAL
MODE: ABNORMAL
MONOCYTES NFR BLD: 0.18 K/UL (ref 0.1–0.95)
MONOCYTES NFR BLD: 3 % (ref 2–12)
MYELOCYTES ABSOLUTE COUNT: 0.06 K/UL
MYELOCYTES: 1 %
NEUTROPHILS NFR BLD: 96 % (ref 43–80)
NEUTS SEG NFR BLD: 6.79 K/UL (ref 1.8–7.3)
NON-GYN CYTOLOGY REPORT: NORMAL
O2 CONTENT: 11.6 ML/DL
O2 SATURATION: 94.7 % (ref 92–98.5)
O2HB: 92.9 % (ref 94–97)
OPERATOR ID: 1210
PATIENT TEMP: 37 C
PCO2: 45.3 MMHG (ref 35–45)
PH BLOOD GAS: 7.32 (ref 7.35–7.45)
PLATELET # BLD AUTO: 168 K/UL (ref 130–450)
PMV BLD AUTO: 10.1 FL (ref 7–12)
PO2: 79.8 MMHG (ref 75–100)
RBC # BLD AUTO: 2.52 M/UL (ref 3.8–5.8)
RBC # BLD: ABNORMAL 10*6/UL
SOURCE, BLOOD GAS: ABNORMAL
SPECIMEN DESCRIPTION: NORMAL
THB: 8.8 G/DL (ref 11.5–16.5)
TIME ANALYZED: 1526
WBC # BLD: ABNORMAL 10*3/UL
WBC OTHER # BLD: 7.1 K/UL (ref 4.5–11.5)

## 2024-04-11 PROCEDURE — 2060000000 HC ICU INTERMEDIATE R&B

## 2024-04-11 PROCEDURE — 71045 X-RAY EXAM CHEST 1 VIEW: CPT

## 2024-04-11 PROCEDURE — 6360000002 HC RX W HCPCS: Performed by: INTERNAL MEDICINE

## 2024-04-11 PROCEDURE — 2580000003 HC RX 258: Performed by: INTERNAL MEDICINE

## 2024-04-11 PROCEDURE — 6370000000 HC RX 637 (ALT 250 FOR IP): Performed by: INTERNAL MEDICINE

## 2024-04-11 PROCEDURE — P9047 ALBUMIN (HUMAN), 25%, 50ML: HCPCS | Performed by: INTERNAL MEDICINE

## 2024-04-11 PROCEDURE — 36600 WITHDRAWAL OF ARTERIAL BLOOD: CPT

## 2024-04-11 PROCEDURE — 2700000000 HC OXYGEN THERAPY PER DAY

## 2024-04-11 PROCEDURE — 94640 AIRWAY INHALATION TREATMENT: CPT

## 2024-04-11 PROCEDURE — 82805 BLOOD GASES W/O2 SATURATION: CPT

## 2024-04-11 PROCEDURE — 94660 CPAP INITIATION&MGMT: CPT

## 2024-04-11 PROCEDURE — 5A09457 ASSISTANCE WITH RESPIRATORY VENTILATION, 24-96 CONSECUTIVE HOURS, CONTINUOUS POSITIVE AIRWAY PRESSURE: ICD-10-PCS | Performed by: INTERNAL MEDICINE

## 2024-04-11 PROCEDURE — C9113 INJ PANTOPRAZOLE SODIUM, VIA: HCPCS | Performed by: INTERNAL MEDICINE

## 2024-04-11 PROCEDURE — A4216 STERILE WATER/SALINE, 10 ML: HCPCS | Performed by: INTERNAL MEDICINE

## 2024-04-11 PROCEDURE — 85025 COMPLETE CBC W/AUTO DIFF WBC: CPT

## 2024-04-11 PROCEDURE — 2500000003 HC RX 250 WO HCPCS: Performed by: INTERNAL MEDICINE

## 2024-04-11 PROCEDURE — 99233 SBSQ HOSP IP/OBS HIGH 50: CPT | Performed by: INTERNAL MEDICINE

## 2024-04-11 RX ORDER — ALBUMIN (HUMAN) 12.5 G/50ML
25 SOLUTION INTRAVENOUS ONCE
Status: COMPLETED | OUTPATIENT
Start: 2024-04-11 | End: 2024-04-11

## 2024-04-11 RX ORDER — IPRATROPIUM BROMIDE AND ALBUTEROL SULFATE 2.5; .5 MG/3ML; MG/3ML
1 SOLUTION RESPIRATORY (INHALATION)
Status: DISCONTINUED | OUTPATIENT
Start: 2024-04-11 | End: 2024-04-18 | Stop reason: HOSPADM

## 2024-04-11 RX ORDER — DORZOLAMIDE HCL 20 MG/ML
1 SOLUTION/ DROPS OPHTHALMIC 2 TIMES DAILY
Status: DISCONTINUED | OUTPATIENT
Start: 2024-04-11 | End: 2024-04-18 | Stop reason: HOSPADM

## 2024-04-11 RX ORDER — BRIMONIDINE TARTRATE 2 MG/ML
1 SOLUTION/ DROPS OPHTHALMIC 2 TIMES DAILY
Status: DISCONTINUED | OUTPATIENT
Start: 2024-04-11 | End: 2024-04-18 | Stop reason: HOSPADM

## 2024-04-11 RX ORDER — ARFORMOTEROL TARTRATE 15 UG/2ML
15 SOLUTION RESPIRATORY (INHALATION)
Status: DISCONTINUED | OUTPATIENT
Start: 2024-04-11 | End: 2024-04-18 | Stop reason: HOSPADM

## 2024-04-11 RX ORDER — BUMETANIDE 0.25 MG/ML
2 INJECTION INTRAMUSCULAR; INTRAVENOUS ONCE
Status: COMPLETED | OUTPATIENT
Start: 2024-04-11 | End: 2024-04-11

## 2024-04-11 RX ORDER — BUDESONIDE 0.5 MG/2ML
500 INHALANT ORAL
Status: DISCONTINUED | OUTPATIENT
Start: 2024-04-11 | End: 2024-04-18 | Stop reason: HOSPADM

## 2024-04-11 RX ADMIN — LATANOPROST 1 DROP: 50 SOLUTION OPHTHALMIC at 21:38

## 2024-04-11 RX ADMIN — BRIMONIDINE TARTRATE 1 DROP: 2 SOLUTION/ DROPS OPHTHALMIC at 21:38

## 2024-04-11 RX ADMIN — SUCRALFATE 1 G: 1 TABLET ORAL at 09:53

## 2024-04-11 RX ADMIN — BUDESONIDE INHALATION 500 MCG: 0.5 SUSPENSION RESPIRATORY (INHALATION) at 20:44

## 2024-04-11 RX ADMIN — SODIUM CHLORIDE, PRESERVATIVE FREE 40 MG: 5 INJECTION INTRAVENOUS at 15:03

## 2024-04-11 RX ADMIN — BRIMONIDINE TARTRATE 1 DROP: 2 SOLUTION/ DROPS OPHTHALMIC at 09:52

## 2024-04-11 RX ADMIN — MULTIVITAMIN TABLET 1 TABLET: TABLET at 09:53

## 2024-04-11 RX ADMIN — ARFORMOTEROL TARTRATE 15 MCG: 15 SOLUTION RESPIRATORY (INHALATION) at 20:44

## 2024-04-11 RX ADMIN — GUAIFENESIN 400 MG: 400 TABLET ORAL at 09:53

## 2024-04-11 RX ADMIN — SODIUM CHLORIDE, PRESERVATIVE FREE 40 MG: 5 INJECTION INTRAVENOUS at 02:56

## 2024-04-11 RX ADMIN — DORZOLAMIDE HYDROCHLORIDE 1 DROP: 20 SOLUTION/ DROPS OPHTHALMIC at 09:52

## 2024-04-11 RX ADMIN — IPRATROPIUM BROMIDE AND ALBUTEROL SULFATE 1 DOSE: 2.5; .5 SOLUTION RESPIRATORY (INHALATION) at 03:19

## 2024-04-11 RX ADMIN — MICONAZOLE NITRATE: 20 POWDER TOPICAL at 21:38

## 2024-04-11 RX ADMIN — SODIUM CHLORIDE 125 MG: 900 INJECTION INTRAVENOUS at 09:54

## 2024-04-11 RX ADMIN — ALBUMIN (HUMAN) 25 G: 0.25 INJECTION, SOLUTION INTRAVENOUS at 11:35

## 2024-04-11 RX ADMIN — IPRATROPIUM BROMIDE AND ALBUTEROL SULFATE 1 DOSE: 2.5; .5 SOLUTION RESPIRATORY (INHALATION) at 20:44

## 2024-04-11 RX ADMIN — ZINC SULFATE 220 MG (50 MG) CAPSULE 50 MG: CAPSULE at 09:52

## 2024-04-11 RX ADMIN — METOPROLOL SUCCINATE 25 MG: 25 TABLET, FILM COATED, EXTENDED RELEASE ORAL at 09:53

## 2024-04-11 RX ADMIN — CYANOCOBALAMIN TAB 1000 MCG 1000 MCG: 1000 TAB at 09:53

## 2024-04-11 RX ADMIN — FINASTERIDE 5 MG: 5 TABLET, FILM COATED ORAL at 09:53

## 2024-04-11 RX ADMIN — BUMETANIDE 2 MG: 0.25 INJECTION INTRAMUSCULAR; INTRAVENOUS at 11:34

## 2024-04-11 RX ADMIN — MICONAZOLE NITRATE: 20 POWDER TOPICAL at 11:34

## 2024-04-11 RX ADMIN — IPRATROPIUM BROMIDE AND ALBUTEROL SULFATE 1 DOSE: 2.5; .5 SOLUTION RESPIRATORY (INHALATION) at 08:17

## 2024-04-11 RX ADMIN — SUCRALFATE 1 G: 1 TABLET ORAL at 14:13

## 2024-04-11 RX ADMIN — DORZOLAMIDE HYDROCHLORIDE 1 DROP: 20 SOLUTION/ DROPS OPHTHALMIC at 21:38

## 2024-04-11 RX ADMIN — CETIRIZINE HYDROCHLORIDE 5 MG: 10 TABLET, FILM COATED ORAL at 09:52

## 2024-04-11 ASSESSMENT — PAIN SCALES - WONG BAKER: WONGBAKER_NUMERICALRESPONSE: NO HURT

## 2024-04-11 ASSESSMENT — PAIN SCALES - GENERAL: PAINLEVEL_OUTOF10: 0

## 2024-04-11 NOTE — PATIENT CARE CONFERENCE
P Quality Flow/Interdisciplinary Rounds Progress Note        Quality Flow Rounds held on April 11, 2024    Disciplines Attending:  Bedside Nurse, , , and Nursing Unit Leadership    Uzair Fuentes was admitted on 4/6/2024 10:45 PM    Anticipated Discharge Date:       Disposition:    Jaylen Score:  Jaylen Scale Score: 13    Readmission Risk              Risk of Unplanned Readmission:  41           Discussed patient goal for the day, patient clinical progression, and barriers to discharge.  The following Goal(s) of the Day/Commitment(s) have been identified:  Diagnostics - Report Results and Labs - Report Results      Emily Pavon RN  April 11, 2024

## 2024-04-11 NOTE — PROGRESS NOTES
Vascular:    Please see my progress note dated 8 April 2024    Discussed with Dr. Sven Dyer, after careful review of multiple venous ultrasounds done starting in October of last year, 2 of them this year, CTA of the chest etc., patient at the present time does not need placement of a vena cava filter    However, should the patient develop new well-documented thromboembolic issues in the future and if there is a contraindication for anticoagulation at that time, consideration could be given at that time for the placement of inferior vena cava filter    Vascular service will sign of the case and see the patient as needed

## 2024-04-11 NOTE — PROGRESS NOTES
04/11/24 1513   NIV Type   $NIV $Daily Charge   NIV Started/Stopped On   Equipment Type v60   Mode Bilevel   Mask Type Full face mask   Mask Size Medium   Settings/Measurements   IPAP 12 cmH20   CPAP/EPAP 5 cmH2O   Vt (Measured) 670 mL   Rate Ordered 12   FiO2  45 %   I Time/ I Time % 0.9 s   Minute Volume (L/min) 17 Liters   Mask Leak (lpm) 35 lpm   Patient's Home Machine No   Alarm Settings   Alarms On Y   Low Pressure (cmH2O) 8 cmH2O   High Pressure (cmH2O) 40 cmH2O   RR Low (bpm) 12   RR High (bpm) 40 br/min   Patient Observation   Observations red outlet and oxygen and alarm in     Pt was placed on bipap.

## 2024-04-11 NOTE — PROGRESS NOTES
Pt requesting a breathing treatment. RT notified    Electronically signed by Radha Gallo RN on 4/11/2024 at 2:59 AM

## 2024-04-11 NOTE — PROGRESS NOTES
Called nurse to nurse to [facility] and notified pt's wife Joan Fuentes of room change.

## 2024-04-11 NOTE — PROGRESS NOTES
4 Eyes Skin Assessment     NAME:  Uzair Fuentes  YOB: 1940  MEDICAL RECORD NUMBER:  [id number]    The patient is being assessed for  Transfer to New Unit    I agree that at least one RN has performed a thorough Head to Toe Skin Assessment on the patient. ALL assessment sites listed below have been assessed.      Areas assessed by both nurses:    Head, Face, Ears, Shoulders, Back, Chest, Arms, Elbows, Hands, Sacrum. Buttock, Coccyx, Ischium, Legs. Feet and Heels, Under Medical Devices , and Other          Does the Patient have a Wound? Yes wound(s) were present on assessment. LDA wound assessment was Initiated and completed by RN       Jaylen Prevention initiated by RN: Yes  Wound Care Orders initiated by RN: Yes    Pressure Injury (Stage 3,4, Unstageable, DTI, NWPT, and Complex wounds) if present, place Wound referral order by RN under : No    New Ostomies, if present place, Ostomy referral order under : No     Nurse 1 eSignature: Electronically signed by YO TERRELL RN on 4/11/24 at 4:57 PM EDT    **SHARE this note so that the co-signing nurse can place an eSignature**    Nurse 2 eSignature: Electronically signed by Ishmael Aguirre RN on 4/11/24 at 5:00 PM EDT

## 2024-04-11 NOTE — PROGRESS NOTES
Progress  Note  Chief Complaint   Patient presents with    Lab result      Gets blood transfusions once a month. Hgb at [facility] was 6.9. is on 2L NC at baseline. +thinners      Historical Issues:  Current Facility-Administered Medications   Medication Dose Route Frequency Provider Last Rate Last Admin    dorzolamide (TRUSOPT) 2 % ophthalmic solution 1 drop  1 drop Both Eyes BID Sven Dyer MD   1 drop at 04/11/24 0952    And    brimonidine (ALPHAGAN) 0.2 % ophthalmic solution 1 drop  1 drop Both Eyes BID Sven Dyer MD   1 drop at 04/11/24 0952    miconazole (MICOTIN) 2 % powder   Topical BID Sven Dyer MD   Given at 04/11/24 1134    ipratropium 0.5 mg-albuterol 2.5 mg (DUONEB) nebulizer solution 1 Dose  1 Dose Inhalation 4x Daily RT Sven Dyer MD        zinc sulfate (ZINCATE) 220 mg capsule - elemental zinc 50 mg  50 mg Oral Daily Sven Dyer MD   50 mg at 04/11/24 0952    0.9 % sodium chloride infusion   IntraVENous PRN Erma, Abdelrahman, DO        sodium chloride flush 0.9 % injection 5-40 mL  5-40 mL IntraVENous 2 times per day Sven Dyer MD   10 mL at 04/10/24 0819    sodium chloride flush 0.9 % injection 5-40 mL  5-40 mL IntraVENous PRN Sven Dyer MD        0.9 % sodium chloride infusion   IntraVENous PRN Sven Dyer MD        0.9 % sodium chloride infusion   IntraVENous PRN Sven Dyer MD        guaiFENesin tablet 400 mg  400 mg Oral 4x Daily PRN Marian Jackson APRN - NP   400 mg at 04/11/24 0953    ipratropium 0.5 mg-albuterol 2.5 mg (DUONEB) nebulizer solution 1 Dose  1 Dose Inhalation Q4H PRN Marian Jackson APRN - NP   1 Dose at 04/11/24 0319    atorvastatin (LIPITOR) tablet 40 mg  40 mg Oral Nightly Sven Dyer MD   40 mg at 04/10/24 2206    vitamin B-12 (CYANOCOBALAMIN) tablet 1,000 mcg  1,000 mcg Oral Daily Sven Dyer MD   1,000 mcg at 04/11/24 0953    finasteride (PROSCAR) tablet 5 mg  5 mg Oral Daily Sven Dyer MD   5 mg at 04/11/24 0953       Temp: 98.2 °F (36.8 °C)   SpO2: 94%     Physical Exam  84-year-old male who appears to be uncomfortable today.  He is having some difficulty with breathing.  He has some loose mucus in his upper lungs.  He has wheezing and rales in the lower lung fields.  His abdomen is soft.  His heart rate is controlled.  His lower extremities demonstrate edema.  The left arm has considerable amount erythema.  It is not hot.  Appears that he had a extravasation of IV fluid but he is does not appear to have any phlebitis.  Chest x-ray demonstrated below shows poor positioning with him leaning to the left.  The heart borders are obliterated by pulmonary edema.  The effusion there is previously seen on the left side is markedly reduced.  This was all based on my personal interpretation of the chest x-ray    Recent Labs     04/09/24  1400 04/09/24  2115 04/10/24  1055 04/10/24  1625 04/11/24  0100   WBC 5.3  --   --   --  7.1   RBC 2.30*  --   --   --  2.52*   HGB 7.0*   < > 8.0* 7.9* 7.7*   HCT 22.8*   < > 25.7* 25.3* 24.4*   MCV 99.1  --   --   --  96.8   MCH 30.4  --   --   --  30.6   MCHC 30.7*  --   --   --  31.6*   RDW 15.5*  --   --   --  15.9*     --   --   --  168   MPV 10.7  --   --   --  10.1    < > = values in this interval not displayed.           Principal Problem:    Acute upper GI bleed  Active Problems:    Coronary artery disease involving native coronary artery of native heart without angina pectoris    Renal cell cancer (HCC)    Severe protein-calorie malnutrition (HCC)    Acute blood loss anemia    CKD (chronic kidney disease)    Pleural effusion    ABLA (acute blood loss anemia)    Hypotension  Resolved Problems:    * No resolved hospital problems. *      Plan:  Albumin 25 g x 1   Bumex 2 mg IV.  Increase aerosols  Monitor H&H  Working with vascular surgery for IVC filter, they actually have a note on 4/8/24. I could not see it until, I broadened the search. They feel there was never a DVT so no planned

## 2024-04-11 NOTE — PROGRESS NOTES
Rectal bleed    GI bleed    Chest pain    PVC's (premature ventricular contractions)    Coronary artery disease involving native coronary artery of native heart without angina pectoris    Primary hypertension    Hyperlipidemia    Stage 3b chronic kidney disease (HCC)    Renal cell cancer (HCC)    Acute on chronic anemia    Metastatic renal cell carcinoma (HCC)    Severe protein-calorie malnutrition (HCC)    First degree AV block    Abdominal aortic aneurysm (AAA) without rupture (HCC)    Acute kidney injury (HCC)    Stage 4 chronic kidney disease (HCC)    Monoclonal gammopathy    Chronic deep vein thrombosis (DVT) of proximal vein of lower extremity (HCC)    Goals of care, counseling/discussion    Palliative care by specialist    Iron deficiency    Low zinc level    Acute blood loss anemia    Cardiomyopathy (HCC)    AV block, Mobitz 1    On apixaban therapy    Deep vein thrombosis (DVT) of left lower extremity (HCC)    Other long term (current) drug therapy    Acute on chronic blood loss anemia    CKD (chronic kidney disease)    Acute upper GI bleed    Pleural effusion    ABLA (acute blood loss anemia)    Hypotension     1.  Anemia/GI bleed  -Acute on chronic anemia  -Normocytic/iron deficient  -Questionable red stool.  -Likely precipitated/exacerbated by oral anticoagulation/chemotherapy  --EGD 2/10/2024 by Dr. Heredia showing normal examined esophagus, moderate gastritis, normal examined proximal small bowel  -Colonoscopy 2/12/2024 with diverticular disease transverse/descending/sigmoid colon otherwise unremarkable with fair prep  -Repeat upper endoscopy 3/28/2024 revealing mild gastritis but no source of bleeding  -Negative nuclear medicine bleeding scan on previous admission including delayed images  -Little utility in repeating colonoscopy unless ongoing bleeding     2.  Metastatic malignancy  -Per admitting/pertinent consultants     3.  Multiple comorbidities  -Significantly short of breath  -Anticoagulation  recommendation as above  -Per admitting / pertinent consultants     Decreased H&H however mild  Decrease frequency of H&H checks  Unremarkable EGD and colonoscopy in mid February of this year and unremarkable repeat upper endoscopy on 28 March.  Negative nuclear medicine bleeding scan on prior admission 3/31/2024.  Little utility in repeating colonoscopy at this time.  Anemia likely precipitated by oral anticoagulation and recent chemotherapy.  Agree with plan for IVC filter placement and holding oral anticoagulation.  No immediate plan for balloon enteroscopy but will plan for video capsule endoscopy as outpatient which will be scheduled in short order after patient is discharged from the hospital.  If further decrease in H&H should consider repeat inpatient nuclear medicine bleeding scan.  Future interventions will depend on H&H dynamics/clinical course and video capsule endoscopy findings.  Okay for regular diet from GI.    Law Daniel MD  4/11/2024  12:31 PM    NOTE:  This report was transcribed using voice recognition software.  Every effort was made to ensure accuracy; however, inadvertent computerized transcription errors may be present.

## 2024-04-12 PROBLEM — I50.43 ACUTE ON CHRONIC COMBINED SYSTOLIC AND DIASTOLIC CONGESTIVE HEART FAILURE (HCC): Status: ACTIVE | Noted: 2024-04-12

## 2024-04-12 PROBLEM — J96.01 ACUTE RESPIRATORY FAILURE WITH HYPOXIA (HCC): Status: ACTIVE | Noted: 2024-04-12

## 2024-04-12 LAB
ALBUMIN SERPL-MCNC: 2.5 G/DL (ref 3.5–5.2)
ALP SERPL-CCNC: 85 U/L (ref 40–129)
ALT SERPL-CCNC: 19 U/L (ref 0–40)
ANION GAP SERPL CALCULATED.3IONS-SCNC: 6 MMOL/L (ref 7–16)
AST SERPL-CCNC: 21 U/L (ref 0–39)
B PARAP IS1001 DNA NPH QL NAA+NON-PROBE: NOT DETECTED
B PERT DNA SPEC QL NAA+PROBE: NOT DETECTED
BASOPHILS # BLD: 0 K/UL (ref 0–0.2)
BASOPHILS NFR BLD: 0 % (ref 0–2)
BILIRUB SERPL-MCNC: 0.4 MG/DL (ref 0–1.2)
BNP SERPL-MCNC: ABNORMAL PG/ML (ref 0–450)
BUN SERPL-MCNC: 30 MG/DL (ref 6–23)
C PNEUM DNA NPH QL NAA+NON-PROBE: NOT DETECTED
CALCIUM SERPL-MCNC: 10.1 MG/DL (ref 8.6–10.2)
CHLORIDE SERPL-SCNC: 112 MMOL/L (ref 98–107)
CO2 SERPL-SCNC: 22 MMOL/L (ref 22–29)
CREAT SERPL-MCNC: 1.9 MG/DL (ref 0.7–1.2)
EOSINOPHIL # BLD: 0.01 K/UL (ref 0.05–0.5)
EOSINOPHILS RELATIVE PERCENT: 0 % (ref 0–6)
ERYTHROCYTE [DISTWIDTH] IN BLOOD BY AUTOMATED COUNT: 16.2 % (ref 11.5–15)
FLUAV RNA NPH QL NAA+NON-PROBE: NOT DETECTED
FLUBV RNA NPH QL NAA+NON-PROBE: NOT DETECTED
GFR SERPL CREATININE-BSD FRML MDRD: 36 ML/MIN/1.73M2
GLUCOSE SERPL-MCNC: 86 MG/DL (ref 74–99)
HADV DNA NPH QL NAA+NON-PROBE: NOT DETECTED
HCOV 229E RNA NPH QL NAA+NON-PROBE: NOT DETECTED
HCOV HKU1 RNA NPH QL NAA+NON-PROBE: NOT DETECTED
HCOV NL63 RNA NPH QL NAA+NON-PROBE: NOT DETECTED
HCOV OC43 RNA NPH QL NAA+NON-PROBE: NOT DETECTED
HCT VFR BLD AUTO: 25.4 % (ref 37–54)
HGB BLD-MCNC: 7.5 G/DL (ref 12.5–16.5)
HMPV RNA NPH QL NAA+NON-PROBE: NOT DETECTED
HPIV1 RNA NPH QL NAA+NON-PROBE: NOT DETECTED
HPIV2 RNA NPH QL NAA+NON-PROBE: NOT DETECTED
HPIV3 RNA NPH QL NAA+NON-PROBE: NOT DETECTED
HPIV4 RNA NPH QL NAA+NON-PROBE: NOT DETECTED
IMM GRANULOCYTES # BLD AUTO: 0.03 K/UL (ref 0–0.58)
IMM GRANULOCYTES NFR BLD: 1 % (ref 0–5)
LYMPHOCYTES NFR BLD: 0.45 K/UL (ref 1.5–4)
LYMPHOCYTES RELATIVE PERCENT: 9 % (ref 20–42)
M PNEUMO DNA NPH QL NAA+NON-PROBE: NOT DETECTED
MAGNESIUM SERPL-MCNC: 1.7 MG/DL (ref 1.6–2.6)
MCH RBC QN AUTO: 31 PG (ref 26–35)
MCHC RBC AUTO-ENTMCNC: 29.5 G/DL (ref 32–34.5)
MCV RBC AUTO: 105 FL (ref 80–99.9)
MONOCYTES NFR BLD: 0.36 K/UL (ref 0.1–0.95)
MONOCYTES NFR BLD: 7 % (ref 2–12)
NEUTROPHILS NFR BLD: 83 % (ref 43–80)
NEUTS SEG NFR BLD: 4.19 K/UL (ref 1.8–7.3)
PHOSPHATE SERPL-MCNC: 3.9 MG/DL (ref 2.5–4.5)
PLATELET # BLD AUTO: 141 K/UL (ref 130–450)
PMV BLD AUTO: 10.5 FL (ref 7–12)
POTASSIUM SERPL-SCNC: 3.7 MMOL/L (ref 3.5–5)
PROT SERPL-MCNC: 4.4 G/DL (ref 6.4–8.3)
RBC # BLD AUTO: 2.42 M/UL (ref 3.8–5.8)
RBC # BLD: ABNORMAL 10*6/UL
RSV RNA NPH QL NAA+NON-PROBE: NOT DETECTED
RV+EV RNA NPH QL NAA+NON-PROBE: NOT DETECTED
SARS-COV-2 RNA NPH QL NAA+NON-PROBE: NOT DETECTED
SODIUM SERPL-SCNC: 140 MMOL/L (ref 132–146)
SPECIMEN DESCRIPTION: NORMAL
WBC OTHER # BLD: 5 K/UL (ref 4.5–11.5)

## 2024-04-12 PROCEDURE — 99223 1ST HOSP IP/OBS HIGH 75: CPT | Performed by: INTERNAL MEDICINE

## 2024-04-12 PROCEDURE — 83880 ASSAY OF NATRIURETIC PEPTIDE: CPT

## 2024-04-12 PROCEDURE — 2700000000 HC OXYGEN THERAPY PER DAY

## 2024-04-12 PROCEDURE — 2060000000 HC ICU INTERMEDIATE R&B

## 2024-04-12 PROCEDURE — 6360000002 HC RX W HCPCS: Performed by: INTERNAL MEDICINE

## 2024-04-12 PROCEDURE — 84100 ASSAY OF PHOSPHORUS: CPT

## 2024-04-12 PROCEDURE — 94660 CPAP INITIATION&MGMT: CPT

## 2024-04-12 PROCEDURE — 2580000003 HC RX 258: Performed by: INTERNAL MEDICINE

## 2024-04-12 PROCEDURE — 83735 ASSAY OF MAGNESIUM: CPT

## 2024-04-12 PROCEDURE — A4216 STERILE WATER/SALINE, 10 ML: HCPCS | Performed by: INTERNAL MEDICINE

## 2024-04-12 PROCEDURE — 99233 SBSQ HOSP IP/OBS HIGH 50: CPT | Performed by: INTERNAL MEDICINE

## 2024-04-12 PROCEDURE — 80053 COMPREHEN METABOLIC PANEL: CPT

## 2024-04-12 PROCEDURE — P9047 ALBUMIN (HUMAN), 25%, 50ML: HCPCS | Performed by: INTERNAL MEDICINE

## 2024-04-12 PROCEDURE — C9113 INJ PANTOPRAZOLE SODIUM, VIA: HCPCS | Performed by: INTERNAL MEDICINE

## 2024-04-12 PROCEDURE — 0202U NFCT DS 22 TRGT SARS-COV-2: CPT

## 2024-04-12 PROCEDURE — 6360000002 HC RX W HCPCS: Performed by: NURSE PRACTITIONER

## 2024-04-12 PROCEDURE — 94640 AIRWAY INHALATION TREATMENT: CPT

## 2024-04-12 PROCEDURE — 85025 COMPLETE CBC W/AUTO DIFF WBC: CPT

## 2024-04-12 PROCEDURE — 6370000000 HC RX 637 (ALT 250 FOR IP): Performed by: INTERNAL MEDICINE

## 2024-04-12 PROCEDURE — APPSS180 APP SPLIT SHARED TIME > 60 MINUTES: Performed by: NURSE PRACTITIONER

## 2024-04-12 RX ORDER — ALBUMIN (HUMAN) 12.5 G/50ML
25 SOLUTION INTRAVENOUS ONCE
Status: COMPLETED | OUTPATIENT
Start: 2024-04-12 | End: 2024-04-12

## 2024-04-12 RX ORDER — BUMETANIDE 0.25 MG/ML
2 INJECTION INTRAMUSCULAR; INTRAVENOUS 2 TIMES DAILY
Status: DISCONTINUED | OUTPATIENT
Start: 2024-04-12 | End: 2024-04-18 | Stop reason: HOSPADM

## 2024-04-12 RX ORDER — MAGNESIUM SULFATE IN WATER 40 MG/ML
2000 INJECTION, SOLUTION INTRAVENOUS ONCE
Status: COMPLETED | OUTPATIENT
Start: 2024-04-12 | End: 2024-04-12

## 2024-04-12 RX ADMIN — SODIUM CHLORIDE, PRESERVATIVE FREE 10 ML: 5 INJECTION INTRAVENOUS at 09:33

## 2024-04-12 RX ADMIN — BUDESONIDE INHALATION 500 MCG: 0.5 SUSPENSION RESPIRATORY (INHALATION) at 20:31

## 2024-04-12 RX ADMIN — SUCRALFATE 1 G: 1 TABLET ORAL at 10:26

## 2024-04-12 RX ADMIN — PETROLATUM: 420 OINTMENT TOPICAL at 20:19

## 2024-04-12 RX ADMIN — SUCRALFATE 1 G: 1 TABLET ORAL at 15:43

## 2024-04-12 RX ADMIN — IPRATROPIUM BROMIDE AND ALBUTEROL SULFATE 1 DOSE: 2.5; .5 SOLUTION RESPIRATORY (INHALATION) at 20:31

## 2024-04-12 RX ADMIN — MICONAZOLE NITRATE: 20 POWDER TOPICAL at 09:32

## 2024-04-12 RX ADMIN — BRIMONIDINE TARTRATE 1 DROP: 2 SOLUTION/ DROPS OPHTHALMIC at 10:32

## 2024-04-12 RX ADMIN — METOPROLOL SUCCINATE 25 MG: 25 TABLET, FILM COATED, EXTENDED RELEASE ORAL at 10:26

## 2024-04-12 RX ADMIN — FINASTERIDE 5 MG: 5 TABLET, FILM COATED ORAL at 10:31

## 2024-04-12 RX ADMIN — SODIUM CHLORIDE, PRESERVATIVE FREE 10 ML: 5 INJECTION INTRAVENOUS at 01:33

## 2024-04-12 RX ADMIN — SODIUM CHLORIDE, PRESERVATIVE FREE 10 ML: 5 INJECTION INTRAVENOUS at 09:00

## 2024-04-12 RX ADMIN — PETROLATUM: 420 OINTMENT TOPICAL at 09:33

## 2024-04-12 RX ADMIN — MULTIVITAMIN TABLET 1 TABLET: TABLET at 10:27

## 2024-04-12 RX ADMIN — DORZOLAMIDE HYDROCHLORIDE 1 DROP: 20 SOLUTION/ DROPS OPHTHALMIC at 20:11

## 2024-04-12 RX ADMIN — DORZOLAMIDE HYDROCHLORIDE 1 DROP: 20 SOLUTION/ DROPS OPHTHALMIC at 10:32

## 2024-04-12 RX ADMIN — SODIUM CHLORIDE, PRESERVATIVE FREE 40 MG: 5 INJECTION INTRAVENOUS at 15:43

## 2024-04-12 RX ADMIN — BUMETANIDE 2 MG: 0.25 INJECTION INTRAMUSCULAR; INTRAVENOUS at 20:10

## 2024-04-12 RX ADMIN — SODIUM CHLORIDE, PRESERVATIVE FREE 10 ML: 5 INJECTION INTRAVENOUS at 20:11

## 2024-04-12 RX ADMIN — IPRATROPIUM BROMIDE AND ALBUTEROL SULFATE 1 DOSE: 2.5; .5 SOLUTION RESPIRATORY (INHALATION) at 17:07

## 2024-04-12 RX ADMIN — SUCRALFATE 1 G: 1 TABLET ORAL at 13:37

## 2024-04-12 RX ADMIN — ATORVASTATIN CALCIUM 40 MG: 40 TABLET, FILM COATED ORAL at 20:10

## 2024-04-12 RX ADMIN — ARFORMOTEROL TARTRATE 15 MCG: 15 SOLUTION RESPIRATORY (INHALATION) at 20:31

## 2024-04-12 RX ADMIN — CYANOCOBALAMIN TAB 1000 MCG 1000 MCG: 1000 TAB at 10:27

## 2024-04-12 RX ADMIN — ALBUMIN (HUMAN) 25 G: 0.25 INJECTION, SOLUTION INTRAVENOUS at 11:28

## 2024-04-12 RX ADMIN — MICONAZOLE NITRATE: 20 POWDER TOPICAL at 20:19

## 2024-04-12 RX ADMIN — BUDESONIDE INHALATION 500 MCG: 0.5 SUSPENSION RESPIRATORY (INHALATION) at 09:36

## 2024-04-12 RX ADMIN — METOPROLOL SUCCINATE 25 MG: 25 TABLET, FILM COATED, EXTENDED RELEASE ORAL at 20:10

## 2024-04-12 RX ADMIN — LATANOPROST 1 DROP: 50 SOLUTION OPHTHALMIC at 20:11

## 2024-04-12 RX ADMIN — BUMETANIDE 2 MG: 0.25 INJECTION INTRAMUSCULAR; INTRAVENOUS at 08:54

## 2024-04-12 RX ADMIN — SODIUM CHLORIDE, PRESERVATIVE FREE 40 MG: 5 INJECTION INTRAVENOUS at 05:06

## 2024-04-12 RX ADMIN — ARFORMOTEROL TARTRATE 15 MCG: 15 SOLUTION RESPIRATORY (INHALATION) at 09:36

## 2024-04-12 RX ADMIN — IPRATROPIUM BROMIDE AND ALBUTEROL SULFATE 1 DOSE: 2.5; .5 SOLUTION RESPIRATORY (INHALATION) at 12:11

## 2024-04-12 RX ADMIN — ZINC SULFATE 220 MG (50 MG) CAPSULE 50 MG: CAPSULE at 10:26

## 2024-04-12 RX ADMIN — CETIRIZINE HYDROCHLORIDE 5 MG: 10 TABLET, FILM COATED ORAL at 10:26

## 2024-04-12 RX ADMIN — SUCRALFATE 1 G: 1 TABLET ORAL at 20:10

## 2024-04-12 RX ADMIN — SODIUM CHLORIDE, PRESERVATIVE FREE 10 ML: 5 INJECTION INTRAVENOUS at 01:32

## 2024-04-12 RX ADMIN — BRIMONIDINE TARTRATE 1 DROP: 2 SOLUTION/ DROPS OPHTHALMIC at 20:11

## 2024-04-12 RX ADMIN — MAGNESIUM SULFATE HEPTAHYDRATE 2000 MG: 40 INJECTION, SOLUTION INTRAVENOUS at 09:07

## 2024-04-12 RX ADMIN — IPRATROPIUM BROMIDE AND ALBUTEROL SULFATE 1 DOSE: 2.5; .5 SOLUTION RESPIRATORY (INHALATION) at 09:36

## 2024-04-12 ASSESSMENT — PAIN SCALES - GENERAL: PAINLEVEL_OUTOF10: 0

## 2024-04-12 NOTE — CONSULTS
Inpatient Cardiology Consultation      Reason for Consult:  Pulmonary Edema     Consulting Physician: Dr. Mancini    Requesting Physician:  Dr. Dyer    Date of Consultation: 4/12/2024    HISTORY OF PRESENT ILLNESS:   Mr. Fuentes is an 84 year old male who was seen in 2022 with Dr. Patterson and more recently with Dr. Mccrary in 01/2024 and Dr. Mancini in consultation on 02/10/2024. His medical history as stated below.   Mr. Fuentes presented to [facility] ED on 04/06/2024 from a nursing facility with complaints of worsening anemia with reported hemoglobin of 6.9.  Please note that Mr. Fuentes is somewhat of a poor historian and unable to ascertain the events that led to his presentation and subsequent admission.  He is currently alert and oriented to himself and time only at this time.  There is a sitter at the bedside as he was pulling off his PAP overnight and reportedly attempting to get out of bed.  He states that prior to presentation he was not having chest pain or dyspnea.  He also denies black, bloody, tarry stools and dysuria.  Upon arrival to the ED his VS were oral temperature 97.4-95-15-98% on 2 L by nasal cannula-135/78.  EKG SR with PVCs with second-degree SA block (Mobitz 2) and nonspecific intraventricular conduction delay and nonspecific T wave abnormality (as interpreted by Dr. Kay).  Troponin 80.  proBNP 33,345.  FOBT positive.  WBC 5.6.  H&H 7.1/23.5.  .  K3.5.  BUN/SCR 24/1.8.  BLE ultrasound negative for DVT.  CXR with large left pleural effusion and probable left lung airspace opacity.  He received Protonix 80 mg IV and Zofran 40 mg IV.  He was admitted to a telemetry monitored unit.  GI was consulted.  Pulmonology was consulted and he underwent a left-sided thoracentesis on 04/10/2024 with removal of 1080 mL with clear yellow pleural fluid.  Oncology was consulted.  Vascular surgery was consulted for placement of IVC filter.  Cardiology was consulted for management of pulmonary

## 2024-04-12 NOTE — PROGRESS NOTES
Initial Inpatient Wound Care    Admit Date: 4/6/2024 10:45 PM    Reason for consult:  1. very small reddoned open area to coccyx  2.   coccyx wound 0.3x0.1, JEOVANY with aquaphor BID ordered    Significant history:  adm with \"gets infusions every month\"  Adm from facility, GI bleed  Findings:  awake and verbal  Coccyx reddened, open area-coccyx 1.0.5x0.1. superficial. Areas of coccyx nonblanchable      Impression:  stage 2 pressure injury.        Plan:bed ordered today  Continue preventive care      Lexy Caba RN 4/12/2024 11:45 AM

## 2024-04-12 NOTE — PROGRESS NOTES
04/12/24 0955   Oxygen Therapy/Pulse Ox   O2 Therapy Oxygen   O2 Device Nasal cannula   O2 Flow Rate (L/min) 5 L/min   SpO2 97 %     Spoke with Physician, Placed pt on NC5L, off bipap at this time. Pt tolerating well, no wob seen. Notified RN.

## 2024-04-12 NOTE — PLAN OF CARE
Problem: Safety - Adult  Goal: Free from fall injury  4/12/2024 1458 by Mary Cheney, RN  Outcome: Progressing     Problem: Skin/Tissue Integrity  Goal: Absence of new skin breakdown  Description: 1.  Monitor for areas of redness and/or skin breakdown  2.  Assess vascular access sites hourly  3.  Every 4-6 hours minimum:  Change oxygen saturation probe site  4.  Every 4-6 hours:  If on nasal continuous positive airway pressure, respiratory therapy assess nares and determine need for appliance change or resting period.  4/12/2024 1458 by Mary Cheney, RN  Outcome: Progressing     Problem: Pain  Goal: Verbalizes/displays adequate comfort level or baseline comfort level  Outcome: Progressing

## 2024-04-12 NOTE — CARE COORDINATION
Pt transferred from med surg for bipap needs and diuresis, pulm and cardiology consulted on 4/11. Echo pending. Plan remains for pt to return to [facility] at [facility]. N17 started in [facility] and transport forms in folder for [facility] [phone], completed by . Will follow.  KEEVN MercadoN, RN  [facility] Case Management  [phone]

## 2024-04-12 NOTE — PLAN OF CARE
Problem: Safety - Adult  Goal: Free from fall injury  4/12/2024 0128 by Argenis Gould RN  Outcome: Progressing  4/11/2024 1658 by Yara Tran RN  Outcome: Not Progressing     Problem: Skin/Tissue Integrity  Goal: Absence of new skin breakdown  Description: 1.  Monitor for areas of redness and/or skin breakdown  2.  Assess vascular access sites hourly  3.  Every 4-6 hours minimum:  Change oxygen saturation probe site  4.  Every 4-6 hours:  If on nasal continuous positive airway pressure, respiratory therapy assess nares and determine need for appliance change or resting period.  4/12/2024 0128 by Argenis Gould RN  Outcome: Progressing  4/11/2024 1658 by Yara Tran RN  Outcome: Progressing     Problem: Pain  Goal: Verbalizes/displays adequate comfort level or baseline comfort level  4/11/2024 1658 by Yara Tran RN  Outcome: Not Progressing  Flowsheets (Taken 4/11/2024 1615)  Verbalizes/displays adequate comfort level or baseline comfort level: Assess pain using appropriate pain scale     Problem: Safety - Adult  Goal: Free from fall injury  4/12/2024 0128 by Argenis Gould RN  Outcome: Progressing  4/11/2024 1658 by Yara Tran RN  Outcome: Not Progressing     Problem: Pain  Goal: Verbalizes/displays adequate comfort level or baseline comfort level  4/11/2024 1658 by Yara Tran RN  Outcome: Not Progressing  Flowsheets (Taken 4/11/2024 1615)  Verbalizes/displays adequate comfort level or baseline comfort level: Assess pain using appropriate pain scale

## 2024-04-12 NOTE — PROGRESS NOTES
PROGRESS NOTE  By Law Daniel M.D.    [facility]  Dr. Leopoldo Contreras M.D.,  Dr. Abraham Cuello M.D.,   Dr. Franc Diaz D.O.,  Dr. Sánchez Ho M.D.,  Dr. Dave Heredia D.O.,          Uzair Fuentes  84 y.o.  male    SUBJECTIVE:  Persistent shortness of breath/dyspnea    OBJECTIVE:    /61   Pulse 61   Temp 96.8 °F (36 °C) (Axillary)   Resp 18   Ht 1.803 m (5' 11\")   Wt 105.6 kg (232 lb 12.8 oz)   SpO2 97%   BMI 32.47 kg/m²     General: NAD/older adult  male  HEENT: Anicteric sclera/moist oral mucosa/poor dentition  Neck: Supple/trachea midline  Chest: Symmetric excursion/diffuse coarse breath sounds and rhonchi  Cor: Regular/S1-S2  Abd.: Left and nontender  Extr.:  BLE mild edema.  Decreased muscle tone and bulk throughout, consistent with aging condition  Skin: Warm and dry/anicteric      DATA:    Monitor data reviewed -sinus rhythm noted.       Lab Results   Component Value Date/Time    WBC 5.0 04/12/2024 05:17 AM    RBC 2.42 04/12/2024 05:17 AM    HGB 7.5 04/12/2024 05:17 AM    HCT 25.4 04/12/2024 05:17 AM    .0 04/12/2024 05:17 AM    MCH 31.0 04/12/2024 05:17 AM    MCHC 29.5 04/12/2024 05:17 AM    RDW 16.2 04/12/2024 05:17 AM     04/12/2024 05:17 AM    MPV 10.5 04/12/2024 05:17 AM     Lab Results   Component Value Date/Time     04/12/2024 05:17 AM    K 3.7 04/12/2024 05:17 AM    K 4.4 10/18/2022 04:50 AM     04/12/2024 05:17 AM    CO2 22 04/12/2024 05:17 AM    BUN 30 04/12/2024 05:17 AM    CREATININE 1.9 04/12/2024 05:17 AM    CALCIUM 10.1 04/12/2024 05:17 AM    PROT 4.4 04/12/2024 05:17 AM    LABALBU 2.5 04/12/2024 05:17 AM    BILITOT 0.4 04/12/2024 05:17 AM    ALKPHOS 85 04/12/2024 05:17 AM    AST 21 04/12/2024 05:17 AM    ALT 19 04/12/2024 05:17 AM     Lab Results   Component Value Date/Time    LIPASE 66 03/27/2024 03:55 PM     No results found for: \"AMYLASE\"      ASSESSMENT/PLAN:  Patient Active Problem List   Diagnosis    Normocytic  anemia    Rectal bleed    GI bleed    Chest pain    PVC's (premature ventricular contractions)    Coronary artery disease involving native coronary artery of native heart without angina pectoris    Primary hypertension    Hyperlipidemia    Stage 3b chronic kidney disease (HCC)    Renal cell cancer (HCC)    Acute on chronic anemia    Metastatic renal cell carcinoma (HCC)    Severe protein-calorie malnutrition (HCC)    First degree AV block    Abdominal aortic aneurysm (AAA) without rupture (HCC)    Acute kidney injury (HCC)    Stage 4 chronic kidney disease (HCC)    Monoclonal gammopathy    Chronic deep vein thrombosis (DVT) of proximal vein of lower extremity (HCC)    Goals of care, counseling/discussion    Palliative care by specialist    Iron deficiency    Low zinc level    Acute blood loss anemia    Cardiomyopathy (HCC)    AV block, Mobitz 1    On apixaban therapy    Deep vein thrombosis (DVT) of left lower extremity (HCC)    Other long term (current) drug therapy    Acute on chronic blood loss anemia    CKD (chronic kidney disease)    Acute upper GI bleed    Pleural effusion    ABLA (acute blood loss anemia)    Hypotension     1.  Anemia/GI bleed  -Acute on chronic anemia  -Normocytic/iron deficient  -Questionable red stool.  -Likely precipitated/exacerbated by oral anticoagulation/chemotherapy  --EGD 2/10/2024 by Dr. Heredia showing normal examined esophagus, moderate gastritis, normal examined proximal small bowel  -Colonoscopy 2/12/2024 with diverticular disease transverse/descending/sigmoid colon otherwise unremarkable with fair prep  -Repeat upper endoscopy 3/28/2024 revealing mild gastritis but no source of bleeding  -Negative nuclear medicine bleeding scan on previous admission including delayed images  -Little utility in repeating colonoscopy unless ongoing bleeding  -Consider holding oral anticoagulation if clinically feasible however overall anticoagulation management to be deferred to admitting

## 2024-04-12 NOTE — PROGRESS NOTES
04/11/24 2045   NIV Type   NIV Started/Stopped On   Equipment Type v60   Mode Bilevel   Mask Type Full face mask   Mask Size Medium   Settings/Measurements   PIP Observed 12 cm H20   IPAP 12 cmH20   CPAP/EPAP 6 cmH2O   Vt (Measured) 526 mL   Rate Ordered 12   Insp Rise Time (%) 2 %   FiO2  45 %   I Time/ I Time % 0.9 s   Minute Volume (L/min) 8.2 Liters   Mask Leak (lpm) 22 lpm   Patient's Home Machine No   Alarm Settings   Alarms On Y   Oxygen Therapy/Pulse Ox   O2 Device PAP (positive airway pressure)

## 2024-04-12 NOTE — PROGRESS NOTES
P Quality Flow/Interdisciplinary Rounds Progress Note        Quality Flow Rounds held on April 12, 2024    Disciplines Attending:  Bedside Nurse, , , and Nursing Unit Leadership    Uzair Fuentes was admitted on 4/6/2024 10:45 PM    Anticipated Discharge Date:   tbd    Disposition: snf    Jaylen Score:  Jaylen Scale Score: 14    Readmission Risk              Risk of Unplanned Readmission:  50           Discussed patient goal for the day, patient clinical progression, and barriers to discharge.  The following Goal(s) of the Day/Commitment(s) have been identified:   continue bipap, cardio plan, pulm plan      Chiquis Pascal RN  April 12, 2024

## 2024-04-12 NOTE — PROGRESS NOTES
Progress  Note  Chief Complaint   Patient presents with    Lab result      Gets blood transfusions once a month. Hgb at [facility] was 6.9. is on 2L NC at baseline. +thinners      Historical Issues:  Current Facility-Administered Medications   Medication Dose Route Frequency Provider Last Rate Last Admin    white petrolatum ointment   Topical BID Sven Dyer MD   Given at 04/12/24 0933    bumetanide (BUMEX) injection 2 mg  2 mg IntraVENous BID Chris Mancini MD   2 mg at 04/12/24 0854    dorzolamide (TRUSOPT) 2 % ophthalmic solution 1 drop  1 drop Both Eyes BID Sven Dyer MD   1 drop at 04/12/24 1032    And    brimonidine (ALPHAGAN) 0.2 % ophthalmic solution 1 drop  1 drop Both Eyes BID Sven Dyer MD   1 drop at 04/12/24 1032    miconazole (MICOTIN) 2 % powder   Topical BID Sven Dyer MD   Given at 04/12/24 0932    ipratropium 0.5 mg-albuterol 2.5 mg (DUONEB) nebulizer solution 1 Dose  1 Dose Inhalation 4x Daily RT Sven Dyer MD   1 Dose at 04/12/24 1211    arformoterol tartrate (BROVANA) nebulizer solution 15 mcg  15 mcg Nebulization BID RT Sven Dyer MD   15 mcg at 04/12/24 0936    budesonide (PULMICORT) nebulizer suspension 500 mcg  500 mcg Nebulization BID RT Sven Dyer MD   500 mcg at 04/12/24 0936    zinc sulfate (ZINCATE) 220 mg capsule - elemental zinc 50 mg  50 mg Oral Daily Sven Dyer MD   50 mg at 04/12/24 1026    0.9 % sodium chloride infusion   IntraVENous PRN Hric, Abdelrahman, DO        sodium chloride flush 0.9 % injection 5-40 mL  5-40 mL IntraVENous 2 times per day Sven Dyer MD   10 mL at 04/12/24 0900    sodium chloride flush 0.9 % injection 5-40 mL  5-40 mL IntraVENous PRN Sven Dyer MD        0.9 % sodium chloride infusion   IntraVENous PRN Sven Dyer MD        0.9 % sodium chloride infusion   IntraVENous PRN Manisha, Sven W, MD        guaiFENesin tablet 400 mg  400 mg Oral 4x Daily PRN Marian Jackson, JONATHAN - NP   400 mg at 04/11/24 9678     acetaminophen (TYLENOL) suppository 650 mg  650 mg Rectal Q6H PRN Sven Dyer MD        pantoprazole (PROTONIX) 40 mg in sodium chloride (PF) 0.9 % 10 mL injection  40 mg IntraVENous Q12H vSen Dyer MD   40 mg at 04/12/24 0506     Recent Complaints:  Review of Systems  Vitals:    04/12/24 1129   BP: 102/61   Pulse:    Resp: 18   Temp:    SpO2:      Physical Exam  Work of breathing is back to normal.  He has been on BiPAP.  Will be interrupting BiPAP to intermittent use.  Heart rate is controlled  Lungs are relatively clear with BiPAP  Edema still significant  Recent Labs     04/12/24  0517      K 3.7   *   CO2 22   BUN 30*   CREATININE 1.9*   GLUCOSE 86   CALCIUM 10.1     Recent Labs     04/10/24  1625 04/11/24  0100 04/12/24  0517   WBC  --  7.1 5.0   RBC  --  2.52* 2.42*   HGB 7.9* 7.7* 7.5*   HCT 25.3* 24.4* 25.4*   MCV  --  96.8 105.0*   MCH  --  30.6 31.0   MCHC  --  31.6* 29.5*   RDW  --  15.9* 16.2*   PLT  --  168 141   MPV  --  10.1 10.5           Principal Problem:    Acute upper GI bleed  Active Problems:    Coronary artery disease involving native coronary artery of native heart without angina pectoris    Renal cell cancer (HCC)    Severe protein-calorie malnutrition (HCC)    Acute blood loss anemia    CKD (chronic kidney disease)    Pleural effusion    ABLA (acute blood loss anemia)    Hypotension  Resolved Problems:    * No resolved hospital problems. *      Plan:  Intermittent BiPAP  Continue diuresis  Supplementing albumin to help with diuresis given the profound severe protein energy malnutrition that exist  Monitor blood        Case Discussed with:      Electronically signed by Sven Dyer MD on 4/12/2024 at 2:52 PM

## 2024-04-13 PROBLEM — I50.23 ACUTE ON CHRONIC SYSTOLIC (CONGESTIVE) HEART FAILURE (HCC): Status: ACTIVE | Noted: 2024-04-12

## 2024-04-13 PROCEDURE — 2580000003 HC RX 258: Performed by: INTERNAL MEDICINE

## 2024-04-13 PROCEDURE — C9113 INJ PANTOPRAZOLE SODIUM, VIA: HCPCS | Performed by: INTERNAL MEDICINE

## 2024-04-13 PROCEDURE — 2060000000 HC ICU INTERMEDIATE R&B

## 2024-04-13 PROCEDURE — 6370000000 HC RX 637 (ALT 250 FOR IP): Performed by: INTERNAL MEDICINE

## 2024-04-13 PROCEDURE — 6360000002 HC RX W HCPCS: Performed by: INTERNAL MEDICINE

## 2024-04-13 PROCEDURE — 94660 CPAP INITIATION&MGMT: CPT

## 2024-04-13 PROCEDURE — 99232 SBSQ HOSP IP/OBS MODERATE 35: CPT | Performed by: INTERNAL MEDICINE

## 2024-04-13 PROCEDURE — 94640 AIRWAY INHALATION TREATMENT: CPT

## 2024-04-13 PROCEDURE — 99233 SBSQ HOSP IP/OBS HIGH 50: CPT | Performed by: INTERNAL MEDICINE

## 2024-04-13 PROCEDURE — A4216 STERILE WATER/SALINE, 10 ML: HCPCS | Performed by: INTERNAL MEDICINE

## 2024-04-13 PROCEDURE — 2700000000 HC OXYGEN THERAPY PER DAY

## 2024-04-13 PROCEDURE — 93005 ELECTROCARDIOGRAM TRACING: CPT | Performed by: INTERNAL MEDICINE

## 2024-04-13 RX ADMIN — BRIMONIDINE TARTRATE 1 DROP: 2 SOLUTION/ DROPS OPHTHALMIC at 09:20

## 2024-04-13 RX ADMIN — SODIUM CHLORIDE, PRESERVATIVE FREE 10 ML: 5 INJECTION INTRAVENOUS at 09:21

## 2024-04-13 RX ADMIN — METOPROLOL SUCCINATE 25 MG: 25 TABLET, FILM COATED, EXTENDED RELEASE ORAL at 19:49

## 2024-04-13 RX ADMIN — BUMETANIDE 2 MG: 0.25 INJECTION INTRAMUSCULAR; INTRAVENOUS at 19:49

## 2024-04-13 RX ADMIN — ZINC SULFATE 220 MG (50 MG) CAPSULE 50 MG: CAPSULE at 09:19

## 2024-04-13 RX ADMIN — SODIUM CHLORIDE, PRESERVATIVE FREE 40 MG: 5 INJECTION INTRAVENOUS at 02:32

## 2024-04-13 RX ADMIN — IPRATROPIUM BROMIDE AND ALBUTEROL SULFATE 1 DOSE: 2.5; .5 SOLUTION RESPIRATORY (INHALATION) at 11:44

## 2024-04-13 RX ADMIN — DORZOLAMIDE HYDROCHLORIDE 1 DROP: 20 SOLUTION/ DROPS OPHTHALMIC at 09:20

## 2024-04-13 RX ADMIN — IPRATROPIUM BROMIDE AND ALBUTEROL SULFATE 1 DOSE: 2.5; .5 SOLUTION RESPIRATORY (INHALATION) at 08:12

## 2024-04-13 RX ADMIN — SUCRALFATE 1 G: 1 TABLET ORAL at 19:49

## 2024-04-13 RX ADMIN — IPRATROPIUM BROMIDE AND ALBUTEROL SULFATE 1 DOSE: 2.5; .5 SOLUTION RESPIRATORY (INHALATION) at 15:33

## 2024-04-13 RX ADMIN — FINASTERIDE 5 MG: 5 TABLET, FILM COATED ORAL at 09:20

## 2024-04-13 RX ADMIN — BUMETANIDE 2 MG: 0.25 INJECTION INTRAMUSCULAR; INTRAVENOUS at 09:19

## 2024-04-13 RX ADMIN — PETROLATUM: 420 OINTMENT TOPICAL at 09:20

## 2024-04-13 RX ADMIN — OXYCODONE AND ACETAMINOPHEN 1 TABLET: 5; 325 TABLET ORAL at 02:37

## 2024-04-13 RX ADMIN — LATANOPROST 1 DROP: 50 SOLUTION OPHTHALMIC at 19:49

## 2024-04-13 RX ADMIN — MICONAZOLE NITRATE: 20 POWDER TOPICAL at 19:49

## 2024-04-13 RX ADMIN — SODIUM CHLORIDE, PRESERVATIVE FREE 10 ML: 5 INJECTION INTRAVENOUS at 09:00

## 2024-04-13 RX ADMIN — ARFORMOTEROL TARTRATE 15 MCG: 15 SOLUTION RESPIRATORY (INHALATION) at 20:16

## 2024-04-13 RX ADMIN — BRIMONIDINE TARTRATE 1 DROP: 2 SOLUTION/ DROPS OPHTHALMIC at 19:49

## 2024-04-13 RX ADMIN — METOPROLOL SUCCINATE 25 MG: 25 TABLET, FILM COATED, EXTENDED RELEASE ORAL at 09:19

## 2024-04-13 RX ADMIN — SUCRALFATE 1 G: 1 TABLET ORAL at 09:18

## 2024-04-13 RX ADMIN — SUCRALFATE 1 G: 1 TABLET ORAL at 18:10

## 2024-04-13 RX ADMIN — MULTIVITAMIN TABLET 1 TABLET: TABLET at 09:19

## 2024-04-13 RX ADMIN — CYANOCOBALAMIN TAB 1000 MCG 1000 MCG: 1000 TAB at 09:18

## 2024-04-13 RX ADMIN — ARFORMOTEROL TARTRATE 15 MCG: 15 SOLUTION RESPIRATORY (INHALATION) at 08:12

## 2024-04-13 RX ADMIN — SODIUM CHLORIDE, PRESERVATIVE FREE 10 ML: 5 INJECTION INTRAVENOUS at 19:50

## 2024-04-13 RX ADMIN — SUCRALFATE 1 G: 1 TABLET ORAL at 14:38

## 2024-04-13 RX ADMIN — BUDESONIDE INHALATION 500 MCG: 0.5 SUSPENSION RESPIRATORY (INHALATION) at 20:16

## 2024-04-13 RX ADMIN — BUDESONIDE INHALATION 500 MCG: 0.5 SUSPENSION RESPIRATORY (INHALATION) at 08:12

## 2024-04-13 RX ADMIN — SODIUM CHLORIDE, PRESERVATIVE FREE 5 ML: 5 INJECTION INTRAVENOUS at 09:00

## 2024-04-13 RX ADMIN — CETIRIZINE HYDROCHLORIDE 5 MG: 10 TABLET, FILM COATED ORAL at 09:18

## 2024-04-13 RX ADMIN — PETROLATUM: 420 OINTMENT TOPICAL at 19:49

## 2024-04-13 RX ADMIN — MICONAZOLE NITRATE: 20 POWDER TOPICAL at 09:19

## 2024-04-13 RX ADMIN — SODIUM CHLORIDE, PRESERVATIVE FREE 40 MG: 5 INJECTION INTRAVENOUS at 14:38

## 2024-04-13 RX ADMIN — IPRATROPIUM BROMIDE AND ALBUTEROL SULFATE 1 DOSE: 2.5; .5 SOLUTION RESPIRATORY (INHALATION) at 20:16

## 2024-04-13 RX ADMIN — SODIUM CHLORIDE 125 MG: 900 INJECTION INTRAVENOUS at 11:55

## 2024-04-13 RX ADMIN — DORZOLAMIDE HYDROCHLORIDE 1 DROP: 20 SOLUTION/ DROPS OPHTHALMIC at 19:49

## 2024-04-13 RX ADMIN — ATORVASTATIN CALCIUM 40 MG: 40 TABLET, FILM COATED ORAL at 19:49

## 2024-04-13 ASSESSMENT — PAIN SCALES - GENERAL: PAINLEVEL_OUTOF10: 7

## 2024-04-13 ASSESSMENT — PAIN DESCRIPTION - LOCATION: LOCATION: CHEST;SHOULDER

## 2024-04-13 NOTE — PROGRESS NOTES
[facility] Quality Flow/Interdisciplinary Rounds Progress Note        Quality Flow Rounds held on April 13, 2024    Disciplines Attending:  Bedside Nurse and Nursing Unit Leadership    Uzair Fuentes was admitted on 4/6/2024 10:45 PM    Anticipated Discharge Date:  Expected Discharge Date: 04/15/24    Disposition:    Jaylen Score:  Jaylen Scale Score: 13    Readmission Risk              Risk of Unplanned Readmission:  50           Discussed patient goal for the day, patient clinical progression, and barriers to discharge.  The following Goal(s) of the Day/Commitment(s) have been identified:   iv bumex, monitor respiratory status,.       Argenis Gould RN  April 13, 2024

## 2024-04-13 NOTE — PLAN OF CARE
Problem: Safety - Adult  Goal: Free from fall injury  4/13/2024 0119 by Mariam Samuel RN  Outcome: Progressing  4/12/2024 1458 by Mary Cheney RN  Outcome: Progressing     Problem: Skin/Tissue Integrity  Goal: Absence of new skin breakdown  Description: 1.  Monitor for areas of redness and/or skin breakdown  2.  Assess vascular access sites hourly  3.  Every 4-6 hours minimum:  Change oxygen saturation probe site  4.  Every 4-6 hours:  If on nasal continuous positive airway pressure, respiratory therapy assess nares and determine need for appliance change or resting period.  4/13/2024 0119 by Mariam Samuel, RN  Outcome: Progressing  4/12/2024 1458 by Mary Cheney RN  Outcome: Progressing     Problem: Pain  Goal: Verbalizes/displays adequate comfort level or baseline comfort level  4/13/2024 0119 by Mariam Samuel RN  Outcome: Progressing  4/12/2024 1458 by Mary Cheney RN  Outcome: Progressing

## 2024-04-13 NOTE — PROGRESS NOTES
Physician Progress Note      PATIENT:               MANI GALLEGO  CSN #:                  [id number]  :                       1940  ADMIT DATE:       2024 10:45 PM  DISCH DATE:  RESPONDING  PROVIDER #:        Sven Dyer MD          QUERY TEXT:    Dear Attending Physician,    Patient admitted with GIB, ABLA and is on chronic anticoagulation.  If   possible, please document in the progress notes and discharge summary if you   are evaluating and/or treating any of the following:    The medical record reflects the following:  Risk Factors: Eliquis for DVT of left femoral vein, ASA  Clinical Indicators: Per HEM/ONC,\"...HEM/ONC-Recurrent lower GI bleeding in a   patient on anticoagulation with Eliquis for DVT of left femoral vein diagnosed   in 2023 as well has antiplatelet therapy with aspirin. EGD showed   mild irritation...multiple admissions with GI bleed...discontinue Eliquis and   baby aspirin ...\"  Treatment: Eliquis, ASA on hold    Thank you,  Deisy Kelley RN CCDS  Clinical Documentation Improvement Specialist  Phone# [phone]  Options provided:  -- GIB associated with Eliquis  -- Bleeding unrelated to anticoagulation. GIB due to, Please specify the   cause.  -- Other - I will add my own diagnosis  -- Disagree - Not applicable / Not valid  -- Disagree - Clinically unable to determine / Unknown  -- Refer to Clinical Documentation Reviewer    PROVIDER RESPONSE TEXT:    This patient has GIB associated with Eliquis.    Query created by: Deisy Kelley on 2024 4:57 PM      Electronically signed by:  Sven Dyer MD 2024 8:32 AM

## 2024-04-13 NOTE — PROGRESS NOTES
mL  5-40 mL IntraVENous PRN Sven Dyer MD        0.9 % sodium chloride infusion   IntraVENous PRN Sven Dyer MD        ondansetron (ZOFRAN-ODT) disintegrating tablet 4 mg  4 mg Oral Q8H PRN Sven Dyer MD        Or    ondansetron (ZOFRAN) injection 4 mg  4 mg IntraVENous Q6H PRN Sven Dyer MD   4 mg at 04/07/24 1734    acetaminophen (TYLENOL) tablet 650 mg  650 mg Oral Q6H PRSven Stovall MD        Or    acetaminophen (TYLENOL) suppository 650 mg  650 mg Rectal Q6H PRN Sven Dyer MD        pantoprazole (PROTONIX) 40 mg in sodium chloride (PF) 0.9 % 10 mL injection  40 mg IntraVENous Q12H Sven Dyer MD   40 mg at 04/13/24 0232      sodium chloride      sodium chloride      sodium chloride      sodium chloride         Physical Exam:  /66   Pulse 89   Temp 97.8 °F (36.6 °C) (Oral)   Resp 16   Ht 1.803 m (5' 11\")   Wt 102 kg (224 lb 13.9 oz)   SpO2 100%   BMI 31.36 kg/m²   Weight change:   Wt Readings from Last 3 Encounters:   04/13/24 102 kg (224 lb 13.9 oz)   04/02/24 92.2 kg (203 lb 3.2 oz)   03/26/24 88.5 kg (195 lb)     The patient is lethargic and in no discomfort or distress. No gross musculoskeletal deformity is present. No significant skin or nail changes are present. Gross examination of head, eyes, nose and throat are negative. Jugular venous pressure is normal and no carotid bruits are present. Normal respiratory effort is noted with no accessory muscle usage present. Lung fields are clear to ascultation. Cardiac examination is notable for a regular rate and rhythm with no palpable thrill. No gallop rhythm or cardiac murmur are identified. A benign abdominal examination is present with no masses or organomegaly. Intact pulses are present throughout all extremities and no significant peripheral edema is present. No focal neurologic deficits are present.    Intake/Output:    Intake/Output Summary (Last 24 hours) at 4/13/2024 0837  Last data filed at 4/13/2024 0553  Gross  assistance of the gastroenterology service in regards to his anemia.  An echocardiogram is pending for reassessment of left ventricular systolic function to guide additional management recommendations.  Further stabilization will be necessary prior to any consideration of ischemic assessment and based on multiple comorbidities, recommendation of consideration of exclusive medical management.  Ongoing aggressive risk factor modification of blood pressure and serum lipids will remain essential to reducing risk of future atherosclerotic development.      Note: This report was completed utilizing computer voice recognition software. Every effort has been made to ensure accuracy, however; inadvertent computerized transcription errors may be present.    Keenan Mccrary MD  [facility]

## 2024-04-13 NOTE — PROGRESS NOTES
PROGRESS NOTE  By Jas Weston, MACEYP    [facility]  Dr. Leopoldo Contreras M.D.,  Dr. Abraham Cuello M.D.,   Dr. Franc Diaz D.O.,  Dr. Sánchez Ho M.D.,  Dr. Dave Heredia D.O.,          Uzair Fuentes  84 y.o.  male    SUBJECTIVE:  Patient resting in bed.  Sitter at bedside.  Reports mild nausea.  Reports that he had a bowel movement, unable to provide further information.    OBJECTIVE:    /64   Pulse 93   Temp 97.5 °F (36.4 °C) (Oral)   Resp 18   Ht 1.803 m (5' 11\")   Wt 102 kg (224 lb 13.9 oz)   SpO2 94%   BMI 31.36 kg/m²     General: NAD/older adult  male  HEENT: Anicteric sclera/moist oral mucosa/poor dentition  Neck: Supple/trachea midline  Chest: Symmetric excursion/diffuse coarse breath sounds and rhonchi  Cor: Regular/S1-S2  Abd.: Left and nontender  Extr.:  BLE mild edema.  Decreased muscle tone and bulk throughout, consistent with aging condition  Skin: Warm and dry/anicteric      DATA:    Monitor data reviewed -sinus rhythm noted.       Lab Results   Component Value Date/Time    WBC 5.0 04/12/2024 05:17 AM    RBC 2.42 04/12/2024 05:17 AM    HGB 7.5 04/12/2024 05:17 AM    HCT 25.4 04/12/2024 05:17 AM    .0 04/12/2024 05:17 AM    MCH 31.0 04/12/2024 05:17 AM    MCHC 29.5 04/12/2024 05:17 AM    RDW 16.2 04/12/2024 05:17 AM     04/12/2024 05:17 AM    MPV 10.5 04/12/2024 05:17 AM     Lab Results   Component Value Date/Time     04/12/2024 05:17 AM    K 3.7 04/12/2024 05:17 AM    K 4.4 10/18/2022 04:50 AM     04/12/2024 05:17 AM    CO2 22 04/12/2024 05:17 AM    BUN 30 04/12/2024 05:17 AM    CREATININE 1.9 04/12/2024 05:17 AM    CALCIUM 10.1 04/12/2024 05:17 AM    PROT 4.4 04/12/2024 05:17 AM    LABALBU 2.5 04/12/2024 05:17 AM    BILITOT 0.4 04/12/2024 05:17 AM    ALKPHOS 85 04/12/2024 05:17 AM    AST 21 04/12/2024 05:17 AM    ALT 19 04/12/2024 05:17 AM     Lab Results   Component Value Date/Time    LIPASE 66 03/27/2024 03:55 PM     No results  found for: \"AMYLASE\"      ASSESSMENT/PLAN:  Patient Active Problem List   Diagnosis    Normocytic anemia    Rectal bleed    GI bleed    Chest pain    PVC's (premature ventricular contractions)    Coronary artery disease involving native coronary artery of native heart without angina pectoris    Primary hypertension    Hyperlipidemia    Stage 3b chronic kidney disease (HCC)    Renal cell cancer (HCC)    Acute on chronic anemia    Metastatic renal cell carcinoma (HCC)    Severe protein-calorie malnutrition (HCC)    First degree AV block    Abdominal aortic aneurysm (AAA) without rupture (HCC)    Acute kidney injury (HCC)    Stage 4 chronic kidney disease (HCC)    Monoclonal gammopathy    Chronic deep vein thrombosis (DVT) of proximal vein of lower extremity (HCC)    Goals of care, counseling/discussion    Palliative care by specialist    Iron deficiency    Low zinc level    Acute blood loss anemia    Cardiomyopathy (HCC)    AV block, Mobitz 1    On apixaban therapy    Deep vein thrombosis (DVT) of left lower extremity (HCC)    Other long term (current) drug therapy    Acute on chronic blood loss anemia    CKD (chronic kidney disease)    Acute upper GI bleed    Pleural effusion    ABLA (acute blood loss anemia)    Hypotension    Acute on chronic systolic (congestive) heart failure (HCC)    Acute hypoxic respiratory failure (HCC)         Assessment / Plan:    1.  Anemia/GI bleed  -Acute on chronic anemia  -Normocytic/iron deficient  -Questionable red stool.  -Likely precipitated/exacerbated by oral anticoagulation/chemotherapy  -EGD 2/10/2024 by Dr. Heredia showing normal examined esophagus, moderate gastritis, normal examined proximal small bowel  -Colonoscopy 2/12/2024 with diverticular disease transverse/descending/sigmoid colon otherwise unremarkable with fair prep  -Repeat upper endoscopy 3/28/2024 revealing mild gastritis but no source of bleeding  -Negative nuclear medicine bleeding scan on previous admission

## 2024-04-13 NOTE — PROGRESS NOTES
Uzair AGUILERA Gina 1940    SUBJECTIVE:    Overnight events noted, he had chest pain, feeling better now, on BiPAP, sitter is at bedside. He has dark stools.    OBJECTIVE  Physical Exam:  VITALS:  /66   Pulse 89   Temp 97.8 °F (36.6 °C) (Oral)   Resp 16   Ht 1.803 m (5' 11\")   Wt 102 kg (224 lb 13.9 oz)   SpO2 100%   BMI 31.36 kg/m²   ENT:  oropharynx clear, no evidence of mucositis.  Positive for pallor.  NHEMATOLOGIC/LYMPHATICS:  No abnormal lymphadenopathy.  LUNGS: Decreased air entry bilaterally.  CARDIOVASCULAR:  Regular rate and rhythm.   ABDOMEN:  Soft, nontender.  No ascites.  No mass or organomegaly.  NEUROLOGIC:  No focal deficits.  SKIN:  No rash.  EXTREMITIES: without clubbing, cyanosis, positive for lower leg edema.    DIAGNOSTIC DATA  Labs:    Lab Results   Component Value Date    WBC 5.0 04/12/2024    HGB 7.5 (L) 04/12/2024    HCT 25.4 (L) 04/12/2024    .0 (H) 04/12/2024     04/12/2024     No results found for: \"CEA\"  Recent Labs     04/12/24  0517      CO2 22   PHOS 3.9   BUN 30*   CREATININE 1.9*     Lab Results   Component Value Date    FERRITIN 238 04/07/2024     Lab Results   Component Value Date    ALT 19 04/12/2024    AST 21 04/12/2024    ALKPHOS 85 04/12/2024    BILITOT 0.4 04/12/2024     Lab Results   Component Value Date    LABALBU 2.5 (L) 04/12/2024         Current Facility-Administered Medications   Medication Dose Route Frequency Provider Last Rate Last Admin    white petrolatum ointment   Topical BID Sven Dyer MD   Given at 04/12/24 2019    bumetanide (BUMEX) injection 2 mg  2 mg IntraVENous BID Chris Mancini MD   2 mg at 04/12/24 2010    dorzolamide (TRUSOPT) 2 % ophthalmic solution 1 drop  1 drop Both Eyes BID Sven Dyer MD   1 drop at 04/12/24 2011    And    brimonidine (ALPHAGAN) 0.2 % ophthalmic solution 1 drop  1 drop Both Eyes BID Sven Dyer MD   1 drop at 04/12/24 2011    miconazole (MICOTIN) 2 % powder   Topical BID Manisha,  kidney injury (HCC)    Stage 4 chronic kidney disease (HCC)    Monoclonal gammopathy    Chronic deep vein thrombosis (DVT) of proximal vein of lower extremity (HCC)    Goals of care, counseling/discussion    Palliative care by specialist    Iron deficiency    Low zinc level    Acute blood loss anemia    Cardiomyopathy (HCC)    AV block, Mobitz 1    On apixaban therapy    Deep vein thrombosis (DVT) of left lower extremity (HCC)    Other long term (current) drug therapy    Acute on chronic blood loss anemia    CKD (chronic kidney disease)    Acute upper GI bleed    Pleural effusion    ABLA (acute blood loss anemia)    Hypotension    Acute on chronic combined systolic and diastolic congestive heart failure (HCC)    Acute respiratory failure with hypoxia (HCC)           ASSESSMENT/PLAN : 84-year-old man     Metastatic renal cell carcinoma on single agent immunotherapy with nivolumab with good partial response  with decrease in bulky retroperitoneal lymphadenopathy on his most recent CT abdomen from February  Last colonoscopy in February 2024 did not show autoimmune colitis     Recurrent lower GI bleeding in a patient on anticoagulation with Eliquis for DVT of left femoral vein diagnosed in October 2023 as well has antiplatelet therapy with aspirin. EGD showed mild irritation.  GI following; no immediate interventions recommended at this time.     Patient has had multiple admissions with GI bleed  Eliquis and baby aspirin on hold  Repeat U/S did not show DVT.  Case evaluated by vascular surgery.   No IVC filter placement recommended.    Monitor for bleed.  Monitor for recurrent VTE.     Transfuse for hemoglobin below 7, labs reviewed from yesterday hemoglobin 7.5G/DL, stable, hematocrit 25.4, wait labs from today.  Will order Ferrlecit  No vitamin B12 or folate deficiency.  Anemia is likely multifactorial including anemia of chronic disease as well as GI bleed. Retic elevated  No overt evidence of hemolysis    Left large

## 2024-04-13 NOTE — PROGRESS NOTES
acetaminophen (TYLENOL) suppository 650 mg  650 mg Rectal Q6H PRN Sven Dyer MD        pantoprazole (PROTONIX) 40 mg in sodium chloride (PF) 0.9 % 10 mL injection  40 mg IntraVENous Q12H Sven Dyer MD   40 mg at 04/13/24 0232     Recent Complaints:  Review of Systems   Unable to perform ROS: Mental status change     Vitals:    04/13/24 1216   BP: 128/64   Pulse: 93   Resp: 18   Temp: 97.5 °F (36.4 °C)   SpO2: 94%     Physical Exam  Constitutional:       Appearance: Normal appearance.   Cardiovascular:      Rate and Rhythm: Normal rate.      Heart sounds: No murmur heard.  Pulmonary:      Comments: Poor respiratory effort.  No rales no wheezes.  Musculoskeletal:      Right lower leg: Edema present.      Left lower leg: Edema present.   Skin:     General: Skin is warm.   Neurological:      Mental Status: He is alert.         Recent Labs     04/12/24  0517      K 3.7   *   CO2 22   BUN 30*   CREATININE 1.9*   GLUCOSE 86   CALCIUM 10.1     Recent Labs     04/10/24  1625 04/11/24  0100 04/12/24  0517   WBC  --  7.1 5.0   RBC  --  2.52* 2.42*   HGB 7.9* 7.7* 7.5*   HCT 25.3* 24.4* 25.4*   MCV  --  96.8 105.0*   MCH  --  30.6 31.0   MCHC  --  31.6* 29.5*   RDW  --  15.9* 16.2*   PLT  --  168 141   MPV  --  10.1 10.5           Principal Problem:    Acute upper GI bleed  Active Problems:    Coronary artery disease involving native coronary artery of native heart without angina pectoris    Renal cell cancer (HCC)    Severe protein-calorie malnutrition (HCC)    Acute blood loss anemia    CKD (chronic kidney disease)    Pleural effusion    ABLA (acute blood loss anemia)    Hypotension    Acute on chronic systolic (congestive) heart failure (HCC)    Acute hypoxic respiratory failure (HCC)  Resolved Problems:    * No resolved hospital problems. *      Plan:  Uzair required BiPAP for 48 hours earlier because of worsening heart failure.  I taken him off the BiPAP and he has done fair.  I been giving him  diuretics to reduce the volume overload.  Hemoglobin is staying stable at 7.5  Protein supplementation          Case Discussed with:      Electronically signed by Sven Dyer MD on 4/13/2024 at 1:14 PM

## 2024-04-13 NOTE — PROGRESS NOTES
Pt complaining of chest pressure/ pain, EKG completed, vitals stable, Dr. Veliz notified via [employer]. No new orders

## 2024-04-13 NOTE — PROGRESS NOTES
[facility]  [address], [address]  [place] [address]      Pulmonary Progress Note      SUBJECTIVE:  lying down in bed on 4 li NC, wore NIPPV 12/6 45% last night. Awake, conversant, oriented to year but not month or place, mention by sitter of occ coughing with minimal sputum production, looking comfortable with breathing.     OBJECTIVE    Medications    Continuous Infusions:   sodium chloride      sodium chloride      sodium chloride      sodium chloride         Scheduled Meds:   white petrolatum   Topical BID    bumetanide  2 mg IntraVENous BID    dorzolamide  1 drop Both Eyes BID    And    brimonidine  1 drop Both Eyes BID    miconazole   Topical BID    ipratropium 0.5 mg-albuterol 2.5 mg  1 Dose Inhalation 4x Daily RT    arformoterol tartrate  15 mcg Nebulization BID RT    budesonide  500 mcg Nebulization BID RT    zinc sulfate  50 mg Oral Daily    sodium chloride flush  5-40 mL IntraVENous 2 times per day    atorvastatin  40 mg Oral Nightly    cyanocobalamin  1,000 mcg Oral Daily    finasteride  5 mg Oral Daily    latanoprost  1 drop Both Eyes Nightly    cetirizine  5 mg Oral Daily    metoprolol succinate  25 mg Oral BID    multivitamin  1 tablet Oral Daily    sucralfate  1 g Oral 4x Daily    [Held by provider] tamsulosin  0.4 mg Oral Nightly    sodium chloride flush  5-40 mL IntraVENous 2 times per day    pantoprazole (PROTONIX) 40 mg in sodium chloride (PF) 0.9 % 10 mL injection  40 mg IntraVENous Q12H       PRN Meds:sodium chloride, sodium chloride flush, sodium chloride, sodium chloride, guaiFENesin, ipratropium 0.5 mg-albuterol 2.5 mg, lidocaine, oxyCODONE-acetaminophen, sodium chloride flush, sodium chloride, ondansetron **OR** ondansetron, acetaminophen **OR** acetaminophen    Physical    VITALS:  /64   Pulse 93   Temp 97.5 °F (36.4 °C) (Oral)   Resp 18   Ht 1.803 m (5' 11\")   Wt 102 kg (224 lb 13.9 oz)   SpO2 94%    function  Cont with oxygen daytime and NIPPV at night, observe respiratory function  Repeat CXR to watch effusion if any change  Cont with nebs, observe respiratory function and cough  OOB to chair as tolerated      Time at the bedside, reviewing labs and radiographs, reviewing notes and consultations, discussing with staff and family was more than 50 minutes.      Thanks for letting us see this patient in consultation.  Please contact us with any questions. Office [phone] or after hours through [employer], x [phone].

## 2024-04-14 ENCOUNTER — APPOINTMENT (OUTPATIENT)
Dept: GENERAL RADIOLOGY | Age: 84
DRG: 813 | End: 2024-04-14
Payer: OTHER GOVERNMENT

## 2024-04-14 PROBLEM — E44.0 MODERATE PROTEIN-CALORIE MALNUTRITION (HCC): Status: ACTIVE | Noted: 2024-04-14

## 2024-04-14 PROBLEM — E44.0 MODERATE PROTEIN-CALORIE MALNUTRITION (HCC): Status: ACTIVE | Noted: 2024-03-28

## 2024-04-14 LAB
ALBUMIN SERPL-MCNC: 2.7 G/DL (ref 3.5–5.2)
ALP SERPL-CCNC: 92 U/L (ref 40–129)
ALT SERPL-CCNC: 20 U/L (ref 0–40)
ANION GAP SERPL CALCULATED.3IONS-SCNC: 5 MMOL/L (ref 7–16)
AST SERPL-CCNC: 26 U/L (ref 0–39)
BASOPHILS # BLD: 0 K/UL (ref 0–0.2)
BASOPHILS NFR BLD: 0 % (ref 0–2)
BILIRUB SERPL-MCNC: 0.3 MG/DL (ref 0–1.2)
BUN SERPL-MCNC: 31 MG/DL (ref 6–23)
CALCIUM SERPL-MCNC: 10.3 MG/DL (ref 8.6–10.2)
CHLORIDE SERPL-SCNC: 106 MMOL/L (ref 98–107)
CO2 SERPL-SCNC: 27 MMOL/L (ref 22–29)
CREAT SERPL-MCNC: 2 MG/DL (ref 0.7–1.2)
EOSINOPHIL # BLD: 0.06 K/UL (ref 0.05–0.5)
EOSINOPHILS RELATIVE PERCENT: 1 % (ref 0–6)
ERYTHROCYTE [DISTWIDTH] IN BLOOD BY AUTOMATED COUNT: 16.4 % (ref 11.5–15)
GFR SERPL CREATININE-BSD FRML MDRD: 32 ML/MIN/1.73M2
GLUCOSE SERPL-MCNC: 122 MG/DL (ref 74–99)
HCT VFR BLD AUTO: 25.9 % (ref 37–54)
HGB BLD-MCNC: 8 G/DL (ref 12.5–16.5)
IMM GRANULOCYTES # BLD AUTO: 0.03 K/UL (ref 0–0.58)
IMM GRANULOCYTES NFR BLD: 1 % (ref 0–5)
LYMPHOCYTES NFR BLD: 0.32 K/UL (ref 1.5–4)
LYMPHOCYTES RELATIVE PERCENT: 6 % (ref 20–42)
MCH RBC QN AUTO: 31.1 PG (ref 26–35)
MCHC RBC AUTO-ENTMCNC: 30.9 G/DL (ref 32–34.5)
MCV RBC AUTO: 100.8 FL (ref 80–99.9)
MONOCYTES NFR BLD: 0.39 K/UL (ref 0.1–0.95)
MONOCYTES NFR BLD: 8 % (ref 2–12)
NEUTROPHILS NFR BLD: 84 % (ref 43–80)
NEUTS SEG NFR BLD: 4.23 K/UL (ref 1.8–7.3)
PLATELET # BLD AUTO: 169 K/UL (ref 130–450)
PMV BLD AUTO: 10.4 FL (ref 7–12)
POTASSIUM SERPL-SCNC: 3.3 MMOL/L (ref 3.5–5)
PROT SERPL-MCNC: 4.8 G/DL (ref 6.4–8.3)
RBC # BLD AUTO: 2.57 M/UL (ref 3.8–5.8)
RBC # BLD: ABNORMAL 10*6/UL
SODIUM SERPL-SCNC: 138 MMOL/L (ref 132–146)
TROPONIN I SERPL HS-MCNC: 109 NG/L (ref 0–11)
WBC OTHER # BLD: 5 K/UL (ref 4.5–11.5)

## 2024-04-14 PROCEDURE — 36415 COLL VENOUS BLD VENIPUNCTURE: CPT

## 2024-04-14 PROCEDURE — 80053 COMPREHEN METABOLIC PANEL: CPT

## 2024-04-14 PROCEDURE — C9113 INJ PANTOPRAZOLE SODIUM, VIA: HCPCS | Performed by: INTERNAL MEDICINE

## 2024-04-14 PROCEDURE — 2580000003 HC RX 258: Performed by: INTERNAL MEDICINE

## 2024-04-14 PROCEDURE — 94660 CPAP INITIATION&MGMT: CPT

## 2024-04-14 PROCEDURE — 84484 ASSAY OF TROPONIN QUANT: CPT

## 2024-04-14 PROCEDURE — 6370000000 HC RX 637 (ALT 250 FOR IP): Performed by: INTERNAL MEDICINE

## 2024-04-14 PROCEDURE — 2060000000 HC ICU INTERMEDIATE R&B

## 2024-04-14 PROCEDURE — 6360000002 HC RX W HCPCS: Performed by: INTERNAL MEDICINE

## 2024-04-14 PROCEDURE — 85025 COMPLETE CBC W/AUTO DIFF WBC: CPT

## 2024-04-14 PROCEDURE — 2700000000 HC OXYGEN THERAPY PER DAY

## 2024-04-14 PROCEDURE — 71045 X-RAY EXAM CHEST 1 VIEW: CPT

## 2024-04-14 PROCEDURE — 99233 SBSQ HOSP IP/OBS HIGH 50: CPT | Performed by: INTERNAL MEDICINE

## 2024-04-14 PROCEDURE — A4216 STERILE WATER/SALINE, 10 ML: HCPCS | Performed by: INTERNAL MEDICINE

## 2024-04-14 PROCEDURE — 94640 AIRWAY INHALATION TREATMENT: CPT

## 2024-04-14 RX ORDER — POTASSIUM CHLORIDE 20 MEQ/1
40 TABLET, EXTENDED RELEASE ORAL ONCE
Status: COMPLETED | OUTPATIENT
Start: 2024-04-14 | End: 2024-04-14

## 2024-04-14 RX ORDER — ACETAZOLAMIDE 500 MG/5ML
250 INJECTION, POWDER, LYOPHILIZED, FOR SOLUTION INTRAVENOUS
Status: DISCONTINUED | OUTPATIENT
Start: 2024-04-14 | End: 2024-04-18 | Stop reason: HOSPADM

## 2024-04-14 RX ADMIN — IPRATROPIUM BROMIDE AND ALBUTEROL SULFATE 1 DOSE: 2.5; .5 SOLUTION RESPIRATORY (INHALATION) at 13:46

## 2024-04-14 RX ADMIN — IPRATROPIUM BROMIDE AND ALBUTEROL SULFATE 1 DOSE: 2.5; .5 SOLUTION RESPIRATORY (INHALATION) at 10:12

## 2024-04-14 RX ADMIN — MICONAZOLE NITRATE: 20 POWDER TOPICAL at 08:30

## 2024-04-14 RX ADMIN — POTASSIUM CHLORIDE 40 MEQ: 1500 TABLET, EXTENDED RELEASE ORAL at 13:25

## 2024-04-14 RX ADMIN — SUCRALFATE 1 G: 1 TABLET ORAL at 13:25

## 2024-04-14 RX ADMIN — LATANOPROST 1 DROP: 50 SOLUTION OPHTHALMIC at 20:19

## 2024-04-14 RX ADMIN — OXYCODONE AND ACETAMINOPHEN 1 TABLET: 5; 325 TABLET ORAL at 20:19

## 2024-04-14 RX ADMIN — SODIUM CHLORIDE, PRESERVATIVE FREE 40 MG: 5 INJECTION INTRAVENOUS at 15:42

## 2024-04-14 RX ADMIN — FINASTERIDE 5 MG: 5 TABLET, FILM COATED ORAL at 08:29

## 2024-04-14 RX ADMIN — BUMETANIDE 2 MG: 0.25 INJECTION INTRAMUSCULAR; INTRAVENOUS at 08:29

## 2024-04-14 RX ADMIN — SODIUM CHLORIDE, PRESERVATIVE FREE 10 ML: 5 INJECTION INTRAVENOUS at 08:29

## 2024-04-14 RX ADMIN — SUCRALFATE 1 G: 1 TABLET ORAL at 20:19

## 2024-04-14 RX ADMIN — BRIMONIDINE TARTRATE 1 DROP: 2 SOLUTION/ DROPS OPHTHALMIC at 08:28

## 2024-04-14 RX ADMIN — ATORVASTATIN CALCIUM 40 MG: 40 TABLET, FILM COATED ORAL at 20:19

## 2024-04-14 RX ADMIN — PETROLATUM: 420 OINTMENT TOPICAL at 20:20

## 2024-04-14 RX ADMIN — MULTIVITAMIN TABLET 1 TABLET: TABLET at 08:28

## 2024-04-14 RX ADMIN — BUDESONIDE INHALATION 500 MCG: 0.5 SUSPENSION RESPIRATORY (INHALATION) at 20:14

## 2024-04-14 RX ADMIN — IPRATROPIUM BROMIDE AND ALBUTEROL SULFATE 1 DOSE: 2.5; .5 SOLUTION RESPIRATORY (INHALATION) at 16:25

## 2024-04-14 RX ADMIN — SUCRALFATE 1 G: 1 TABLET ORAL at 16:43

## 2024-04-14 RX ADMIN — CETIRIZINE HYDROCHLORIDE 5 MG: 10 TABLET, FILM COATED ORAL at 08:29

## 2024-04-14 RX ADMIN — SODIUM CHLORIDE, PRESERVATIVE FREE 10 ML: 5 INJECTION INTRAVENOUS at 20:19

## 2024-04-14 RX ADMIN — SODIUM CHLORIDE, PRESERVATIVE FREE 40 MG: 5 INJECTION INTRAVENOUS at 02:53

## 2024-04-14 RX ADMIN — METOPROLOL SUCCINATE 25 MG: 25 TABLET, FILM COATED, EXTENDED RELEASE ORAL at 08:29

## 2024-04-14 RX ADMIN — PETROLATUM: 420 OINTMENT TOPICAL at 08:30

## 2024-04-14 RX ADMIN — ACETAZOLAMIDE 250 MG: 500 INJECTION, POWDER, LYOPHILIZED, FOR SOLUTION INTRAVENOUS at 15:42

## 2024-04-14 RX ADMIN — ARFORMOTEROL TARTRATE 15 MCG: 15 SOLUTION RESPIRATORY (INHALATION) at 10:13

## 2024-04-14 RX ADMIN — BUDESONIDE INHALATION 500 MCG: 0.5 SUSPENSION RESPIRATORY (INHALATION) at 10:13

## 2024-04-14 RX ADMIN — MICONAZOLE NITRATE: 20 POWDER TOPICAL at 20:20

## 2024-04-14 RX ADMIN — ARFORMOTEROL TARTRATE 15 MCG: 15 SOLUTION RESPIRATORY (INHALATION) at 20:14

## 2024-04-14 RX ADMIN — GUAIFENESIN 400 MG: 400 TABLET ORAL at 00:22

## 2024-04-14 RX ADMIN — METOPROLOL SUCCINATE 25 MG: 25 TABLET, FILM COATED, EXTENDED RELEASE ORAL at 20:19

## 2024-04-14 RX ADMIN — IPRATROPIUM BROMIDE AND ALBUTEROL SULFATE 1 DOSE: 2.5; .5 SOLUTION RESPIRATORY (INHALATION) at 20:14

## 2024-04-14 RX ADMIN — SUCRALFATE 1 G: 1 TABLET ORAL at 08:29

## 2024-04-14 RX ADMIN — CYANOCOBALAMIN TAB 1000 MCG 1000 MCG: 1000 TAB at 08:28

## 2024-04-14 RX ADMIN — BRIMONIDINE TARTRATE 1 DROP: 2 SOLUTION/ DROPS OPHTHALMIC at 20:19

## 2024-04-14 RX ADMIN — DORZOLAMIDE HYDROCHLORIDE 1 DROP: 20 SOLUTION/ DROPS OPHTHALMIC at 08:28

## 2024-04-14 RX ADMIN — SODIUM CHLORIDE, PRESERVATIVE FREE 10 ML: 5 INJECTION INTRAVENOUS at 09:11

## 2024-04-14 RX ADMIN — DORZOLAMIDE HYDROCHLORIDE 1 DROP: 20 SOLUTION/ DROPS OPHTHALMIC at 20:18

## 2024-04-14 RX ADMIN — ZINC SULFATE 220 MG (50 MG) CAPSULE 50 MG: CAPSULE at 08:29

## 2024-04-14 RX ADMIN — BUMETANIDE 2 MG: 0.25 INJECTION INTRAMUSCULAR; INTRAVENOUS at 20:19

## 2024-04-14 ASSESSMENT — PAIN DESCRIPTION - PAIN TYPE: TYPE: CHRONIC PAIN

## 2024-04-14 ASSESSMENT — PAIN SCALES - GENERAL: PAINLEVEL_OUTOF10: 7

## 2024-04-14 ASSESSMENT — PAIN DESCRIPTION - DESCRIPTORS: DESCRIPTORS: ACHING

## 2024-04-14 ASSESSMENT — PAIN - FUNCTIONAL ASSESSMENT: PAIN_FUNCTIONAL_ASSESSMENT: ACTIVITIES ARE NOT PREVENTED

## 2024-04-14 ASSESSMENT — PAIN DESCRIPTION - FREQUENCY: FREQUENCY: INTERMITTENT

## 2024-04-14 ASSESSMENT — PAIN DESCRIPTION - LOCATION: LOCATION: GENERALIZED

## 2024-04-14 NOTE — PROGRESS NOTES
[facility] Quality Flow/Interdisciplinary Rounds Progress Note        Quality Flow Rounds held on April 14, 2024    Disciplines Attending:  Bedside Nurse and Nursing Unit Leadership    Uzair Fuentes was admitted on 4/6/2024 10:45 PM    Anticipated Discharge Date:  Expected Discharge Date: 04/15/24    Disposition:    Jaylen Score:  Jaylen Scale Score: 13    Readmission Risk              Risk of Unplanned Readmission:  51           Discussed patient goal for the day, patient clinical progression, and barriers to discharge.  The following Goal(s) of the Day/Commitment(s) have been identified:   bumex bid, echo, monitor labs, wean O2.       Argenis Gould RN  April 14, 2024

## 2024-04-14 NOTE — PLAN OF CARE
Problem: Safety - Adult  Goal: Free from fall injury  4/14/2024 1043 by Arline Stark RN  Outcome: Progressing  4/13/2024 2133 by Mariam Samuel RN  Outcome: Progressing     Problem: Skin/Tissue Integrity  Goal: Absence of new skin breakdown  Description: 1.  Monitor for areas of redness and/or skin breakdown  2.  Assess vascular access sites hourly  3.  Every 4-6 hours minimum:  Change oxygen saturation probe site  4.  Every 4-6 hours:  If on nasal continuous positive airway pressure, respiratory therapy assess nares and determine need for appliance change or resting period.  4/14/2024 1043 by Arline Stark RN  Outcome: Progressing  4/13/2024 2133 by Mariam Samuel RN  Outcome: Progressing     Problem: Pain  Goal: Verbalizes/displays adequate comfort level or baseline comfort level  4/14/2024 1043 by Arline Stark RN  Outcome: Progressing  4/13/2024 2133 by Mariam Samuel RN  Outcome: Progressing     Problem: ABCDS Injury Assessment  Goal: Absence of physical injury  4/14/2024 1043 by Arline Stark RN  Outcome: Progressing  4/13/2024 2133 by Mariam Samuel RN  Outcome: Progressing

## 2024-04-14 NOTE — PLAN OF CARE
Problem: Safety - Adult  Goal: Free from fall injury  4/13/2024 2133 by Mariam Samuel RN  Outcome: Progressing  4/13/2024 1013 by Arline Stark RN  Outcome: Progressing     Problem: Skin/Tissue Integrity  Goal: Absence of new skin breakdown  Description: 1.  Monitor for areas of redness and/or skin breakdown  2.  Assess vascular access sites hourly  3.  Every 4-6 hours minimum:  Change oxygen saturation probe site  4.  Every 4-6 hours:  If on nasal continuous positive airway pressure, respiratory therapy assess nares and determine need for appliance change or resting period.  4/13/2024 2133 by Mariam Samuel RN  Outcome: Progressing  4/13/2024 1013 by Arline Stark RN  Outcome: Progressing     Problem: Pain  Goal: Verbalizes/displays adequate comfort level or baseline comfort level  4/13/2024 2133 by Mariam Samuel RN  Outcome: Progressing  4/13/2024 1013 by Arline Stark RN  Outcome: Progressing     Problem: ABCDS Injury Assessment  Goal: Absence of physical injury  4/13/2024 2133 by Mariam Samuel RN  Outcome: Progressing  4/13/2024 1013 by Arline Stark RN  Outcome: Progressing

## 2024-04-14 NOTE — PROGRESS NOTES
[facility]  [address], [address]  [place] [address]      Pulmonary Progress Note      SUBJECTIVE:  reclining in bed on 4 li NC eating lunch, looking comfortable with breathing, can't say much if breathing better compared to past few days, some cough and min-mod sputum production as per sitter.     OBJECTIVE    Medications    Continuous Infusions:   sodium chloride      sodium chloride      sodium chloride      sodium chloride         Scheduled Meds:   acetaZOLAMIDE  250 mg IntraVENous Daily before lunch    potassium chloride  40 mEq Oral Once    white petrolatum   Topical BID    bumetanide  2 mg IntraVENous BID    dorzolamide  1 drop Both Eyes BID    And    brimonidine  1 drop Both Eyes BID    miconazole   Topical BID    ipratropium 0.5 mg-albuterol 2.5 mg  1 Dose Inhalation 4x Daily RT    arformoterol tartrate  15 mcg Nebulization BID RT    budesonide  500 mcg Nebulization BID RT    zinc sulfate  50 mg Oral Daily    sodium chloride flush  5-40 mL IntraVENous 2 times per day    atorvastatin  40 mg Oral Nightly    cyanocobalamin  1,000 mcg Oral Daily    finasteride  5 mg Oral Daily    latanoprost  1 drop Both Eyes Nightly    cetirizine  5 mg Oral Daily    metoprolol succinate  25 mg Oral BID    multivitamin  1 tablet Oral Daily    sucralfate  1 g Oral 4x Daily    [Held by provider] tamsulosin  0.4 mg Oral Nightly    sodium chloride flush  5-40 mL IntraVENous 2 times per day    pantoprazole (PROTONIX) 40 mg in sodium chloride (PF) 0.9 % 10 mL injection  40 mg IntraVENous Q12H       PRN Meds:sodium chloride, sodium chloride flush, sodium chloride, sodium chloride, guaiFENesin, ipratropium 0.5 mg-albuterol 2.5 mg, lidocaine, oxyCODONE-acetaminophen, sodium chloride flush, sodium chloride, ondansetron **OR** ondansetron, acetaminophen **OR** acetaminophen    Physical    VITALS:  /74   Pulse 80   Temp 98 °F (36.7 °C) (Axillary)   Resp 21

## 2024-04-14 NOTE — CONSULTS
Comprehensive Nutrition Assessment    Type and Reason for Visit:  Initial, Consult (Malnutrition)    Nutrition Recommendations/Plan:   Continue current diet as tolerated & monitor  Will modify ONS     Malnutrition Assessment:  Malnutrition Status:  Moderate malnutrition (04/14/24 1258)    Context:  Chronic Illness     Findings of the 6 clinical characteristics of malnutrition:  Energy Intake:  75% or less estimated energy requirements for 1 month or longer  Weight Loss:  Unable to assess (d/t large fluid shifts)     Body Fat Loss:  Mild body fat loss (edema likely masking further losses) Orbital, Buccal region   Muscle Mass Loss:  Mild muscle mass loss (edema likely masking further losses) Temples (temporalis)  Fluid Accumulation:  Unable to assess (d/t multifactorial)     Strength:  Not Performed    Nutrition Assessment:    Pt w/ anemia/GIB, acute on chronic CHF, L pleural effusion s/p thoracentesis 4/10. Hx COPD, CKD, metastatic renal CA on immunotherapy. Wound noted, will modify ONS & monitor.    Nutrition Related Findings:    A&O X2/sitter at bedside, -2L, +3 edema, abd WDL, +BS, K+ 3.3, BUN/Cr 31/2, Onc RD followed 10/2023-1/2024   Wound Type: Pressure Injury, Stage II       Current Nutrition Intake & Therapies:    Average Meal Intake: 0%, 1-25%, % (100% breakfast (oatmeal) per observation)  Average Supplements Intake: Unable to assess  ADULT DIET; Regular; Low Fat/Low Chol/High Fiber/2 gm Na; Low Sodium (2 gm)  ADULT ORAL NUTRITION SUPPLEMENT; PM Snack, HS Snack; Frozen Oral Supplement    Anthropometric Measures:  Height: 180.3 cm (5' 11\")  Ideal Body Weight (IBW): 172 lbs (78 kg)    Admission Body Weight: 94.3 kg (207 lb 14.3 oz) (4/8 bed)  Current Body Weight: 100.8 kg (222 lb 3.6 oz) (4/14 elevated (+edema)), 129.2 % IBW. Weight Source: Bed Scale  Current BMI (kg/m2): 31  Usual Body Weight: 71.3 kg (157 lb 3 oz) (1/26/24 standing per EMR)  % Weight Change (Calculated): 41.4                    BMI  Categories: Overweight (BMI 25.0-29.9) (admit wt BMI)    Estimated Daily Nutrient Needs:  Energy Requirements Based On: Formula  Weight Used for Energy Requirements: Admission  Energy (kcal/day):   Weight Used for Protein Requirements: Ideal  Protein (g/day):  (1.2-1.3 as tolerated w/ CKD)  Method Used for Fluid Requirements: 1 ml/kcal  Fluid (ml/day):  or per cardio management    Nutrition Diagnosis:   Moderate malnutrition, In context of chronic illness related to catabolic illness (metastatic renal CA) as evidenced by Criteria as identified in malnutrition assessment, wounds    Nutrition Interventions:   Food and/or Nutrient Delivery: Continue Current Diet, Modify Oral Nutrition Supplement  Nutrition Education/Counseling: No recommendation at this time (pt confused, from SNF)  Coordination of Nutrition Care: Continue to monitor while inpatient       Goals:     Goals: Meet at least 75% of estimated needs, by next RD assessment       Nutrition Monitoring and Evaluation:      Food/Nutrient Intake Outcomes: Food and Nutrient Intake, Supplement Intake  Physical Signs/Symptoms Outcomes: Biochemical Data, GI Status, Fluid Status or Edema, Nutrition Focused Physical Findings, Skin, Weight    Discharge Planning:    Continue Oral Nutrition Supplement     Miranda D'Amico, RD, LD  Contact: [phone]

## 2024-04-14 NOTE — PROGRESS NOTES
Progress  Note  Chief Complaint   Patient presents with    Lab result      Gets blood transfusions once a month. Hgb at [facility] was 6.9. is on 2L NC at baseline. +thinners      Historical Issues:  84-year-old male from skilled nursing facility presented to the hospital because of gastrointestinal bleeding.  He has had endoscopy on February 10, 2024 showing moderate gastritis and subsequent repeat March 28, 2024 revealing mild gastritis without source of bleeding.  Nuclear scans have been negative.  The GI service is felt colonoscopy would not yield anything.  Current Facility-Administered Medications   Medication Dose Route Frequency Provider Last Rate Last Admin    white petrolatum ointment   Topical BID Sven Dyer MD   Given at 04/14/24 0830    bumetanide (BUMEX) injection 2 mg  2 mg IntraVENous BID Chris Mancini MD   2 mg at 04/14/24 0829    dorzolamide (TRUSOPT) 2 % ophthalmic solution 1 drop  1 drop Both Eyes BID Sven Dyer MD   1 drop at 04/14/24 0828    And    brimonidine (ALPHAGAN) 0.2 % ophthalmic solution 1 drop  1 drop Both Eyes BID Sven Dyer MD   1 drop at 04/14/24 0828    miconazole (MICOTIN) 2 % powder   Topical BID Sven Dyer MD   Given at 04/14/24 0830    ipratropium 0.5 mg-albuterol 2.5 mg (DUONEB) nebulizer solution 1 Dose  1 Dose Inhalation 4x Daily RT Sven Dyer MD   1 Dose at 04/14/24 1012    arformoterol tartrate (BROVANA) nebulizer solution 15 mcg  15 mcg Nebulization BID RT Sven Dyer MD   15 mcg at 04/14/24 1013    budesonide (PULMICORT) nebulizer suspension 500 mcg  500 mcg Nebulization BID RT Sven Dyer MD   500 mcg at 04/14/24 1013    zinc sulfate (ZINCATE) 220 mg capsule - elemental zinc 50 mg  50 mg Oral Daily Sven Dyer MD   50 mg at 04/14/24 0829    0.9 % sodium chloride infusion   IntraVENous PRN Hric, Abdelrahman, DO        sodium chloride flush 0.9 % injection 5-40 mL  5-40 mL IntraVENous 2 times per day Sven Dyer MD   10 mL at         0.9 % sodium chloride infusion   IntraVENous PRN Sven Dyer MD        ondansetron (ZOFRAN-ODT) disintegrating tablet 4 mg  4 mg Oral Q8H PRN Sven Dyer MD        Or    ondansetron (ZOFRAN) injection 4 mg  4 mg IntraVENous Q6H PRN Sven Dyer MD   4 mg at 04/07/24 1734    acetaminophen (TYLENOL) tablet 650 mg  650 mg Oral Q6H PRN Sven Dyer MD        Or    acetaminophen (TYLENOL) suppository 650 mg  650 mg Rectal Q6H PRN Sven Dyer MD        pantoprazole (PROTONIX) 40 mg in sodium chloride (PF) 0.9 % 10 mL injection  40 mg IntraVENous Q12H Sven Dyer MD   40 mg at 04/14/24 0253     Recent Complaints:  Review of Systems  Vitals:    04/14/24 0830   BP: 117/64   Pulse: 88   Resp:    Temp: 98.2 °F (36.8 °C)   SpO2: 99%     Physical Exam  84-year-old male who appears to be very worn out medically.  Heart tones are a bit distant but rate is controlled.  Lungs are diminished.  Edema is present  Recent chest x-ray demonstrate significant amount of pulmonary edema however it is improved since earlier in his stay.  Overall we have seen a decrease of 5 kg in weight since midweek.    Recent Labs     04/12/24  0517 04/14/24  0822    138   K 3.7 3.3*   * 106   CO2 22 27   BUN 30* 31*   CREATININE 1.9* 2.0*   GLUCOSE 86 122*   CALCIUM 10.1 10.3*     Recent Labs     04/12/24  0517 04/14/24  0822   WBC 5.0 5.0   RBC 2.42* 2.57*   HGB 7.5* 8.0*   HCT 25.4* 25.9*   .0* 100.8*   MCH 31.0 31.1   MCHC 29.5* 30.9*   RDW 16.2* 16.4*    169   MPV 10.5 10.4           Principal Problem:    Acute upper GI bleed  Active Problems:    Coronary artery disease involving native coronary artery of native heart without angina pectoris    Renal cell cancer (HCC)    Severe protein-calorie malnutrition (HCC)    Acute blood loss anemia    CKD (chronic kidney disease)    Pleural effusion    ABLA (acute blood loss anemia)    Hypotension    Acute on chronic systolic (congestive) heart failure (HCC)

## 2024-04-15 ENCOUNTER — APPOINTMENT (OUTPATIENT)
Age: 84
DRG: 813 | End: 2024-04-15
Attending: INTERNAL MEDICINE
Payer: OTHER GOVERNMENT

## 2024-04-15 LAB
ANION GAP SERPL CALCULATED.3IONS-SCNC: 6 MMOL/L (ref 7–16)
BUN SERPL-MCNC: 33 MG/DL (ref 6–23)
CALCIUM SERPL-MCNC: 10.5 MG/DL (ref 8.6–10.2)
CHLORIDE SERPL-SCNC: 104 MMOL/L (ref 98–107)
CO2 SERPL-SCNC: 30 MMOL/L (ref 22–29)
CREAT SERPL-MCNC: 2.1 MG/DL (ref 0.7–1.2)
EKG ATRIAL RATE: 74 BPM
EKG P AXIS: 33 DEGREES
EKG P-R INTERVAL: 216 MS
EKG Q-T INTERVAL: 364 MS
EKG QRS DURATION: 126 MS
EKG QTC CALCULATION (BAZETT): 404 MS
EKG R AXIS: 12 DEGREES
EKG T AXIS: 94 DEGREES
EKG VENTRICULAR RATE: 74 BPM
ERYTHROCYTE [DISTWIDTH] IN BLOOD BY AUTOMATED COUNT: 16.2 % (ref 11.5–15)
GFR SERPL CREATININE-BSD FRML MDRD: 31 ML/MIN/1.73M2
GLUCOSE SERPL-MCNC: 101 MG/DL (ref 74–99)
HCT VFR BLD AUTO: 28.2 % (ref 37–54)
HGB BLD-MCNC: 8.4 G/DL (ref 12.5–16.5)
MCH RBC QN AUTO: 30.7 PG (ref 26–35)
MCHC RBC AUTO-ENTMCNC: 29.8 G/DL (ref 32–34.5)
MCV RBC AUTO: 102.9 FL (ref 80–99.9)
PLATELET # BLD AUTO: 165 K/UL (ref 130–450)
PMV BLD AUTO: 10.1 FL (ref 7–12)
POTASSIUM SERPL-SCNC: 3.8 MMOL/L (ref 3.5–5)
PTH-INTACT SERPL-MCNC: 171.8 PG/ML (ref 15–65)
RBC # BLD AUTO: 2.74 M/UL (ref 3.8–5.8)
SODIUM SERPL-SCNC: 140 MMOL/L (ref 132–146)
WBC OTHER # BLD: 5.9 K/UL (ref 4.5–11.5)

## 2024-04-15 PROCEDURE — 97165 OT EVAL LOW COMPLEX 30 MIN: CPT

## 2024-04-15 PROCEDURE — 93010 ELECTROCARDIOGRAM REPORT: CPT | Performed by: INTERNAL MEDICINE

## 2024-04-15 PROCEDURE — A4216 STERILE WATER/SALINE, 10 ML: HCPCS | Performed by: INTERNAL MEDICINE

## 2024-04-15 PROCEDURE — C9113 INJ PANTOPRAZOLE SODIUM, VIA: HCPCS | Performed by: INTERNAL MEDICINE

## 2024-04-15 PROCEDURE — 83970 ASSAY OF PARATHORMONE: CPT

## 2024-04-15 PROCEDURE — 93308 TTE F-UP OR LMTD: CPT

## 2024-04-15 PROCEDURE — 2700000000 HC OXYGEN THERAPY PER DAY

## 2024-04-15 PROCEDURE — 99232 SBSQ HOSP IP/OBS MODERATE 35: CPT | Performed by: STUDENT IN AN ORGANIZED HEALTH CARE EDUCATION/TRAINING PROGRAM

## 2024-04-15 PROCEDURE — 36415 COLL VENOUS BLD VENIPUNCTURE: CPT

## 2024-04-15 PROCEDURE — 97530 THERAPEUTIC ACTIVITIES: CPT

## 2024-04-15 PROCEDURE — 99233 SBSQ HOSP IP/OBS HIGH 50: CPT | Performed by: INTERNAL MEDICINE

## 2024-04-15 PROCEDURE — 80048 BASIC METABOLIC PNL TOTAL CA: CPT

## 2024-04-15 PROCEDURE — 85027 COMPLETE CBC AUTOMATED: CPT

## 2024-04-15 PROCEDURE — 94640 AIRWAY INHALATION TREATMENT: CPT

## 2024-04-15 PROCEDURE — 97535 SELF CARE MNGMENT TRAINING: CPT

## 2024-04-15 PROCEDURE — 6360000002 HC RX W HCPCS: Performed by: INTERNAL MEDICINE

## 2024-04-15 PROCEDURE — 82542 COL CHROMOTOGRAPHY QUAL/QUAN: CPT

## 2024-04-15 PROCEDURE — 94660 CPAP INITIATION&MGMT: CPT

## 2024-04-15 PROCEDURE — 2060000000 HC ICU INTERMEDIATE R&B

## 2024-04-15 PROCEDURE — 97161 PT EVAL LOW COMPLEX 20 MIN: CPT

## 2024-04-15 PROCEDURE — 2580000003 HC RX 258: Performed by: INTERNAL MEDICINE

## 2024-04-15 PROCEDURE — 6370000000 HC RX 637 (ALT 250 FOR IP): Performed by: STUDENT IN AN ORGANIZED HEALTH CARE EDUCATION/TRAINING PROGRAM

## 2024-04-15 PROCEDURE — 6370000000 HC RX 637 (ALT 250 FOR IP): Performed by: INTERNAL MEDICINE

## 2024-04-15 RX ADMIN — SODIUM CHLORIDE, PRESERVATIVE FREE 10 ML: 5 INJECTION INTRAVENOUS at 08:20

## 2024-04-15 RX ADMIN — CYANOCOBALAMIN TAB 1000 MCG 1000 MCG: 1000 TAB at 08:25

## 2024-04-15 RX ADMIN — LATANOPROST 1 DROP: 50 SOLUTION OPHTHALMIC at 20:43

## 2024-04-15 RX ADMIN — MULTIVITAMIN TABLET 1 TABLET: TABLET at 08:20

## 2024-04-15 RX ADMIN — PETROLATUM: 420 OINTMENT TOPICAL at 08:20

## 2024-04-15 RX ADMIN — SUCRALFATE 1 G: 1 TABLET ORAL at 15:58

## 2024-04-15 RX ADMIN — IPRATROPIUM BROMIDE AND ALBUTEROL SULFATE 1 DOSE: 2.5; .5 SOLUTION RESPIRATORY (INHALATION) at 13:20

## 2024-04-15 RX ADMIN — IPRATROPIUM BROMIDE AND ALBUTEROL SULFATE 1 DOSE: 2.5; .5 SOLUTION RESPIRATORY (INHALATION) at 08:37

## 2024-04-15 RX ADMIN — ARFORMOTEROL TARTRATE 15 MCG: 15 SOLUTION RESPIRATORY (INHALATION) at 21:12

## 2024-04-15 RX ADMIN — MICONAZOLE NITRATE: 20 POWDER TOPICAL at 08:20

## 2024-04-15 RX ADMIN — PETROLATUM: 420 OINTMENT TOPICAL at 20:40

## 2024-04-15 RX ADMIN — SUCRALFATE 1 G: 1 TABLET ORAL at 08:20

## 2024-04-15 RX ADMIN — METOPROLOL SUCCINATE 25 MG: 25 TABLET, FILM COATED, EXTENDED RELEASE ORAL at 20:38

## 2024-04-15 RX ADMIN — FINASTERIDE 5 MG: 5 TABLET, FILM COATED ORAL at 08:20

## 2024-04-15 RX ADMIN — DORZOLAMIDE HYDROCHLORIDE 1 DROP: 20 SOLUTION/ DROPS OPHTHALMIC at 08:19

## 2024-04-15 RX ADMIN — BRIMONIDINE TARTRATE 1 DROP: 2 SOLUTION/ DROPS OPHTHALMIC at 20:38

## 2024-04-15 RX ADMIN — BUMETANIDE 2 MG: 0.25 INJECTION INTRAMUSCULAR; INTRAVENOUS at 08:20

## 2024-04-15 RX ADMIN — APIXABAN 2.5 MG: 2.5 TABLET, FILM COATED ORAL at 20:38

## 2024-04-15 RX ADMIN — ATORVASTATIN CALCIUM 40 MG: 40 TABLET, FILM COATED ORAL at 20:38

## 2024-04-15 RX ADMIN — BRIMONIDINE TARTRATE 1 DROP: 2 SOLUTION/ DROPS OPHTHALMIC at 08:19

## 2024-04-15 RX ADMIN — SODIUM CHLORIDE, PRESERVATIVE FREE 10 ML: 5 INJECTION INTRAVENOUS at 20:40

## 2024-04-15 RX ADMIN — ARFORMOTEROL TARTRATE 15 MCG: 15 SOLUTION RESPIRATORY (INHALATION) at 08:37

## 2024-04-15 RX ADMIN — IPRATROPIUM BROMIDE AND ALBUTEROL SULFATE 1 DOSE: 2.5; .5 SOLUTION RESPIRATORY (INHALATION) at 21:13

## 2024-04-15 RX ADMIN — BUDESONIDE INHALATION 500 MCG: 0.5 SUSPENSION RESPIRATORY (INHALATION) at 08:37

## 2024-04-15 RX ADMIN — SUCRALFATE 1 G: 1 TABLET ORAL at 20:38

## 2024-04-15 RX ADMIN — CETIRIZINE HYDROCHLORIDE 5 MG: 10 TABLET, FILM COATED ORAL at 08:19

## 2024-04-15 RX ADMIN — IPRATROPIUM BROMIDE AND ALBUTEROL SULFATE 1 DOSE: 2.5; .5 SOLUTION RESPIRATORY (INHALATION) at 17:22

## 2024-04-15 RX ADMIN — BUDESONIDE INHALATION 500 MCG: 0.5 SUSPENSION RESPIRATORY (INHALATION) at 21:12

## 2024-04-15 RX ADMIN — DORZOLAMIDE HYDROCHLORIDE 1 DROP: 20 SOLUTION/ DROPS OPHTHALMIC at 20:38

## 2024-04-15 RX ADMIN — SODIUM CHLORIDE, PRESERVATIVE FREE 40 MG: 5 INJECTION INTRAVENOUS at 03:09

## 2024-04-15 RX ADMIN — METOPROLOL SUCCINATE 25 MG: 25 TABLET, FILM COATED, EXTENDED RELEASE ORAL at 08:19

## 2024-04-15 RX ADMIN — MICONAZOLE NITRATE: 20 POWDER TOPICAL at 20:39

## 2024-04-15 RX ADMIN — ACETAZOLAMIDE 250 MG: 500 INJECTION, POWDER, LYOPHILIZED, FOR SOLUTION INTRAVENOUS at 11:28

## 2024-04-15 RX ADMIN — SODIUM CHLORIDE, PRESERVATIVE FREE 40 MG: 5 INJECTION INTRAVENOUS at 15:58

## 2024-04-15 RX ADMIN — ZINC SULFATE 220 MG (50 MG) CAPSULE 50 MG: CAPSULE at 08:20

## 2024-04-15 NOTE — PROGRESS NOTES
[facility]  [address], [address]  [place] [address]      Pulmonary Progress Note      SUBJECTIVE:  sitting up in bed on 4 li NC, looking comfortable with breathing, thinks breathing and coughing is ok, appears to have worn NIPPV last night.     OBJECTIVE    Medications    Continuous Infusions:   sodium chloride      sodium chloride      sodium chloride      sodium chloride         Scheduled Meds:   acetaZOLAMIDE  250 mg IntraVENous Daily before lunch    white petrolatum   Topical BID    bumetanide  2 mg IntraVENous BID    dorzolamide  1 drop Both Eyes BID    And    brimonidine  1 drop Both Eyes BID    miconazole   Topical BID    ipratropium 0.5 mg-albuterol 2.5 mg  1 Dose Inhalation 4x Daily RT    arformoterol tartrate  15 mcg Nebulization BID RT    budesonide  500 mcg Nebulization BID RT    zinc sulfate  50 mg Oral Daily    sodium chloride flush  5-40 mL IntraVENous 2 times per day    atorvastatin  40 mg Oral Nightly    cyanocobalamin  1,000 mcg Oral Daily    finasteride  5 mg Oral Daily    latanoprost  1 drop Both Eyes Nightly    cetirizine  5 mg Oral Daily    metoprolol succinate  25 mg Oral BID    multivitamin  1 tablet Oral Daily    sucralfate  1 g Oral 4x Daily    [Held by provider] tamsulosin  0.4 mg Oral Nightly    sodium chloride flush  5-40 mL IntraVENous 2 times per day    pantoprazole (PROTONIX) 40 mg in sodium chloride (PF) 0.9 % 10 mL injection  40 mg IntraVENous Q12H       PRN Meds:sodium chloride, sodium chloride flush, sodium chloride, sodium chloride, guaiFENesin, ipratropium 0.5 mg-albuterol 2.5 mg, lidocaine, oxyCODONE-acetaminophen, sodium chloride flush, sodium chloride, ondansetron **OR** ondansetron, acetaminophen **OR** acetaminophen    Physical    VITALS:  BP (!) 154/63   Pulse 81   Temp 98 °F (36.7 °C) (Oral)   Resp 20   Ht 1.803 m (5' 11\")   Wt 98 kg (216 lb 0.8 oz)   SpO2 92%   BMI 30.13 kg/m²     24HR  disylwiae as per PCP and Cardiology, watch renal function  Cont with oxygen daytime and NIPPV at night, observe respiratory function, taper as tolerated, might not need NIPPV upon discharge  Cont with nebs, observe respiratory function and cough, may not need scheduled when discharged  OOB to chair as tolerated      Time at the bedside, reviewing labs and radiographs, reviewing notes and consultations, discussing with staff and family was more than 50 minutes.      Thanks for letting us see this patient in consultation.  Please contact us with any questions. Office [phone] or after hours through [employer], x [phone].

## 2024-04-15 NOTE — PLAN OF CARE
Problem: Safety - Adult  Goal: Free from fall injury  Outcome: Progressing     Problem: Skin/Tissue Integrity  Goal: Absence of new skin breakdown  Description: 1.  Monitor for areas of redness and/or skin breakdown  2.  Assess vascular access sites hourly  3.  Every 4-6 hours minimum:  Change oxygen saturation probe site  4.  Every 4-6 hours:  If on nasal continuous positive airway pressure, respiratory therapy assess nares and determine need for appliance change or resting period.  Outcome: Progressing     Problem: Pain  Goal: Verbalizes/displays adequate comfort level or baseline comfort level  Outcome: Progressing     Problem: ABCDS Injury Assessment  Goal: Absence of physical injury  Outcome: Progressing     Problem: Nutrition Deficit:  Goal: Optimize nutritional status  Outcome: Progressing

## 2024-04-15 NOTE — PROGRESS NOTES
[facility] Hospitalist Progress Note    Admitting Date and Time: 4/6/2024 10:45 PM  Admit Dx: Pleural effusion [J90]  Acute upper GI bleed [K92.2]  Gastrointestinal hemorrhage, unspecified gastrointestinal hemorrhage type [K92.2]  ABLA (acute blood loss anemia) [D62]    Subjective:  Patient is being followed for Pleural effusion [J90]  Acute upper GI bleed [K92.2]  Gastrointestinal hemorrhage, unspecified gastrointestinal hemorrhage type [K92.2]  ABLA (acute blood loss anemia) [D62]   Pt feels breathing is better. Was on NIV and napping when seen this am.  Per RN: No major concerns.     ROS: denies fever, chills, cp, sob, n/v, HA unless stated above.      [Held by provider] acetaZOLAMIDE  250 mg IntraVENous Daily before lunch    white petrolatum   Topical BID    [Held by provider] bumetanide  2 mg IntraVENous BID    dorzolamide  1 drop Both Eyes BID    And    brimonidine  1 drop Both Eyes BID    miconazole   Topical BID    ipratropium 0.5 mg-albuterol 2.5 mg  1 Dose Inhalation 4x Daily RT    arformoterol tartrate  15 mcg Nebulization BID RT    budesonide  500 mcg Nebulization BID RT    zinc sulfate  50 mg Oral Daily    sodium chloride flush  5-40 mL IntraVENous 2 times per day    atorvastatin  40 mg Oral Nightly    cyanocobalamin  1,000 mcg Oral Daily    finasteride  5 mg Oral Daily    latanoprost  1 drop Both Eyes Nightly    cetirizine  5 mg Oral Daily    metoprolol succinate  25 mg Oral BID    multivitamin  1 tablet Oral Daily    sucralfate  1 g Oral 4x Daily    [Held by provider] tamsulosin  0.4 mg Oral Nightly    sodium chloride flush  5-40 mL IntraVENous 2 times per day    pantoprazole (PROTONIX) 40 mg in sodium chloride (PF) 0.9 % 10 mL injection  40 mg IntraVENous Q12H     sodium chloride, , PRN  sodium chloride flush, 5-40 mL, PRN  sodium chloride, , PRN  sodium chloride, , PRN  guaiFENesin, 400 mg, 4x Daily PRN  ipratropium 0.5 mg-albuterol 2.5 mg, 1 Dose, Q4H PRN  lidocaine, , Daily  perihilar vascular congestion.  There is patchy airspace disease in the mid and lower lungs bilaterally.  There may be small bilateral pleural effusions.  No pneumothorax on either side.     Pulmonary edema pattern does not appear significantly changed.  Continued radiographic follow-up recommended.       Assessment:    Principal Problem:    Acute upper GI bleed  Active Problems:    Coronary artery disease involving native coronary artery of native heart without angina pectoris    Renal cell cancer (HCC)    Moderate protein-calorie malnutrition (HCC)    Acute blood loss anemia    CKD (chronic kidney disease)    Pleural effusion    ABLA (acute blood loss anemia)    Hypotension    Acute on chronic systolic (congestive) heart failure (HCC)    Acute hypoxic respiratory failure (HCC)  Resolved Problems:    * No resolved hospital problems. *      Plan:  1.  Ac on chr Anemia likely sec to GI bleed - EGD - 3/28/2024 revealing mild gastritis but no source of bleeding, GI on board, no active interventions. Eliquis and baby aspirin on hold   2. Ac hypoxic Resp failure - Acute on chronic systolic (congestive) heart failure, cardiology was on board, s/p IV Diuresis. Pulmonology on board.  3. MOISE on CKD - getting contraction alkalosis - hold diuretics, will f/u bnp tomorrow and change to po diuretics.  4. 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 Left large Pleural effusion-status post left thoracentesis, 1080 mL of clear yellow pleural fluid removed, cytology reviewed, negative for malignancy   6. Hx of DVT    DVT Prophylaxis - Restart eliquis once ok per GI.   Dispo - [facility] - 14/24, [facility]/ [facility] - [facility] ?    NOTE: This report was transcribed using voice recognition software. Every effort was made to ensure accuracy; however, inadvertent computerized transcription errors may be present.  Electronically signed by Alla Lawson MD on 4/15/2024 at 1:19 PM

## 2024-04-15 NOTE — PROGRESS NOTES
Inpatient Cardiology Consultation      Reason for Consult:  Pulmonary Edema     Consulting Physician: Dr. Mancini    Requesting Physician:  Dr. Dyer    Date of Consultation: 4/15/2024    Interim:  Awaiting echocardiogram to guide therapy  Per chart record patient has been cleared by GI to resume anticoagulation    Past Medical and Surgical History:    Reported CAD s/p PCI (specifics unclear).    Patient states 12 years ago he had PCI in [place], further details unknown.  [facility] and most recently had a stress test and unremarkable echocardiogram in 2021 (specifics unclear)  TTE 10/17/2022 (Dr. Patterson):  No previous echo for comparison. Technically adequate study. Left ventricle size is normal. Moderate concentric left ventricular hypertrophy. Ejection fraction is visually estimated at 55%. No regional wall motion abnormalities seen. E/A flow reversal noted. Suggestive of diastolic dysfunction. Agitated saline injected for shunt evaluation. Patent foramen ovale with right-to-left shunt was visualized. Physiologic and/or trace mitral regurgitation is present. Physiologic and/or trace tricuspid regurgitation. RVSP is normal.  TTE 01/27/2024 (Dr. Mccrary):  Left Ventricle: The left ventricular chamber appears dilated both at end systole and end diastole.  Endocardial surfaces appear at the upper limits of normal in thickness.  Abnormal septal motion is present suggesting the presence of conduction system disease.  No regional wall motion appetizer identified with global hypokinesis and mild left ventricular systolic dysfunction.  An estimated ejection fraction of 45-50% is present.  Indeterminant diastolic function is present. Aortic Valve: The aortic valve is trileaflet with mild leaflet thickening and no evidence of aortic stenosis. Mitral Valve: Mitral leaflets are structurally normal with mild mitral annular calcification and no mitral significant regurgitation. Left Atrium:  DVT  MGUS  Metastatic renal cell carcinoma on single agent nivolumab  Hyperlipidemia  BPH    Plan:  Awaiting echocardiogram to guide therapy  He has been cleared by GI to resume anticoagulation and currently on Eliquis  Agree with holding diuresis given worsening kidney function  Continue on beta-blocker for rate control  Continue Lipitor  Monitor electrolytes and keep potassium at 4 magnesium at 2  Daily weight, strict in and out monitoring, and low-salt diet  Further recommendation to follow after echo is resulted  Monitor closely on telemetry      Thank you for the consultation.  Please do not hesitate to call with questions.    Elijah Steward MD  [facility] Cardiology

## 2024-04-15 NOTE — PROGRESS NOTES
clinically feasible however overall anticoagulation management to be deferred to admitting service     2.  Metastatic malignancy  -Per admitting/pertinent consultants     3.  Multiple comorbidities  -Significantly short of breath/persistent respiratory failure  -Anticoagulation recommendation as above  -Per admitting / pertinent consultants        Stable H&H without evidence of overt bleed.  Unremarkable EGD and colonoscopy in mid February of this year and unremarkable repeat upper endoscopy on 28 March.  Negative nuclear medicine bleeding scan on prior admission 3/31/2024.  Little utility in repeating colonoscopy at this time.  Anemia likely precipitated by oral anticoagulation and recent chemotherapy.    No immediate plan for balloon enteroscopy but will plan for video capsule endoscopy as outpatient which will be scheduled in short order after patient is discharged from the hospital.  If further decrease in H&H should consider repeat inpatient nuclear medicine bleeding scan.  Future interventions will depend on H&H dynamics/clinical course and video capsule endoscopy findings.      Law Daniel MD  4/15/2024  11:32 AM    NOTE:  This report was transcribed using voice recognition software.  Every effort was made to ensure accuracy; however, inadvertent computerized transcription errors may be present.

## 2024-04-15 NOTE — CARE COORDINATION
Diuresis continues w/diamox, cardiology and pulm following. GI following w/further work up at this time as outpt. Plan remains for pt to return to [facility]. N17 started in [facility] and transport forms in folder for [facility] [phone], completed by . Will follow.   KEVEN MercadoN, RN  [facility] Case Management  [phone]

## 2024-04-15 NOTE — PROGRESS NOTES
Physical Therapy  Facility/Department: [address] INTERMEDIATE  Physical Therapy Initial Assessment    Name: Uzair Fuentes  : 1940  MRN: [id number]  Date of Service: 4/15/2024          Patient Diagnosis(es): The primary encounter diagnosis was Gastrointestinal hemorrhage, unspecified gastrointestinal hemorrhage type. Diagnoses of Pleural effusion, Deep vein thrombosis (DVT) of left lower extremity, unspecified chronicity, unspecified vein (HCC), and Acute on chronic combined systolic and diastolic congestive heart failure (HCC) were also pertinent to this visit.  Past Medical History:  has a past medical history of Cancer (HCC), Disease of blood and blood forming organ, and Hypertension.  Past Surgical History:  has a past surgical history that includes Abdominal aortic aneurysm repair; Upper gastrointestinal endoscopy (N/A, 10/14/2022); Total knee arthroplasty (Bilateral); Tonsillectomy; Colonoscopy; Upper gastrointestinal endoscopy (N/A, 2/10/2024); Colonoscopy (N/A, 2024); and Upper gastrointestinal endoscopy (N/A, 3/28/2024).         Requires PT Follow-Up: Yes            Evaluating Therapist: Alina Gan PT      Referring Provider:  Alla Lawson MD      PT order : PT eval and treat      Room #: 638  DIAGNOSIS: The primary encounter diagnosis was Gastrointestinal hemorrhage, unspecified gastrointestinal hemorrhage type. Diagnoses of Pleural effusion, Deep vein thrombosis (DVT) of left lower extremity, unspecified chronicity, unspecified vein (HCC), and Acute on chronic combined systolic and diastolic congestive heart failure (HCC) were also pertinent to this visit.  PRECAUTIONS: falls      Social:  Pt admitted from SNF. Per chart, pt is LTC resident   Prior to admission pt walked with ww or uses w/c        Initial Evaluation  Date:  4/15/2024  Treatment      Short Term/ Long Term   Goals   Was pt agreeable to Eval/treatment?  Yes        Does pt have pain?  None reported        Bed Mobility

## 2024-04-15 NOTE — PROGRESS NOTES
Therapeutic exercise to improve tolerance and functional strength for ADLs    Balance retraining/tolerance tasks for facilitation of postural control with dynamic challenges during ADLs .      Positioning to improve functional independence      Recommended Adaptive Equipment: TBD     SOCIAL:   patient resident of SNF- ambulates with walker or wheelchair       Pain Level: no pain observed this session ;   Cognition: A&O: following simple commands, conversing    Memory:  forgetful    Sequencing:  fair    Problem solving:  fair    Judgement/safety:  fair      Functional Assessment:  AM-PAC Daily Activity Raw Score: 13/24   Initial Eval Status  Date: 4/15/2024 Treatment Status  Date: STGs = LTGs  Time frame: 10-14 days   Feeding SBA/set-up   Supervision    Grooming Min A, seated  SBA/set-up    UB Dressing Mod A   Min A    LB Dressing Max A   Mod A    Bathing Max  A  Mod A    Toileting Max  A  Patient incontinent of bowel   Assist with hygiene and clean pads   Min A    Bed Mobility  Min A  Supine to sit   CGA   Functional Transfers Mod A  Sit-stand from bed,chair   Min/CGA   Functional Mobility Mod A,,w/walker   SPT from bed to chair   Min/CGA with good tolerance    Balance Sitting:     Static:  SBA - EOB     Dynamic:Min A   Standing: mod A   Supervision - sitting   Min/CGA- standing    Activity Tolerance No SOB observed   Fair +  with ADL activity    Visual/  Perceptual No deficits observed         UE ROM/strength  AROM present throughout     Tolerate UE therapeutic activity/exercises to increase strength/endurance for ADL/xfer activity       Hand Dominance right    Hearing: [place]   Sensation:  No c/o numbness or tingling   Tone: WFL   Edema: none observed     Comments: Upon arrival patient lying in bed .  At end of session, patient sitting in chair  with call light and phone within reach, all lines and tubes intact.  *ALARM ON     Overall patient demonstrated  decreased independence and safety during completion of  ADL/functional transfer/mobility tasks.  Pt would benefit from continued skilled OT to increase safety and independence with completion of ADL/IADL tasks for functional independence and quality of life.      Comments/Treatment:      Patient practiced and was instructed on following during evaluation and OT session:          -Bed mobility - technique and safety         -Tranfers - hand placement, tech and safety         -Mobility - safety, and tech with  a device         -ADLs - tech, AE if appropriate/needed   - continue to encourage patient to participate and complete ADL activity         -Education:  breathing tech, importance of OOB activity and participating in ADLs, safety awareness             Rehab Potential: fair + for established goals     Patient / Family Goal: none stated       Patient and/or family were instructed on functional diagnosis, prognosis/goals and OT plan of care. Demonstrated fair  understanding.     Eval Complexity: Low    Time In: 0914  Time Out: 0936  Total Treatment Time: 10    Min Units   OT Eval Low 97165 x  1   OT Eval Medium 88643      OT Eval High 68880      OT Re-Eval 99039       Therapeutic Ex 09794      Therapeutic Activities 04146       ADL/Self Care 78379  10  1   Orthotic Management 90886       Manual 68167     Neuro Re-Ed 61157       Non-Billable Time      OT Re eval 61123        Evaluation Time additionally includes thorough review of current medical information, gathering information on past medical history/social history and prior level of function, interpretation of standardized testing/informal observation of tasks, assessment of data and development of plan of care and goals.            April De Luna  OTR/L  OT [id number]

## 2024-04-15 NOTE — PROGRESS NOTES
[facility] Quality Flow/Interdisciplinary Rounds Progress Note        Quality Flow Rounds held on April 15, 2024    Disciplines Attending:  Bedside Nurse, , , and Nursing Unit Leadership    Uzair Fuentes was admitted on 4/6/2024 10:45 PM    Anticipated Discharge Date:  Expected Discharge Date: 04/15/24    Disposition:    Jaylen Score:  Jaylen Scale Score: 17    Readmission Risk              Risk of Unplanned Readmission:  49           Discussed patient goal for the day, patient clinical progression, and barriers to discharge.  The following Goal(s) of the Day/Commitment(s) have been identified:  monitor labs, iv diuretics, wean oxygen/bipap as able      Carolee Barrett RN  April 15, 2024

## 2024-04-15 NOTE — PROGRESS NOTES
Have been unable to obtain strict outputs as patient has been incontinent and the external catheter has not been working properly due to patient anatomy.

## 2024-04-16 LAB
ALBUMIN SERPL-MCNC: 2.4 G/DL (ref 3.5–5.2)
ALP SERPL-CCNC: 92 U/L (ref 40–129)
ALT SERPL-CCNC: 19 U/L (ref 0–40)
ANION GAP SERPL CALCULATED.3IONS-SCNC: 5 MMOL/L (ref 7–16)
AST SERPL-CCNC: 21 U/L (ref 0–39)
BASOPHILS # BLD: 0 K/UL (ref 0–0.2)
BASOPHILS NFR BLD: 0 % (ref 0–2)
BILIRUB SERPL-MCNC: 0.4 MG/DL (ref 0–1.2)
BUN SERPL-MCNC: 45 MG/DL (ref 6–23)
CALCIUM SERPL-MCNC: 10.3 MG/DL (ref 8.6–10.2)
CHLORIDE SERPL-SCNC: 103 MMOL/L (ref 98–107)
CO2 SERPL-SCNC: 30 MMOL/L (ref 22–29)
CREAT SERPL-MCNC: 2 MG/DL (ref 0.7–1.2)
ECHO BSA: 2.18 M2
ECHO LV EDV A2C: 231 ML
ECHO LV EDV A4C: 234 ML
ECHO LV EDV BP: 239 ML (ref 67–155)
ECHO LV EDV INDEX A4C: 107 ML/M2
ECHO LV EDV INDEX BP: 110 ML/M2
ECHO LV EDV NDEX A2C: 106 ML/M2
ECHO LV EJECTION FRACTION A2C: 33 %
ECHO LV EJECTION FRACTION A4C: 23 %
ECHO LV EJECTION FRACTION BIPLANE: 30 % (ref 55–100)
ECHO LV ESV A2C: 155 ML
ECHO LV ESV A4C: 181 ML
ECHO LV ESV BP: 168 ML (ref 22–58)
ECHO LV ESV INDEX A2C: 71 ML/M2
ECHO LV ESV INDEX A4C: 83 ML/M2
ECHO LV ESV INDEX BP: 77 ML/M2
ECHO LV FRACTIONAL SHORTENING: 16 % (ref 28–44)
ECHO LV INTERNAL DIMENSION DIASTOLE INDEX: 2.66 CM/M2
ECHO LV INTERNAL DIMENSION DIASTOLIC: 5.8 CM (ref 4.2–5.9)
ECHO LV INTERNAL DIMENSION SYSTOLIC INDEX: 2.25 CM/M2
ECHO LV INTERNAL DIMENSION SYSTOLIC: 4.9 CM
ECHO LV IVSD: 1.3 CM (ref 0.6–1)
ECHO LV IVSS: 1.1 CM
ECHO LV MASS 2D: 297 G (ref 88–224)
ECHO LV MASS INDEX 2D: 136.2 G/M2 (ref 49–115)
ECHO LV POSTERIOR WALL DIASTOLIC: 1.1 CM (ref 0.6–1)
ECHO LV POSTERIOR WALL SYSTOLIC: 1.7 CM
ECHO LV RELATIVE WALL THICKNESS RATIO: 0.38
ECHO MV REGURGITANT PEAK GRADIENT: 96 MMHG
ECHO MV REGURGITANT PEAK VELOCITY: 4.9 M/S
ECHO TV REGURGITANT MAX VELOCITY: 2.72 M/S
ECHO TV REGURGITANT PEAK GRADIENT: 30 MMHG
EOSINOPHIL # BLD: 0.13 K/UL (ref 0.05–0.5)
EOSINOPHILS RELATIVE PERCENT: 2 % (ref 0–6)
ERYTHROCYTE [DISTWIDTH] IN BLOOD BY AUTOMATED COUNT: 15.9 % (ref 11.5–15)
GFR SERPL CREATININE-BSD FRML MDRD: 33 ML/MIN/1.73M2
GLUCOSE SERPL-MCNC: 100 MG/DL (ref 74–99)
HCT VFR BLD AUTO: 26.3 % (ref 37–54)
HGB BLD-MCNC: 7.8 G/DL (ref 12.5–16.5)
IMM GRANULOCYTES # BLD AUTO: 0.03 K/UL (ref 0–0.58)
IMM GRANULOCYTES NFR BLD: 1 % (ref 0–5)
LYMPHOCYTES NFR BLD: 0.38 K/UL (ref 1.5–4)
LYMPHOCYTES RELATIVE PERCENT: 6 % (ref 20–42)
MCH RBC QN AUTO: 30.5 PG (ref 26–35)
MCHC RBC AUTO-ENTMCNC: 29.7 G/DL (ref 32–34.5)
MCV RBC AUTO: 102.7 FL (ref 80–99.9)
MONOCYTES NFR BLD: 0.42 K/UL (ref 0.1–0.95)
MONOCYTES NFR BLD: 7 % (ref 2–12)
NEUTROPHILS NFR BLD: 85 % (ref 43–80)
NEUTS SEG NFR BLD: 5.34 K/UL (ref 1.8–7.3)
PLATELET # BLD AUTO: 164 K/UL (ref 130–450)
PMV BLD AUTO: 10.4 FL (ref 7–12)
POTASSIUM SERPL-SCNC: 3.4 MMOL/L (ref 3.5–5)
PROT SERPL-MCNC: 4.6 G/DL (ref 6.4–8.3)
RBC # BLD AUTO: 2.56 M/UL (ref 3.8–5.8)
RBC # BLD: ABNORMAL 10*6/UL
SODIUM SERPL-SCNC: 138 MMOL/L (ref 132–146)
WBC OTHER # BLD: 6.3 K/UL (ref 4.5–11.5)

## 2024-04-16 PROCEDURE — 97530 THERAPEUTIC ACTIVITIES: CPT

## 2024-04-16 PROCEDURE — C9113 INJ PANTOPRAZOLE SODIUM, VIA: HCPCS | Performed by: INTERNAL MEDICINE

## 2024-04-16 PROCEDURE — 2580000003 HC RX 258: Performed by: INTERNAL MEDICINE

## 2024-04-16 PROCEDURE — 93308 TTE F-UP OR LMTD: CPT | Performed by: INTERNAL MEDICINE

## 2024-04-16 PROCEDURE — 6360000002 HC RX W HCPCS: Performed by: INTERNAL MEDICINE

## 2024-04-16 PROCEDURE — 94640 AIRWAY INHALATION TREATMENT: CPT

## 2024-04-16 PROCEDURE — 6370000000 HC RX 637 (ALT 250 FOR IP): Performed by: STUDENT IN AN ORGANIZED HEALTH CARE EDUCATION/TRAINING PROGRAM

## 2024-04-16 PROCEDURE — 36415 COLL VENOUS BLD VENIPUNCTURE: CPT

## 2024-04-16 PROCEDURE — 80053 COMPREHEN METABOLIC PANEL: CPT

## 2024-04-16 PROCEDURE — 93321 DOPPLER ECHO F-UP/LMTD STD: CPT | Performed by: INTERNAL MEDICINE

## 2024-04-16 PROCEDURE — A4216 STERILE WATER/SALINE, 10 ML: HCPCS | Performed by: INTERNAL MEDICINE

## 2024-04-16 PROCEDURE — 6370000000 HC RX 637 (ALT 250 FOR IP): Performed by: INTERNAL MEDICINE

## 2024-04-16 PROCEDURE — 85025 COMPLETE CBC W/AUTO DIFF WBC: CPT

## 2024-04-16 PROCEDURE — 97535 SELF CARE MNGMENT TRAINING: CPT

## 2024-04-16 PROCEDURE — 2060000000 HC ICU INTERMEDIATE R&B

## 2024-04-16 PROCEDURE — 2700000000 HC OXYGEN THERAPY PER DAY

## 2024-04-16 PROCEDURE — 99233 SBSQ HOSP IP/OBS HIGH 50: CPT | Performed by: INTERNAL MEDICINE

## 2024-04-16 PROCEDURE — 99232 SBSQ HOSP IP/OBS MODERATE 35: CPT | Performed by: STUDENT IN AN ORGANIZED HEALTH CARE EDUCATION/TRAINING PROGRAM

## 2024-04-16 PROCEDURE — 93325 DOPPLER ECHO COLOR FLOW MAPG: CPT | Performed by: INTERNAL MEDICINE

## 2024-04-16 PROCEDURE — 94660 CPAP INITIATION&MGMT: CPT

## 2024-04-16 RX ORDER — POTASSIUM CHLORIDE 20 MEQ/1
40 TABLET, EXTENDED RELEASE ORAL ONCE
Status: COMPLETED | OUTPATIENT
Start: 2024-04-16 | End: 2024-04-16

## 2024-04-16 RX ADMIN — GUAIFENESIN 400 MG: 400 TABLET ORAL at 09:12

## 2024-04-16 RX ADMIN — METOPROLOL SUCCINATE 25 MG: 25 TABLET, FILM COATED, EXTENDED RELEASE ORAL at 08:13

## 2024-04-16 RX ADMIN — SODIUM CHLORIDE, PRESERVATIVE FREE 40 MG: 5 INJECTION INTRAVENOUS at 03:20

## 2024-04-16 RX ADMIN — SUCRALFATE 1 G: 1 TABLET ORAL at 11:43

## 2024-04-16 RX ADMIN — SUCRALFATE 1 G: 1 TABLET ORAL at 21:35

## 2024-04-16 RX ADMIN — ARFORMOTEROL TARTRATE 15 MCG: 15 SOLUTION RESPIRATORY (INHALATION) at 20:48

## 2024-04-16 RX ADMIN — MULTIVITAMIN TABLET 1 TABLET: TABLET at 08:13

## 2024-04-16 RX ADMIN — DORZOLAMIDE HYDROCHLORIDE 1 DROP: 20 SOLUTION/ DROPS OPHTHALMIC at 08:13

## 2024-04-16 RX ADMIN — PETROLATUM: 420 OINTMENT TOPICAL at 21:36

## 2024-04-16 RX ADMIN — CETIRIZINE HYDROCHLORIDE 5 MG: 10 TABLET, FILM COATED ORAL at 08:13

## 2024-04-16 RX ADMIN — BRIMONIDINE TARTRATE 1 DROP: 2 SOLUTION/ DROPS OPHTHALMIC at 21:36

## 2024-04-16 RX ADMIN — APIXABAN 2.5 MG: 2.5 TABLET, FILM COATED ORAL at 21:35

## 2024-04-16 RX ADMIN — FINASTERIDE 5 MG: 5 TABLET, FILM COATED ORAL at 08:13

## 2024-04-16 RX ADMIN — SODIUM CHLORIDE, PRESERVATIVE FREE 10 ML: 5 INJECTION INTRAVENOUS at 08:13

## 2024-04-16 RX ADMIN — POTASSIUM CHLORIDE 40 MEQ: 1500 TABLET, EXTENDED RELEASE ORAL at 11:43

## 2024-04-16 RX ADMIN — BUDESONIDE INHALATION 500 MCG: 0.5 SUSPENSION RESPIRATORY (INHALATION) at 20:48

## 2024-04-16 RX ADMIN — IPRATROPIUM BROMIDE AND ALBUTEROL SULFATE 1 DOSE: 2.5; .5 SOLUTION RESPIRATORY (INHALATION) at 20:48

## 2024-04-16 RX ADMIN — PETROLATUM: 420 OINTMENT TOPICAL at 08:14

## 2024-04-16 RX ADMIN — LATANOPROST 1 DROP: 50 SOLUTION OPHTHALMIC at 21:37

## 2024-04-16 RX ADMIN — IPRATROPIUM BROMIDE AND ALBUTEROL SULFATE 1 DOSE: 2.5; .5 SOLUTION RESPIRATORY (INHALATION) at 13:39

## 2024-04-16 RX ADMIN — ARFORMOTEROL TARTRATE 15 MCG: 15 SOLUTION RESPIRATORY (INHALATION) at 09:35

## 2024-04-16 RX ADMIN — SODIUM CHLORIDE, PRESERVATIVE FREE 40 MG: 5 INJECTION INTRAVENOUS at 15:42

## 2024-04-16 RX ADMIN — ZINC SULFATE 220 MG (50 MG) CAPSULE 50 MG: CAPSULE at 08:13

## 2024-04-16 RX ADMIN — MICONAZOLE NITRATE: 20 POWDER TOPICAL at 08:13

## 2024-04-16 RX ADMIN — BRIMONIDINE TARTRATE 1 DROP: 2 SOLUTION/ DROPS OPHTHALMIC at 08:13

## 2024-04-16 RX ADMIN — DORZOLAMIDE HYDROCHLORIDE 1 DROP: 20 SOLUTION/ DROPS OPHTHALMIC at 21:36

## 2024-04-16 RX ADMIN — MICONAZOLE NITRATE: 20 POWDER TOPICAL at 21:36

## 2024-04-16 RX ADMIN — ATORVASTATIN CALCIUM 40 MG: 40 TABLET, FILM COATED ORAL at 21:35

## 2024-04-16 RX ADMIN — BUDESONIDE INHALATION 500 MCG: 0.5 SUSPENSION RESPIRATORY (INHALATION) at 09:35

## 2024-04-16 RX ADMIN — IPRATROPIUM BROMIDE AND ALBUTEROL SULFATE 1 DOSE: 2.5; .5 SOLUTION RESPIRATORY (INHALATION) at 09:35

## 2024-04-16 RX ADMIN — METOPROLOL SUCCINATE 25 MG: 25 TABLET, FILM COATED, EXTENDED RELEASE ORAL at 21:35

## 2024-04-16 RX ADMIN — SUCRALFATE 1 G: 1 TABLET ORAL at 08:13

## 2024-04-16 RX ADMIN — SUCRALFATE 1 G: 1 TABLET ORAL at 15:43

## 2024-04-16 RX ADMIN — SODIUM CHLORIDE, PRESERVATIVE FREE 10 ML: 5 INJECTION INTRAVENOUS at 21:36

## 2024-04-16 RX ADMIN — APIXABAN 2.5 MG: 2.5 TABLET, FILM COATED ORAL at 08:13

## 2024-04-16 RX ADMIN — CYANOCOBALAMIN TAB 1000 MCG 1000 MCG: 1000 TAB at 08:13

## 2024-04-16 NOTE — PROGRESS NOTES
PROGRESS NOTE  By Law Daniel M.D.    [facility]  Dr. Leopoldo Contreras M.D.,  Dr. Abraham Cuello M.D.,   GAVIOTA CoronelO.,  Dr. Sánchez Ho M.D.,  Dr. Dave Heredia D.O.,          Uzair Fuentes  84 y.o.  male    SUBJECTIVE:  Denies abdominal pain.  Denies nausea vomiting.  His shortness of breath has improved.    OBJECTIVE:    /60   Pulse 72   Temp 97.8 °F (36.6 °C) (Oral)   Resp 20   Ht 1.803 m (5' 11\")   Wt 98 kg (216 lb 0.8 oz)   SpO2 94%   BMI 30.13 kg/m²     General: NAD/ male.  AAOx3  HEENT: Anicteric sclera/moist oral mucosa  Neck: Supple with trachea midline  Chest: Symmetric excursion/nonlabored respirations  Cor: Appears regular  Abd.: Soft and nontender  Extr.:  Decreased muscle tone and bulk throughout  Skin: Warm and dry/anicteric      DATA:    Monitor data reviewed -sinus rhythm noted.       Lab Results   Component Value Date/Time    WBC 6.3 04/16/2024 09:27 AM    RBC 2.56 04/16/2024 09:27 AM    HGB 7.8 04/16/2024 09:27 AM    HCT 26.3 04/16/2024 09:27 AM    .7 04/16/2024 09:27 AM    MCH 30.5 04/16/2024 09:27 AM    MCHC 29.7 04/16/2024 09:27 AM    RDW 15.9 04/16/2024 09:27 AM     04/16/2024 09:27 AM    MPV 10.4 04/16/2024 09:27 AM     Lab Results   Component Value Date/Time     04/15/2024 07:20 AM    K 3.8 04/15/2024 07:20 AM    K 4.4 10/18/2022 04:50 AM     04/15/2024 07:20 AM    CO2 30 04/15/2024 07:20 AM    BUN 33 04/15/2024 07:20 AM    CREATININE 2.1 04/15/2024 07:20 AM    CALCIUM 10.5 04/15/2024 07:20 AM    PROT 4.8 04/14/2024 08:22 AM    BILITOT 0.3 04/14/2024 08:22 AM    ALKPHOS 92 04/14/2024 08:22 AM    AST 26 04/14/2024 08:22 AM    ALT 20 04/14/2024 08:22 AM     Lab Results   Component Value Date/Time    LIPASE 66 03/27/2024 03:55 PM     No results found for: \"AMYLASE\"      ASSESSMENT/PLAN:  Patient Active Problem List   Diagnosis    Normocytic anemia    Rectal bleed    GI bleed    Chest pain    PVC's (premature  management to be deferred to admitting service     2.  Metastatic malignancy  -Per admitting/pertinent consultants     3.  Multiple comorbidities  -Significantly short of breath/persistent respiratory failure  -Anticoagulation recommendation as above  -Per admitting / pertinent consultants        Stable H&H without evidence of overt bleed.  Unremarkable EGD and colonoscopy in mid February of this year and unremarkable repeat upper endoscopy on 28 March.  Negative nuclear medicine bleeding scan on prior admission 3/31/2024.  Little utility in repeating colonoscopy at this time.  Anemia likely precipitated by oral anticoagulation and recent chemotherapy.    No immediate plan for balloon enteroscopy but will plan for video capsule endoscopy as outpatient which will be scheduled in short order after patient is discharged from the hospital.  If further decrease in H&H should consider repeat inpatient nuclear medicine bleeding scan.  Future interventions will depend on H&H dynamics/clinical course and video capsule endoscopy findings.    Law Daniel MD  4/16/2024  11:12 AM    NOTE:  This report was transcribed using voice recognition software.  Every effort was made to ensure accuracy; however, inadvertent computerized transcription errors may be present.

## 2024-04-16 NOTE — PLAN OF CARE
Problem: Safety - Adult  Goal: Free from fall injury  4/16/2024 1026 by Glenda Hussein RN  Outcome: Progressing  4/15/2024 2155 by Mary Cheney RN  Outcome: Progressing     Problem: Skin/Tissue Integrity  Goal: Absence of new skin breakdown  Description: 1.  Monitor for areas of redness and/or skin breakdown  2.  Assess vascular access sites hourly  3.  Every 4-6 hours minimum:  Change oxygen saturation probe site  4.  Every 4-6 hours:  If on nasal continuous positive airway pressure, respiratory therapy assess nares and determine need for appliance change or resting period.  4/16/2024 1026 by Glenda Hussein RN  Outcome: Progressing  4/15/2024 2155 by Mary Cheney RN  Outcome: Progressing     Problem: Pain  Goal: Verbalizes/displays adequate comfort level or baseline comfort level  4/16/2024 1026 by Glenda Hussein RN  Outcome: Progressing  4/15/2024 2155 by Mary Cheney RN  Outcome: Progressing     Problem: ABCDS Injury Assessment  Goal: Absence of physical injury  4/16/2024 1026 by Glenda Hussein RN  Outcome: Progressing  4/15/2024 2155 by Mary Cheney RN  Outcome: Progressing     Problem: Nutrition Deficit:  Goal: Optimize nutritional status  Outcome: Progressing     Problem: Chronic Conditions and Co-morbidities  Goal: Patient's chronic conditions and co-morbidity symptoms are monitored and maintained or improved  Outcome: Progressing

## 2024-04-16 NOTE — PROGRESS NOTES
[facility] Hospitalist Progress Note    Admitting Date and Time: 4/6/2024 10:45 PM  Admit Dx: Pleural effusion [J90]  Acute upper GI bleed [K92.2]  Gastrointestinal hemorrhage, unspecified gastrointestinal hemorrhage type [K92.2]  ABLA (acute blood loss anemia) [D62]    Subjective:  Patient is being followed for Pleural effusion [J90]  Acute upper GI bleed [K92.2]  Gastrointestinal hemorrhage, unspecified gastrointestinal hemorrhage type [K92.2]  ABLA (acute blood loss anemia) [D62]   Pt feels breathing is better.  Per RN: No major concerns.     ROS: denies fever, chills, cp, sob, n/v, HA unless stated above.      apixaban  2.5 mg Oral BID    [Held by provider] acetaZOLAMIDE  250 mg IntraVENous Daily before lunch    white petrolatum   Topical BID    [Held by provider] bumetanide  2 mg IntraVENous BID    dorzolamide  1 drop Both Eyes BID    And    brimonidine  1 drop Both Eyes BID    miconazole   Topical BID    ipratropium 0.5 mg-albuterol 2.5 mg  1 Dose Inhalation 4x Daily RT    arformoterol tartrate  15 mcg Nebulization BID RT    budesonide  500 mcg Nebulization BID RT    zinc sulfate  50 mg Oral Daily    sodium chloride flush  5-40 mL IntraVENous 2 times per day    atorvastatin  40 mg Oral Nightly    cyanocobalamin  1,000 mcg Oral Daily    finasteride  5 mg Oral Daily    latanoprost  1 drop Both Eyes Nightly    cetirizine  5 mg Oral Daily    metoprolol succinate  25 mg Oral BID    multivitamin  1 tablet Oral Daily    sucralfate  1 g Oral 4x Daily    [Held by provider] tamsulosin  0.4 mg Oral Nightly    sodium chloride flush  5-40 mL IntraVENous 2 times per day    pantoprazole (PROTONIX) 40 mg in sodium chloride (PF) 0.9 % 10 mL injection  40 mg IntraVENous Q12H     sodium chloride, , PRN  sodium chloride flush, 5-40 mL, PRN  sodium chloride, , PRN  sodium chloride, , PRN  guaiFENesin, 400 mg, 4x Daily PRN  ipratropium 0.5 mg-albuterol 2.5 mg, 1 Dose, Q4H PRN  lidocaine, , Daily

## 2024-04-16 NOTE — PLAN OF CARE
Problem: Safety - Adult  Goal: Free from fall injury  4/15/2024 2155 by Mary Cheney RN  Outcome: Progressing     Problem: Skin/Tissue Integrity  Goal: Absence of new skin breakdown  Description: 1.  Monitor for areas of redness and/or skin breakdown  2.  Assess vascular access sites hourly  3.  Every 4-6 hours minimum:  Change oxygen saturation probe site  4.  Every 4-6 hours:  If on nasal continuous positive airway pressure, respiratory therapy assess nares and determine need for appliance change or resting period.  4/15/2024 2155 by Mary Cheney, RN  Outcome: Progressing     Problem: Pain  Goal: Verbalizes/displays adequate comfort level or baseline comfort level  4/15/2024 2155 by Mary Cheney RN  Outcome: Progressing     Problem: ABCDS Injury Assessment  Goal: Absence of physical injury  4/15/2024 2155 by Mary Cheney, RN  Outcome: Progressing

## 2024-04-16 NOTE — PROGRESS NOTES
[facility] Quality Flow/Interdisciplinary Rounds Progress Note        Quality Flow Rounds held on April 16, 2024    Disciplines Attending:  Bedside Nurse, , , and Nursing Unit Leadership    Uzair Fuentes was admitted on 4/6/2024 10:45 PM    Anticipated Discharge Date:  Expected Discharge Date: 04/15/24    Disposition:    Jaylen Score:  Jaylen Scale Score: 17    Readmission Risk              Risk of Unplanned Readmission:  50           Discussed patient goal for the day, patient clinical progression, and barriers to discharge.  The following Goal(s) of the Day/Commitment(s) have been identified:   monitor labs, iv diuretics,       Carolee Barrett, LG  April 16, 2024

## 2024-04-16 NOTE — PROGRESS NOTES
Pulmonary Progress Note    Admit Date: 4/6/2024  Hospital day                               PCP: Guilherme Salgado DO    Chief Complaint (s):  Patient Active Problem List   Diagnosis    Normocytic anemia    Rectal bleed    GI bleed    Chest pain    PVC's (premature ventricular contractions)    Coronary artery disease involving native coronary artery of native heart without angina pectoris    Primary hypertension    Hyperlipidemia    Stage 3b chronic kidney disease (HCC)    Renal cell cancer (HCC)    Acute on chronic anemia    Metastatic renal cell carcinoma (HCC)    Moderate protein-calorie malnutrition (HCC)    First degree AV block    Abdominal aortic aneurysm (AAA) without rupture (HCC)    Acute kidney injury (HCC)    Stage 4 chronic kidney disease (HCC)    Monoclonal gammopathy    Chronic deep vein thrombosis (DVT) of proximal vein of lower extremity (HCC)    Goals of care, counseling/discussion    Palliative care by specialist    Iron deficiency    Low zinc level    Acute blood loss anemia    Cardiomyopathy (HCC)    AV block, Mobitz 1    On apixaban therapy    Deep vein thrombosis (DVT) of left lower extremity (HCC)    Other long term (current) drug therapy    Acute on chronic blood loss anemia    CKD (chronic kidney disease)    Acute upper GI bleed    Pleural effusion    ABLA (acute blood loss anemia)    Hypotension    Acute on chronic systolic (congestive) heart failure (HCC)    Acute hypoxic respiratory failure (HCC)       Subjective:  Patient seen asleep on 4 L nasal cannula. Looks to be breathing comfortable. Left side rales in upright position.       Vitals:  VITALS:  /60   Pulse 72   Temp 97.8 °F (36.6 °C) (Oral)   Resp 20   Ht 1.803 m (5' 11\")   Wt 98 kg (216 lb 0.8 oz)   SpO2 94%   BMI 30.13 kg/m²     24HR INTAKE/OUTPUT:      Intake/Output Summary (Last 24 hours) at 4/16/2024 1044  Last data filed at 4/16/2024 0885  Gross per 24 hour   Intake --   Output 1850 ml   Net -1850 ml  Findings consistent of positive treatment response   3. Urinary bladder has peripheral gas or air attenuation which appears   partially involving the wall concerning for emphysematous cystitis.   4. Cholelithiasis.   5. Bilateral renal cystic lesions some of which are increased in attenuation   of potential hyperdense cysts such as hemorrhagic or proteinaceous.  No   significant change in overall renal findings.   6. Diffuse body wall edema.         CT ABDOMEN PELVIS WO CONTRAST Additional Contrast? None   Final Result   1. Moderate right and large left pleural effusions with adjacent compressive   atelectasis.  Significant increased from 02/09/2024 comparison.   2. Bulky retroperitoneal adenopathy appears mildly decreased in size compared   to prior comparison.  Findings consistent of positive treatment response   3. Urinary bladder has peripheral gas or air attenuation which appears   partially involving the wall concerning for emphysematous cystitis.   4. Cholelithiasis.   5. Bilateral renal cystic lesions some of which are increased in attenuation   of potential hyperdense cysts such as hemorrhagic or proteinaceous.  No   significant change in overall renal findings.   6. Diffuse body wall edema.         XR CHEST PORTABLE   Final Result   1. Large left pleural effusion with a small right pleural effusion.   2. Increased bilateral perihilar alveolar and interstitial markings, left   greater than right.         Vascular duplex lower extremity venous bilateral   Final Result   No evidence of DVT in either lower extremity.         XR CHEST PORTABLE   Final Result   Large left pleural effusion and probable left lung airspace opacity.           Assessment:  Acute on chronic congestive heart failure. Echocardiogram with ejection fraction of 30%-35%.   Acute hypoxic respiratory failure due to #1.   GI Bleed.      Plan:  Diuretics per cardiology   Continue aerosols   Continue supplemental oxygen. Keep SpO2 88% or above.

## 2024-04-16 NOTE — PROGRESS NOTES
Occupational Therapy  OT BEDSIDE TREATMENT NOTE      Date:2024  Patient Name: Uzair Fuentes  MRN: [id number]  : 1940  Room: [address] [place] [address]         Evaluating OT: April De Luna OTR/L   [id number]       Referring Provider:Alla Lawson MD     Specific Provider Orders/Date:OT eval and treat 4/15/2024       Diagnosis:  Pleural effusion [J90]  Acute upper GI bleed [K92.2]  Gastrointestinal hemorrhage, unspecified gastrointestinal hemorrhage type [K92.2]  ABLA (acute blood loss anemia) [D62]     Pertinent Medical History: CA, HTN,      Precautions:  Fall Risk, O2       Assessment of current deficits    [x] Functional mobility            [x]ADLs           [x] Strength                  [x]Cognition    [x] Functional transfers          [x] IADLs         [x] Safety Awareness   [x]Endurance    [x] Fine Coordination                         [x] Balance      [] Vision/perception   []Sensation      []Gross Motor Coordination             [] ROM           [] Delirium                   [] Motor Control      OT PLAN OF CARE   OT POC based on physician orders, patient diagnosis and results of clinical assessment     Frequency/Duration  2-4 days/wk for 2 - 4weeks PRN   Specific OT Treatment Interventions to include:   ADL retraining/adapted techniques and AE recommendations to increase functional independence within precautions                    Energy conservation techniques to improve tolerance for selfcare routine   Functional transfer/mobility training/DME recommendations for increased independence, safety and fall prevention         Patient/family education to increase safety and functional independence             Environmental modifications for safe mobility and completion of ADLs                             Therapeutic activity to improve functional performance during ADLs.                                         Therapeutic exercise to improve tolerance and functional strength for ADLs    Balance

## 2024-04-16 NOTE — PROGRESS NOTES
Date: 4/15/2024    Time: 9:30 PM    Patient Placed On BIPAP/CPAP/ Non-Invasive Ventilation?  Yes    If no must comment.  Facial area red/color change? No           If YES are Blister/Lesion present?No   If yes must notify nursing staff  BIPAP/CPAP skin barrier?  Yes    Skin barrier type:mepilexlite       Comments:        Patricia Simerlink, RCP

## 2024-04-16 NOTE — PROGRESS NOTES
Physical Therapy  Facility/Department: [address] INTERMEDIATE  Physical Therapy Initial Assessment    Name: Uzair Fuentes  : 1940  MRN: [id number]  Date of Service: 2024    Patient Diagnosis(es): The primary encounter diagnosis was Gastrointestinal hemorrhage, unspecified gastrointestinal hemorrhage type. Diagnoses of Pleural effusion, Deep vein thrombosis (DVT) of left lower extremity, unspecified chronicity, unspecified vein (HCC), and Acute on chronic combined systolic and diastolic congestive heart failure (HCC) were also pertinent to this visit.  Past Medical History:  has a past medical history of Cancer (HCC), Disease of blood and blood forming organ, and Hypertension.  Past Surgical History:  has a past surgical history that includes Abdominal aortic aneurysm repair; Upper gastrointestinal endoscopy (N/A, 10/14/2022); Total knee arthroplasty (Bilateral); Tonsillectomy; Colonoscopy; Upper gastrointestinal endoscopy (N/A, 2/10/2024); Colonoscopy (N/A, 2024); and Upper gastrointestinal endoscopy (N/A, 3/28/2024).      Evaluating Therapist: Alina Gan PT      Referring Provider:  Alla Lawson MD      PT order : PT eval and treat      Room #: 638  DIAGNOSIS: The primary encounter diagnosis was Gastrointestinal hemorrhage, unspecified gastrointestinal hemorrhage type. Diagnoses of Pleural effusion, Deep vein thrombosis (DVT) of left lower extremity, unspecified chronicity, unspecified vein (HCC), and Acute on chronic combined systolic and diastolic congestive heart failure (HCC) were also pertinent to this visit.  PRECAUTIONS: falls      Social:  Pt admitted from SNF. Per chart, pt is LTC resident   Prior to admission pt walked with ww or uses w/c        Initial Evaluation  Date:  4/15/2024  Treatment      Short Term/ Long Term   Goals   Was pt agreeable to Eval/treatment?  Yes   yes     Does pt have pain?  None reported   back pain     Bed Mobility  Rolling:  NT   Supine to sit:  min

## 2024-04-17 VITALS
HEART RATE: 99 BPM | HEIGHT: 71 IN | DIASTOLIC BLOOD PRESSURE: 79 MMHG | RESPIRATION RATE: 20 BRPM | TEMPERATURE: 98.3 F | BODY MASS INDEX: 30.25 KG/M2 | OXYGEN SATURATION: 93 % | SYSTOLIC BLOOD PRESSURE: 131 MMHG | WEIGHT: 216.05 LBS

## 2024-04-17 PROCEDURE — C9113 INJ PANTOPRAZOLE SODIUM, VIA: HCPCS | Performed by: INTERNAL MEDICINE

## 2024-04-17 PROCEDURE — 2700000000 HC OXYGEN THERAPY PER DAY

## 2024-04-17 PROCEDURE — 6370000000 HC RX 637 (ALT 250 FOR IP): Performed by: INTERNAL MEDICINE

## 2024-04-17 PROCEDURE — 6370000000 HC RX 637 (ALT 250 FOR IP): Performed by: STUDENT IN AN ORGANIZED HEALTH CARE EDUCATION/TRAINING PROGRAM

## 2024-04-17 PROCEDURE — 6360000002 HC RX W HCPCS: Performed by: INTERNAL MEDICINE

## 2024-04-17 PROCEDURE — 94660 CPAP INITIATION&MGMT: CPT

## 2024-04-17 PROCEDURE — 99233 SBSQ HOSP IP/OBS HIGH 50: CPT | Performed by: INTERNAL MEDICINE

## 2024-04-17 PROCEDURE — 94640 AIRWAY INHALATION TREATMENT: CPT

## 2024-04-17 PROCEDURE — 99239 HOSP IP/OBS DSCHRG MGMT >30: CPT | Performed by: STUDENT IN AN ORGANIZED HEALTH CARE EDUCATION/TRAINING PROGRAM

## 2024-04-17 PROCEDURE — 99232 SBSQ HOSP IP/OBS MODERATE 35: CPT | Performed by: INTERNAL MEDICINE

## 2024-04-17 PROCEDURE — 94669 MECHANICAL CHEST WALL OSCILL: CPT

## 2024-04-17 PROCEDURE — 2580000003 HC RX 258: Performed by: INTERNAL MEDICINE

## 2024-04-17 PROCEDURE — A4216 STERILE WATER/SALINE, 10 ML: HCPCS | Performed by: INTERNAL MEDICINE

## 2024-04-17 RX ORDER — MIDODRINE HYDROCHLORIDE 2.5 MG/1
2.5 TABLET ORAL
Status: DISCONTINUED | OUTPATIENT
Start: 2024-04-17 | End: 2024-04-18 | Stop reason: HOSPADM

## 2024-04-17 RX ORDER — HYDRALAZINE HYDROCHLORIDE 10 MG/1
10 TABLET, FILM COATED ORAL EVERY 12 HOURS SCHEDULED
Qty: 90 TABLET | Refills: 3 | Status: SHIPPED | OUTPATIENT
Start: 2024-04-17 | End: 2024-04-24

## 2024-04-17 RX ORDER — MIDODRINE HYDROCHLORIDE 2.5 MG/1
2.5 TABLET ORAL
Qty: 90 TABLET | Refills: 3 | Status: SHIPPED | OUTPATIENT
Start: 2024-04-17 | End: 2024-04-24

## 2024-04-17 RX ORDER — POTASSIUM CHLORIDE 20 MEQ/1
40 TABLET, EXTENDED RELEASE ORAL ONCE
Status: COMPLETED | OUTPATIENT
Start: 2024-04-17 | End: 2024-04-17

## 2024-04-17 RX ORDER — ISOSORBIDE DINITRATE 5 MG/1
5 TABLET ORAL 2 TIMES DAILY
Qty: 90 TABLET | Refills: 3 | Status: SHIPPED | OUTPATIENT
Start: 2024-04-17 | End: 2024-04-24

## 2024-04-17 RX ORDER — ISOSORBIDE DINITRATE 10 MG/1
5 TABLET ORAL 2 TIMES DAILY
Status: DISCONTINUED | OUTPATIENT
Start: 2024-04-17 | End: 2024-04-18 | Stop reason: HOSPADM

## 2024-04-17 RX ORDER — HYDRALAZINE HYDROCHLORIDE 10 MG/1
10 TABLET, FILM COATED ORAL EVERY 12 HOURS SCHEDULED
Status: DISCONTINUED | OUTPATIENT
Start: 2024-04-17 | End: 2024-04-18 | Stop reason: HOSPADM

## 2024-04-17 RX ADMIN — IPRATROPIUM BROMIDE AND ALBUTEROL SULFATE 1 DOSE: 2.5; .5 SOLUTION RESPIRATORY (INHALATION) at 20:33

## 2024-04-17 RX ADMIN — ARFORMOTEROL TARTRATE 15 MCG: 15 SOLUTION RESPIRATORY (INHALATION) at 08:30

## 2024-04-17 RX ADMIN — LATANOPROST 1 DROP: 50 SOLUTION OPHTHALMIC at 20:27

## 2024-04-17 RX ADMIN — SODIUM CHLORIDE, PRESERVATIVE FREE 10 ML: 5 INJECTION INTRAVENOUS at 20:28

## 2024-04-17 RX ADMIN — IPRATROPIUM BROMIDE AND ALBUTEROL SULFATE 1 DOSE: 2.5; .5 SOLUTION RESPIRATORY (INHALATION) at 12:23

## 2024-04-17 RX ADMIN — HYDRALAZINE HYDROCHLORIDE 10 MG: 10 TABLET, FILM COATED ORAL at 20:25

## 2024-04-17 RX ADMIN — SUCRALFATE 1 G: 1 TABLET ORAL at 09:25

## 2024-04-17 RX ADMIN — METOPROLOL SUCCINATE 25 MG: 25 TABLET, FILM COATED, EXTENDED RELEASE ORAL at 20:25

## 2024-04-17 RX ADMIN — SODIUM CHLORIDE, PRESERVATIVE FREE 40 MG: 5 INJECTION INTRAVENOUS at 17:11

## 2024-04-17 RX ADMIN — IPRATROPIUM BROMIDE AND ALBUTEROL SULFATE 1 DOSE: 2.5; .5 SOLUTION RESPIRATORY (INHALATION) at 08:30

## 2024-04-17 RX ADMIN — IPRATROPIUM BROMIDE AND ALBUTEROL SULFATE 1 DOSE: 2.5; .5 SOLUTION RESPIRATORY (INHALATION) at 16:13

## 2024-04-17 RX ADMIN — DORZOLAMIDE HYDROCHLORIDE 1 DROP: 20 SOLUTION/ DROPS OPHTHALMIC at 09:27

## 2024-04-17 RX ADMIN — BUDESONIDE INHALATION 500 MCG: 0.5 SUSPENSION RESPIRATORY (INHALATION) at 20:33

## 2024-04-17 RX ADMIN — PETROLATUM: 420 OINTMENT TOPICAL at 09:27

## 2024-04-17 RX ADMIN — DORZOLAMIDE HYDROCHLORIDE 1 DROP: 20 SOLUTION/ DROPS OPHTHALMIC at 20:27

## 2024-04-17 RX ADMIN — PETROLATUM: 420 OINTMENT TOPICAL at 20:27

## 2024-04-17 RX ADMIN — APIXABAN 2.5 MG: 2.5 TABLET, FILM COATED ORAL at 09:25

## 2024-04-17 RX ADMIN — METOPROLOL SUCCINATE 25 MG: 25 TABLET, FILM COATED, EXTENDED RELEASE ORAL at 09:25

## 2024-04-17 RX ADMIN — CETIRIZINE HYDROCHLORIDE 5 MG: 10 TABLET, FILM COATED ORAL at 09:26

## 2024-04-17 RX ADMIN — BUDESONIDE INHALATION 500 MCG: 0.5 SUSPENSION RESPIRATORY (INHALATION) at 08:30

## 2024-04-17 RX ADMIN — SUCRALFATE 1 G: 1 TABLET ORAL at 20:29

## 2024-04-17 RX ADMIN — FINASTERIDE 5 MG: 5 TABLET, FILM COATED ORAL at 09:25

## 2024-04-17 RX ADMIN — MICONAZOLE NITRATE: 20 POWDER TOPICAL at 09:27

## 2024-04-17 RX ADMIN — POTASSIUM CHLORIDE 40 MEQ: 1500 TABLET, EXTENDED RELEASE ORAL at 09:25

## 2024-04-17 RX ADMIN — ISOSORBIDE DINITRATE 5 MG: 10 TABLET ORAL at 17:11

## 2024-04-17 RX ADMIN — SUCRALFATE 1 G: 1 TABLET ORAL at 17:11

## 2024-04-17 RX ADMIN — ARFORMOTEROL TARTRATE 15 MCG: 15 SOLUTION RESPIRATORY (INHALATION) at 20:33

## 2024-04-17 RX ADMIN — SODIUM CHLORIDE, PRESERVATIVE FREE 10 ML: 5 INJECTION INTRAVENOUS at 09:25

## 2024-04-17 RX ADMIN — BRIMONIDINE TARTRATE 1 DROP: 2 SOLUTION/ DROPS OPHTHALMIC at 09:27

## 2024-04-17 RX ADMIN — APIXABAN 2.5 MG: 2.5 TABLET, FILM COATED ORAL at 20:25

## 2024-04-17 RX ADMIN — SODIUM CHLORIDE, PRESERVATIVE FREE 40 MG: 5 INJECTION INTRAVENOUS at 03:31

## 2024-04-17 RX ADMIN — BRIMONIDINE TARTRATE 1 DROP: 2 SOLUTION/ DROPS OPHTHALMIC at 20:27

## 2024-04-17 RX ADMIN — CYANOCOBALAMIN TAB 1000 MCG 1000 MCG: 1000 TAB at 09:25

## 2024-04-17 RX ADMIN — MIDODRINE HYDROCHLORIDE 2.5 MG: 2.5 TABLET ORAL at 17:11

## 2024-04-17 RX ADMIN — GUAIFENESIN 400 MG: 400 TABLET ORAL at 21:32

## 2024-04-17 RX ADMIN — ATORVASTATIN CALCIUM 40 MG: 40 TABLET, FILM COATED ORAL at 20:25

## 2024-04-17 RX ADMIN — SUCRALFATE 1 G: 1 TABLET ORAL at 14:01

## 2024-04-17 RX ADMIN — ZINC SULFATE 220 MG (50 MG) CAPSULE 50 MG: CAPSULE at 09:25

## 2024-04-17 RX ADMIN — MULTIVITAMIN TABLET 1 TABLET: TABLET at 09:25

## 2024-04-17 RX ADMIN — MICONAZOLE NITRATE: 20 POWDER TOPICAL at 20:27

## 2024-04-17 NOTE — DISCHARGE SUMMARY
the abdomen and pelvis was performed without the administration of intravenous contrast. Multiplanar reformatted images are provided for review. Automated exposure control, iterative reconstruction, and/or weight based adjustment of the mA/kV was utilized to reduce the radiation dose to as low as reasonably achievable. COMPARISON: None. HISTORY: ORDERING SYSTEM PROVIDED HISTORY: metastatic renal cell cancer TECHNOLOGIST PROVIDED HISTORY: Reason for exam:->metastatic renal cell cancer; ORDERING SYSTEM PROVIDED HISTORY: metastatic renal cell cancer TECHNOLOGIST PROVIDED HISTORY: Reason for exam:->metastatic renal cell cancer Additional Contrast?->None FINDINGS: Chest: Mediastinum: Thyroid is homogeneous in attenuation. No bulky mediastinal adenopathy. Central airways are patent. Esophagus is normal course and caliber. Patent nonaneurysmal thoracic aorta. Cardiac size enlarged without pericardial effusion. Lungs/pleura: Moderate right and large left pleural effusions with adjacent compressive atelectasis.  No isolated dominant nodule or mass in the largely obscured lung fields with background chronic changes partially observed Soft Tissues/Bones: No acute osseous findings.  Diffuse body wall edema. Abdomen/Pelvis: Organs: Limited evaluation of solid organs due to lack of intravenous contrast.  Liver without focal lesion.  Gallbladder contains stones in the proximal gallbladder of cholelithiasis..  Pancreas and spleen unremarkable. Adrenals without nodule.  Kidneys demonstrate bilateral renal cystic lesions some of which are increased in attenuation of potential hyperdense cysts such as hemorrhagic or proteinaceous. GI/Bowel: No focal thickening or disproportion dilatation of bowel.  No inflammatory findings.  Appendix is unremarkable and visualized in the right lower quadrant. Pelvis: No bulky pelvic adenopathy.  Urinary bladder has peripheral gas or air attenuation which appears partially involving the wall concerning        Patient Instructions:      Medication List        START taking these medications      hydrALAZINE 10 MG tablet  Commonly known as: APRESOLINE  Take 1 tablet by mouth every 12 hours     isosorbide dinitrate 5 MG tablet  Commonly known as: ISORDIL  Take 1 tablet by mouth 2 times daily     midodrine 2.5 MG tablet  Commonly known as: PROAMATINE  Take 1 tablet by mouth 3 times daily (with meals)            CONTINUE taking these medications      acetaminophen 325 MG tablet  Commonly known as: TYLENOL     apixaban 2.5 MG Tabs tablet  Commonly known as: Eliquis  Take 1 tablet by mouth 2 times daily     aspirin 81 MG chewable tablet     atorvastatin 40 MG tablet  Commonly known as: LIPITOR     brinzolamide-brimonidine 1-0.2 % Susp     cyanocobalamin 1000 MCG tablet     diphenoxylate-atropine 2.5-0.025 MG per tablet  Commonly known as: LOMOTIL     finasteride 5 MG tablet  Commonly known as: PROSCAR     latanoprost 0.005 % ophthalmic solution  Commonly known as: XALATAN     lidocaine 4 % cream  Commonly known as: LMX     loratadine 10 MG tablet  Commonly known as: CLARITIN     metoprolol succinate 25 MG extended release tablet  Commonly known as: TOPROL XL  Take 1 tablet by mouth 2 times daily     multivitamin Tabs tablet  Take 1 tablet by mouth daily     nitroGLYCERIN 0.4 MG SL tablet  Commonly known as: NITROSTAT  Place 1 tablet under the tongue every 5 minutes as needed for Chest pain up to max of 3 total doses. If no relief after 1 dose, call 911.     nystatin 138438 UNIT/GM powder  Commonly known as: MYCOSTATIN     oxyCODONE-acetaminophen 5-325 MG per tablet  Commonly known as: PERCOCET     pantoprazole 40 MG tablet  Commonly known as: PROTONIX  Take 1 tablet by mouth 2 times daily (before meals)     senna 8.6 MG tablet  Commonly known as: SENOKOT     sucralfate 1 GM tablet  Commonly known as: Carafate  Take 1 tablet by mouth 4 times daily     tamsulosin 0.4 MG capsule  Commonly known as: FLOMAX     urea 20 %

## 2024-04-17 NOTE — PROGRESS NOTES
PROGRESS NOTE  By Law Daniel M.D.    [facility]  Dr. Leopoldo Contreras M.D.,  Dr. Abraham Cuello M.D.,   Dr. Franc Diaz D.O.,  Dr. Sánchez Ho M.D.,  GAVIOTA LariosO.,          Uzair Fuentes  84 y.o.  male    SUBJECTIVE:  No new complaints    OBJECTIVE:    BP (!) 109/55   Pulse 69   Temp (!) 96.7 °F (35.9 °C) (Axillary)   Resp 18   Ht 1.803 m (5' 11\")   Wt 98 kg (216 lb 0.8 oz)   SpO2 90%   BMI 30.13 kg/m²     General: NAD/older adult  male  HEENT: Anicteric sclera/moist oral mucosa  Neck: Supple with trachea midline  Chest: Symmetric excursion/nonlabored respirations  Cor: Irregular  Abd.: Soft and obese.  Nontender  Extr.:  Decreased muscle tone and bulk throughout  Skin: Warm and dry/anicteric      DATA:    Monitor data reviewed -atrial fibrillation noted.       Lab Results   Component Value Date/Time    WBC 6.3 04/16/2024 09:27 AM    RBC 2.56 04/16/2024 09:27 AM    HGB 7.8 04/16/2024 09:27 AM    HCT 26.3 04/16/2024 09:27 AM    .7 04/16/2024 09:27 AM    MCH 30.5 04/16/2024 09:27 AM    MCHC 29.7 04/16/2024 09:27 AM    RDW 15.9 04/16/2024 09:27 AM     04/16/2024 09:27 AM    MPV 10.4 04/16/2024 09:27 AM     Lab Results   Component Value Date/Time     04/16/2024 09:27 AM    K 3.4 04/16/2024 09:27 AM    K 4.4 10/18/2022 04:50 AM     04/16/2024 09:27 AM    CO2 30 04/16/2024 09:27 AM    BUN 45 04/16/2024 09:27 AM    CREATININE 2.0 04/16/2024 09:27 AM    CALCIUM 10.3 04/16/2024 09:27 AM    PROT 4.6 04/16/2024 09:27 AM    BILITOT 0.4 04/16/2024 09:27 AM    ALKPHOS 92 04/16/2024 09:27 AM    AST 21 04/16/2024 09:27 AM    ALT 19 04/16/2024 09:27 AM     Lab Results   Component Value Date/Time    LIPASE 66 03/27/2024 03:55 PM     No results found for: \"AMYLASE\"      ASSESSMENT/PLAN:  Patient Active Problem List   Diagnosis    Normocytic anemia    Rectal bleed    GI bleed    Chest pain    PVC's (premature ventricular contractions)    Coronary artery  Heart is regular. No murmurs, rubs or gallops.

## 2024-04-17 NOTE — PROGRESS NOTES
[facility]  [address], [address]  [place] [address]      Pulmonary Progress Note      SUBJECTIVE:  lying down in bed on 12/6 45%, looking comfortable with breathing, awake and conversant, some ronchorous cough as per sitter.     OBJECTIVE    Medications    Continuous Infusions:   sodium chloride      sodium chloride      sodium chloride      sodium chloride         Scheduled Meds:   potassium chloride  40 mEq Oral Once    apixaban  2.5 mg Oral BID    [Held by provider] acetaZOLAMIDE  250 mg IntraVENous Daily before lunch    white petrolatum   Topical BID    [Held by provider] bumetanide  2 mg IntraVENous BID    dorzolamide  1 drop Both Eyes BID    And    brimonidine  1 drop Both Eyes BID    miconazole   Topical BID    ipratropium 0.5 mg-albuterol 2.5 mg  1 Dose Inhalation 4x Daily RT    arformoterol tartrate  15 mcg Nebulization BID RT    budesonide  500 mcg Nebulization BID RT    zinc sulfate  50 mg Oral Daily    sodium chloride flush  5-40 mL IntraVENous 2 times per day    atorvastatin  40 mg Oral Nightly    cyanocobalamin  1,000 mcg Oral Daily    finasteride  5 mg Oral Daily    latanoprost  1 drop Both Eyes Nightly    cetirizine  5 mg Oral Daily    metoprolol succinate  25 mg Oral BID    multivitamin  1 tablet Oral Daily    sucralfate  1 g Oral 4x Daily    [Held by provider] tamsulosin  0.4 mg Oral Nightly    sodium chloride flush  5-40 mL IntraVENous 2 times per day    pantoprazole (PROTONIX) 40 mg in sodium chloride (PF) 0.9 % 10 mL injection  40 mg IntraVENous Q12H       PRN Meds:sodium chloride, sodium chloride flush, sodium chloride, sodium chloride, guaiFENesin, ipratropium 0.5 mg-albuterol 2.5 mg, lidocaine, oxyCODONE-acetaminophen, sodium chloride flush, sodium chloride, ondansetron **OR** ondansetron, acetaminophen **OR** acetaminophen    Physical    VITALS:  BP (!) 110/58   Pulse 74   Temp 98.1 °F (36.7 °C) (Axillary)   Resp

## 2024-04-17 NOTE — PROGRESS NOTES
Inpatient Cardiology Consultation      Reason for Consult:  Pulmonary Edema     Consulting Physician: Dr. Mancini    Requesting Physician:  Dr. Dyer    Date of Consultation: 4/16/2024    Interim:  Awaiting echocardiogram to guide therapy  Per chart record patient has been cleared by GI to resume anticoagulation    Past Medical and Surgical History:    Reported CAD s/p PCI (specifics unclear).    Patient states 12 years ago he had PCI in [place], further details unknown.  [facility] most recently had a stress test and unremarkable echocardiogram in 2021 (specifics unclear)  TTE 10/17/2022 (Dr. Patterson):  No previous echo for comparison. Technically adequate study. Left ventricle size is normal. Moderate concentric left ventricular hypertrophy. Ejection fraction is visually estimated at 55%. No regional wall motion abnormalities seen. E/A flow reversal noted. Suggestive of diastolic dysfunction. Agitated saline injected for shunt evaluation. Patent foramen ovale with right-to-left shunt was visualized. Physiologic and/or trace mitral regurgitation is present. Physiologic and/or trace tricuspid regurgitation. RVSP is normal.  TTE 01/27/2024 (Dr. Mccrary):  Left Ventricle: The left ventricular chamber appears dilated both at end systole and end diastole.  Endocardial surfaces appear at the upper limits of normal in thickness.  Abnormal septal motion is present suggesting the presence of conduction system disease.  No regional wall motion appetizer identified with global hypokinesis and mild left ventricular systolic dysfunction.  An estimated ejection fraction of 45-50% is present.  Indeterminant diastolic function is present. Aortic Valve: The aortic valve is trileaflet with mild leaflet thickening and no evidence of aortic stenosis. Mitral Valve: Mitral leaflets are structurally normal with mild mitral annular calcification and no mitral significant regurgitation. Left Atrium:  intra-atrial septum is present with previous documentation of a patent foramen ovale.    Transthoracic echocardiogram April 12, 2024    Left Ventricle: The EF by visual approximation is 30 - 35%. Left ventricle is moderately dilated.    Right Ventricle: Right ventricle size is normal. Normal systolic function.    Mitral Valve: Moderate to severe regurgitation.    Tricuspid Valve: Mild regurgitation.    Image quality is technically difficult.    Impression:   Acute on chronic heart failure with mildly reduced EF.  proBNP 33,000  Intermittent second-degree AV block Mobitz type I (Wenckebach), first-degree AV block  PACs and blocked PACs  Elevated hs-cTnT 80 ng/L single value this admission not repeated.  Chronically elevated.  1/2024 was 300s.  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  EGD 10/2023 mild antral gastritis.  EGD 2/2024 moderate gastritis.  C-scope 2/2024 diverticulosis.  NM bleeding scan 4/2024 no active bleeding.  CKD creatinine 1.7.    Provoked lower extremity DVT 10/2023, on apixaban, dose adjusted per hematology.  Per vascular, there was no DVT  MGUS  Metastatic renal cell carcinoma on single agent nivolumab  Hyperlipidemia  BPH    Plan:  Echo revealed EF has further decreased  We will attempt to obtain recent ischemic evaluation from patient cardiologist to guide therapy  Will also discussed with patient when was the last ischemic evaluation to determine if ischemic evaluation needed prior to discharge  He has been cleared by GI to resume anticoagulation and currently on Eliquis  Continue to hold diuresis given worsening kidney function  Continue on beta-blocker for rate control  We will attempt to add low-dose Isordil and

## 2024-04-17 NOTE — PROGRESS NOTES
P Quality Flow/Interdisciplinary Rounds Progress Note        Quality Flow Rounds held on April 17, 2024    Disciplines Attending:  Bedside Nurse, , , and Nursing Unit Leadership    Uzair Fuentes was admitted on 4/6/2024 10:45 PM    Anticipated Discharge Date:  Expected Discharge Date: 04/18/24    Disposition: snf    Jaylen Score:  Jaylen Scale Score: 16    Readmission Risk              Risk of Unplanned Readmission:  50           Discussed patient goal for the day, patient clinical progression, and barriers to discharge.  The following Goal(s) of the Day/Commitment(s) have been identified:   cardio plan, pulm plan, discharge?      Chiquis Pascal RN  April 17, 2024

## 2024-04-17 NOTE — PROGRESS NOTES
Pt cleared for discharge. Report given to nurse Maloney at [facility]. [facility] faxed.

## 2024-04-17 NOTE — PLAN OF CARE
Problem: Safety - Adult  Goal: Free from fall injury  4/16/2024 2252 by Mary Cheney RN  Outcome: Progressing     Problem: Skin/Tissue Integrity  Goal: Absence of new skin breakdown  Description: 1.  Monitor for areas of redness and/or skin breakdown  2.  Assess vascular access sites hourly  3.  Every 4-6 hours minimum:  Change oxygen saturation probe site  4.  Every 4-6 hours:  If on nasal continuous positive airway pressure, respiratory therapy assess nares and determine need for appliance change or resting period.  4/16/2024 2252 by Mary Cheney, RN  Outcome: Progressing     Problem: Pain  Goal: Verbalizes/displays adequate comfort level or baseline comfort level  4/16/2024 2252 by Mary Cheney RN  Outcome: Progressing     Problem: ABCDS Injury Assessment  Goal: Absence of physical injury  4/16/2024 2252 by Mary Cheney, RN  Outcome: Progressing

## 2024-04-17 NOTE — PROGRESS NOTES
Uzair Fuentes 1940    SUBJECTIVE:    No major events overnight, he denies any bleeding, has shortness of breath, on supplemental O2 3l/min.    OBJECTIVE  Physical Exam:  VITALS:  /64   Pulse 91   Temp 97.6 °F (36.4 °C) (Axillary)   Resp 19   Ht 1.803 m (5' 11\")   Wt 98 kg (216 lb 0.8 oz)   SpO2 90%   BMI 30.13 kg/m²   ENT:  oropharynx clear, no evidence of mucositis.  Positive for pallor.  NHEMATOLOGIC/LYMPHATICS:  No abnormal lymphadenopathy.  LUNGS: Decreased air entry bilaterally.  CARDIOVASCULAR:  Regular rate and rhythm.   ABDOMEN:  Soft, nontender.  No ascites.  No mass or organomegaly.  NEUROLOGIC:  No focal deficits.  SKIN:  No rash.  EXTREMITIES: without clubbing, cyanosis, positive for lower leg edema.    DIAGNOSTIC DATA  Labs:    Lab Results   Component Value Date    WBC 6.3 04/16/2024    HGB 7.8 (L) 04/16/2024    HCT 26.3 (L) 04/16/2024    .7 (H) 04/16/2024     04/16/2024     No results found for: \"CEA\"  Recent Labs     04/15/24  0720 04/16/24  0927    138   CO2 30* 30*   BUN 33* 45*   CREATININE 2.1* 2.0*     Lab Results   Component Value Date    FERRITIN 238 04/07/2024     Lab Results   Component Value Date    ALT 19 04/16/2024    AST 21 04/16/2024    ALKPHOS 92 04/16/2024    BILITOT 0.4 04/16/2024     No results found for: \"LABPROT\", \"LABALBU\"        Current Facility-Administered Medications   Medication Dose Route Frequency Provider Last Rate Last Admin    midodrine (PROAMATINE) tablet 2.5 mg  2.5 mg Oral TID WC Elijah Steward MD        isosorbide dinitrate (ISORDIL) tablet 5 mg  5 mg Oral BID Elijah Steward MD        hydrALAZINE (APRESOLINE) tablet 10 mg  10 mg Oral 2 times per day Elijah Steward MD        apixaban (ELIQUIS) tablet 2.5 mg  2.5 mg Oral BID Alla Lawson MD   2.5 mg at 04/17/24 0925    [Held by provider] acetaZOLAMIDE (DIAMOX) injection 250 mg  250 mg IntraVENous Daily before lunch Sven Dyer MD   250 mg at 04/15/24  List   Diagnosis    Normocytic anemia    Rectal bleed    GI bleed    Chest pain    PVC's (premature ventricular contractions)    Coronary artery disease involving native coronary artery of native heart without angina pectoris    Primary hypertension    Hyperlipidemia    Stage 3b chronic kidney disease (HCC)    Renal cell cancer (HCC)    Acute on chronic anemia    Metastatic renal cell carcinoma (HCC)    Moderate protein-calorie malnutrition (HCC)    First degree AV block    Abdominal aortic aneurysm (AAA) without rupture (HCC)    Acute kidney injury (HCC)    Stage 4 chronic kidney disease (HCC)    Monoclonal gammopathy    Chronic deep vein thrombosis (DVT) of proximal vein of lower extremity (HCC)    Goals of care, counseling/discussion    Palliative care by specialist    Iron deficiency    Low zinc level    Acute blood loss anemia    Cardiomyopathy (HCC)    AV block, Mobitz 1    On apixaban therapy    Deep vein thrombosis (DVT) of left lower extremity (HCC)    Other long term (current) drug therapy    Acute on chronic blood loss anemia    CKD (chronic kidney disease)    Acute upper GI bleed    Pleural effusion    ABLA (acute blood loss anemia)    Hypotension    Acute on chronic systolic (congestive) heart failure (HCC)    Acute hypoxic respiratory failure (HCC)           ASSESSMENT/PLAN : 84-year-old man     Metastatic renal cell carcinoma on single agent immunotherapy with nivolumab with good partial response  with decrease in bulky retroperitoneal lymphadenopathy on his most recent CT abdomen from February  Last colonoscopy in February 2024 did not show autoimmune colitis     Recurrent lower GI bleeding in a patient on anticoagulation with Eliquis for DVT of left femoral vein diagnosed in October 2023 as well has antiplatelet therapy with aspirin. EGD showed mild irritation.  GI following; no immediate interventions recommended at this time.     Patient has had multiple admissions with GI bleed  Eliquis and baby

## 2024-04-17 NOTE — PROGRESS NOTES
nitroGLYCERIN (NITROSTAT) 0.4 MG SL tablet Place 1 tablet under the tongue every 5 minutes as needed for Chest pain up to max of 3 total doses. If no relief after 1 dose, call 911. 2/13/24   Sven Dyer MD   acetaminophen (TYLENOL) 325 MG tablet Take 2 tablets by mouth every 6 hours as needed for Fever (TEMP >100)    Trevor Gibson MD   diphenoxylate-atropine (LOMOTIL) 2.5-0.025 MG per tablet Take 2 tablets by mouth every 6 hours as needed for Diarrhea.    Trevor Gibson MD   nystatin (MYCOSTATIN) 270508 UNIT/GM powder Apply topically 2 times daily -ABD FOLDS-    Trevor Gibson MD   oxyCODONE-acetaminophen (PERCOCET) 5-325 MG per tablet Take 1 tablet by mouth every 4 hours as needed for Pain.    Trevor Gisbon MD   metoprolol succinate (TOPROL XL) 25 MG extended release tablet Take 1 tablet by mouth 2 times daily 2/2/24   Celso Lester MD   zinc sulfate (ZINCATE) 220 (50 Zn)  mg capsule - elemental zinc Take 1 capsule by mouth daily 2/3/24   Celso Lester MD   apixaban (ELIQUIS) 2.5 MG TABS tablet Take 1 tablet by mouth 2 times daily 11/21/23   Sonya Hartley MD   pantoprazole (PROTONIX) 40 MG tablet Take 1 tablet by mouth 2 times daily (before meals) 11/8/23   Codi Britt MD   sucralfate (CARAFATE) 1 GM tablet Take 1 tablet by mouth 4 times daily 11/8/23   Codi Britt MD   cyanocobalamin 1000 MCG tablet Take 1 tablet by mouth daily    Trevor Gibson MD   finasteride (PROSCAR) 5 MG tablet Take 1 tablet by mouth daily    Trevor Gibson MD   lidocaine (LMX) 4 % cream Apply topically daily as needed for Pain (BACL, KNEES, SHOULDER)    Trevor Gibosn MD   loratadine (CLARITIN) 10 MG tablet Take 1 tablet by mouth daily    Trevor Gibson MD   senna (SENOKOT) 8.6 MG tablet Take 1 tablet by mouth daily as needed for Constipation    Trevor Gibson MD   tamsulosin (FLOMAX) 0.4 MG capsule Take 1 capsule by mouth nightly     normal with mild mitral annular calcification and no mitral significant regurgitation.    Left Atrium: The left atrium is moderately dilated with a volume index of 44 mL/m².  In the emergency room the intra-atrial septum is present with previous documentation of a patent foramen ovale.    Transthoracic echocardiogram April 12, 2024    Left Ventricle: The EF by visual approximation is 30 - 35%. Left ventricle is moderately dilated.    Right Ventricle: Right ventricle size is normal. Normal systolic function.    Mitral Valve: Moderate to severe regurgitation.    Tricuspid Valve: Mild regurgitation.    Image quality is technically difficult.    Impression:   Acute on chronic heart failure with mildly reduced EF.  proBNP 33,000  Intermittent second-degree AV block Mobitz type I (Wenckebach), first-degree AV block  PACs and blocked PACs  Elevated hs-cTnT 80 ng/L single value this admission not repeated.  Chronically elevated.  1/2024 was 300s.  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  EGD 10/2023 mild antral gastritis.  EGD 2/2024 moderate gastritis.  C-scope 2/2024 diverticulosis.  NM bleeding scan 4/2024 no active bleeding.  CKD creatinine 1.7.    Provoked lower extremity DVT 10/2023, on apixaban, dose adjusted per hematology.  Per vascular, there was no DVT  MGUS  Metastatic renal cell carcinoma on single agent nivolumab  Hyperlipidemia  BPH    Plan:  Echo revealed EF has further decreased  Blood pressure still remains low and to optimize guideline directed medical therapy I am adding low-dose midodrine in order to allow addition of Isordil and hydralazine for management of heart failure with reduced EF  He has been cleared by GI to resume

## 2024-04-17 NOTE — PLAN OF CARE
Problem: Safety - Adult  Goal: Free from fall injury  4/17/2024 1143 by Venus Villegas RN  Outcome: Progressing  4/16/2024 2252 by Mary Cheney RN  Outcome: Progressing     Problem: Skin/Tissue Integrity  Goal: Absence of new skin breakdown  Description: 1.  Monitor for areas of redness and/or skin breakdown  2.  Assess vascular access sites hourly  3.  Every 4-6 hours minimum:  Change oxygen saturation probe site  4.  Every 4-6 hours:  If on nasal continuous positive airway pressure, respiratory therapy assess nares and determine need for appliance change or resting period.  4/17/2024 1143 by Venus Villegas RN  Outcome: Progressing  4/16/2024 2252 by Mary Cheney RN  Outcome: Progressing     Problem: Pain  Goal: Verbalizes/displays adequate comfort level or baseline comfort level  4/17/2024 1143 by Venus Villegas RN  Outcome: Progressing  4/16/2024 2252 by Mary Cheney RN  Outcome: Progressing     Problem: ABCDS Injury Assessment  Goal: Absence of physical injury  4/17/2024 1143 by Venus Villegas RN  Outcome: Progressing  4/16/2024 2252 by Mary Cheney RN  Outcome: Progressing     Problem: Nutrition Deficit:  Goal: Optimize nutritional status  Outcome: Progressing     Problem: Chronic Conditions and Co-morbidities  Goal: Patient's chronic conditions and co-morbidity symptoms are monitored and maintained or improved  Outcome: Progressing

## 2024-04-18 LAB
ALBUMIN SERPL-MCNC: 2.3 G/DL (ref 3.5–5.2)
ALP SERPL-CCNC: 104 U/L (ref 40–129)
ALT SERPL-CCNC: 23 U/L (ref 0–40)
ANION GAP SERPL CALCULATED.3IONS-SCNC: 12 MMOL/L (ref 7–16)
AST SERPL-CCNC: 27 U/L (ref 0–39)
BILIRUB SERPL-MCNC: 0.4 MG/DL (ref 0–1.2)
BUN SERPL-MCNC: 57 MG/DL (ref 6–23)
CALCIUM SERPL-MCNC: 10 MG/DL (ref 8.6–10.2)
CHLORIDE SERPL-SCNC: 102 MMOL/L (ref 98–107)
CHOLEST SERPL-MCNC: 74 MG/DL
CO2 SERPL-SCNC: 22 MMOL/L (ref 22–29)
CREAT SERPL-MCNC: 1.8 MG/DL (ref 0.7–1.2)
ERYTHROCYTE [DISTWIDTH] IN BLOOD BY AUTOMATED COUNT: 15.6 % (ref 11.5–15)
GFR SERPL CREATININE-BSD FRML MDRD: 38 ML/MIN/1.73M2
GLUCOSE SERPL-MCNC: 89 MG/DL (ref 74–99)
HCT VFR BLD AUTO: 22.7 % (ref 37–54)
HDLC SERPL-MCNC: 35 MG/DL
HGB BLD-MCNC: 7.2 G/DL (ref 12.5–16.5)
LDLC SERPL CALC-MCNC: 29 MG/DL
MCH RBC QN AUTO: 31 PG (ref 26–35)
MCHC RBC AUTO-ENTMCNC: 31.7 G/DL (ref 32–34.5)
MCV RBC AUTO: 97.8 FL (ref 80–99.9)
PLATELET # BLD AUTO: 172 K/UL (ref 130–450)
PMV BLD AUTO: 10.4 FL (ref 7–12)
POTASSIUM SERPL-SCNC: 3.9 MMOL/L (ref 3.5–5)
PROT SERPL-MCNC: 4.4 G/DL (ref 6.4–8.3)
RBC # BLD AUTO: 2.32 M/UL (ref 3.8–5.8)
SODIUM SERPL-SCNC: 136 MMOL/L (ref 132–146)
TRIGL SERPL-MCNC: 50 MG/DL
VLDLC SERPL CALC-MCNC: 10 MG/DL
WBC OTHER # BLD: 5.5 K/UL (ref 4.5–11.5)

## 2024-04-19 LAB
ERYTHROCYTE [DISTWIDTH] IN BLOOD BY AUTOMATED COUNT: 15.4 % (ref 11.5–15)
HCT VFR BLD AUTO: 24.1 % (ref 37–54)
HGB BLD-MCNC: 7.6 G/DL (ref 12.5–16.5)
MCH RBC QN AUTO: 30.8 PG (ref 26–35)
MCHC RBC AUTO-ENTMCNC: 31.5 G/DL (ref 32–34.5)
MCV RBC AUTO: 97.6 FL (ref 80–99.9)
PLATELET # BLD AUTO: 196 K/UL (ref 130–450)
PMV BLD AUTO: 10.3 FL (ref 7–12)
RBC # BLD AUTO: 2.47 M/UL (ref 3.8–5.8)
WBC OTHER # BLD: 5.9 K/UL (ref 4.5–11.5)

## 2024-04-20 ENCOUNTER — APPOINTMENT (OUTPATIENT)
Dept: GENERAL RADIOLOGY | Age: 84
DRG: 291 | End: 2024-04-20
Payer: OTHER GOVERNMENT

## 2024-04-20 ENCOUNTER — OUTSIDE SERVICES (OUTPATIENT)
Dept: PRIMARY CARE CLINIC | Age: 84
End: 2024-04-20

## 2024-04-20 ENCOUNTER — APPOINTMENT (OUTPATIENT)
Dept: CT IMAGING | Age: 84
DRG: 291 | End: 2024-04-20
Payer: OTHER GOVERNMENT

## 2024-04-20 ENCOUNTER — HOSPITAL ENCOUNTER (INPATIENT)
Age: 84
LOS: 2 days | Discharge: HOSPICE | DRG: 291 | End: 2024-04-22
Attending: EMERGENCY MEDICINE | Admitting: FAMILY MEDICINE
Payer: OTHER GOVERNMENT

## 2024-04-20 DIAGNOSIS — N39.0 URINARY TRACT INFECTION WITH HEMATURIA, SITE UNSPECIFIED: ICD-10-CM

## 2024-04-20 DIAGNOSIS — Z91.81 AT MODERATE RISK FOR FALL: ICD-10-CM

## 2024-04-20 DIAGNOSIS — D62 ACUTE BLOOD LOSS ANEMIA: Primary | ICD-10-CM

## 2024-04-20 DIAGNOSIS — E87.6 HYPOKALEMIA: ICD-10-CM

## 2024-04-20 DIAGNOSIS — I10 PRIMARY HYPERTENSION: ICD-10-CM

## 2024-04-20 DIAGNOSIS — Z98.890 S/P THORACENTESIS: ICD-10-CM

## 2024-04-20 DIAGNOSIS — K92.2 GASTROINTESTINAL HEMORRHAGE, UNSPECIFIED GASTROINTESTINAL HEMORRHAGE TYPE: ICD-10-CM

## 2024-04-20 DIAGNOSIS — E87.70 HYPERVOLEMIA, UNSPECIFIED HYPERVOLEMIA TYPE: ICD-10-CM

## 2024-04-20 DIAGNOSIS — J81.0 ACUTE PULMONARY EDEMA (HCC): Primary | ICD-10-CM

## 2024-04-20 DIAGNOSIS — I50.23 ACUTE ON CHRONIC SYSTOLIC (CONGESTIVE) HEART FAILURE (HCC): ICD-10-CM

## 2024-04-20 DIAGNOSIS — R53.81 PHYSICAL DECONDITIONING: ICD-10-CM

## 2024-04-20 DIAGNOSIS — J90 PLEURAL EFFUSION ON LEFT: ICD-10-CM

## 2024-04-20 DIAGNOSIS — C64.9 METASTATIC RENAL CELL CARCINOMA, UNSPECIFIED LATERALITY (HCC): ICD-10-CM

## 2024-04-20 DIAGNOSIS — D64.9 ACUTE ON CHRONIC ANEMIA: ICD-10-CM

## 2024-04-20 DIAGNOSIS — R53.1 GENERALIZED WEAKNESS: ICD-10-CM

## 2024-04-20 DIAGNOSIS — R31.9 URINARY TRACT INFECTION WITH HEMATURIA, SITE UNSPECIFIED: ICD-10-CM

## 2024-04-20 PROBLEM — I50.9 ACUTE DECOMPENSATED HEART FAILURE (HCC): Status: ACTIVE | Noted: 2024-04-20

## 2024-04-20 LAB
ABO + RH BLD: NORMAL
ALBUMIN SERPL-MCNC: 2.7 G/DL (ref 3.5–5.2)
ALP SERPL-CCNC: 107 U/L (ref 40–129)
ALT SERPL-CCNC: 24 U/L (ref 0–40)
AMMONIA PLAS-SCNC: <10 UMOL/L (ref 16–60)
ANION GAP SERPL CALCULATED.3IONS-SCNC: 4 MMOL/L (ref 7–16)
ARM BAND NUMBER: NORMAL
AST SERPL-CCNC: 28 U/L (ref 0–39)
BACTERIA URNS QL MICRO: ABNORMAL
BACTERIA URNS QL MICRO: ABNORMAL
BASOPHILS # BLD: 0 K/UL (ref 0–0.2)
BASOPHILS NFR BLD: 0 % (ref 0–2)
BILIRUB SERPL-MCNC: 0.3 MG/DL (ref 0–1.2)
BILIRUB UR QL STRIP: NEGATIVE
BILIRUB UR QL STRIP: NEGATIVE
BLOOD BANK SAMPLE EXPIRATION: NORMAL
BLOOD GROUP ANTIBODIES SERPL: NEGATIVE
BNP SERPL-MCNC: ABNORMAL PG/ML (ref 0–450)
BUN SERPL-MCNC: 48 MG/DL (ref 6–23)
CALCIUM SERPL-MCNC: 11.5 MG/DL (ref 8.6–10.2)
CHLORIDE SERPL-SCNC: 103 MMOL/L (ref 98–107)
CLARITY UR: ABNORMAL
CLARITY UR: ABNORMAL
CO2 SERPL-SCNC: 27 MMOL/L (ref 22–29)
COLOR UR: YELLOW
COLOR UR: YELLOW
CREAT SERPL-MCNC: 1.5 MG/DL (ref 0.7–1.2)
EOSINOPHIL # BLD: 0 K/UL (ref 0.05–0.5)
EOSINOPHILS RELATIVE PERCENT: 0 % (ref 0–6)
ERYTHROCYTE [DISTWIDTH] IN BLOOD BY AUTOMATED COUNT: 15.4 % (ref 11.5–15)
FLOW RATE: 4
GFR SERPL CREATININE-BSD FRML MDRD: 46 ML/MIN/1.73M2
GLUCOSE SERPL-MCNC: 125 MG/DL (ref 74–99)
GLUCOSE UR STRIP-MCNC: NEGATIVE MG/DL
GLUCOSE UR STRIP-MCNC: NEGATIVE MG/DL
HCT VFR BLD AUTO: 24 % (ref 37–54)
HGB BLD-MCNC: 7.2 G/DL (ref 12.5–16.5)
HGB UR QL STRIP.AUTO: ABNORMAL
HGB UR QL STRIP.AUTO: ABNORMAL
INFLUENZA A BY PCR: NOT DETECTED
INFLUENZA B BY PCR: NOT DETECTED
INR PPP: 1.4
KETONES UR STRIP-MCNC: NEGATIVE MG/DL
KETONES UR STRIP-MCNC: NEGATIVE MG/DL
LACTATE BLDV-SCNC: 0.8 MMOL/L (ref 0.5–2.2)
LEUKOCYTE ESTERASE UR QL STRIP: ABNORMAL
LEUKOCYTE ESTERASE UR QL STRIP: ABNORMAL
LYMPHOCYTES NFR BLD: 0.13 K/UL (ref 1.5–4)
LYMPHOCYTES RELATIVE PERCENT: 2 % (ref 20–42)
MAGNESIUM SERPL-MCNC: 2.1 MG/DL (ref 1.6–2.6)
MCH RBC QN AUTO: 29.9 PG (ref 26–35)
MCHC RBC AUTO-ENTMCNC: 30 G/DL (ref 32–34.5)
MCV RBC AUTO: 99.6 FL (ref 80–99.9)
MONOCYTES NFR BLD: 0.06 K/UL (ref 0.1–0.95)
MONOCYTES NFR BLD: 1 % (ref 2–12)
NEUTROPHILS NFR BLD: 97 % (ref 43–80)
NEUTS SEG NFR BLD: 7.11 K/UL (ref 1.8–7.3)
NITRITE UR QL STRIP: NEGATIVE
NITRITE UR QL STRIP: NEGATIVE
O2 DELIVERY DEVICE: ABNORMAL
PH UR STRIP: 6 [PH] (ref 5–9)
PH UR STRIP: 7.5 [PH] (ref 5–9)
PLATELET # BLD AUTO: 198 K/UL (ref 130–450)
PMV BLD AUTO: 10.6 FL (ref 7–12)
POC HCO3: 24.1 MMOL/L (ref 22–26)
POC O2 SATURATION: 99.1 % (ref 92–98.5)
POC PCO2: 33.3 MM HG (ref 35–45)
POC PH: 7.47 (ref 7.35–7.45)
POC PO2: 126.1 MM HG (ref 60–80)
POSITIVE BASE EXCESS, ART: 0.5 MMOL/L (ref 0–3)
POTASSIUM SERPL-SCNC: 3.3 MMOL/L (ref 3.5–5)
PROT SERPL-MCNC: 5.3 G/DL (ref 6.4–8.3)
PROT UR STRIP-MCNC: 30 MG/DL
PROT UR STRIP-MCNC: 30 MG/DL
PROTHROMBIN TIME: 15.3 SEC (ref 9.3–12.4)
PTH RELATED PEPTIDE: 2.7 PMOL/L (ref 0–2.3)
RBC # BLD AUTO: 2.41 M/UL (ref 3.8–5.8)
RBC # BLD: ABNORMAL 10*6/UL
RBC #/AREA URNS HPF: ABNORMAL /HPF
RBC #/AREA URNS HPF: ABNORMAL /HPF
SARS-COV-2 RDRP RESP QL NAA+PROBE: NOT DETECTED
SODIUM SERPL-SCNC: 134 MMOL/L (ref 132–146)
SP GR UR STRIP: 1.01 (ref 1–1.03)
SP GR UR STRIP: 1.01 (ref 1–1.03)
SPECIMEN DESCRIPTION: NORMAL
T4 FREE SERPL-MCNC: 1.1 NG/DL (ref 0.9–1.7)
TROPONIN I SERPL HS-MCNC: 106 NG/L (ref 0–11)
TROPONIN I SERPL HS-MCNC: 106 NG/L (ref 0–11)
TSH SERPL DL<=0.05 MIU/L-ACNC: 1.33 UIU/ML (ref 0.27–4.2)
UROBILINOGEN UR STRIP-ACNC: 0.2 EU/DL (ref 0–1)
UROBILINOGEN UR STRIP-ACNC: 0.2 EU/DL (ref 0–1)
WBC #/AREA URNS HPF: ABNORMAL /HPF
WBC #/AREA URNS HPF: ABNORMAL /HPF
WBC OTHER # BLD: 7.3 K/UL (ref 4.5–11.5)

## 2024-04-20 PROCEDURE — 99223 1ST HOSP IP/OBS HIGH 75: CPT | Performed by: FAMILY MEDICINE

## 2024-04-20 PROCEDURE — 99285 EMERGENCY DEPT VISIT HI MDM: CPT

## 2024-04-20 PROCEDURE — 81001 URINALYSIS AUTO W/SCOPE: CPT

## 2024-04-20 PROCEDURE — 86850 RBC ANTIBODY SCREEN: CPT

## 2024-04-20 PROCEDURE — 85025 COMPLETE CBC W/AUTO DIFF WBC: CPT

## 2024-04-20 PROCEDURE — 83735 ASSAY OF MAGNESIUM: CPT

## 2024-04-20 PROCEDURE — 87635 SARS-COV-2 COVID-19 AMP PRB: CPT

## 2024-04-20 PROCEDURE — 83550 IRON BINDING TEST: CPT

## 2024-04-20 PROCEDURE — 85610 PROTHROMBIN TIME: CPT

## 2024-04-20 PROCEDURE — 70450 CT HEAD/BRAIN W/O DYE: CPT

## 2024-04-20 PROCEDURE — 87077 CULTURE AEROBIC IDENTIFY: CPT

## 2024-04-20 PROCEDURE — 2580000003 HC RX 258: Performed by: FAMILY MEDICINE

## 2024-04-20 PROCEDURE — 83605 ASSAY OF LACTIC ACID: CPT

## 2024-04-20 PROCEDURE — 2580000003 HC RX 258: Performed by: STUDENT IN AN ORGANIZED HEALTH CARE EDUCATION/TRAINING PROGRAM

## 2024-04-20 PROCEDURE — P9612 CATHETERIZE FOR URINE SPEC: HCPCS

## 2024-04-20 PROCEDURE — 82803 BLOOD GASES ANY COMBINATION: CPT

## 2024-04-20 PROCEDURE — 6360000002 HC RX W HCPCS: Performed by: STUDENT IN AN ORGANIZED HEALTH CARE EDUCATION/TRAINING PROGRAM

## 2024-04-20 PROCEDURE — 84443 ASSAY THYROID STIM HORMONE: CPT

## 2024-04-20 PROCEDURE — 83880 ASSAY OF NATRIURETIC PEPTIDE: CPT

## 2024-04-20 PROCEDURE — 2060000000 HC ICU INTERMEDIATE R&B

## 2024-04-20 PROCEDURE — 82140 ASSAY OF AMMONIA: CPT

## 2024-04-20 PROCEDURE — 86900 BLOOD TYPING SEROLOGIC ABO: CPT

## 2024-04-20 PROCEDURE — 71045 X-RAY EXAM CHEST 1 VIEW: CPT

## 2024-04-20 PROCEDURE — 84484 ASSAY OF TROPONIN QUANT: CPT

## 2024-04-20 PROCEDURE — 83540 ASSAY OF IRON: CPT

## 2024-04-20 PROCEDURE — 87502 INFLUENZA DNA AMP PROBE: CPT

## 2024-04-20 PROCEDURE — 87086 URINE CULTURE/COLONY COUNT: CPT

## 2024-04-20 PROCEDURE — 80053 COMPREHEN METABOLIC PANEL: CPT

## 2024-04-20 PROCEDURE — 6360000002 HC RX W HCPCS: Performed by: FAMILY MEDICINE

## 2024-04-20 PROCEDURE — 93005 ELECTROCARDIOGRAM TRACING: CPT | Performed by: NURSE PRACTITIONER

## 2024-04-20 PROCEDURE — 86901 BLOOD TYPING SEROLOGIC RH(D): CPT

## 2024-04-20 PROCEDURE — 6370000000 HC RX 637 (ALT 250 FOR IP): Performed by: STUDENT IN AN ORGANIZED HEALTH CARE EDUCATION/TRAINING PROGRAM

## 2024-04-20 PROCEDURE — 84439 ASSAY OF FREE THYROXINE: CPT

## 2024-04-20 RX ORDER — ZINC SULFATE 50(220)MG
50 CAPSULE ORAL DAILY
Status: DISCONTINUED | OUTPATIENT
Start: 2024-04-21 | End: 2024-04-22 | Stop reason: HOSPADM

## 2024-04-20 RX ORDER — ISOSORBIDE DINITRATE 10 MG/1
5 TABLET ORAL 2 TIMES DAILY
Status: DISCONTINUED | OUTPATIENT
Start: 2024-04-21 | End: 2024-04-22 | Stop reason: HOSPADM

## 2024-04-20 RX ORDER — SODIUM CHLORIDE 0.9 % (FLUSH) 0.9 %
5-40 SYRINGE (ML) INJECTION EVERY 12 HOURS SCHEDULED
Status: DISCONTINUED | OUTPATIENT
Start: 2024-04-20 | End: 2024-04-22 | Stop reason: HOSPADM

## 2024-04-20 RX ORDER — PANTOPRAZOLE SODIUM 40 MG/1
40 TABLET, DELAYED RELEASE ORAL
Status: DISCONTINUED | OUTPATIENT
Start: 2024-04-21 | End: 2024-04-22 | Stop reason: HOSPADM

## 2024-04-20 RX ORDER — DORZOLAMIDE HCL 20 MG/ML
1 SOLUTION/ DROPS OPHTHALMIC 2 TIMES DAILY
Status: DISCONTINUED | OUTPATIENT
Start: 2024-04-21 | End: 2024-04-22 | Stop reason: HOSPADM

## 2024-04-20 RX ORDER — ACETAMINOPHEN 650 MG/1
650 SUPPOSITORY RECTAL EVERY 6 HOURS PRN
Status: DISCONTINUED | OUTPATIENT
Start: 2024-04-20 | End: 2024-04-22 | Stop reason: HOSPADM

## 2024-04-20 RX ORDER — BUMETANIDE 0.25 MG/ML
2 INJECTION INTRAMUSCULAR; INTRAVENOUS DAILY
Status: DISCONTINUED | OUTPATIENT
Start: 2024-04-21 | End: 2024-04-22

## 2024-04-20 RX ORDER — ATORVASTATIN CALCIUM 40 MG/1
40 TABLET, FILM COATED ORAL NIGHTLY
Status: DISCONTINUED | OUTPATIENT
Start: 2024-04-21 | End: 2024-04-22 | Stop reason: HOSPADM

## 2024-04-20 RX ORDER — BRIMONIDINE TARTRATE 2 MG/ML
1 SOLUTION/ DROPS OPHTHALMIC 2 TIMES DAILY
Status: DISCONTINUED | OUTPATIENT
Start: 2024-04-21 | End: 2024-04-22 | Stop reason: HOSPADM

## 2024-04-20 RX ORDER — ACETAMINOPHEN 325 MG/1
650 TABLET ORAL EVERY 6 HOURS PRN
Status: DISCONTINUED | OUTPATIENT
Start: 2024-04-20 | End: 2024-04-22 | Stop reason: HOSPADM

## 2024-04-20 RX ORDER — SUCRALFATE 1 G/1
1 TABLET ORAL 4 TIMES DAILY
Status: DISCONTINUED | OUTPATIENT
Start: 2024-04-21 | End: 2024-04-22 | Stop reason: HOSPADM

## 2024-04-20 RX ORDER — FINASTERIDE 5 MG/1
5 TABLET, FILM COATED ORAL DAILY
Status: DISCONTINUED | OUTPATIENT
Start: 2024-04-21 | End: 2024-04-22 | Stop reason: HOSPADM

## 2024-04-20 RX ORDER — BUMETANIDE 0.25 MG/ML
1 INJECTION INTRAMUSCULAR; INTRAVENOUS ONCE
Status: COMPLETED | OUTPATIENT
Start: 2024-04-20 | End: 2024-04-20

## 2024-04-20 RX ORDER — METOPROLOL SUCCINATE 25 MG/1
25 TABLET, EXTENDED RELEASE ORAL 2 TIMES DAILY
Status: DISCONTINUED | OUTPATIENT
Start: 2024-04-21 | End: 2024-04-22 | Stop reason: HOSPADM

## 2024-04-20 RX ORDER — LATANOPROST 50 UG/ML
1 SOLUTION/ DROPS OPHTHALMIC NIGHTLY
Status: DISCONTINUED | OUTPATIENT
Start: 2024-04-21 | End: 2024-04-22 | Stop reason: HOSPADM

## 2024-04-20 RX ORDER — LANOLIN ALCOHOL/MO/W.PET/CERES
1000 CREAM (GRAM) TOPICAL DAILY
Status: DISCONTINUED | OUTPATIENT
Start: 2024-04-21 | End: 2024-04-22 | Stop reason: HOSPADM

## 2024-04-20 RX ORDER — SODIUM CHLORIDE 9 MG/ML
INJECTION, SOLUTION INTRAVENOUS PRN
Status: DISCONTINUED | OUTPATIENT
Start: 2024-04-20 | End: 2024-04-22 | Stop reason: HOSPADM

## 2024-04-20 RX ORDER — MIDODRINE HYDROCHLORIDE 5 MG/1
5 TABLET ORAL 3 TIMES DAILY PRN
Status: DISCONTINUED | OUTPATIENT
Start: 2024-04-20 | End: 2024-04-22 | Stop reason: HOSPADM

## 2024-04-20 RX ORDER — TAMSULOSIN HYDROCHLORIDE 0.4 MG/1
0.4 CAPSULE ORAL NIGHTLY
Status: DISCONTINUED | OUTPATIENT
Start: 2024-04-21 | End: 2024-04-22 | Stop reason: HOSPADM

## 2024-04-20 RX ORDER — SODIUM CHLORIDE 0.9 % (FLUSH) 0.9 %
5-40 SYRINGE (ML) INJECTION PRN
Status: DISCONTINUED | OUTPATIENT
Start: 2024-04-20 | End: 2024-04-22 | Stop reason: HOSPADM

## 2024-04-20 RX ORDER — ENOXAPARIN SODIUM 100 MG/ML
40 INJECTION SUBCUTANEOUS DAILY
Status: DISCONTINUED | OUTPATIENT
Start: 2024-04-20 | End: 2024-04-20

## 2024-04-20 RX ORDER — LISINOPRIL 5 MG/1
5 TABLET ORAL DAILY
Status: DISCONTINUED | OUTPATIENT
Start: 2024-04-20 | End: 2024-04-21

## 2024-04-20 RX ORDER — CETIRIZINE HYDROCHLORIDE 10 MG/1
10 TABLET ORAL DAILY
Status: DISCONTINUED | OUTPATIENT
Start: 2024-04-21 | End: 2024-04-22 | Stop reason: HOSPADM

## 2024-04-20 RX ORDER — HYDRALAZINE HYDROCHLORIDE 10 MG/1
10 TABLET, FILM COATED ORAL EVERY 12 HOURS SCHEDULED
Status: DISCONTINUED | OUTPATIENT
Start: 2024-04-21 | End: 2024-04-22 | Stop reason: HOSPADM

## 2024-04-20 RX ADMIN — BUMETANIDE 1 MG: 0.25 INJECTION, SOLUTION INTRAMUSCULAR; INTRAVENOUS at 20:40

## 2024-04-20 RX ADMIN — CEFTRIAXONE 1000 MG: 1 INJECTION, POWDER, FOR SOLUTION INTRAMUSCULAR; INTRAVENOUS at 20:38

## 2024-04-20 RX ADMIN — POTASSIUM BICARBONATE 20 MEQ: 782 TABLET, EFFERVESCENT ORAL at 19:31

## 2024-04-20 RX ADMIN — ENOXAPARIN SODIUM 40 MG: 100 INJECTION SUBCUTANEOUS at 20:36

## 2024-04-20 RX ADMIN — SODIUM CHLORIDE, PRESERVATIVE FREE 10 ML: 5 INJECTION INTRAVENOUS at 22:06

## 2024-04-20 RX ADMIN — POTASSIUM BICARBONATE 20 MEQ: 782 TABLET, EFFERVESCENT ORAL at 20:48

## 2024-04-20 ASSESSMENT — PAIN - FUNCTIONAL ASSESSMENT: PAIN_FUNCTIONAL_ASSESSMENT: NONE - DENIES PAIN

## 2024-04-20 NOTE — PROGRESS NOTES
Last Year: No     Number of Places Lived in the Last Year: 1     Unstable Housing in the Last Year: No        HPI    Uzair Fuentes is a very pleasant and cooperative 83 year old male.  Hospital course discussed in brief given his cognition.  Patient's not fully aware of why he was admitted thus I advised patient he was admitted for a GI bleed.  He was also found to have a left-sided pleural effusion which required thoracentesis.  Patient also had diuresis.  He he responded minimally to this however was stable thus was discharged to our facility in stable condition.  Currently, patient states he is doing okay at this time.  After further discussion, patient states that he just wants to be comfortable at this time and does not like going to the hospital.  He denies any fever, chills, nausea, vomiting, or issues with urination.     Allergies   Allergen Reactions    Shellfish-Derived Products         Review of Systems - as per HPI however limited given his mental status       Objective   Physical Exam  Constitutional:       Appearance: He is well-developed.   HENT:      Head: Normocephalic and atraumatic.   Eyes:      General:         Right eye: No discharge.         Left eye: No discharge.      Conjunctiva/sclera: Conjunctivae normal.   Neck:      Trachea: No tracheal deviation.   Cardiovascular:      Rate and Rhythm: Normal rate. Rhythm irregular.      Heart sounds: Normal heart sounds.   Pulmonary:      Effort: Pulmonary effort is normal. No respiratory distress.      Breath sounds: Rales present. No wheezing.      Comments: Coarse and diminished bilaterally  Abdominal:      General: Bowel sounds are normal. There is no distension.      Palpations: Abdomen is soft.      Tenderness: There is no abdominal tenderness.   Musculoskeletal:         General: Tenderness present.      Cervical back: Normal range of motion and neck supple.   Skin:     General: Skin is warm and dry.      Findings: Bruising present.

## 2024-04-20 NOTE — ED PROVIDER NOTES
Name: Uzair Fuentes    MRN: [id number]     Date / Time Roomed:  4/20/2024  4:01 PM  ED Bed Assignment:  0444/0444-A    ------------------ History of Present Illness --------------------  4/20/24, Time: 4:42 PM EDT   Chief Complaint   Patient presents with    Altered Mental Status     Per NH patient has had a change in mental status. Patient lethargic but A/Ox4. States that he is upset with himself and takes it out on the staff.      HPI    Uzair Fuentes is a 84 y.o. male, with hx of anemia, CHF, CAD, CKD, AAA, DVT, renal cell cancer, hypertension, recent GI bleed however recently back on Eliquis, on 4 L nasal cannula baseline, who presents to the ED today for concern for altered mental status and nursing care facility over the past several days. Patient coming from nursing home by EMS.  According to nursing facility, he has had change in mental status over the past several days, lethargic, as well as some agitation.  On arrival to ED, he is answering questions appropriately although does appear lethargic and fatigued.  He relates having some shortness of breath and cough.  He denies any chest pain or abdominal pain.  The pt denies other ROS at this time.     PCP: Guilherme Salgado DO.    -------------------- PMH --------------------    Past Medical History:   has a past medical history of Cancer (HCC) (2023), Disease of blood and blood forming organ, and Hypertension.     Surgical History:  Past Surgical History:   Procedure Laterality Date    ABDOMINAL AORTIC ANEURYSM REPAIR      COLONOSCOPY      COLONOSCOPY N/A 2/12/2024    COLONOSCOPY DIAGNOSTIC performed by Dave Heredia DO at [facility] ENDOSCOPY    TONSILLECTOMY      TOTAL KNEE ARTHROPLASTY Bilateral     UPPER GASTROINTESTINAL ENDOSCOPY N/A 10/14/2022    EGD DIAGNOSTIC ONLY performed by Joshua Avila MD at [facility] ENDOSCOPY    UPPER GASTROINTESTINAL ENDOSCOPY N/A 2/10/2024    EGD ESOPHAGOGASTRODUODENOSCOPY performed by Dave Heredia DO at [facility] OR

## 2024-04-20 NOTE — ED NOTES
Patient becoming very agitated, pulling at BP cuff, telemetry leads, Sp02 monitor not wanting to keep it on. Patient yelling pulling at gown will not allow this RN to button. Patient kicking feet then soundly falls asleep.

## 2024-04-21 PROBLEM — J90 PLEURAL EFFUSION: Status: RESOLVED | Noted: 2024-04-08 | Resolved: 2024-04-21

## 2024-04-21 PROBLEM — R07.9 CHEST PAIN: Status: RESOLVED | Noted: 2022-10-16 | Resolved: 2024-04-21

## 2024-04-21 PROBLEM — D64.9 ACUTE ON CHRONIC ANEMIA: Status: RESOLVED | Noted: 2023-11-06 | Resolved: 2024-04-21

## 2024-04-21 PROBLEM — N18.4 STAGE 4 CHRONIC KIDNEY DISEASE (HCC): Status: RESOLVED | Noted: 2024-01-27 | Resolved: 2024-04-21

## 2024-04-21 PROBLEM — Z51.5 PALLIATIVE CARE BY SPECIALIST: Status: RESOLVED | Noted: 2024-01-29 | Resolved: 2024-04-21

## 2024-04-21 PROBLEM — K62.5 RECTAL BLEED: Status: RESOLVED | Noted: 2022-05-05 | Resolved: 2024-04-21

## 2024-04-21 PROBLEM — K92.2 GI BLEED: Status: RESOLVED | Noted: 2022-10-11 | Resolved: 2024-04-21

## 2024-04-21 PROBLEM — N17.9 ACUTE KIDNEY INJURY (HCC): Status: RESOLVED | Noted: 2024-01-27 | Resolved: 2024-04-21

## 2024-04-21 PROBLEM — K92.2 ACUTE UPPER GI BLEED: Status: RESOLVED | Noted: 2024-04-07 | Resolved: 2024-04-21

## 2024-04-21 PROBLEM — Z71.89 GOALS OF CARE, COUNSELING/DISCUSSION: Status: RESOLVED | Noted: 2024-01-29 | Resolved: 2024-04-21

## 2024-04-21 PROBLEM — J81.0 ACUTE PULMONARY EDEMA (HCC): Status: ACTIVE | Noted: 2024-04-21

## 2024-04-21 PROBLEM — D62 ABLA (ACUTE BLOOD LOSS ANEMIA): Status: RESOLVED | Noted: 2024-04-09 | Resolved: 2024-04-21

## 2024-04-21 PROBLEM — N18.9 CKD (CHRONIC KIDNEY DISEASE): Status: RESOLVED | Noted: 2024-03-27 | Resolved: 2024-04-21

## 2024-04-21 LAB
ANION GAP SERPL CALCULATED.3IONS-SCNC: 7 MMOL/L (ref 7–16)
BASOPHILS # BLD: 0 K/UL (ref 0–0.2)
BASOPHILS # BLD: 0 K/UL (ref 0–0.2)
BASOPHILS NFR BLD: 0 % (ref 0–2)
BASOPHILS NFR BLD: 0 % (ref 0–2)
BUN SERPL-MCNC: 46 MG/DL (ref 6–23)
CALCIUM SERPL-MCNC: 11.6 MG/DL (ref 8.6–10.2)
CHLORIDE SERPL-SCNC: 109 MMOL/L (ref 98–107)
CHOLEST SERPL-MCNC: 80 MG/DL
CO2 SERPL-SCNC: 25 MMOL/L (ref 22–29)
CREAT SERPL-MCNC: 1.5 MG/DL (ref 0.7–1.2)
EOSINOPHIL # BLD: 0 K/UL (ref 0.05–0.5)
EOSINOPHIL # BLD: 0 K/UL (ref 0.05–0.5)
EOSINOPHILS RELATIVE PERCENT: 0 % (ref 0–6)
EOSINOPHILS RELATIVE PERCENT: 0 % (ref 0–6)
ERYTHROCYTE [DISTWIDTH] IN BLOOD BY AUTOMATED COUNT: 15.5 % (ref 11.5–15)
ERYTHROCYTE [DISTWIDTH] IN BLOOD BY AUTOMATED COUNT: 15.6 % (ref 11.5–15)
GFR SERPL CREATININE-BSD FRML MDRD: 46 ML/MIN/1.73M2
GLUCOSE SERPL-MCNC: 130 MG/DL (ref 74–99)
HCT VFR BLD AUTO: 21.7 % (ref 37–54)
HCT VFR BLD AUTO: 22.8 % (ref 37–54)
HDLC SERPL-MCNC: 38 MG/DL
HGB BLD-MCNC: 6.7 G/DL (ref 12.5–16.5)
HGB BLD-MCNC: 7 G/DL (ref 12.5–16.5)
IRON SATN MFR SERPL: 23 % (ref 20–55)
IRON SERPL-MCNC: 34 UG/DL (ref 59–158)
LDLC SERPL CALC-MCNC: 29 MG/DL
LYMPHOCYTES NFR BLD: 0.22 K/UL (ref 1.5–4)
LYMPHOCYTES NFR BLD: 0.3 K/UL (ref 1.5–4)
LYMPHOCYTES RELATIVE PERCENT: 4 % (ref 20–42)
LYMPHOCYTES RELATIVE PERCENT: 5 % (ref 20–42)
MAGNESIUM SERPL-MCNC: 2.1 MG/DL (ref 1.6–2.6)
MCH RBC QN AUTO: 30.6 PG (ref 26–35)
MCH RBC QN AUTO: 30.9 PG (ref 26–35)
MCHC RBC AUTO-ENTMCNC: 30.7 G/DL (ref 32–34.5)
MCHC RBC AUTO-ENTMCNC: 30.9 G/DL (ref 32–34.5)
MCV RBC AUTO: 100 FL (ref 80–99.9)
MCV RBC AUTO: 99.6 FL (ref 80–99.9)
MICROORGANISM SPEC CULT: ABNORMAL
MONOCYTES NFR BLD: 0.28 K/UL (ref 0.1–0.95)
MONOCYTES NFR BLD: 0.3 K/UL (ref 0.1–0.95)
MONOCYTES NFR BLD: 5 % (ref 2–12)
MONOCYTES NFR BLD: 5 % (ref 2–12)
NEUTROPHILS NFR BLD: 90 % (ref 43–80)
NEUTROPHILS NFR BLD: 91 % (ref 43–80)
NEUTS SEG NFR BLD: 5.1 K/UL (ref 1.8–7.3)
NEUTS SEG NFR BLD: 5.4 K/UL (ref 1.8–7.3)
PLATELET # BLD AUTO: 168 K/UL (ref 130–450)
PLATELET # BLD AUTO: 183 K/UL (ref 130–450)
PMV BLD AUTO: 10.5 FL (ref 7–12)
PMV BLD AUTO: 10.5 FL (ref 7–12)
POTASSIUM SERPL-SCNC: 3.4 MMOL/L (ref 3.5–5)
RBC # BLD AUTO: 2.17 M/UL (ref 3.8–5.8)
RBC # BLD AUTO: 2.29 M/UL (ref 3.8–5.8)
RBC # BLD: ABNORMAL 10*6/UL
SODIUM SERPL-SCNC: 141 MMOL/L (ref 132–146)
SPECIMEN DESCRIPTION: ABNORMAL
TIBC SERPL-MCNC: 146 UG/DL (ref 250–450)
TRIGL SERPL-MCNC: 64 MG/DL
VLDLC SERPL CALC-MCNC: 13 MG/DL
WBC OTHER # BLD: 5.6 K/UL (ref 4.5–11.5)
WBC OTHER # BLD: 6 K/UL (ref 4.5–11.5)

## 2024-04-21 PROCEDURE — 36415 COLL VENOUS BLD VENIPUNCTURE: CPT

## 2024-04-21 PROCEDURE — 99233 SBSQ HOSP IP/OBS HIGH 50: CPT | Performed by: INTERNAL MEDICINE

## 2024-04-21 PROCEDURE — 80048 BASIC METABOLIC PNL TOTAL CA: CPT

## 2024-04-21 PROCEDURE — 6370000000 HC RX 637 (ALT 250 FOR IP): Performed by: FAMILY MEDICINE

## 2024-04-21 PROCEDURE — 6360000002 HC RX W HCPCS

## 2024-04-21 PROCEDURE — 93005 ELECTROCARDIOGRAM TRACING: CPT | Performed by: STUDENT IN AN ORGANIZED HEALTH CARE EDUCATION/TRAINING PROGRAM

## 2024-04-21 PROCEDURE — APPSS180 APP SPLIT SHARED TIME > 60 MINUTES: Performed by: NURSE PRACTITIONER

## 2024-04-21 PROCEDURE — 6360000002 HC RX W HCPCS: Performed by: FAMILY MEDICINE

## 2024-04-21 PROCEDURE — 99223 1ST HOSP IP/OBS HIGH 75: CPT | Performed by: INTERNAL MEDICINE

## 2024-04-21 PROCEDURE — 80061 LIPID PANEL: CPT

## 2024-04-21 PROCEDURE — 83735 ASSAY OF MAGNESIUM: CPT

## 2024-04-21 PROCEDURE — 2060000000 HC ICU INTERMEDIATE R&B

## 2024-04-21 PROCEDURE — 6370000000 HC RX 637 (ALT 250 FOR IP)

## 2024-04-21 PROCEDURE — 85025 COMPLETE CBC W/AUTO DIFF WBC: CPT

## 2024-04-21 PROCEDURE — 51798 US URINE CAPACITY MEASURE: CPT

## 2024-04-21 PROCEDURE — 2580000003 HC RX 258: Performed by: FAMILY MEDICINE

## 2024-04-21 RX ORDER — POLYETHYLENE GLYCOL 3350 17 G/17G
17 POWDER, FOR SOLUTION ORAL DAILY
Status: DISCONTINUED | OUTPATIENT
Start: 2024-04-21 | End: 2024-04-22 | Stop reason: HOSPADM

## 2024-04-21 RX ORDER — BISACODYL 10 MG
10 SUPPOSITORY, RECTAL RECTAL DAILY PRN
Status: DISCONTINUED | OUTPATIENT
Start: 2024-04-21 | End: 2024-04-22 | Stop reason: HOSPADM

## 2024-04-21 RX ORDER — TRAMADOL HYDROCHLORIDE 50 MG/1
50 TABLET ORAL EVERY 6 HOURS PRN
Status: DISCONTINUED | OUTPATIENT
Start: 2024-04-21 | End: 2024-04-22 | Stop reason: HOSPADM

## 2024-04-21 RX ORDER — SENNA AND DOCUSATE SODIUM 50; 8.6 MG/1; MG/1
2 TABLET, FILM COATED ORAL 2 TIMES DAILY
Status: DISCONTINUED | OUTPATIENT
Start: 2024-04-21 | End: 2024-04-22 | Stop reason: HOSPADM

## 2024-04-21 RX ORDER — MORPHINE SULFATE 2 MG/ML
2 INJECTION, SOLUTION INTRAMUSCULAR; INTRAVENOUS
Status: DISCONTINUED | OUTPATIENT
Start: 2024-04-21 | End: 2024-04-22 | Stop reason: HOSPADM

## 2024-04-21 RX ADMIN — MORPHINE SULFATE 2 MG: 2 INJECTION, SOLUTION INTRAMUSCULAR; INTRAVENOUS at 23:26

## 2024-04-21 RX ADMIN — CETIRIZINE HYDROCHLORIDE 10 MG: 10 TABLET, FILM COATED ORAL at 08:39

## 2024-04-21 RX ADMIN — FINASTERIDE 5 MG: 5 TABLET, FILM COATED ORAL at 08:39

## 2024-04-21 RX ADMIN — ZINC SULFATE 220 MG (50 MG) CAPSULE 50 MG: CAPSULE at 08:39

## 2024-04-21 RX ADMIN — SODIUM CHLORIDE, PRESERVATIVE FREE 10 ML: 5 INJECTION INTRAVENOUS at 08:38

## 2024-04-21 RX ADMIN — MORPHINE SULFATE 2 MG: 2 INJECTION, SOLUTION INTRAMUSCULAR; INTRAVENOUS at 20:22

## 2024-04-21 RX ADMIN — ISOSORBIDE DINITRATE 5 MG: 10 TABLET ORAL at 08:38

## 2024-04-21 RX ADMIN — TAMSULOSIN HYDROCHLORIDE 0.4 MG: 0.4 CAPSULE ORAL at 00:55

## 2024-04-21 RX ADMIN — LATANOPROST 1 DROP: 50 SOLUTION OPHTHALMIC at 00:15

## 2024-04-21 RX ADMIN — POLYETHYLENE GLYCOL 3350 17 G: 17 POWDER, FOR SOLUTION ORAL at 15:18

## 2024-04-21 RX ADMIN — PANTOPRAZOLE SODIUM 40 MG: 40 TABLET, DELAYED RELEASE ORAL at 08:39

## 2024-04-21 RX ADMIN — ACETAMINOPHEN 650 MG: 325 TABLET ORAL at 12:13

## 2024-04-21 RX ADMIN — HYDRALAZINE HYDROCHLORIDE 10 MG: 10 TABLET, FILM COATED ORAL at 08:39

## 2024-04-21 RX ADMIN — DORZOLAMIDE HYDROCHLORIDE 1 DROP: 20 SOLUTION/ DROPS OPHTHALMIC at 00:15

## 2024-04-21 RX ADMIN — SUCRALFATE 1 G: 1 TABLET ORAL at 08:39

## 2024-04-21 RX ADMIN — SODIUM CHLORIDE, PRESERVATIVE FREE 10 ML: 5 INJECTION INTRAVENOUS at 20:22

## 2024-04-21 RX ADMIN — BRIMONIDINE TARTRATE 1 DROP: 2 SOLUTION/ DROPS OPHTHALMIC at 00:15

## 2024-04-21 RX ADMIN — SUCRALFATE 1 G: 1 TABLET ORAL at 00:55

## 2024-04-21 RX ADMIN — LISINOPRIL 5 MG: 5 TABLET ORAL at 08:39

## 2024-04-21 RX ADMIN — CYANOCOBALAMIN TAB 1000 MCG 1000 MCG: 1000 TAB at 08:39

## 2024-04-21 RX ADMIN — BUMETANIDE 2 MG: 0.25 INJECTION INTRAMUSCULAR; INTRAVENOUS at 08:38

## 2024-04-21 RX ADMIN — MORPHINE SULFATE 2 MG: 2 INJECTION, SOLUTION INTRAMUSCULAR; INTRAVENOUS at 17:17

## 2024-04-21 RX ADMIN — METOPROLOL SUCCINATE 25 MG: 25 TABLET, EXTENDED RELEASE ORAL at 08:39

## 2024-04-21 RX ADMIN — ATORVASTATIN CALCIUM 40 MG: 40 TABLET, FILM COATED ORAL at 00:55

## 2024-04-21 RX ADMIN — DOCUSATE SODIUM 50 MG AND SENNOSIDES 8.6 MG 2 TABLET: 8.6; 5 TABLET, FILM COATED ORAL at 15:18

## 2024-04-21 RX ADMIN — BRIMONIDINE TARTRATE 1 DROP: 2 SOLUTION/ DROPS OPHTHALMIC at 08:40

## 2024-04-21 RX ADMIN — DORZOLAMIDE HYDROCHLORIDE 1 DROP: 20 SOLUTION/ DROPS OPHTHALMIC at 08:39

## 2024-04-21 RX ADMIN — APIXABAN 2.5 MG: 2.5 TABLET, FILM COATED ORAL at 08:39

## 2024-04-21 NOTE — ED NOTES
ED to Inpatient Handoff Report    Notified Kary CASTANON that electronic handoff available and patient ready for transport to room 0437.    Safety Risks: Risk of falls    Patient in Restraints: no    Constant Observer or Patient : yes    Telemetry Monitoring Ordered :Yes           Order to transfer to unit without monitor:N/A    Last MEWS: 1 Time completed: 2045    Deterioration Index Score:   Predictive Model Details          32 (Caution)  Factor Value    Calculated 4/20/2024 20:49 39% Age 84 years old    Deterioration Index Model 34% Supplemental oxygen Nasal cannula     7% Respiratory rate 18     7% Hematocrit abnormal (24.0 %)     3% Pulse 94     3% Potassium abnormal (3.3 mmol/L)     3% BUN abnormal (48 mg/dL)     3% Sodium 134 mmol/L     1% Systolic 108     0% Pulse oximetry 98 %     0% WBC count 7.3 k/uL     0% Temperature 98.2 °F (36.8 °C)        Vitals:    04/20/24 1823 04/20/24 1853 04/20/24 2040 04/20/24 2045   BP:   108/75 108/75   Pulse: 91   94   Resp: 16   18   Temp:    98.2 °F (36.8 °C)   TempSrc:    Axillary   SpO2:  95%  98%   Weight:       Height:             Opportunity for questions and clarification was provided.

## 2024-04-21 NOTE — H&P
PHYSICAL EXAM:  Vitals:  /82   Pulse 91   Temp 98.7 °F (37.1 °C) (Oral)   Resp 16   Ht 1.803 m (5' 11\")   Wt 85.3 kg (188 lb)   SpO2 95%   BMI 26.22 kg/m²     Constitutional:  Elderly, NAD, awake, restless, appears chronically ill  Eyes: no scleral icterus, normal lids, no discharge  ENMT:  Normocephalic, atraumatic, mucosa moist, EOMI  Neck:  trachea midline, no JVD  Lungs:  bibasilar rales, no audible rhonchi or wheezes noted, respirations tachypneic, no retractions  Heart::  RRR, no murmur, rub, or gallop noted during exam  Abd:  Soft, non tender, non distended, bowel sounds present  :  deferred  MSK: sarcopenia present  Ext:  Moving all extremities, +2 lower extremity edema, pulses present  Skin:  Warm and dry, no rashes on visible skin  Psych: non-anxious affect  Neuro:  PERRL, awake, grossly nonfocal; following commands, oriented to person, year    LABS:  Recent Labs     04/18/24  0554 04/20/24  1650    134   K 3.9 3.3*    103   CO2 22 27   BUN 57* 48*   CREATININE 1.8* 1.5*   GLUCOSE 89 125*   CALCIUM 10.0 11.5*       Recent Labs     04/18/24  0554 04/19/24  0554 04/20/24  1650   WBC 5.5 5.9 7.3   RBC 2.32* 2.47* 2.41*   HGB 7.2* 7.6* 7.2*   HCT 22.7* 24.1* 24.0*   MCV 97.8 97.6 99.6   MCH 31.0 30.8 29.9   MCHC 31.7* 31.5* 30.0*   RDW 15.6* 15.4* 15.4*    196 198   MPV 10.4 10.3 10.6       No results for input(s): \"POCGLU\" in the last 72 hours.      Radiology: CT HEAD WO CONTRAST    Result Date: 4/20/2024  EXAMINATION: CT OF THE HEAD WITHOUT CONTRAST  4/20/2024 5:42 pm TECHNIQUE: CT of the head was performed without the administration of intravenous contrast. Automated exposure control, iterative reconstruction, and/or weight based adjustment of the mA/kV was utilized to reduce the radiation dose to as low as reasonably achievable. COMPARISON: CT head January 25, 2024. HISTORY: ORDERING SYSTEM PROVIDED HISTORY: ams TECHNOLOGIST PROVIDED HISTORY: Reason for exam:->ams Has

## 2024-04-22 VITALS
DIASTOLIC BLOOD PRESSURE: 68 MMHG | HEIGHT: 71 IN | BODY MASS INDEX: 26.57 KG/M2 | WEIGHT: 189.8 LBS | HEART RATE: 80 BPM | RESPIRATION RATE: 24 BRPM | SYSTOLIC BLOOD PRESSURE: 130 MMHG | TEMPERATURE: 97.6 F | OXYGEN SATURATION: 92 %

## 2024-04-22 LAB
ANION GAP SERPL CALCULATED.3IONS-SCNC: 4 MMOL/L (ref 7–16)
BUN SERPL-MCNC: 46 MG/DL (ref 6–23)
CALCIUM SERPL-MCNC: 12 MG/DL (ref 8.6–10.2)
CHLORIDE SERPL-SCNC: 109 MMOL/L (ref 98–107)
CO2 SERPL-SCNC: 28 MMOL/L (ref 22–29)
CREAT SERPL-MCNC: 1.6 MG/DL (ref 0.7–1.2)
EKG ATRIAL RATE: 88 BPM
EKG ATRIAL RATE: 89 BPM
EKG P AXIS: 56 DEGREES
EKG P-R INTERVAL: 220 MS
EKG Q-T INTERVAL: 366 MS
EKG Q-T INTERVAL: 392 MS
EKG QRS DURATION: 116 MS
EKG QRS DURATION: 116 MS
EKG QTC CALCULATION (BAZETT): 427 MS
EKG QTC CALCULATION (BAZETT): 446 MS
EKG R AXIS: -5 DEGREES
EKG R AXIS: 44 DEGREES
EKG T AXIS: -14 DEGREES
EKG T AXIS: 65 DEGREES
EKG VENTRICULAR RATE: 78 BPM
EKG VENTRICULAR RATE: 82 BPM
GFR SERPL CREATININE-BSD FRML MDRD: 43 ML/MIN/1.73M2
GLUCOSE SERPL-MCNC: 121 MG/DL (ref 74–99)
MAGNESIUM SERPL-MCNC: 2.1 MG/DL (ref 1.6–2.6)
MICROORGANISM SPEC CULT: ABNORMAL
POTASSIUM SERPL-SCNC: 3.4 MMOL/L (ref 3.5–5)
SODIUM SERPL-SCNC: 141 MMOL/L (ref 132–146)
SPECIMEN DESCRIPTION: ABNORMAL

## 2024-04-22 PROCEDURE — 6360000002 HC RX W HCPCS

## 2024-04-22 PROCEDURE — 6360000002 HC RX W HCPCS: Performed by: INTERNAL MEDICINE

## 2024-04-22 PROCEDURE — 80048 BASIC METABOLIC PNL TOTAL CA: CPT

## 2024-04-22 PROCEDURE — 99239 HOSP IP/OBS DSCHRG MGMT >30: CPT | Performed by: INTERNAL MEDICINE

## 2024-04-22 PROCEDURE — 93010 ELECTROCARDIOGRAM REPORT: CPT | Performed by: INTERNAL MEDICINE

## 2024-04-22 PROCEDURE — 99232 SBSQ HOSP IP/OBS MODERATE 35: CPT | Performed by: INTERNAL MEDICINE

## 2024-04-22 PROCEDURE — 83735 ASSAY OF MAGNESIUM: CPT

## 2024-04-22 PROCEDURE — 36415 COLL VENOUS BLD VENIPUNCTURE: CPT

## 2024-04-22 RX ORDER — CEPHALEXIN 500 MG/1
500 CAPSULE ORAL 2 TIMES DAILY
Qty: 10 CAPSULE | Refills: 0 | Status: SHIPPED | OUTPATIENT
Start: 2024-04-22 | End: 2024-04-24

## 2024-04-22 RX ORDER — FERROUS SULFATE 325(65) MG
325 TABLET ORAL 2 TIMES DAILY WITH MEALS
Qty: 30 TABLET | Refills: 3 | Status: SHIPPED | OUTPATIENT
Start: 2024-04-22 | End: 2024-04-24

## 2024-04-22 RX ORDER — MORPHINE SULFATE 2 MG/ML
1 INJECTION, SOLUTION INTRAMUSCULAR; INTRAVENOUS ONCE
Status: COMPLETED | OUTPATIENT
Start: 2024-04-22 | End: 2024-04-22

## 2024-04-22 RX ORDER — FUROSEMIDE 40 MG/1
40 TABLET ORAL DAILY
Status: DISCONTINUED | OUTPATIENT
Start: 2024-04-22 | End: 2024-04-22 | Stop reason: HOSPADM

## 2024-04-22 RX ORDER — POTASSIUM CHLORIDE 20 MEQ/1
40 TABLET, EXTENDED RELEASE ORAL ONCE
Status: DISCONTINUED | OUTPATIENT
Start: 2024-04-22 | End: 2024-04-22 | Stop reason: HOSPADM

## 2024-04-22 RX ORDER — FERROUS SULFATE 325(65) MG
325 TABLET ORAL 2 TIMES DAILY WITH MEALS
Status: DISCONTINUED | OUTPATIENT
Start: 2024-04-22 | End: 2024-04-22 | Stop reason: HOSPADM

## 2024-04-22 RX ORDER — FUROSEMIDE 40 MG/1
40 TABLET ORAL DAILY
Qty: 60 TABLET | Refills: 3 | Status: SHIPPED | OUTPATIENT
Start: 2024-04-23 | End: 2024-04-24

## 2024-04-22 RX ADMIN — MORPHINE SULFATE 1 MG: 2 INJECTION, SOLUTION INTRAMUSCULAR; INTRAVENOUS at 11:38

## 2024-04-22 RX ADMIN — MORPHINE SULFATE 2 MG: 2 INJECTION, SOLUTION INTRAMUSCULAR; INTRAVENOUS at 09:16

## 2024-04-22 RX ADMIN — MORPHINE SULFATE 2 MG: 2 INJECTION, SOLUTION INTRAMUSCULAR; INTRAVENOUS at 02:44

## 2024-04-22 RX ADMIN — MORPHINE SULFATE 2 MG: 2 INJECTION, SOLUTION INTRAMUSCULAR; INTRAVENOUS at 05:53

## 2024-04-22 ASSESSMENT — PAIN SCALES - GENERAL: PAINLEVEL_OUTOF10: 9

## 2024-04-22 NOTE — CARE COORDINATION
CASE MANAGEMENT.... Per conversation with [facility], patient is hospice house appropriate and transport arrangements were made for 11:30am. Patient was from [facility] with the facility updated.

## 2024-04-22 NOTE — PLAN OF CARE
Patient's chart updated to reflect:      .    - HF care plan, HF education points and HF discharge instructions.  -Orders: 2 gram sodium diet, daily weights, I/O.  -PCP and cardiology follow up appointments to be scheduled within 7 days of hospital discharge.  -CHF education session will be provided to the patient prior to hospital discharge.    Clarice Pickett RN   Heart Failure Navigator

## 2024-04-22 NOTE — CONSULTS
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 (1940) male with a history of HFrEF, most recent EF:  Lab Results   Component Value Date    LVEF 55 10/17/2022       EF 30-35% 04/15/2024    Patient is being discharged with Hospice at this time.     Chart Reviewed:  Diet: ADULT DIET; Regular; No Added Salt (3-4 gm)   Daily Weights: Patient Vitals for the past 96 hrs (Last 3 readings):   Weight   04/22/24 0034 86.1 kg (189 lb 12.8 oz)   04/20/24 1624 85.3 kg (188 lb)     I/O:   Intake/Output Summary (Last 24 hours) at 4/22/2024 1219  Last data filed at 4/22/2024 0436  Gross per 24 hour   Intake 360 ml   Output 2000 ml   Net -1640 ml     Clarice Pickett, RN   Heart Failure Navigator     
     Nutrition Note    Consult for CHF diet teaching. Pt is Comfort care measures and to be transferred to Hospice House. Dietitian available per Consult.      Electronically signed by Brunilda Conn RD, CNSC, LD on 4/22/24 at 11:04 AM EDT    Contact: x [phone]    
  Palliative Care Department  [phone]  Palliative Care Initial Consult  Provider JONATHAN Gaona - [facility]     Uzair Fuentes  [id number]  Hospital Day: 2  Date of Initial Consult: 4/21/24  Referring Provider: Hodan Duran DO   Palliative Medicine was consulted for assistance with: Goals of care, end-stage disease, overwhelming symptoms    HPI:   Uzair Fuentes is a 84 y.o. with a medical history of DVT on Eliquis, HTN, HLD, CKD, GI bleed, renal cancer with metastatic disease to retroperitoneum who was admitted on 4/20/2024 from nursing facility with a CHIEF COMPLAINT of altered mental status.  Patient recently discharged 3 days ago after being admitted for 11 days, treated for GI bleed and acute on chronic HFrEF.  ED workup significant for: BUNs/creatinine 48/1.5, calcium 11.5, proBNP 30,552, troponin 106, hemoglobin 7.2. CXR showed pulmonary edema.  Patient admitted for further management for diuresis as he is not responding well outpatient.  Palliative medicine consulted for further assistance.    ASSESSMENT/PLAN:     Pertinent Hospital Diagnoses     Acute on chronic HFrEF  Acute hypoxic respiratory failure  CKD  Chronic anemia  Renal cell cancer    Palliative Care Encounter / Counseling Regarding Goals of Care  Please see detailed goals of care discussion as below  At this time, Uzair Fuentes, Does Not have capacity for medical decision-making.  Capacity is time limited and situation/question specific  During encounter daughter was surrogate medical decision-maker  Outcome of goals of care meeting:   Await return call  Symptoms as below  Follow-up on hospice consult placed per primary  Code status DNR-CCA  Advanced Directives: no POA or living will in [facility]  Surrogate/Legal NOK:  Yu Phillips (daughter): [phone]  Sandeepalexia Lucia (daughter): [phone]  Joansoledad Fuentes (unknown) [phone]    Pain r/t neoplasm:   -Start tramadol 50 mg every 6 hours as needed   -Start morphine 2 
Continued  radiographic follow-up recommended.    04/15/2024 TTE (Dr. Mancini):  Left Ventricle: The EF by visual approximation is 30 - 35%. Left ventricle is moderately dilated.  Right Ventricle: Right ventricle size is normal. Normal systolic function.  Mitral Valve: Moderate to severe regurgitation.  Tricuspid Valve: Mild regurgitation.  Image quality is technically difficult.    04/20/2024 CXR:  Findings suggestive of pulmonary edema may in the setting of CHF, infection  is however not fully excluded.    04/20/2024 CT Head WO contrast:  No acute intracranial abnormality.      Latest Reference Range & Units 04/14/24 08:22 04/20/24 16:50 04/20/24 19:27   Pro-BNP 0 - 450 pg/mL  30,552 (H)    Troponin, High Sensitivity 0 - 11 ng/L 109 (H) 106 (H) 106 (H)   (H): Data is abnormally high    IMPRESSION and PLAN to follow per Dr. Kay     Electronically signed by JONATHAN Allen - CNP on 4/21/2024 at 9:34 AM

## 2024-04-22 NOTE — ACP (ADVANCE CARE PLANNING)
Advance Care Planning   Healthcare Decision Maker:    Primary Decision Maker: mariah martinez - [facility] - [phone]    Secondary Decision Maker: Joan Disla - [phone]

## 2024-04-22 NOTE — PROGRESS NOTES
[facility] Hospitalist Progress Note    Admitting Date and Time: 4/20/2024  4:01 PM  Admit Dx: Hypokalemia [E87.6]  Acute pulmonary edema (HCC) [J81.0]  Acute decompensated heart failure (HCC) [I50.9]  Urinary tract infection with hematuria, site unspecified [N39.0, R31.9]  Hypervolemia, unspecified hypervolemia type [E87.70]    Subjective:  Patient is being followed for Hypokalemia [E87.6]  Acute pulmonary edema (HCC) [J81.0]  Acute decompensated heart failure (HCC) [I50.9]  Urinary tract infection with hematuria, site unspecified [N39.0, R31.9]  Hypervolemia, unspecified hypervolemia type [E87.70]     ROS: denies fever, chills, cp, sob, n/v, HA unless stated above.      sennosides-docusate sodium  2 tablet Oral BID    polyethylene glycol  17 g Oral Daily    sodium chloride flush  5-40 mL IntraVENous 2 times per day    bumetanide  2 mg IntraVENous Daily    apixaban  2.5 mg Oral BID    atorvastatin  40 mg Oral Nightly    cyanocobalamin  1,000 mcg Oral Daily    finasteride  5 mg Oral Daily    hydrALAZINE  10 mg Oral 2 times per day    isosorbide dinitrate  5 mg Oral BID    latanoprost  1 drop Both Eyes Nightly    cetirizine  10 mg Oral Daily    metoprolol succinate  25 mg Oral BID    pantoprazole  40 mg Oral BID AC    sucralfate  1 g Oral 4x Daily    tamsulosin  0.4 mg Oral Nightly    zinc sulfate  50 mg Oral Daily    dorzolamide  1 drop Both Eyes BID    And    brimonidine  1 drop Both Eyes BID     traMADol, 50 mg, Q6H PRN  morphine, 2 mg, Q3H PRN  bisacodyl, 10 mg, Daily PRN  sodium chloride flush, 5-40 mL, PRN  sodium chloride, , PRN  acetaminophen, 650 mg, Q6H PRN   Or  acetaminophen, 650 mg, Q6H PRN  midodrine, 5 mg, TID PRN         Objective:    BP (!) 127/55   Pulse 94   Temp 97.8 °F (36.6 °C) (Axillary)   Resp 24   Ht 1.803 m (5' 11\")   Wt 86.1 kg (189 lb 12.8 oz)   SpO2 94%   BMI 26.47 kg/m²     General Appearance: alert and did not answer questions  Skin: warm and dry  Head: normocephalic and 
    INPATIENT CARDIOLOGY FOLLOW-UP    Name: Uzair Fuentes    Age: 84 y.o.    Date of Admission: 4/20/2024  4:01 PM    Date of Service: 4/22/2024    Primary Cardiologist: Dr Patterson    Chief Complaint: Follow-up for CHF    Interim History:  Patient unresponsive with agonal breathing.  Daughter at the bedside.  Apparently plans are being made to transition to hospice care.    Review of Systems:   Unable to obtain    Problem List:  Patient Active Problem List   Diagnosis    Normocytic anemia    PVC's (premature ventricular contractions)    Coronary artery disease involving native coronary artery of native heart without angina pectoris    Primary hypertension    Hyperlipidemia    Stage 3b chronic kidney disease (HCC)    Renal cell cancer (HCC)    Metastatic renal cell carcinoma (HCC)    Moderate protein-calorie malnutrition (HCC)    First degree AV block    Abdominal aortic aneurysm (AAA) without rupture (HCC)    Monoclonal gammopathy    Chronic deep vein thrombosis (DVT) of proximal vein of lower extremity (HCC)    Iron deficiency    Low zinc level    Acute blood loss anemia    Cardiomyopathy (HCC)    AV block, Mobitz 1    On apixaban therapy    Deep vein thrombosis (DVT) of left lower extremity (HCC)    Other long term (current) drug therapy    Acute on chronic blood loss anemia    Hypotension    Acute on chronic systolic (congestive) heart failure (HCC)    Acute hypoxic respiratory failure (HCC)    Acute decompensated heart failure (HCC)    Acute pulmonary edema (HCC)       Current Medications:    Current Facility-Administered Medications:     potassium chloride (KLOR-CON M) extended release tablet 40 mEq, 40 mEq, Oral, Once, Corine Nicole DO    ferrous sulfate (IRON 325) tablet 325 mg, 325 mg, Oral, BID WC, Corine Nicole DO    furosemide (LASIX) tablet 40 mg, 40 mg, Oral, Daily, Corine Nicole DO    sennosides-docusate sodium (SENOKOT-S) 8.6-50 MG tablet 2 tablet, 2 tablet, Oral, BID, Adri Angel, 
Dr. Mccrary notified of new consult   
HOSPICE [facility]    Consult received. Chart reviewed. Spoke with daughter Erica on phone to give consult.     The hospice benefit and philosophy were explained including that hospice is end of life care in which, per Medicare, a patient has a terminal diagnosis that life expectancy would be 6 months or less. Explained that once in hospice care, all aggressive treatments would be stopped and allow nature to takes its course with focus on comfort care for the patient.  Explained hospice services at home, at [facility] with room/board private pay unless patient has Medicaid and the Hospice House for short-term symptom management and respite.    Erica does not want her father to return to [facility]. Explained that Hospice House is short term for symptom management. Discussed starting medications for symptoms of pain and agitation at the hospital. She wants to speak to her mother before starting comfort medication. This nurse will visit patient in AM. Will evaluate for Hospice House versus following patient at facility if they choose to go with hospice services. Updated charge nurse.     Thank you for the consult.     Electronically signed by Randa Goodman RN on 4/21/2024 at 4:00 PM  [phone]; [phone]  
HOSPICE [facility]    Visited patient at bedside. He is restless and appears to be short of breath. Nurse had just medicated. Spoke with daughter and AZIZA Maldonado on phone. She would like her father to go to Hospice House for symptom management.     Spoke with Kylee Diehl, MSN-APRN-CNP, AGACNP-BC who accepted patient for inpatient level of care hospice.     Received bed 103 with a requested transport of ASAP. [facility] able to transport at 1130. Ambulance forms placed in soft chart.      Updated CM and charge nurse. Requested discharge order be obtained. Please leave IV and laguna in place.      Gave nurse to nurse report to hospice house.    Signed consents with daughter, AZIZA Myers.    Electronically signed by Randa Goodman RN on 4/22/2024 at 10:03 AM  [phone]; [phone]  
Hospice consult placed to IMELDA  
Palliative consult placed  
SPIRITUAL HEALTH SERVICES - DARYL Gupta Encounter    Name: Uzair Fuentes                  Referral:  Nurse called to request visit for patient.    Sacraments  Anointed (Last Rites): Yes  Apostolic [place]: Yes  Confession: No  Communion: No     Assessment:  Patient's family were receptive to 's visit.      Intervention:   provided spiritual support and sacramental ministry for patient.     Outcome:  Patient's family expressed gratitude for visit.    Plan:  Chaplains will remain available to offer spiritual and emotional support as needed.      Electronically signed by Chaplain Jyoti, on 4/22/2024 at 4:33 PM.  Spiritual Care Department  [facility]  [phone]   
midodrine as well as hydralazine and toprol  Severe MR  DVT - On Eliquis  HLD - Continue lipitor  H/O GIB - Continue carafate  H/O Renal cancer  DVT prophylaxis - Eliquis    PT/OT    NOTE: This report was transcribed using voice recognition software. Every effort was made to ensure accuracy; however, inadvertent computerized transcription errors may be present.    Electronically signed by Corine Nicole DO on 4/21/2024 at 10:20 AM

## 2024-04-22 NOTE — DISCHARGE SUMMARY
[facility] Hospitalist Physician Discharge Summary       No follow-up provider specified.    Activity level: As tolerated     Dispo: Hospice      Condition on discharge: Stable     Patient ID:  Uzair Fuentes  [id number]  84 y.o.  1940    Admit date: 4/20/2024    Discharge date and time:  4/22/2024  11:50 AM    Admission Diagnoses: Principal Problem:    Acute decompensated heart failure (HCC)  Active Problems:    PVC's (premature ventricular contractions)    Primary hypertension    Stage 3b chronic kidney disease (HCC)    Renal cell cancer (HCC)    On apixaban therapy    Hypotension    Acute hypoxic respiratory failure (HCC)    Acute pulmonary edema (HCC)  Resolved Problems:    * No resolved hospital problems. *      Discharge Diagnoses: Principal Problem:    Acute decompensated heart failure (HCC)  Active Problems:    PVC's (premature ventricular contractions)    Primary hypertension    Stage 3b chronic kidney disease (HCC)    Renal cell cancer (HCC)    On apixaban therapy    Hypotension    Acute hypoxic respiratory failure (HCC)    Acute pulmonary edema (HCC)  Resolved Problems:    * No resolved hospital problems. *      Consults:  IP CONSULT TO DIETITIAN  IP CONSULT TO CARDIOLOGY  IP CONSULT TO PALLIATIVE CARE  IP CONSULT TO HEART FAILURE NURSE/COORDINATOR  IP CONSULT TO HEART FAILURE NURSE/COORDINATOR  IP CONSULT TO HOSPICE  IP CONSULT TO IV TEAM    Procedures: None    Hospital Course:   Patient Uzair Fuentes is a 84 y.o. presented with Hypokalemia [E87.6]  Acute pulmonary edema (HCC) [J81.0]  Acute decompensated heart failure (HCC) [I50.9]  Urinary tract infection with hematuria, site unspecified [N39.0, R31.9]  Hypervolemia, unspecified hypervolemia type [E87.70]    This is a 84 year old male presents with acute hypoxic respiratory failure. He was also treated for UTI. Patient was found to have change in mentation likely due to UTI as rest of the workup was negative. Family has decided

## 2024-04-22 NOTE — DISCHARGE INSTR - COC
Continuity of Care Form    Patient Name: Uzair Fuentes   :  1940  MRN:  [id number]    Admit date:  2024  Discharge date:  24    Code Status Order: DNR-CC   Advance Directives:     Admitting Physician:  Hodan Duran DO  PCP: Guilherme Salgado DO    Discharging Nurse: Mary Beth HATHAWAY  Discharging Hospital Unit/Room#: 0444/0444-A  Discharging Unit Phone Number: [phone]    Emergency Contact:   Extended Emergency Contact Information  Primary Emergency Contact: mariah martinez  Address: [address], [place] [address] [place] [address]  Home Phone: [phone]  Mobile Phone: [phone]  Relation: Child   needed? No  Secondary Emergency Contact: Joan Disla  Home Phone: [phone]  Relation: None  Preferred language: English   needed? No    Past Surgical History:  Past Surgical History:   Procedure Laterality Date    ABDOMINAL AORTIC ANEURYSM REPAIR      COLONOSCOPY      COLONOSCOPY N/A 2024    COLONOSCOPY DIAGNOSTIC performed by Dave Heredia DO at [facility] ENDOSCOPY    TONSILLECTOMY      TOTAL KNEE ARTHROPLASTY Bilateral     UPPER GASTROINTESTINAL ENDOSCOPY N/A 10/14/2022    EGD DIAGNOSTIC ONLY performed by Joshua Avila MD at [facility]COPY    UPPER GASTROINTESTINAL ENDOSCOPY N/A 2/10/2024    EGD ESOPHAGOGASTRODUODENOSCOPY performed by Dave Heredia DO at [facility] OR    UPPER GASTROINTESTINAL ENDOSCOPY N/A 3/28/2024    ESOPHAGOGASTRODUODENOSCOPY BIOPSY performed by Dave Heredia DO at [facility] ENDOSCOPY       Immunization History:     There is no immunization history on file for this patient.    Active Problems:  Patient Active Problem List   Diagnosis Code    Normocytic anemia D64.9    PVC's (premature ventricular contractions) I49.3    Coronary artery disease involving native coronary artery of native heart without angina pectoris I25.10    Primary hypertension I10    Hyperlipidemia E78.5    Stage 3b chronic kidney disease

## 2024-04-23 LAB
MICROORGANISM SPEC CULT: ABNORMAL
SPECIMEN DESCRIPTION: ABNORMAL

## (undated) DEVICE — DEFENDO AIR WATER SUCTION AND BIOPSY VALVE KIT FOR  OLYMPUS: Brand: DEFENDO AIR/WATER/SUCTION AND BIOPSY VALVE

## (undated) DEVICE — BLOCK BITE 60FR RUBBER ADLT DENTAL

## (undated) DEVICE — SPONGE GZ W4XL4IN RAYON POLY CVR W/NONWOVEN FAB STRL 2/PK

## (undated) DEVICE — GRADUATE TRIANG MEASURE 1000ML BLK PRNT

## (undated) DEVICE — Z DISCONTINUED NO SUB IDED TUBING ETCO2 AD L6.5FT NSL ORAL CVD PRNG NONFLARED TIP OVR

## (undated) DEVICE — CONNECTOR IRRIGATION AUXILIARY H2O JET W/ PRT MTL THRD HYDR

## (undated) DEVICE — BITEBLOCK 54FR W/ DENT RIM BLOX

## (undated) DEVICE — SPONGE GZ W4XL4IN RAYON POLY FILL CVR W/ NONWOVEN FAB